# Patient Record
Sex: FEMALE | URBAN - METROPOLITAN AREA
[De-identification: names, ages, dates, MRNs, and addresses within clinical notes are randomized per-mention and may not be internally consistent; named-entity substitution may affect disease eponyms.]

---

## 2018-01-01 ENCOUNTER — INPATIENT (INPATIENT)
Facility: HOSPITAL | Age: 73
LOS: 29 days | End: 2019-01-10
Attending: INTERNAL MEDICINE | Admitting: INTERNAL MEDICINE
Payer: MEDICARE

## 2018-01-01 VITALS
RESPIRATION RATE: 16 BRPM | TEMPERATURE: 97 F | DIASTOLIC BLOOD PRESSURE: 60 MMHG | HEART RATE: 103 BPM | OXYGEN SATURATION: 69 % | SYSTOLIC BLOOD PRESSURE: 129 MMHG

## 2018-01-01 DIAGNOSIS — Z66 DO NOT RESUSCITATE: ICD-10-CM

## 2018-01-01 DIAGNOSIS — E78.5 HYPERLIPIDEMIA, UNSPECIFIED: ICD-10-CM

## 2018-01-01 DIAGNOSIS — B96.4 PROTEUS (MIRABILIS) (MORGANII) AS THE CAUSE OF DISEASES CLASSIFIED ELSEWHERE: ICD-10-CM

## 2018-01-01 DIAGNOSIS — J96.01 ACUTE RESPIRATORY FAILURE WITH HYPOXIA: ICD-10-CM

## 2018-01-01 DIAGNOSIS — D63.8 ANEMIA IN OTHER CHRONIC DISEASES CLASSIFIED ELSEWHERE: ICD-10-CM

## 2018-01-01 DIAGNOSIS — N17.9 ACUTE KIDNEY FAILURE, UNSPECIFIED: ICD-10-CM

## 2018-01-01 DIAGNOSIS — I10 ESSENTIAL (PRIMARY) HYPERTENSION: ICD-10-CM

## 2018-01-01 DIAGNOSIS — B02.9 ZOSTER WITHOUT COMPLICATIONS: ICD-10-CM

## 2018-01-01 DIAGNOSIS — R06.02 SHORTNESS OF BREATH: ICD-10-CM

## 2018-01-01 DIAGNOSIS — I12.9 HYPERTENSIVE CHRONIC KIDNEY DISEASE WITH STAGE 1 THROUGH STAGE 4 CHRONIC KIDNEY DISEASE, OR UNSPECIFIED CHRONIC KIDNEY DISEASE: ICD-10-CM

## 2018-01-01 DIAGNOSIS — J96.22 ACUTE AND CHRONIC RESPIRATORY FAILURE WITH HYPERCAPNIA: ICD-10-CM

## 2018-01-01 DIAGNOSIS — E66.2 MORBID (SEVERE) OBESITY WITH ALVEOLAR HYPOVENTILATION: ICD-10-CM

## 2018-01-01 DIAGNOSIS — I48.91 UNSPECIFIED ATRIAL FIBRILLATION: ICD-10-CM

## 2018-01-01 DIAGNOSIS — E83.51 HYPOCALCEMIA: ICD-10-CM

## 2018-01-01 DIAGNOSIS — E87.6 HYPOKALEMIA: ICD-10-CM

## 2018-01-01 DIAGNOSIS — E87.0 HYPEROSMOLALITY AND HYPERNATREMIA: ICD-10-CM

## 2018-01-01 DIAGNOSIS — J84.09 OTHER ALVEOLAR AND PARIETO-ALVEOLAR CONDITIONS: ICD-10-CM

## 2018-01-01 DIAGNOSIS — I49.5 SICK SINUS SYNDROME: ICD-10-CM

## 2018-01-01 DIAGNOSIS — N18.3 CHRONIC KIDNEY DISEASE, STAGE 3 (MODERATE): ICD-10-CM

## 2018-01-01 DIAGNOSIS — J69.0 PNEUMONITIS DUE TO INHALATION OF FOOD AND VOMIT: ICD-10-CM

## 2018-01-01 DIAGNOSIS — N39.0 URINARY TRACT INFECTION, SITE NOT SPECIFIED: ICD-10-CM
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
## 2018-01-01 DIAGNOSIS — I48.0 PAROXYSMAL ATRIAL FIBRILLATION: ICD-10-CM

## 2018-01-01 DIAGNOSIS — K59.00 CONSTIPATION, UNSPECIFIED: ICD-10-CM

## 2018-01-01 DIAGNOSIS — Z51.5 ENCOUNTER FOR PALLIATIVE CARE: ICD-10-CM

## 2018-01-01 DIAGNOSIS — T45.515A ADVERSE EFFECT OF ANTICOAGULANTS, INITIAL ENCOUNTER: ICD-10-CM

## 2018-01-01 DIAGNOSIS — E83.39 OTHER DISORDERS OF PHOSPHORUS METABOLISM: ICD-10-CM

## 2018-01-01 LAB
-  COAGULASE NEGATIVE STAPHYLOCOCCUS: SIGNIFICANT CHANGE UP
ALBUMIN SERPL ELPH-MCNC: 2.8 G/DL — LOW (ref 3.5–5.2)
ALBUMIN SERPL ELPH-MCNC: 2.9 G/DL — LOW (ref 3.5–5.2)
ALBUMIN SERPL ELPH-MCNC: 3 G/DL — LOW (ref 3.5–5.2)
ALBUMIN SERPL ELPH-MCNC: 3.1 G/DL — LOW (ref 3.5–5.2)
ALBUMIN SERPL ELPH-MCNC: 3.2 G/DL — LOW (ref 3.5–5.2)
ALBUMIN SERPL ELPH-MCNC: 3.3 G/DL — LOW (ref 3.5–5.2)
ALBUMIN SERPL ELPH-MCNC: 3.4 G/DL — LOW (ref 3.5–5.2)
ALBUMIN SERPL ELPH-MCNC: 3.6 G/DL — SIGNIFICANT CHANGE UP (ref 3.5–5.2)
ALBUMIN SERPL ELPH-MCNC: 4.1 G/DL — SIGNIFICANT CHANGE UP (ref 3.5–5.2)
ALDOLASE SERPL-CCNC: 12 U/L — HIGH (ref 3.3–10.3)
ALP SERPL-CCNC: 107 U/L — SIGNIFICANT CHANGE UP (ref 30–115)
ALP SERPL-CCNC: 47 U/L — SIGNIFICANT CHANGE UP (ref 30–115)
ALP SERPL-CCNC: 49 U/L — SIGNIFICANT CHANGE UP (ref 30–115)
ALP SERPL-CCNC: 50 U/L — SIGNIFICANT CHANGE UP (ref 30–115)
ALP SERPL-CCNC: 52 U/L — SIGNIFICANT CHANGE UP (ref 30–115)
ALP SERPL-CCNC: 54 U/L — SIGNIFICANT CHANGE UP (ref 30–115)
ALP SERPL-CCNC: 56 U/L — SIGNIFICANT CHANGE UP (ref 30–115)
ALP SERPL-CCNC: 56 U/L — SIGNIFICANT CHANGE UP (ref 30–115)
ALP SERPL-CCNC: 57 U/L — SIGNIFICANT CHANGE UP (ref 30–115)
ALP SERPL-CCNC: 62 U/L — SIGNIFICANT CHANGE UP (ref 30–115)
ALP SERPL-CCNC: 62 U/L — SIGNIFICANT CHANGE UP (ref 30–115)
ALP SERPL-CCNC: 63 U/L — SIGNIFICANT CHANGE UP (ref 30–115)
ALP SERPL-CCNC: 63 U/L — SIGNIFICANT CHANGE UP (ref 30–115)
ALP SERPL-CCNC: 64 U/L — SIGNIFICANT CHANGE UP (ref 30–115)
ALP SERPL-CCNC: 67 U/L — SIGNIFICANT CHANGE UP (ref 30–115)
ALP SERPL-CCNC: 89 U/L — SIGNIFICANT CHANGE UP (ref 30–115)
ALT FLD-CCNC: 28 U/L — SIGNIFICANT CHANGE UP (ref 0–41)
ALT FLD-CCNC: 32 U/L — SIGNIFICANT CHANGE UP (ref 0–41)
ALT FLD-CCNC: 33 U/L — SIGNIFICANT CHANGE UP (ref 0–41)
ALT FLD-CCNC: 35 U/L — SIGNIFICANT CHANGE UP (ref 0–41)
ALT FLD-CCNC: 37 U/L — SIGNIFICANT CHANGE UP (ref 0–41)
ALT FLD-CCNC: 37 U/L — SIGNIFICANT CHANGE UP (ref 0–41)
ALT FLD-CCNC: 39 U/L — SIGNIFICANT CHANGE UP (ref 0–41)
ALT FLD-CCNC: 39 U/L — SIGNIFICANT CHANGE UP (ref 0–41)
ALT FLD-CCNC: 40 U/L — SIGNIFICANT CHANGE UP (ref 0–41)
ALT FLD-CCNC: 40 U/L — SIGNIFICANT CHANGE UP (ref 0–41)
ALT FLD-CCNC: 41 U/L — SIGNIFICANT CHANGE UP (ref 0–41)
ALT FLD-CCNC: 41 U/L — SIGNIFICANT CHANGE UP (ref 0–41)
ALT FLD-CCNC: 44 U/L — HIGH (ref 0–41)
ALT FLD-CCNC: 51 U/L — HIGH (ref 0–41)
ANA TITR SER: NEGATIVE — SIGNIFICANT CHANGE UP
ANION GAP SERPL CALC-SCNC: 10 MMOL/L — SIGNIFICANT CHANGE UP (ref 7–14)
ANION GAP SERPL CALC-SCNC: 10 MMOL/L — SIGNIFICANT CHANGE UP (ref 7–14)
ANION GAP SERPL CALC-SCNC: 11 MMOL/L — SIGNIFICANT CHANGE UP (ref 7–14)
ANION GAP SERPL CALC-SCNC: 14 MMOL/L — SIGNIFICANT CHANGE UP (ref 7–14)
ANION GAP SERPL CALC-SCNC: 15 MMOL/L — HIGH (ref 7–14)
ANION GAP SERPL CALC-SCNC: 17 MMOL/L — HIGH (ref 7–14)
ANION GAP SERPL CALC-SCNC: 6 MMOL/L — LOW (ref 7–14)
ANION GAP SERPL CALC-SCNC: 6 MMOL/L — LOW (ref 7–14)
ANION GAP SERPL CALC-SCNC: 7 MMOL/L — SIGNIFICANT CHANGE UP (ref 7–14)
ANION GAP SERPL CALC-SCNC: 8 MMOL/L — SIGNIFICANT CHANGE UP (ref 7–14)
ANION GAP SERPL CALC-SCNC: 9 MMOL/L — SIGNIFICANT CHANGE UP (ref 7–14)
APPEARANCE UR: ABNORMAL
APPEARANCE UR: ABNORMAL
APPEARANCE UR: CLEAR — SIGNIFICANT CHANGE UP
APTT BLD: 23.1 SEC — CRITICAL LOW (ref 27–39.2)
APTT BLD: 28.3 SEC — SIGNIFICANT CHANGE UP (ref 27–39.2)
APTT BLD: 29 SEC — SIGNIFICANT CHANGE UP (ref 27–39.2)
APTT BLD: 30.5 SEC — SIGNIFICANT CHANGE UP (ref 27–39.2)
APTT BLD: 35 SEC — SIGNIFICANT CHANGE UP (ref 27–39.2)
APTT BLD: 52.5 SEC — HIGH (ref 27–39.2)
AST SERPL-CCNC: 12 U/L — SIGNIFICANT CHANGE UP (ref 0–41)
AST SERPL-CCNC: 13 U/L — SIGNIFICANT CHANGE UP (ref 0–41)
AST SERPL-CCNC: 15 U/L — SIGNIFICANT CHANGE UP (ref 0–41)
AST SERPL-CCNC: 17 U/L — SIGNIFICANT CHANGE UP (ref 0–41)
AST SERPL-CCNC: 17 U/L — SIGNIFICANT CHANGE UP (ref 0–41)
AST SERPL-CCNC: 24 U/L — SIGNIFICANT CHANGE UP (ref 0–41)
AST SERPL-CCNC: 24 U/L — SIGNIFICANT CHANGE UP (ref 0–41)
AST SERPL-CCNC: 26 U/L — SIGNIFICANT CHANGE UP (ref 0–41)
AST SERPL-CCNC: 28 U/L — SIGNIFICANT CHANGE UP (ref 0–41)
AST SERPL-CCNC: 29 U/L — SIGNIFICANT CHANGE UP (ref 0–41)
AST SERPL-CCNC: 30 U/L — SIGNIFICANT CHANGE UP (ref 0–41)
AST SERPL-CCNC: 32 U/L — SIGNIFICANT CHANGE UP (ref 0–41)
AST SERPL-CCNC: 34 U/L — SIGNIFICANT CHANGE UP (ref 0–41)
AST SERPL-CCNC: 34 U/L — SIGNIFICANT CHANGE UP (ref 0–41)
AST SERPL-CCNC: 45 U/L — HIGH (ref 0–41)
AST SERPL-CCNC: 46 U/L — HIGH (ref 0–41)
AUTO DIFF PNL BLD: NEGATIVE — SIGNIFICANT CHANGE UP
B PERT IGG+IGM PNL SER: ABNORMAL
BACTERIA # UR AUTO: ABNORMAL /HPF
BASE EXCESS BLDA CALC-SCNC: 11 MMOL/L — HIGH (ref -2–2)
BASE EXCESS BLDA CALC-SCNC: 12.3 MMOL/L — HIGH (ref -2–2)
BASE EXCESS BLDA CALC-SCNC: 13.1 MMOL/L — HIGH (ref -2–2)
BASE EXCESS BLDA CALC-SCNC: 13.3 MMOL/L — HIGH (ref -2–2)
BASE EXCESS BLDA CALC-SCNC: 13.4 MMOL/L — HIGH (ref -2–2)
BASE EXCESS BLDA CALC-SCNC: 14.3 MMOL/L — HIGH (ref -2–2)
BASE EXCESS BLDA CALC-SCNC: 14.3 MMOL/L — HIGH (ref -2–2)
BASE EXCESS BLDA CALC-SCNC: 18.1 MMOL/L — HIGH (ref -2–2)
BASE EXCESS BLDA CALC-SCNC: 8 MMOL/L — HIGH (ref -2–2)
BASE EXCESS BLDA CALC-SCNC: 9 MMOL/L — HIGH (ref -2–2)
BASE EXCESS BLDA CALC-SCNC: 9.4 MMOL/L — HIGH (ref -2–2)
BASE EXCESS BLDV CALC-SCNC: 2.3 MMOL/L — HIGH (ref -2–2)
BASOPHILS # BLD AUTO: 0 K/UL — SIGNIFICANT CHANGE UP (ref 0–0.2)
BASOPHILS # BLD AUTO: 0.01 K/UL — SIGNIFICANT CHANGE UP (ref 0–0.2)
BASOPHILS # BLD AUTO: 0.02 K/UL — SIGNIFICANT CHANGE UP (ref 0–0.2)
BASOPHILS # BLD AUTO: 0.03 K/UL — SIGNIFICANT CHANGE UP (ref 0–0.2)
BASOPHILS NFR BLD AUTO: 0 % — SIGNIFICANT CHANGE UP (ref 0–1)
BASOPHILS NFR BLD AUTO: 0.1 % — SIGNIFICANT CHANGE UP (ref 0–1)
BASOPHILS NFR BLD AUTO: 0.2 % — SIGNIFICANT CHANGE UP (ref 0–1)
BASOPHILS NFR BLD AUTO: 0.3 % — SIGNIFICANT CHANGE UP (ref 0–1)
BASOPHILS NFR BLD AUTO: 0.3 % — SIGNIFICANT CHANGE UP (ref 0–1)
BASOPHILS NFR BLD AUTO: 0.4 % — SIGNIFICANT CHANGE UP (ref 0–1)
BILIRUB DIRECT SERPL-MCNC: <0.2 MG/DL — SIGNIFICANT CHANGE UP (ref 0–0.2)
BILIRUB INDIRECT FLD-MCNC: >0.2 MG/DL — SIGNIFICANT CHANGE UP (ref 0.2–1.2)
BILIRUB SERPL-MCNC: 0.4 MG/DL — SIGNIFICANT CHANGE UP (ref 0.2–1.2)
BILIRUB SERPL-MCNC: 0.6 MG/DL — SIGNIFICANT CHANGE UP (ref 0.2–1.2)
BILIRUB SERPL-MCNC: 0.7 MG/DL — SIGNIFICANT CHANGE UP (ref 0.2–1.2)
BILIRUB SERPL-MCNC: 0.8 MG/DL — SIGNIFICANT CHANGE UP (ref 0.2–1.2)
BILIRUB SERPL-MCNC: 0.9 MG/DL — SIGNIFICANT CHANGE UP (ref 0.2–1.2)
BILIRUB SERPL-MCNC: 1 MG/DL — SIGNIFICANT CHANGE UP (ref 0.2–1.2)
BILIRUB UR-MCNC: NEGATIVE — SIGNIFICANT CHANGE UP
BLD GP AB SCN SERPL QL: SIGNIFICANT CHANGE UP
BUN SERPL-MCNC: 36 MG/DL — HIGH (ref 10–20)
BUN SERPL-MCNC: 37 MG/DL — HIGH (ref 10–20)
BUN SERPL-MCNC: 39 MG/DL — HIGH (ref 10–20)
BUN SERPL-MCNC: 39 MG/DL — HIGH (ref 10–20)
BUN SERPL-MCNC: 41 MG/DL — HIGH (ref 10–20)
BUN SERPL-MCNC: 42 MG/DL — HIGH (ref 10–20)
BUN SERPL-MCNC: 43 MG/DL — HIGH (ref 10–20)
BUN SERPL-MCNC: 43 MG/DL — HIGH (ref 10–20)
BUN SERPL-MCNC: 44 MG/DL — HIGH (ref 10–20)
BUN SERPL-MCNC: 47 MG/DL — HIGH (ref 10–20)
BUN SERPL-MCNC: 48 MG/DL — HIGH (ref 10–20)
BUN SERPL-MCNC: 51 MG/DL — HIGH (ref 10–20)
BUN SERPL-MCNC: 53 MG/DL — HIGH (ref 10–20)
BUN SERPL-MCNC: 53 MG/DL — HIGH (ref 10–20)
BUN SERPL-MCNC: 55 MG/DL — HIGH (ref 10–20)
BUN SERPL-MCNC: 55 MG/DL — HIGH (ref 10–20)
BUN SERPL-MCNC: 58 MG/DL — HIGH (ref 10–20)
BUN SERPL-MCNC: 59 MG/DL — HIGH (ref 10–20)
BUN SERPL-MCNC: 59 MG/DL — HIGH (ref 10–20)
BUN SERPL-MCNC: 60 MG/DL — HIGH (ref 10–20)
BUN SERPL-MCNC: 64 MG/DL — CRITICAL HIGH (ref 10–20)
BUN SERPL-MCNC: 66 MG/DL — CRITICAL HIGH (ref 10–20)
C-ANCA SER-ACNC: NEGATIVE — SIGNIFICANT CHANGE UP
C3 SERPL-MCNC: 114 MG/DL — SIGNIFICANT CHANGE UP (ref 81–157)
C4 SERPL-MCNC: 32 MG/DL — SIGNIFICANT CHANGE UP (ref 13–39)
CA-I SERPL-SCNC: 1.23 MMOL/L — SIGNIFICANT CHANGE UP (ref 1.12–1.3)
CALCIUM SERPL-MCNC: 8.2 MG/DL — LOW (ref 8.5–10.1)
CALCIUM SERPL-MCNC: 8.2 MG/DL — LOW (ref 8.5–10.1)
CALCIUM SERPL-MCNC: 8.3 MG/DL — LOW (ref 8.5–10.1)
CALCIUM SERPL-MCNC: 8.4 MG/DL — LOW (ref 8.5–10.1)
CALCIUM SERPL-MCNC: 8.4 MG/DL — LOW (ref 8.5–10.1)
CALCIUM SERPL-MCNC: 8.5 MG/DL — SIGNIFICANT CHANGE UP (ref 8.5–10.1)
CALCIUM SERPL-MCNC: 8.5 MG/DL — SIGNIFICANT CHANGE UP (ref 8.5–10.1)
CALCIUM SERPL-MCNC: 8.7 MG/DL — SIGNIFICANT CHANGE UP (ref 8.5–10.1)
CALCIUM SERPL-MCNC: 8.8 MG/DL — SIGNIFICANT CHANGE UP (ref 8.5–10.1)
CALCIUM SERPL-MCNC: 8.8 MG/DL — SIGNIFICANT CHANGE UP (ref 8.5–10.1)
CALCIUM SERPL-MCNC: 8.9 MG/DL — SIGNIFICANT CHANGE UP (ref 8.5–10.1)
CALCIUM SERPL-MCNC: 9 MG/DL — SIGNIFICANT CHANGE UP (ref 8.5–10.1)
CALCIUM SERPL-MCNC: 9.1 MG/DL — SIGNIFICANT CHANGE UP (ref 8.5–10.1)
CALCIUM SERPL-MCNC: 9.1 MG/DL — SIGNIFICANT CHANGE UP (ref 8.5–10.1)
CALCIUM SERPL-MCNC: 9.3 MG/DL — SIGNIFICANT CHANGE UP (ref 8.5–10.1)
CHLORIDE SERPL-SCNC: 100 MMOL/L — SIGNIFICANT CHANGE UP (ref 98–110)
CHLORIDE SERPL-SCNC: 101 MMOL/L — SIGNIFICANT CHANGE UP (ref 98–110)
CHLORIDE SERPL-SCNC: 101 MMOL/L — SIGNIFICANT CHANGE UP (ref 98–110)
CHLORIDE SERPL-SCNC: 102 MMOL/L — SIGNIFICANT CHANGE UP (ref 98–110)
CHLORIDE SERPL-SCNC: 104 MMOL/L — SIGNIFICANT CHANGE UP (ref 98–110)
CHLORIDE SERPL-SCNC: 90 MMOL/L — LOW (ref 98–110)
CHLORIDE SERPL-SCNC: 90 MMOL/L — LOW (ref 98–110)
CHLORIDE SERPL-SCNC: 92 MMOL/L — LOW (ref 98–110)
CHLORIDE SERPL-SCNC: 94 MMOL/L — LOW (ref 98–110)
CHLORIDE SERPL-SCNC: 94 MMOL/L — LOW (ref 98–110)
CHLORIDE SERPL-SCNC: 95 MMOL/L — LOW (ref 98–110)
CHLORIDE SERPL-SCNC: 95 MMOL/L — LOW (ref 98–110)
CHLORIDE SERPL-SCNC: 96 MMOL/L — LOW (ref 98–110)
CHLORIDE SERPL-SCNC: 96 MMOL/L — LOW (ref 98–110)
CHLORIDE SERPL-SCNC: 97 MMOL/L — LOW (ref 98–110)
CHLORIDE SERPL-SCNC: 98 MMOL/L — SIGNIFICANT CHANGE UP (ref 98–110)
CHLORIDE SERPL-SCNC: 99 MMOL/L — SIGNIFICANT CHANGE UP (ref 98–110)
CHLORIDE SERPL-SCNC: 99 MMOL/L — SIGNIFICANT CHANGE UP (ref 98–110)
CHOLEST SERPL-MCNC: 168 MG/DL — SIGNIFICANT CHANGE UP (ref 100–200)
CK MB CFR SERPL CALC: 12.1 NG/ML — HIGH (ref 0.6–6.3)
CK MB CFR SERPL CALC: 3.7 NG/ML — SIGNIFICANT CHANGE UP (ref 0.6–6.3)
CK MB CFR SERPL CALC: 8 NG/ML — HIGH (ref 0.6–6.3)
CK SERPL-CCNC: 102 U/L — SIGNIFICANT CHANGE UP (ref 0–225)
CK SERPL-CCNC: 11 U/L — SIGNIFICANT CHANGE UP (ref 0–225)
CK SERPL-CCNC: 120 U/L — SIGNIFICANT CHANGE UP (ref 0–225)
CK SERPL-CCNC: 180 U/L — SIGNIFICANT CHANGE UP (ref 0–225)
CO2 SERPL-SCNC: 28 MMOL/L — SIGNIFICANT CHANGE UP (ref 17–32)
CO2 SERPL-SCNC: 33 MMOL/L — HIGH (ref 17–32)
CO2 SERPL-SCNC: 33 MMOL/L — HIGH (ref 17–32)
CO2 SERPL-SCNC: 34 MMOL/L — HIGH (ref 17–32)
CO2 SERPL-SCNC: 35 MMOL/L — HIGH (ref 17–32)
CO2 SERPL-SCNC: 35 MMOL/L — HIGH (ref 17–32)
CO2 SERPL-SCNC: 36 MMOL/L — HIGH (ref 17–32)
CO2 SERPL-SCNC: 36 MMOL/L — HIGH (ref 17–32)
CO2 SERPL-SCNC: 37 MMOL/L — HIGH (ref 17–32)
CO2 SERPL-SCNC: 38 MMOL/L — HIGH (ref 17–32)
CO2 SERPL-SCNC: 38 MMOL/L — HIGH (ref 17–32)
CO2 SERPL-SCNC: 39 MMOL/L — HIGH (ref 17–32)
CO2 SERPL-SCNC: 40 MMOL/L — HIGH (ref 17–32)
CO2 SERPL-SCNC: 40 MMOL/L — HIGH (ref 17–32)
CO2 SERPL-SCNC: 41 MMOL/L — CRITICAL HIGH (ref 17–32)
COLOR FLD: SIGNIFICANT CHANGE UP
COLOR SPEC: YELLOW — SIGNIFICANT CHANGE UP
COMMENT - URINE: SIGNIFICANT CHANGE UP
CREAT ?TM UR-MCNC: 94 MG/DL — SIGNIFICANT CHANGE UP
CREAT SERPL-MCNC: 0.6 MG/DL — LOW (ref 0.7–1.5)
CREAT SERPL-MCNC: 0.7 MG/DL — SIGNIFICANT CHANGE UP (ref 0.7–1.5)
CREAT SERPL-MCNC: 0.7 MG/DL — SIGNIFICANT CHANGE UP (ref 0.7–1.5)
CREAT SERPL-MCNC: 0.8 MG/DL — SIGNIFICANT CHANGE UP (ref 0.7–1.5)
CREAT SERPL-MCNC: 0.8 MG/DL — SIGNIFICANT CHANGE UP (ref 0.7–1.5)
CREAT SERPL-MCNC: 0.9 MG/DL — SIGNIFICANT CHANGE UP (ref 0.7–1.5)
CREAT SERPL-MCNC: 1 MG/DL — SIGNIFICANT CHANGE UP (ref 0.7–1.5)
CREAT SERPL-MCNC: 1.1 MG/DL — SIGNIFICANT CHANGE UP (ref 0.7–1.5)
CREAT SERPL-MCNC: 1.3 MG/DL — SIGNIFICANT CHANGE UP (ref 0.7–1.5)
CREAT SERPL-MCNC: 1.5 MG/DL — SIGNIFICANT CHANGE UP (ref 0.7–1.5)
CREAT SERPL-MCNC: 1.7 MG/DL — HIGH (ref 0.7–1.5)
CRP SERPL-MCNC: 1.72 MG/DL — HIGH (ref 0–0.4)
CRP SERPL-MCNC: 2.35 MG/DL — HIGH (ref 0–0.4)
CRYOGLOB SERPL-MCNC: NEGATIVE — SIGNIFICANT CHANGE UP
CULTURE RESULTS: NO GROWTH — SIGNIFICANT CHANGE UP
CULTURE RESULTS: NO GROWTH — SIGNIFICANT CHANGE UP
CULTURE RESULTS: SIGNIFICANT CHANGE UP
D DIMER BLD IA.RAPID-MCNC: 308 NG/ML DDU — HIGH (ref 0–230)
DIFF PNL FLD: ABNORMAL
DIFF PNL FLD: ABNORMAL
DIFF PNL FLD: NEGATIVE — SIGNIFICANT CHANGE UP
DSDNA AB SER-ACNC: <12 IU/ML — SIGNIFICANT CHANGE UP
EOSINOPHIL # BLD AUTO: 0 K/UL — SIGNIFICANT CHANGE UP (ref 0–0.7)
EOSINOPHIL # BLD AUTO: 0.01 K/UL — SIGNIFICANT CHANGE UP (ref 0–0.7)
EOSINOPHIL # BLD AUTO: 0.02 K/UL — SIGNIFICANT CHANGE UP (ref 0–0.7)
EOSINOPHIL # BLD AUTO: 0.03 K/UL — SIGNIFICANT CHANGE UP (ref 0–0.7)
EOSINOPHIL # BLD AUTO: 0.08 K/UL — SIGNIFICANT CHANGE UP (ref 0–0.7)
EOSINOPHIL # BLD AUTO: 0.19 K/UL — SIGNIFICANT CHANGE UP (ref 0–0.7)
EOSINOPHIL NFR BLD AUTO: 0 % — SIGNIFICANT CHANGE UP (ref 0–8)
EOSINOPHIL NFR BLD AUTO: 0.1 % — SIGNIFICANT CHANGE UP (ref 0–8)
EOSINOPHIL NFR BLD AUTO: 0.2 % — SIGNIFICANT CHANGE UP (ref 0–8)
EOSINOPHIL NFR BLD AUTO: 0.3 % — SIGNIFICANT CHANGE UP (ref 0–8)
EOSINOPHIL NFR BLD AUTO: 0.6 % — SIGNIFICANT CHANGE UP (ref 0–8)
EOSINOPHIL NFR BLD AUTO: 0.7 % — SIGNIFICANT CHANGE UP (ref 0–8)
EOSINOPHIL NFR BLD AUTO: 0.7 % — SIGNIFICANT CHANGE UP (ref 0–8)
EOSINOPHIL NFR BLD AUTO: 1.5 % — SIGNIFICANT CHANGE UP (ref 0–8)
EPI CELLS # UR: ABNORMAL /HPF
ERYTHROCYTE [SEDIMENTATION RATE] IN BLOOD: 86 MM/HR — HIGH (ref 0–20)
ERYTHROCYTE [SEDIMENTATION RATE] IN BLOOD: >140 MM/HR — HIGH (ref 0–20)
FIBRINOGEN PPP-MCNC: 447 MG/DL — SIGNIFICANT CHANGE UP (ref 204.4–570.6)
FLUID INTAKE SUBSTANCE CLASS: SIGNIFICANT CHANGE UP
FLUID SEGMENTED GRANULOCYTES: 20 % — SIGNIFICANT CHANGE UP
GAS PNL BLDA: SIGNIFICANT CHANGE UP
GAS PNL BLDA: SIGNIFICANT CHANGE UP
GAS PNL BLDV: 135 MMOL/L — LOW (ref 136–145)
GAS PNL BLDV: SIGNIFICANT CHANGE UP
GBM IGG SER-ACNC: <1 AI — SIGNIFICANT CHANGE UP
GIANT PLATELETS BLD QL SMEAR: PRESENT — SIGNIFICANT CHANGE UP
GLUCOSE BLDC GLUCOMTR-MCNC: 103 MG/DL — HIGH (ref 70–99)
GLUCOSE BLDC GLUCOMTR-MCNC: 122 MG/DL — HIGH (ref 70–99)
GLUCOSE BLDC GLUCOMTR-MCNC: 130 MG/DL — HIGH (ref 70–99)
GLUCOSE BLDC GLUCOMTR-MCNC: 180 MG/DL — HIGH (ref 70–99)
GLUCOSE SERPL-MCNC: 104 MG/DL — HIGH (ref 70–99)
GLUCOSE SERPL-MCNC: 106 MG/DL — HIGH (ref 70–99)
GLUCOSE SERPL-MCNC: 114 MG/DL — HIGH (ref 70–99)
GLUCOSE SERPL-MCNC: 116 MG/DL — HIGH (ref 70–99)
GLUCOSE SERPL-MCNC: 117 MG/DL — HIGH (ref 70–99)
GLUCOSE SERPL-MCNC: 122 MG/DL — HIGH (ref 70–99)
GLUCOSE SERPL-MCNC: 123 MG/DL — HIGH (ref 70–99)
GLUCOSE SERPL-MCNC: 125 MG/DL — HIGH (ref 70–99)
GLUCOSE SERPL-MCNC: 126 MG/DL — HIGH (ref 70–99)
GLUCOSE SERPL-MCNC: 138 MG/DL — HIGH (ref 70–99)
GLUCOSE SERPL-MCNC: 139 MG/DL — HIGH (ref 70–99)
GLUCOSE SERPL-MCNC: 143 MG/DL — HIGH (ref 70–99)
GLUCOSE SERPL-MCNC: 164 MG/DL — HIGH (ref 70–99)
GLUCOSE SERPL-MCNC: 170 MG/DL — HIGH (ref 70–99)
GLUCOSE SERPL-MCNC: 172 MG/DL — HIGH (ref 70–99)
GLUCOSE SERPL-MCNC: 173 MG/DL — HIGH (ref 70–99)
GLUCOSE SERPL-MCNC: 174 MG/DL — HIGH (ref 70–99)
GLUCOSE SERPL-MCNC: 185 MG/DL — HIGH (ref 70–99)
GLUCOSE SERPL-MCNC: 196 MG/DL — HIGH (ref 70–99)
GLUCOSE SERPL-MCNC: 204 MG/DL — HIGH (ref 70–99)
GLUCOSE SERPL-MCNC: 207 MG/DL — HIGH (ref 70–99)
GLUCOSE SERPL-MCNC: 60 MG/DL — LOW (ref 70–99)
GLUCOSE SERPL-MCNC: 96 MG/DL — SIGNIFICANT CHANGE UP (ref 70–99)
GLUCOSE SERPL-MCNC: 96 MG/DL — SIGNIFICANT CHANGE UP (ref 70–99)
GLUCOSE UR QL: NEGATIVE MG/DL — SIGNIFICANT CHANGE UP
GRAM STN FLD: SIGNIFICANT CHANGE UP
HAV IGM SER-ACNC: SIGNIFICANT CHANGE UP
HBV CORE IGM SER-ACNC: SIGNIFICANT CHANGE UP
HBV SURFACE AG SER-ACNC: SIGNIFICANT CHANGE UP
HCO3 BLDA-SCNC: 36 MMOL/L — HIGH (ref 23–27)
HCO3 BLDA-SCNC: 37 MMOL/L — HIGH (ref 23–27)
HCO3 BLDA-SCNC: 37 MMOL/L — HIGH (ref 23–27)
HCO3 BLDA-SCNC: 38 MMOL/L — HIGH (ref 23–27)
HCO3 BLDA-SCNC: 40 MMOL/L — HIGH (ref 21–29)
HCO3 BLDA-SCNC: 40 MMOL/L — HIGH (ref 23–27)
HCO3 BLDA-SCNC: 48 MMOL/L — CRITICAL HIGH (ref 23–27)
HCO3 BLDV-SCNC: 30 MMOL/L — HIGH (ref 22–29)
HCT VFR BLD CALC: 19.5 % — LOW (ref 37–47)
HCT VFR BLD CALC: 21.8 % — LOW (ref 37–47)
HCT VFR BLD CALC: 22.4 % — LOW (ref 37–47)
HCT VFR BLD CALC: 23.1 % — LOW (ref 37–47)
HCT VFR BLD CALC: 23.7 % — LOW (ref 37–47)
HCT VFR BLD CALC: 23.7 % — LOW (ref 37–47)
HCT VFR BLD CALC: 24.1 % — LOW (ref 37–47)
HCT VFR BLD CALC: 24.2 % — LOW (ref 37–47)
HCT VFR BLD CALC: 25 % — LOW (ref 37–47)
HCT VFR BLD CALC: 25.1 % — LOW (ref 37–47)
HCT VFR BLD CALC: 25.3 % — LOW (ref 37–47)
HCT VFR BLD CALC: 25.3 % — LOW (ref 37–47)
HCT VFR BLD CALC: 25.7 % — LOW (ref 37–47)
HCT VFR BLD CALC: 25.7 % — LOW (ref 37–47)
HCT VFR BLD CALC: 26.1 % — LOW (ref 37–47)
HCT VFR BLD CALC: 26.9 % — LOW (ref 37–47)
HCT VFR BLD CALC: 27.1 % — LOW (ref 37–47)
HCT VFR BLD CALC: 27.7 % — LOW (ref 37–47)
HCT VFR BLD CALC: 27.8 % — LOW (ref 37–47)
HCT VFR BLD CALC: 29.2 % — LOW (ref 37–47)
HCT VFR BLD CALC: 30.4 % — LOW (ref 37–47)
HCT VFR BLD CALC: 31.2 % — LOW (ref 37–47)
HCT VFR BLD CALC: 35.3 % — LOW (ref 37–47)
HCT VFR BLD CALC: 36.7 % — LOW (ref 37–47)
HCT VFR BLD CALC: 37.4 % — SIGNIFICANT CHANGE UP (ref 37–47)
HCT VFR BLD CALC: 38 % — SIGNIFICANT CHANGE UP (ref 37–47)
HCT VFR BLD CALC: 38.4 % — SIGNIFICANT CHANGE UP (ref 37–47)
HCT VFR BLD CALC: 38.5 % — SIGNIFICANT CHANGE UP (ref 37–47)
HCT VFR BLD CALC: 40.8 % — SIGNIFICANT CHANGE UP (ref 37–47)
HCT VFR BLDA CALC: 43 % — SIGNIFICANT CHANGE UP (ref 34–44)
HCV AB S/CO SERPL IA: 0.26 S/CO — SIGNIFICANT CHANGE UP
HCV AB SERPL-IMP: SIGNIFICANT CHANGE UP
HDLC SERPL-MCNC: 71 MG/DL — SIGNIFICANT CHANGE UP
HGB BLD CALC-MCNC: 14 G/DL — SIGNIFICANT CHANGE UP (ref 14–18)
HGB BLD-MCNC: 11 G/DL — LOW (ref 12–16)
HGB BLD-MCNC: 11.3 G/DL — LOW (ref 12–16)
HGB BLD-MCNC: 12.1 G/DL — SIGNIFICANT CHANGE UP (ref 12–16)
HGB BLD-MCNC: 12.1 G/DL — SIGNIFICANT CHANGE UP (ref 12–16)
HGB BLD-MCNC: 12.3 G/DL — SIGNIFICANT CHANGE UP (ref 12–16)
HGB BLD-MCNC: 12.6 G/DL — SIGNIFICANT CHANGE UP (ref 12–16)
HGB BLD-MCNC: 13.4 G/DL — SIGNIFICANT CHANGE UP (ref 12–16)
HGB BLD-MCNC: 6.3 G/DL — CRITICAL LOW (ref 12–16)
HGB BLD-MCNC: 6.7 G/DL — CRITICAL LOW (ref 12–16)
HGB BLD-MCNC: 7.3 G/DL — CRITICAL LOW (ref 12–16)
HGB BLD-MCNC: 7.4 G/DL — CRITICAL LOW (ref 12–16)
HGB BLD-MCNC: 7.4 G/DL — CRITICAL LOW (ref 12–16)
HGB BLD-MCNC: 7.5 G/DL — LOW (ref 12–16)
HGB BLD-MCNC: 7.7 G/DL — LOW (ref 12–16)
HGB BLD-MCNC: 7.8 G/DL — LOW (ref 12–16)
HGB BLD-MCNC: 7.9 G/DL — LOW (ref 12–16)
HGB BLD-MCNC: 8 G/DL — LOW (ref 12–16)
HGB BLD-MCNC: 8 G/DL — LOW (ref 12–16)
HGB BLD-MCNC: 8.1 G/DL — LOW (ref 12–16)
HGB BLD-MCNC: 8.1 G/DL — LOW (ref 12–16)
HGB BLD-MCNC: 8.2 G/DL — LOW (ref 12–16)
HGB BLD-MCNC: 8.2 G/DL — LOW (ref 12–16)
HGB BLD-MCNC: 8.4 G/DL — LOW (ref 12–16)
HGB BLD-MCNC: 8.5 G/DL — LOW (ref 12–16)
HGB BLD-MCNC: 8.6 G/DL — LOW (ref 12–16)
HGB BLD-MCNC: 8.7 G/DL — LOW (ref 12–16)
HGB BLD-MCNC: 9.1 G/DL — LOW (ref 12–16)
HGB BLD-MCNC: 9.5 G/DL — LOW (ref 12–16)
HGB BLD-MCNC: 9.8 G/DL — LOW (ref 12–16)
HOROWITZ INDEX BLDA+IHG-RTO: 40 — SIGNIFICANT CHANGE UP
HOROWITZ INDEX BLDA+IHG-RTO: 55 — SIGNIFICANT CHANGE UP
IMM GRANULOCYTES NFR BLD AUTO: 0.4 % — HIGH (ref 0.1–0.3)
IMM GRANULOCYTES NFR BLD AUTO: 0.5 % — HIGH (ref 0.1–0.3)
IMM GRANULOCYTES NFR BLD AUTO: 0.5 % — HIGH (ref 0.1–0.3)
IMM GRANULOCYTES NFR BLD AUTO: 0.6 % — HIGH (ref 0.1–0.3)
IMM GRANULOCYTES NFR BLD AUTO: 0.7 % — HIGH (ref 0.1–0.3)
IMM GRANULOCYTES NFR BLD AUTO: 0.9 % — HIGH (ref 0.1–0.3)
IMM GRANULOCYTES NFR BLD AUTO: 0.9 % — HIGH (ref 0.1–0.3)
IMM GRANULOCYTES NFR BLD AUTO: 1 % — HIGH (ref 0.1–0.3)
IMM GRANULOCYTES NFR BLD AUTO: 1.3 % — HIGH (ref 0.1–0.3)
IMM GRANULOCYTES NFR BLD AUTO: 1.3 % — HIGH (ref 0.1–0.3)
IMM GRANULOCYTES NFR BLD AUTO: 1.4 % — HIGH (ref 0.1–0.3)
IMM GRANULOCYTES NFR BLD AUTO: 1.4 % — HIGH (ref 0.1–0.3)
IMM GRANULOCYTES NFR BLD AUTO: 1.8 % — HIGH (ref 0.1–0.3)
IMM GRANULOCYTES NFR BLD AUTO: 2 % — HIGH (ref 0.1–0.3)
IMM GRANULOCYTES NFR BLD AUTO: 2.2 % — HIGH (ref 0.1–0.3)
IMM GRANULOCYTES NFR BLD AUTO: 2.6 % — HIGH (ref 0.1–0.3)
IMM GRANULOCYTES NFR BLD AUTO: 2.9 % — HIGH (ref 0.1–0.3)
IMM GRANULOCYTES NFR BLD AUTO: 3.2 % — HIGH (ref 0.1–0.3)
IMM GRANULOCYTES NFR BLD AUTO: 3.6 % — HIGH (ref 0.1–0.3)
IMM GRANULOCYTES NFR BLD AUTO: 3.9 % — HIGH (ref 0.1–0.3)
IMM GRANULOCYTES NFR BLD AUTO: 4 % — HIGH (ref 0.1–0.3)
INR BLD: 1.05 RATIO — SIGNIFICANT CHANGE UP (ref 0.65–1.3)
INR BLD: 1.1 RATIO — SIGNIFICANT CHANGE UP (ref 0.65–1.3)
INR BLD: 1.18 RATIO — SIGNIFICANT CHANGE UP (ref 0.65–1.3)
INR BLD: 1.19 RATIO — SIGNIFICANT CHANGE UP (ref 0.65–1.3)
INR BLD: 1.37 RATIO — HIGH (ref 0.65–1.3)
INR BLD: 1.59 RATIO — HIGH (ref 0.65–1.3)
KETONES UR-MCNC: NEGATIVE — SIGNIFICANT CHANGE UP
LACTATE BLDV-MCNC: 3 MMOL/L — HIGH (ref 0.5–1.6)
LACTATE SERPL-SCNC: 0.8 MMOL/L — SIGNIFICANT CHANGE UP (ref 0.5–2.2)
LEGIONELLA AG UR QL: NEGATIVE — SIGNIFICANT CHANGE UP
LEPTOSPIRA AB TITR SER: NEGATIVE — SIGNIFICANT CHANGE UP
LEUKOCYTE ESTERASE UR-ACNC: ABNORMAL
LIPID PNL WITH DIRECT LDL SERPL: 75 MG/DL — SIGNIFICANT CHANGE UP (ref 4–129)
LYMPHOCYTES # BLD AUTO: 0.54 K/UL — LOW (ref 1.2–3.4)
LYMPHOCYTES # BLD AUTO: 0.64 K/UL — LOW (ref 1.2–3.4)
LYMPHOCYTES # BLD AUTO: 0.69 K/UL — LOW (ref 1.2–3.4)
LYMPHOCYTES # BLD AUTO: 0.73 K/UL — LOW (ref 1.2–3.4)
LYMPHOCYTES # BLD AUTO: 0.84 K/UL — LOW (ref 1.2–3.4)
LYMPHOCYTES # BLD AUTO: 0.85 K/UL — LOW (ref 1.2–3.4)
LYMPHOCYTES # BLD AUTO: 0.85 K/UL — LOW (ref 1.2–3.4)
LYMPHOCYTES # BLD AUTO: 1.05 K/UL — LOW (ref 1.2–3.4)
LYMPHOCYTES # BLD AUTO: 1.05 K/UL — LOW (ref 1.2–3.4)
LYMPHOCYTES # BLD AUTO: 1.08 K/UL — LOW (ref 1.2–3.4)
LYMPHOCYTES # BLD AUTO: 1.12 K/UL — LOW (ref 1.2–3.4)
LYMPHOCYTES # BLD AUTO: 1.14 K/UL — LOW (ref 1.2–3.4)
LYMPHOCYTES # BLD AUTO: 1.25 K/UL — SIGNIFICANT CHANGE UP (ref 1.2–3.4)
LYMPHOCYTES # BLD AUTO: 1.28 K/UL — SIGNIFICANT CHANGE UP (ref 1.2–3.4)
LYMPHOCYTES # BLD AUTO: 1.29 K/UL — SIGNIFICANT CHANGE UP (ref 1.2–3.4)
LYMPHOCYTES # BLD AUTO: 1.38 K/UL — SIGNIFICANT CHANGE UP (ref 1.2–3.4)
LYMPHOCYTES # BLD AUTO: 1.61 K/UL — SIGNIFICANT CHANGE UP (ref 1.2–3.4)
LYMPHOCYTES # BLD AUTO: 1.65 K/UL — SIGNIFICANT CHANGE UP (ref 1.2–3.4)
LYMPHOCYTES # BLD AUTO: 1.75 K/UL — SIGNIFICANT CHANGE UP (ref 1.2–3.4)
LYMPHOCYTES # BLD AUTO: 10.6 % — LOW (ref 20.5–51.1)
LYMPHOCYTES # BLD AUTO: 11 % — LOW (ref 20.5–51.1)
LYMPHOCYTES # BLD AUTO: 11.9 % — LOW (ref 20.5–51.1)
LYMPHOCYTES # BLD AUTO: 12 % — LOW (ref 20.5–51.1)
LYMPHOCYTES # BLD AUTO: 13.1 % — LOW (ref 20.5–51.1)
LYMPHOCYTES # BLD AUTO: 13.2 % — LOW (ref 20.5–51.1)
LYMPHOCYTES # BLD AUTO: 13.8 % — LOW (ref 20.5–51.1)
LYMPHOCYTES # BLD AUTO: 15.4 % — LOW (ref 20.5–51.1)
LYMPHOCYTES # BLD AUTO: 16.5 % — LOW (ref 20.5–51.1)
LYMPHOCYTES # BLD AUTO: 16.8 % — LOW (ref 20.5–51.1)
LYMPHOCYTES # BLD AUTO: 2.05 K/UL — SIGNIFICANT CHANGE UP (ref 1.2–3.4)
LYMPHOCYTES # BLD AUTO: 2.62 K/UL — SIGNIFICANT CHANGE UP (ref 1.2–3.4)
LYMPHOCYTES # BLD AUTO: 20.5 % — SIGNIFICANT CHANGE UP (ref 20.5–51.1)
LYMPHOCYTES # BLD AUTO: 21 % — SIGNIFICANT CHANGE UP (ref 20.5–51.1)
LYMPHOCYTES # BLD AUTO: 22.1 % — SIGNIFICANT CHANGE UP (ref 20.5–51.1)
LYMPHOCYTES # BLD AUTO: 3.01 K/UL — SIGNIFICANT CHANGE UP (ref 1.2–3.4)
LYMPHOCYTES # BLD AUTO: 5.8 % — LOW (ref 20.5–51.1)
LYMPHOCYTES # BLD AUTO: 6.1 % — LOW (ref 20.5–51.1)
LYMPHOCYTES # BLD AUTO: 7.6 % — LOW (ref 20.5–51.1)
LYMPHOCYTES # BLD AUTO: 8.8 % — LOW (ref 20.5–51.1)
LYMPHOCYTES # BLD AUTO: 8.9 % — LOW (ref 20.5–51.1)
LYMPHOCYTES # BLD AUTO: 9.1 % — LOW (ref 20.5–51.1)
LYMPHOCYTES # BLD AUTO: 9.5 % — LOW (ref 20.5–51.1)
LYMPHOCYTES # BLD AUTO: 9.6 % — LOW (ref 20.5–51.1)
LYMPHOCYTES # BLD AUTO: 9.9 % — LOW (ref 20.5–51.1)
LYMPHOCYTES # FLD: 10 — SIGNIFICANT CHANGE UP
MAGNESIUM SERPL-MCNC: 1.9 MG/DL — SIGNIFICANT CHANGE UP (ref 1.8–2.4)
MAGNESIUM SERPL-MCNC: 2 MG/DL — SIGNIFICANT CHANGE UP (ref 1.8–2.4)
MAGNESIUM SERPL-MCNC: 2.1 MG/DL — SIGNIFICANT CHANGE UP (ref 1.8–2.4)
MAGNESIUM SERPL-MCNC: 2.2 MG/DL — SIGNIFICANT CHANGE UP (ref 1.8–2.4)
MAGNESIUM SERPL-MCNC: 2.2 MG/DL — SIGNIFICANT CHANGE UP (ref 1.8–2.4)
MAGNESIUM SERPL-MCNC: 2.3 MG/DL — SIGNIFICANT CHANGE UP (ref 1.8–2.4)
MAGNESIUM SERPL-MCNC: 2.4 MG/DL — SIGNIFICANT CHANGE UP (ref 1.8–2.4)
MAGNESIUM SERPL-MCNC: 2.4 MG/DL — SIGNIFICANT CHANGE UP (ref 1.8–2.4)
MAGNESIUM SERPL-MCNC: 2.7 MG/DL — HIGH (ref 1.8–2.4)
MAGNESIUM SERPL-MCNC: 2.7 MG/DL — HIGH (ref 1.8–2.4)
MAGNESIUM SERPL-MCNC: 2.8 MG/DL — HIGH (ref 1.8–2.4)
MANUAL SMEAR VERIFICATION: SIGNIFICANT CHANGE UP
MCHC RBC-ENTMCNC: 28.1 PG — SIGNIFICANT CHANGE UP (ref 27–31)
MCHC RBC-ENTMCNC: 28.2 PG — SIGNIFICANT CHANGE UP (ref 27–31)
MCHC RBC-ENTMCNC: 28.4 PG — SIGNIFICANT CHANGE UP (ref 27–31)
MCHC RBC-ENTMCNC: 28.5 PG — SIGNIFICANT CHANGE UP (ref 27–31)
MCHC RBC-ENTMCNC: 28.5 PG — SIGNIFICANT CHANGE UP (ref 27–31)
MCHC RBC-ENTMCNC: 28.7 PG — SIGNIFICANT CHANGE UP (ref 27–31)
MCHC RBC-ENTMCNC: 28.8 PG — SIGNIFICANT CHANGE UP (ref 27–31)
MCHC RBC-ENTMCNC: 28.8 PG — SIGNIFICANT CHANGE UP (ref 27–31)
MCHC RBC-ENTMCNC: 28.9 PG — SIGNIFICANT CHANGE UP (ref 27–31)
MCHC RBC-ENTMCNC: 29 PG — SIGNIFICANT CHANGE UP (ref 27–31)
MCHC RBC-ENTMCNC: 29 PG — SIGNIFICANT CHANGE UP (ref 27–31)
MCHC RBC-ENTMCNC: 29.1 PG — SIGNIFICANT CHANGE UP (ref 27–31)
MCHC RBC-ENTMCNC: 29.1 PG — SIGNIFICANT CHANGE UP (ref 27–31)
MCHC RBC-ENTMCNC: 29.2 PG — SIGNIFICANT CHANGE UP (ref 27–31)
MCHC RBC-ENTMCNC: 29.2 PG — SIGNIFICANT CHANGE UP (ref 27–31)
MCHC RBC-ENTMCNC: 29.3 PG — SIGNIFICANT CHANGE UP (ref 27–31)
MCHC RBC-ENTMCNC: 29.4 PG — SIGNIFICANT CHANGE UP (ref 27–31)
MCHC RBC-ENTMCNC: 29.5 PG — SIGNIFICANT CHANGE UP (ref 27–31)
MCHC RBC-ENTMCNC: 29.5 PG — SIGNIFICANT CHANGE UP (ref 27–31)
MCHC RBC-ENTMCNC: 29.7 PG — SIGNIFICANT CHANGE UP (ref 27–31)
MCHC RBC-ENTMCNC: 29.8 PG — SIGNIFICANT CHANGE UP (ref 27–31)
MCHC RBC-ENTMCNC: 29.9 PG — SIGNIFICANT CHANGE UP (ref 27–31)
MCHC RBC-ENTMCNC: 29.9 PG — SIGNIFICANT CHANGE UP (ref 27–31)
MCHC RBC-ENTMCNC: 30.6 G/DL — LOW (ref 32–37)
MCHC RBC-ENTMCNC: 30.7 G/DL — LOW (ref 32–37)
MCHC RBC-ENTMCNC: 30.8 G/DL — LOW (ref 32–37)
MCHC RBC-ENTMCNC: 31 G/DL — LOW (ref 32–37)
MCHC RBC-ENTMCNC: 31 G/DL — LOW (ref 32–37)
MCHC RBC-ENTMCNC: 31.1 G/DL — LOW (ref 32–37)
MCHC RBC-ENTMCNC: 31.2 G/DL — LOW (ref 32–37)
MCHC RBC-ENTMCNC: 31.3 G/DL — LOW (ref 32–37)
MCHC RBC-ENTMCNC: 31.4 G/DL — LOW (ref 32–37)
MCHC RBC-ENTMCNC: 31.4 G/DL — LOW (ref 32–37)
MCHC RBC-ENTMCNC: 31.5 G/DL — LOW (ref 32–37)
MCHC RBC-ENTMCNC: 31.6 G/DL — LOW (ref 32–37)
MCHC RBC-ENTMCNC: 31.8 G/DL — LOW (ref 32–37)
MCHC RBC-ENTMCNC: 31.9 G/DL — LOW (ref 32–37)
MCHC RBC-ENTMCNC: 31.9 G/DL — LOW (ref 32–37)
MCHC RBC-ENTMCNC: 32 G/DL — SIGNIFICANT CHANGE UP (ref 32–37)
MCHC RBC-ENTMCNC: 32 G/DL — SIGNIFICANT CHANGE UP (ref 32–37)
MCHC RBC-ENTMCNC: 32.2 G/DL — SIGNIFICANT CHANGE UP (ref 32–37)
MCHC RBC-ENTMCNC: 32.3 G/DL — SIGNIFICANT CHANGE UP (ref 32–37)
MCHC RBC-ENTMCNC: 32.3 G/DL — SIGNIFICANT CHANGE UP (ref 32–37)
MCHC RBC-ENTMCNC: 32.4 G/DL — SIGNIFICANT CHANGE UP (ref 32–37)
MCHC RBC-ENTMCNC: 32.6 G/DL — SIGNIFICANT CHANGE UP (ref 32–37)
MCHC RBC-ENTMCNC: 32.8 G/DL — SIGNIFICANT CHANGE UP (ref 32–37)
MCHC RBC-ENTMCNC: 32.8 G/DL — SIGNIFICANT CHANGE UP (ref 32–37)
MCV RBC AUTO: 87.9 FL — SIGNIFICANT CHANGE UP (ref 81–99)
MCV RBC AUTO: 88.1 FL — SIGNIFICANT CHANGE UP (ref 81–99)
MCV RBC AUTO: 88.5 FL — SIGNIFICANT CHANGE UP (ref 81–99)
MCV RBC AUTO: 88.8 FL — SIGNIFICANT CHANGE UP (ref 81–99)
MCV RBC AUTO: 89.1 FL — SIGNIFICANT CHANGE UP (ref 81–99)
MCV RBC AUTO: 89.4 FL — SIGNIFICANT CHANGE UP (ref 81–99)
MCV RBC AUTO: 90.3 FL — SIGNIFICANT CHANGE UP (ref 81–99)
MCV RBC AUTO: 90.6 FL — SIGNIFICANT CHANGE UP (ref 81–99)
MCV RBC AUTO: 91 FL — SIGNIFICANT CHANGE UP (ref 81–99)
MCV RBC AUTO: 91.2 FL — SIGNIFICANT CHANGE UP (ref 81–99)
MCV RBC AUTO: 91.3 FL — SIGNIFICANT CHANGE UP (ref 81–99)
MCV RBC AUTO: 91.3 FL — SIGNIFICANT CHANGE UP (ref 81–99)
MCV RBC AUTO: 91.4 FL — SIGNIFICANT CHANGE UP (ref 81–99)
MCV RBC AUTO: 91.4 FL — SIGNIFICANT CHANGE UP (ref 81–99)
MCV RBC AUTO: 91.6 FL — SIGNIFICANT CHANGE UP (ref 81–99)
MCV RBC AUTO: 91.6 FL — SIGNIFICANT CHANGE UP (ref 81–99)
MCV RBC AUTO: 91.9 FL — SIGNIFICANT CHANGE UP (ref 81–99)
MCV RBC AUTO: 92 FL — SIGNIFICANT CHANGE UP (ref 81–99)
MCV RBC AUTO: 92 FL — SIGNIFICANT CHANGE UP (ref 81–99)
MCV RBC AUTO: 92.1 FL — SIGNIFICANT CHANGE UP (ref 81–99)
MCV RBC AUTO: 92.2 FL — SIGNIFICANT CHANGE UP (ref 81–99)
MCV RBC AUTO: 92.2 FL — SIGNIFICANT CHANGE UP (ref 81–99)
MCV RBC AUTO: 92.4 FL — SIGNIFICANT CHANGE UP (ref 81–99)
MCV RBC AUTO: 94.5 FL — SIGNIFICANT CHANGE UP (ref 81–99)
MCV RBC AUTO: 95.1 FL — SIGNIFICANT CHANGE UP (ref 81–99)
MCV RBC AUTO: 97.2 FL — SIGNIFICANT CHANGE UP (ref 81–99)
MESOTHL CELL # FLD: 30 % — SIGNIFICANT CHANGE UP
METHOD TYPE: SIGNIFICANT CHANGE UP
MONOCYTES # BLD AUTO: 0.16 K/UL — SIGNIFICANT CHANGE UP (ref 0.1–0.6)
MONOCYTES # BLD AUTO: 0.21 K/UL — SIGNIFICANT CHANGE UP (ref 0.1–0.6)
MONOCYTES # BLD AUTO: 0.3 K/UL — SIGNIFICANT CHANGE UP (ref 0.1–0.6)
MONOCYTES # BLD AUTO: 0.32 K/UL — SIGNIFICANT CHANGE UP (ref 0.1–0.6)
MONOCYTES # BLD AUTO: 0.43 K/UL — SIGNIFICANT CHANGE UP (ref 0.1–0.6)
MONOCYTES # BLD AUTO: 0.46 K/UL — SIGNIFICANT CHANGE UP (ref 0.1–0.6)
MONOCYTES # BLD AUTO: 0.47 K/UL — SIGNIFICANT CHANGE UP (ref 0.1–0.6)
MONOCYTES # BLD AUTO: 0.49 K/UL — SIGNIFICANT CHANGE UP (ref 0.1–0.6)
MONOCYTES # BLD AUTO: 0.7 K/UL — HIGH (ref 0.1–0.6)
MONOCYTES # BLD AUTO: 0.8 K/UL — HIGH (ref 0.1–0.6)
MONOCYTES # BLD AUTO: 0.88 K/UL — HIGH (ref 0.1–0.6)
MONOCYTES # BLD AUTO: 0.88 K/UL — HIGH (ref 0.1–0.6)
MONOCYTES # BLD AUTO: 0.91 K/UL — HIGH (ref 0.1–0.6)
MONOCYTES # BLD AUTO: 0.92 K/UL — HIGH (ref 0.1–0.6)
MONOCYTES # BLD AUTO: 0.93 K/UL — HIGH (ref 0.1–0.6)
MONOCYTES # BLD AUTO: 0.97 K/UL — HIGH (ref 0.1–0.6)
MONOCYTES # BLD AUTO: 1.14 K/UL — HIGH (ref 0.1–0.6)
MONOCYTES # BLD AUTO: 1.17 K/UL — HIGH (ref 0.1–0.6)
MONOCYTES # BLD AUTO: 1.28 K/UL — HIGH (ref 0.1–0.6)
MONOCYTES # BLD AUTO: 1.3 K/UL — HIGH (ref 0.1–0.6)
MONOCYTES # BLD AUTO: 1.41 K/UL — HIGH (ref 0.1–0.6)
MONOCYTES # BLD AUTO: 1.48 K/UL — HIGH (ref 0.1–0.6)
MONOCYTES NFR BLD AUTO: 1.8 % — SIGNIFICANT CHANGE UP (ref 1.7–9.3)
MONOCYTES NFR BLD AUTO: 10 % — HIGH (ref 1.7–9.3)
MONOCYTES NFR BLD AUTO: 10.1 % — HIGH (ref 1.7–9.3)
MONOCYTES NFR BLD AUTO: 10.3 % — HIGH (ref 1.7–9.3)
MONOCYTES NFR BLD AUTO: 10.7 % — HIGH (ref 1.7–9.3)
MONOCYTES NFR BLD AUTO: 11 % — HIGH (ref 1.7–9.3)
MONOCYTES NFR BLD AUTO: 11.1 % — HIGH (ref 1.7–9.3)
MONOCYTES NFR BLD AUTO: 11.8 % — HIGH (ref 1.7–9.3)
MONOCYTES NFR BLD AUTO: 13.1 % — HIGH (ref 1.7–9.3)
MONOCYTES NFR BLD AUTO: 2.2 % — SIGNIFICANT CHANGE UP (ref 1.7–9.3)
MONOCYTES NFR BLD AUTO: 2.7 % — SIGNIFICANT CHANGE UP (ref 1.7–9.3)
MONOCYTES NFR BLD AUTO: 3.7 % — SIGNIFICANT CHANGE UP (ref 1.7–9.3)
MONOCYTES NFR BLD AUTO: 3.7 % — SIGNIFICANT CHANGE UP (ref 1.7–9.3)
MONOCYTES NFR BLD AUTO: 3.8 % — SIGNIFICANT CHANGE UP (ref 1.7–9.3)
MONOCYTES NFR BLD AUTO: 5.5 % — SIGNIFICANT CHANGE UP (ref 1.7–9.3)
MONOCYTES NFR BLD AUTO: 6.4 % — SIGNIFICANT CHANGE UP (ref 1.7–9.3)
MONOCYTES NFR BLD AUTO: 7.2 % — SIGNIFICANT CHANGE UP (ref 1.7–9.3)
MONOCYTES NFR BLD AUTO: 8.4 % — SIGNIFICANT CHANGE UP (ref 1.7–9.3)
MONOCYTES NFR BLD AUTO: 8.9 % — SIGNIFICANT CHANGE UP (ref 1.7–9.3)
MONOCYTES NFR BLD AUTO: 9.1 % — SIGNIFICANT CHANGE UP (ref 1.7–9.3)
MONOCYTES NFR BLD AUTO: 9.2 % — SIGNIFICANT CHANGE UP (ref 1.7–9.3)
MONOCYTES NFR BLD AUTO: 9.6 % — HIGH (ref 1.7–9.3)
MONOS+MACROS # FLD: 40 % — SIGNIFICANT CHANGE UP
MRSA PCR RESULT.: NEGATIVE — SIGNIFICANT CHANGE UP
NEUTROPHILS # BLD AUTO: 10.39 K/UL — HIGH (ref 1.4–6.5)
NEUTROPHILS # BLD AUTO: 10.71 K/UL — HIGH (ref 1.4–6.5)
NEUTROPHILS # BLD AUTO: 4.92 K/UL — SIGNIFICANT CHANGE UP (ref 1.4–6.5)
NEUTROPHILS # BLD AUTO: 5.93 K/UL — SIGNIFICANT CHANGE UP (ref 1.4–6.5)
NEUTROPHILS # BLD AUTO: 5.97 K/UL — SIGNIFICANT CHANGE UP (ref 1.4–6.5)
NEUTROPHILS # BLD AUTO: 6.13 K/UL — SIGNIFICANT CHANGE UP (ref 1.4–6.5)
NEUTROPHILS # BLD AUTO: 6.16 K/UL — SIGNIFICANT CHANGE UP (ref 1.4–6.5)
NEUTROPHILS # BLD AUTO: 6.71 K/UL — HIGH (ref 1.4–6.5)
NEUTROPHILS # BLD AUTO: 6.96 K/UL — HIGH (ref 1.4–6.5)
NEUTROPHILS # BLD AUTO: 6.97 K/UL — HIGH (ref 1.4–6.5)
NEUTROPHILS # BLD AUTO: 7.42 K/UL — HIGH (ref 1.4–6.5)
NEUTROPHILS # BLD AUTO: 7.77 K/UL — HIGH (ref 1.4–6.5)
NEUTROPHILS # BLD AUTO: 7.88 K/UL — HIGH (ref 1.4–6.5)
NEUTROPHILS # BLD AUTO: 8.25 K/UL — HIGH (ref 1.4–6.5)
NEUTROPHILS # BLD AUTO: 8.42 K/UL — HIGH (ref 1.4–6.5)
NEUTROPHILS # BLD AUTO: 8.64 K/UL — HIGH (ref 1.4–6.5)
NEUTROPHILS # BLD AUTO: 8.72 K/UL — HIGH (ref 1.4–6.5)
NEUTROPHILS # BLD AUTO: 9.17 K/UL — HIGH (ref 1.4–6.5)
NEUTROPHILS # BLD AUTO: 9.47 K/UL — HIGH (ref 1.4–6.5)
NEUTROPHILS # BLD AUTO: 9.48 K/UL — HIGH (ref 1.4–6.5)
NEUTROPHILS # BLD AUTO: 9.66 K/UL — HIGH (ref 1.4–6.5)
NEUTROPHILS # BLD AUTO: 9.79 K/UL — HIGH (ref 1.4–6.5)
NEUTROPHILS NFR BLD AUTO: 64 % — SIGNIFICANT CHANGE UP (ref 42.2–75.2)
NEUTROPHILS NFR BLD AUTO: 64.6 % — SIGNIFICANT CHANGE UP (ref 42.2–75.2)
NEUTROPHILS NFR BLD AUTO: 65.7 % — SIGNIFICANT CHANGE UP (ref 42.2–75.2)
NEUTROPHILS NFR BLD AUTO: 70.1 % — SIGNIFICANT CHANGE UP (ref 42.2–75.2)
NEUTROPHILS NFR BLD AUTO: 70.3 % — SIGNIFICANT CHANGE UP (ref 42.2–75.2)
NEUTROPHILS NFR BLD AUTO: 72.6 % — SIGNIFICANT CHANGE UP (ref 42.2–75.2)
NEUTROPHILS NFR BLD AUTO: 72.8 % — SIGNIFICANT CHANGE UP (ref 42.2–75.2)
NEUTROPHILS NFR BLD AUTO: 74.2 % — SIGNIFICANT CHANGE UP (ref 42.2–75.2)
NEUTROPHILS NFR BLD AUTO: 74.8 % — SIGNIFICANT CHANGE UP (ref 42.2–75.2)
NEUTROPHILS NFR BLD AUTO: 77 % — HIGH (ref 42.2–75.2)
NEUTROPHILS NFR BLD AUTO: 77.9 % — HIGH (ref 42.2–75.2)
NEUTROPHILS NFR BLD AUTO: 79.9 % — HIGH (ref 42.2–75.2)
NEUTROPHILS NFR BLD AUTO: 80.2 % — HIGH (ref 42.2–75.2)
NEUTROPHILS NFR BLD AUTO: 80.9 % — HIGH (ref 42.2–75.2)
NEUTROPHILS NFR BLD AUTO: 81.4 % — HIGH (ref 42.2–75.2)
NEUTROPHILS NFR BLD AUTO: 81.7 % — HIGH (ref 42.2–75.2)
NEUTROPHILS NFR BLD AUTO: 84 % — HIGH (ref 42.2–75.2)
NEUTROPHILS NFR BLD AUTO: 86.3 % — HIGH (ref 42.2–75.2)
NEUTROPHILS NFR BLD AUTO: 87.1 % — HIGH (ref 42.2–75.2)
NEUTROPHILS NFR BLD AUTO: 87.4 % — HIGH (ref 42.2–75.2)
NEUTROPHILS NFR BLD AUTO: 88.6 % — HIGH (ref 42.2–75.2)
NEUTROPHILS NFR BLD AUTO: 89.4 % — HIGH (ref 42.2–75.2)
NITRITE UR-MCNC: NEGATIVE — SIGNIFICANT CHANGE UP
NON-GYNECOLOGICAL CYTOLOGY STUDY: SIGNIFICANT CHANGE UP
NON-GYNECOLOGICAL CYTOLOGY STUDY: SIGNIFICANT CHANGE UP
NRBC # BLD: 0 /100 WBCS — SIGNIFICANT CHANGE UP (ref 0–0)
NRBC # BLD: 1 /100 — HIGH (ref 0–0)
NRBC # BLD: SIGNIFICANT CHANGE UP /100 WBCS (ref 0–0)
NRBC # FLD: 0 % — SIGNIFICANT CHANGE UP (ref 0–0)
NT-PROBNP SERPL-SCNC: 320 PG/ML — HIGH (ref 0–300)
NT-PROBNP SERPL-SCNC: 479 PG/ML — HIGH (ref 0–300)
NT-PROBNP SERPL-SCNC: 874 PG/ML — HIGH (ref 0–300)
ORGANISM # SPEC MICROSCOPIC CNT: SIGNIFICANT CHANGE UP
ORGANISM # SPEC MICROSCOPIC CNT: SIGNIFICANT CHANGE UP
P-ANCA SER-ACNC: NEGATIVE — SIGNIFICANT CHANGE UP
PCO2 BLDA: 53 MMHG — HIGH (ref 38–42)
PCO2 BLDA: 60 MMHG — HIGH (ref 38–42)
PCO2 BLDA: 62 MMHG — CRITICAL HIGH (ref 38–42)
PCO2 BLDA: 63 MMHG — CRITICAL HIGH (ref 38–42)
PCO2 BLDA: 68 MMHG — CRITICAL HIGH (ref 38–42)
PCO2 BLDA: 68 MMHG — CRITICAL HIGH (ref 38–42)
PCO2 BLDA: 70 MMHG — CRITICAL HIGH (ref 38–42)
PCO2 BLDA: 86 MMHG — CRITICAL HIGH (ref 38–42)
PCO2 BLDV: 62 MMHG — HIGH (ref 41–51)
PH BLDA: 7.34 — LOW (ref 7.38–7.42)
PH BLDA: 7.34 — LOW (ref 7.38–7.42)
PH BLDA: 7.35 — LOW (ref 7.38–7.42)
PH BLDA: 7.37 — LOW (ref 7.38–7.42)
PH BLDA: 7.38 — SIGNIFICANT CHANGE UP (ref 7.38–7.42)
PH BLDA: 7.39 — SIGNIFICANT CHANGE UP (ref 7.38–7.42)
PH BLDA: 7.42 — SIGNIFICANT CHANGE UP (ref 7.38–7.42)
PH BLDA: 7.43 — HIGH (ref 7.38–7.42)
PH BLDA: 7.43 — HIGH (ref 7.38–7.42)
PH BLDA: 7.44 — HIGH (ref 7.38–7.42)
PH BLDA: 7.47 — HIGH (ref 7.38–7.42)
PH BLDV: 7.3 — SIGNIFICANT CHANGE UP (ref 7.26–7.43)
PH UR: 5.5 — SIGNIFICANT CHANGE UP (ref 5–8)
PH UR: 5.5 — SIGNIFICANT CHANGE UP (ref 5–8)
PH UR: 6 — SIGNIFICANT CHANGE UP (ref 5–8)
PHOSPHATE SERPL-MCNC: 2.5 MG/DL — SIGNIFICANT CHANGE UP (ref 2.1–4.9)
PHOSPHATE SERPL-MCNC: 3.3 MG/DL — SIGNIFICANT CHANGE UP (ref 2.1–4.9)
PHOSPHATE SERPL-MCNC: 4.3 MG/DL — SIGNIFICANT CHANGE UP (ref 2.1–4.9)
PLAT MORPH BLD: NORMAL — SIGNIFICANT CHANGE UP
PLATELET # BLD AUTO: 143 K/UL — SIGNIFICANT CHANGE UP (ref 130–400)
PLATELET # BLD AUTO: 168 K/UL — SIGNIFICANT CHANGE UP (ref 130–400)
PLATELET # BLD AUTO: 171 K/UL — SIGNIFICANT CHANGE UP (ref 130–400)
PLATELET # BLD AUTO: 180 K/UL — SIGNIFICANT CHANGE UP (ref 130–400)
PLATELET # BLD AUTO: 182 K/UL — SIGNIFICANT CHANGE UP (ref 130–400)
PLATELET # BLD AUTO: 186 K/UL — SIGNIFICANT CHANGE UP (ref 130–400)
PLATELET # BLD AUTO: 186 K/UL — SIGNIFICANT CHANGE UP (ref 130–400)
PLATELET # BLD AUTO: 196 K/UL — SIGNIFICANT CHANGE UP (ref 130–400)
PLATELET # BLD AUTO: 200 K/UL — SIGNIFICANT CHANGE UP (ref 130–400)
PLATELET # BLD AUTO: 201 K/UL — SIGNIFICANT CHANGE UP (ref 130–400)
PLATELET # BLD AUTO: 210 K/UL — SIGNIFICANT CHANGE UP (ref 130–400)
PLATELET # BLD AUTO: 211 K/UL — SIGNIFICANT CHANGE UP (ref 130–400)
PLATELET # BLD AUTO: 211 K/UL — SIGNIFICANT CHANGE UP (ref 130–400)
PLATELET # BLD AUTO: 214 K/UL — SIGNIFICANT CHANGE UP (ref 130–400)
PLATELET # BLD AUTO: 216 K/UL — SIGNIFICANT CHANGE UP (ref 130–400)
PLATELET # BLD AUTO: 216 K/UL — SIGNIFICANT CHANGE UP (ref 130–400)
PLATELET # BLD AUTO: 219 K/UL — SIGNIFICANT CHANGE UP (ref 130–400)
PLATELET # BLD AUTO: 230 K/UL — SIGNIFICANT CHANGE UP (ref 130–400)
PLATELET # BLD AUTO: 232 K/UL — SIGNIFICANT CHANGE UP (ref 130–400)
PLATELET # BLD AUTO: 233 K/UL — SIGNIFICANT CHANGE UP (ref 130–400)
PLATELET # BLD AUTO: 238 K/UL — SIGNIFICANT CHANGE UP (ref 130–400)
PLATELET # BLD AUTO: 239 K/UL — SIGNIFICANT CHANGE UP (ref 130–400)
PLATELET # BLD AUTO: 249 K/UL — SIGNIFICANT CHANGE UP (ref 130–400)
PLATELET # BLD AUTO: 250 K/UL — SIGNIFICANT CHANGE UP (ref 130–400)
PLATELET # BLD AUTO: 310 K/UL — SIGNIFICANT CHANGE UP (ref 130–400)
PLATELET # BLD AUTO: 310 K/UL — SIGNIFICANT CHANGE UP (ref 130–400)
PLATELET # BLD AUTO: 351 K/UL — SIGNIFICANT CHANGE UP (ref 130–400)
PLATELET # BLD AUTO: 397 K/UL — SIGNIFICANT CHANGE UP (ref 130–400)
PLATELET # BLD AUTO: 420 K/UL — HIGH (ref 130–400)
PO2 BLDA: 107 MMHG — HIGH (ref 78–95)
PO2 BLDA: 123 MMHG — HIGH (ref 78–95)
PO2 BLDA: 145 MMHG — HIGH (ref 78–95)
PO2 BLDA: 53 MMHG — LOW (ref 78–95)
PO2 BLDA: 67 MMHG — LOW (ref 78–95)
PO2 BLDA: 68 MMHG — LOW (ref 78–95)
PO2 BLDA: 75 MMHG — LOW (ref 78–95)
PO2 BLDA: 77 MMHG — LOW (ref 78–95)
PO2 BLDA: 82 MMHG — SIGNIFICANT CHANGE UP (ref 78–95)
PO2 BLDA: 85 MMHG — SIGNIFICANT CHANGE UP (ref 78–95)
PO2 BLDA: 93 MMHG — SIGNIFICANT CHANGE UP (ref 78–95)
PO2 BLDV: 52 MMHG — HIGH (ref 20–40)
POTASSIUM BLDV-SCNC: 3.2 MMOL/L — LOW (ref 3.3–5.6)
POTASSIUM SERPL-MCNC: 3.2 MMOL/L — LOW (ref 3.5–5)
POTASSIUM SERPL-MCNC: 3.2 MMOL/L — LOW (ref 3.5–5)
POTASSIUM SERPL-MCNC: 3.4 MMOL/L — LOW (ref 3.5–5)
POTASSIUM SERPL-MCNC: 3.4 MMOL/L — LOW (ref 3.5–5)
POTASSIUM SERPL-MCNC: 3.5 MMOL/L — SIGNIFICANT CHANGE UP (ref 3.5–5)
POTASSIUM SERPL-MCNC: 3.6 MMOL/L — SIGNIFICANT CHANGE UP (ref 3.5–5)
POTASSIUM SERPL-MCNC: 3.6 MMOL/L — SIGNIFICANT CHANGE UP (ref 3.5–5)
POTASSIUM SERPL-MCNC: 3.7 MMOL/L — SIGNIFICANT CHANGE UP (ref 3.5–5)
POTASSIUM SERPL-MCNC: 3.7 MMOL/L — SIGNIFICANT CHANGE UP (ref 3.5–5)
POTASSIUM SERPL-MCNC: 3.8 MMOL/L — SIGNIFICANT CHANGE UP (ref 3.5–5)
POTASSIUM SERPL-MCNC: 3.9 MMOL/L — SIGNIFICANT CHANGE UP (ref 3.5–5)
POTASSIUM SERPL-MCNC: 3.9 MMOL/L — SIGNIFICANT CHANGE UP (ref 3.5–5)
POTASSIUM SERPL-MCNC: 4 MMOL/L — SIGNIFICANT CHANGE UP (ref 3.5–5)
POTASSIUM SERPL-MCNC: 4 MMOL/L — SIGNIFICANT CHANGE UP (ref 3.5–5)
POTASSIUM SERPL-MCNC: 4.1 MMOL/L — SIGNIFICANT CHANGE UP (ref 3.5–5)
POTASSIUM SERPL-MCNC: 4.2 MMOL/L — SIGNIFICANT CHANGE UP (ref 3.5–5)
POTASSIUM SERPL-MCNC: 4.2 MMOL/L — SIGNIFICANT CHANGE UP (ref 3.5–5)
POTASSIUM SERPL-MCNC: 4.4 MMOL/L — SIGNIFICANT CHANGE UP (ref 3.5–5)
POTASSIUM SERPL-SCNC: 3.2 MMOL/L — LOW (ref 3.5–5)
POTASSIUM SERPL-SCNC: 3.2 MMOL/L — LOW (ref 3.5–5)
POTASSIUM SERPL-SCNC: 3.4 MMOL/L — LOW (ref 3.5–5)
POTASSIUM SERPL-SCNC: 3.4 MMOL/L — LOW (ref 3.5–5)
POTASSIUM SERPL-SCNC: 3.5 MMOL/L — SIGNIFICANT CHANGE UP (ref 3.5–5)
POTASSIUM SERPL-SCNC: 3.6 MMOL/L — SIGNIFICANT CHANGE UP (ref 3.5–5)
POTASSIUM SERPL-SCNC: 3.6 MMOL/L — SIGNIFICANT CHANGE UP (ref 3.5–5)
POTASSIUM SERPL-SCNC: 3.7 MMOL/L — SIGNIFICANT CHANGE UP (ref 3.5–5)
POTASSIUM SERPL-SCNC: 3.7 MMOL/L — SIGNIFICANT CHANGE UP (ref 3.5–5)
POTASSIUM SERPL-SCNC: 3.8 MMOL/L — SIGNIFICANT CHANGE UP (ref 3.5–5)
POTASSIUM SERPL-SCNC: 3.9 MMOL/L — SIGNIFICANT CHANGE UP (ref 3.5–5)
POTASSIUM SERPL-SCNC: 3.9 MMOL/L — SIGNIFICANT CHANGE UP (ref 3.5–5)
POTASSIUM SERPL-SCNC: 4 MMOL/L — SIGNIFICANT CHANGE UP (ref 3.5–5)
POTASSIUM SERPL-SCNC: 4 MMOL/L — SIGNIFICANT CHANGE UP (ref 3.5–5)
POTASSIUM SERPL-SCNC: 4.1 MMOL/L — SIGNIFICANT CHANGE UP (ref 3.5–5)
POTASSIUM SERPL-SCNC: 4.2 MMOL/L — SIGNIFICANT CHANGE UP (ref 3.5–5)
POTASSIUM SERPL-SCNC: 4.2 MMOL/L — SIGNIFICANT CHANGE UP (ref 3.5–5)
POTASSIUM SERPL-SCNC: 4.4 MMOL/L — SIGNIFICANT CHANGE UP (ref 3.5–5)
PROT SERPL-MCNC: 5.5 G/DL — LOW (ref 6–8)
PROT SERPL-MCNC: 5.7 G/DL — LOW (ref 6–8)
PROT SERPL-MCNC: 5.7 G/DL — LOW (ref 6–8)
PROT SERPL-MCNC: 6 G/DL — SIGNIFICANT CHANGE UP (ref 6–8)
PROT SERPL-MCNC: 6.2 G/DL — SIGNIFICANT CHANGE UP (ref 6–8)
PROT SERPL-MCNC: 6.3 G/DL — SIGNIFICANT CHANGE UP (ref 6–8)
PROT SERPL-MCNC: 6.3 G/DL — SIGNIFICANT CHANGE UP (ref 6–8)
PROT SERPL-MCNC: 6.4 G/DL — SIGNIFICANT CHANGE UP (ref 6–8)
PROT SERPL-MCNC: 6.4 G/DL — SIGNIFICANT CHANGE UP (ref 6–8)
PROT SERPL-MCNC: 6.5 G/DL — SIGNIFICANT CHANGE UP (ref 6–8)
PROT SERPL-MCNC: 6.5 G/DL — SIGNIFICANT CHANGE UP (ref 6–8)
PROT SERPL-MCNC: 6.7 G/DL — SIGNIFICANT CHANGE UP (ref 6–8)
PROT SERPL-MCNC: 7.1 G/DL — SIGNIFICANT CHANGE UP (ref 6–8)
PROT SERPL-MCNC: 8.1 G/DL — HIGH (ref 6–8)
PROT UR-MCNC: 30 MG/DL
PROT UR-MCNC: 30 MG/DL
PROT UR-MCNC: ABNORMAL MG/DL
PROTHROM AB SERPL-ACNC: 12.1 SEC — SIGNIFICANT CHANGE UP (ref 9.95–12.87)
PROTHROM AB SERPL-ACNC: 12.6 SEC — SIGNIFICANT CHANGE UP (ref 9.95–12.87)
PROTHROM AB SERPL-ACNC: 13.6 SEC — HIGH (ref 9.95–12.87)
PROTHROM AB SERPL-ACNC: 13.7 SEC — HIGH (ref 9.95–12.87)
PROTHROM AB SERPL-ACNC: 15.7 SEC — HIGH (ref 9.95–12.87)
PROTHROM AB SERPL-ACNC: 18.2 SEC — HIGH (ref 9.95–12.87)
RAPID RVP RESULT: SIGNIFICANT CHANGE UP
RBC # BLD: 2.11 M/UL — LOW (ref 4.2–5.4)
RBC # BLD: 2.38 M/UL — LOW (ref 4.2–5.4)
RBC # BLD: 2.45 M/UL — LOW (ref 4.2–5.4)
RBC # BLD: 2.51 M/UL — LOW (ref 4.2–5.4)
RBC # BLD: 2.58 M/UL — LOW (ref 4.2–5.4)
RBC # BLD: 2.6 M/UL — LOW (ref 4.2–5.4)
RBC # BLD: 2.64 M/UL — LOW (ref 4.2–5.4)
RBC # BLD: 2.68 M/UL — LOW (ref 4.2–5.4)
RBC # BLD: 2.74 M/UL — LOW (ref 4.2–5.4)
RBC # BLD: 2.76 M/UL — LOW (ref 4.2–5.4)
RBC # BLD: 2.78 M/UL — LOW (ref 4.2–5.4)
RBC # BLD: 2.78 M/UL — LOW (ref 4.2–5.4)
RBC # BLD: 2.79 M/UL — LOW (ref 4.2–5.4)
RBC # BLD: 2.83 M/UL — LOW (ref 4.2–5.4)
RBC # BLD: 2.84 M/UL — LOW (ref 4.2–5.4)
RBC # BLD: 2.85 M/UL — LOW (ref 4.2–5.4)
RBC # BLD: 2.86 M/UL — LOW (ref 4.2–5.4)
RBC # BLD: 2.91 M/UL — LOW (ref 4.2–5.4)
RBC # BLD: 3.03 M/UL — LOW (ref 4.2–5.4)
RBC # BLD: 3.09 M/UL — LOW (ref 4.2–5.4)
RBC # BLD: 3.33 M/UL — LOW (ref 4.2–5.4)
RBC # BLD: 3.39 M/UL — LOW (ref 4.2–5.4)
RBC # BLD: 3.82 M/UL — LOW (ref 4.2–5.4)
RBC # BLD: 3.98 M/UL — LOW (ref 4.2–5.4)
RBC # BLD: 4.21 M/UL — SIGNIFICANT CHANGE UP (ref 4.2–5.4)
RBC # BLD: 4.25 M/UL — SIGNIFICANT CHANGE UP (ref 4.2–5.4)
RBC # BLD: 4.34 M/UL — SIGNIFICANT CHANGE UP (ref 4.2–5.4)
RBC # BLD: 4.38 M/UL — SIGNIFICANT CHANGE UP (ref 4.2–5.4)
RBC # BLD: 4.63 M/UL — SIGNIFICANT CHANGE UP (ref 4.2–5.4)
RBC # FLD: 15.4 % — HIGH (ref 11.5–14.5)
RBC # FLD: 15.6 % — HIGH (ref 11.5–14.5)
RBC # FLD: 15.6 % — HIGH (ref 11.5–14.5)
RBC # FLD: 15.7 % — HIGH (ref 11.5–14.5)
RBC # FLD: 15.8 % — HIGH (ref 11.5–14.5)
RBC # FLD: 15.9 % — HIGH (ref 11.5–14.5)
RBC # FLD: 16.4 % — HIGH (ref 11.5–14.5)
RBC # FLD: 16.5 % — HIGH (ref 11.5–14.5)
RBC # FLD: 16.6 % — HIGH (ref 11.5–14.5)
RBC # FLD: 16.9 % — HIGH (ref 11.5–14.5)
RBC # FLD: 17.3 % — HIGH (ref 11.5–14.5)
RBC # FLD: 17.3 % — HIGH (ref 11.5–14.5)
RBC # FLD: 17.5 % — HIGH (ref 11.5–14.5)
RBC # FLD: 17.6 % — HIGH (ref 11.5–14.5)
RBC # FLD: 17.9 % — HIGH (ref 11.5–14.5)
RBC # FLD: 18 % — HIGH (ref 11.5–14.5)
RBC # FLD: 18.3 % — HIGH (ref 11.5–14.5)
RBC # FLD: 18.5 % — HIGH (ref 11.5–14.5)
RBC # FLD: 18.5 % — HIGH (ref 11.5–14.5)
RBC # FLD: 18.6 % — HIGH (ref 11.5–14.5)
RBC BLD AUTO: NORMAL — SIGNIFICANT CHANGE UP
RBC CASTS # UR COMP ASSIST: >50 /HPF
RBC CASTS # UR COMP ASSIST: >50 /HPF
RCV VOL RI: HIGH /UL (ref 0–5)
RHEUMATOID FACT SERPL-ACNC: <10 IU/ML — SIGNIFICANT CHANGE UP (ref 0–13)
S PNEUM AG UR QL: NEGATIVE — SIGNIFICANT CHANGE UP
SAO2 % BLDA: 100 % — HIGH (ref 94–98)
SAO2 % BLDA: 86 % — LOW (ref 94–98)
SAO2 % BLDA: 92 % — LOW (ref 94–98)
SAO2 % BLDA: 95 % — SIGNIFICANT CHANGE UP (ref 94–98)
SAO2 % BLDA: 95 % — SIGNIFICANT CHANGE UP (ref 94–98)
SAO2 % BLDA: 96 % — SIGNIFICANT CHANGE UP (ref 94–98)
SAO2 % BLDA: 97 % — SIGNIFICANT CHANGE UP (ref 94–98)
SAO2 % BLDA: 98 % — SIGNIFICANT CHANGE UP (ref 94–98)
SAO2 % BLDA: 99 % — HIGH (ref 92–96)
SAO2 % BLDA: 99 % — HIGH (ref 94–98)
SAO2 % BLDA: 99 % — HIGH (ref 94–98)
SAO2 % BLDV: 83 % — SIGNIFICANT CHANGE UP
SODIUM SERPL-SCNC: 135 MMOL/L — SIGNIFICANT CHANGE UP (ref 135–146)
SODIUM SERPL-SCNC: 135 MMOL/L — SIGNIFICANT CHANGE UP (ref 135–146)
SODIUM SERPL-SCNC: 136 MMOL/L — SIGNIFICANT CHANGE UP (ref 135–146)
SODIUM SERPL-SCNC: 138 MMOL/L — SIGNIFICANT CHANGE UP (ref 135–146)
SODIUM SERPL-SCNC: 140 MMOL/L — SIGNIFICANT CHANGE UP (ref 135–146)
SODIUM SERPL-SCNC: 141 MMOL/L — SIGNIFICANT CHANGE UP (ref 135–146)
SODIUM SERPL-SCNC: 142 MMOL/L — SIGNIFICANT CHANGE UP (ref 135–146)
SODIUM SERPL-SCNC: 143 MMOL/L — SIGNIFICANT CHANGE UP (ref 135–146)
SODIUM SERPL-SCNC: 144 MMOL/L — SIGNIFICANT CHANGE UP (ref 135–146)
SODIUM SERPL-SCNC: 145 MMOL/L — SIGNIFICANT CHANGE UP (ref 135–146)
SODIUM SERPL-SCNC: 146 MMOL/L — SIGNIFICANT CHANGE UP (ref 135–146)
SODIUM SERPL-SCNC: 147 MMOL/L — HIGH (ref 135–146)
SODIUM SERPL-SCNC: 147 MMOL/L — HIGH (ref 135–146)
SODIUM UR-SCNC: 30 MMOL/L — SIGNIFICANT CHANGE UP
SP GR SPEC: 1.02 — SIGNIFICANT CHANGE UP (ref 1.01–1.03)
SPECIMEN SOURCE: SIGNIFICANT CHANGE UP
TOTAL CHOLESTEROL/HDL RATIO MEASUREMENT: 2.4 RATIO — LOW (ref 4–5.5)
TOTAL NUCLEATED CELL COUNT, BODY FLUID: 262 /UL — HIGH (ref 0–5)
TRIGL SERPL-MCNC: 90 MG/DL — SIGNIFICANT CHANGE UP (ref 10–149)
TROPONIN T SERPL-MCNC: 0.02 NG/ML — HIGH
TROPONIN T SERPL-MCNC: 0.03 NG/ML — CRITICAL HIGH
TROPONIN T SERPL-MCNC: <0.01 NG/ML — SIGNIFICANT CHANGE UP
TSH SERPL-MCNC: 0.77 UIU/ML — SIGNIFICANT CHANGE UP (ref 0.27–4.2)
TUBE TYPE: SIGNIFICANT CHANGE UP
TYPE + AB SCN PNL BLD: SIGNIFICANT CHANGE UP
UROBILINOGEN FLD QL: 0.2 MG/DL — SIGNIFICANT CHANGE UP (ref 0.2–0.2)
UUN UR-MCNC: 757 MG/DL — SIGNIFICANT CHANGE UP
WBC # BLD: 10.58 K/UL — SIGNIFICANT CHANGE UP (ref 4.8–10.8)
WBC # BLD: 11.05 K/UL — HIGH (ref 4.8–10.8)
WBC # BLD: 11.41 K/UL — HIGH (ref 4.8–10.8)
WBC # BLD: 11.52 K/UL — HIGH (ref 4.8–10.8)
WBC # BLD: 11.55 K/UL — HIGH (ref 4.8–10.8)
WBC # BLD: 11.64 K/UL — HIGH (ref 4.8–10.8)
WBC # BLD: 11.98 K/UL — HIGH (ref 4.8–10.8)
WBC # BLD: 11.99 K/UL — HIGH (ref 4.8–10.8)
WBC # BLD: 12.15 K/UL — HIGH (ref 4.8–10.8)
WBC # BLD: 12.25 K/UL — HIGH (ref 4.8–10.8)
WBC # BLD: 12.69 K/UL — HIGH (ref 4.8–10.8)
WBC # BLD: 12.8 K/UL — HIGH (ref 4.8–10.8)
WBC # BLD: 13.65 K/UL — HIGH (ref 4.8–10.8)
WBC # BLD: 14.34 K/UL — HIGH (ref 4.8–10.8)
WBC # BLD: 6.23 K/UL — SIGNIFICANT CHANGE UP (ref 4.8–10.8)
WBC # BLD: 7 K/UL — SIGNIFICANT CHANGE UP (ref 4.8–10.8)
WBC # BLD: 7.3 K/UL — SIGNIFICANT CHANGE UP (ref 4.8–10.8)
WBC # BLD: 7.68 K/UL — SIGNIFICANT CHANGE UP (ref 4.8–10.8)
WBC # BLD: 7.87 K/UL — SIGNIFICANT CHANGE UP (ref 4.8–10.8)
WBC # BLD: 7.99 K/UL — SIGNIFICANT CHANGE UP (ref 4.8–10.8)
WBC # BLD: 8.15 K/UL — SIGNIFICANT CHANGE UP (ref 4.8–10.8)
WBC # BLD: 8.33 K/UL — SIGNIFICANT CHANGE UP (ref 4.8–10.8)
WBC # BLD: 8.61 K/UL — SIGNIFICANT CHANGE UP (ref 4.8–10.8)
WBC # BLD: 8.72 K/UL — SIGNIFICANT CHANGE UP (ref 4.8–10.8)
WBC # BLD: 8.89 K/UL — SIGNIFICANT CHANGE UP (ref 4.8–10.8)
WBC # BLD: 8.92 K/UL — SIGNIFICANT CHANGE UP (ref 4.8–10.8)
WBC # BLD: 9.26 K/UL — SIGNIFICANT CHANGE UP (ref 4.8–10.8)
WBC # BLD: 9.56 K/UL — SIGNIFICANT CHANGE UP (ref 4.8–10.8)
WBC # BLD: 9.57 K/UL — SIGNIFICANT CHANGE UP (ref 4.8–10.8)
WBC # FLD AUTO: 10.58 K/UL — SIGNIFICANT CHANGE UP (ref 4.8–10.8)
WBC # FLD AUTO: 11.05 K/UL — HIGH (ref 4.8–10.8)
WBC # FLD AUTO: 11.41 K/UL — HIGH (ref 4.8–10.8)
WBC # FLD AUTO: 11.52 K/UL — HIGH (ref 4.8–10.8)
WBC # FLD AUTO: 11.55 K/UL — HIGH (ref 4.8–10.8)
WBC # FLD AUTO: 11.64 K/UL — HIGH (ref 4.8–10.8)
WBC # FLD AUTO: 11.98 K/UL — HIGH (ref 4.8–10.8)
WBC # FLD AUTO: 11.99 K/UL — HIGH (ref 4.8–10.8)
WBC # FLD AUTO: 12.15 K/UL — HIGH (ref 4.8–10.8)
WBC # FLD AUTO: 12.25 K/UL — HIGH (ref 4.8–10.8)
WBC # FLD AUTO: 12.69 K/UL — HIGH (ref 4.8–10.8)
WBC # FLD AUTO: 12.8 K/UL — HIGH (ref 4.8–10.8)
WBC # FLD AUTO: 13.65 K/UL — HIGH (ref 4.8–10.8)
WBC # FLD AUTO: 14.34 K/UL — HIGH (ref 4.8–10.8)
WBC # FLD AUTO: 6.23 K/UL — SIGNIFICANT CHANGE UP (ref 4.8–10.8)
WBC # FLD AUTO: 7 K/UL — SIGNIFICANT CHANGE UP (ref 4.8–10.8)
WBC # FLD AUTO: 7.3 K/UL — SIGNIFICANT CHANGE UP (ref 4.8–10.8)
WBC # FLD AUTO: 7.68 K/UL — SIGNIFICANT CHANGE UP (ref 4.8–10.8)
WBC # FLD AUTO: 7.87 K/UL — SIGNIFICANT CHANGE UP (ref 4.8–10.8)
WBC # FLD AUTO: 7.99 K/UL — SIGNIFICANT CHANGE UP (ref 4.8–10.8)
WBC # FLD AUTO: 8.15 K/UL — SIGNIFICANT CHANGE UP (ref 4.8–10.8)
WBC # FLD AUTO: 8.33 K/UL — SIGNIFICANT CHANGE UP (ref 4.8–10.8)
WBC # FLD AUTO: 8.61 K/UL — SIGNIFICANT CHANGE UP (ref 4.8–10.8)
WBC # FLD AUTO: 8.72 K/UL — SIGNIFICANT CHANGE UP (ref 4.8–10.8)
WBC # FLD AUTO: 8.89 K/UL — SIGNIFICANT CHANGE UP (ref 4.8–10.8)
WBC # FLD AUTO: 8.92 K/UL — SIGNIFICANT CHANGE UP (ref 4.8–10.8)
WBC # FLD AUTO: 9.26 K/UL — SIGNIFICANT CHANGE UP (ref 4.8–10.8)
WBC # FLD AUTO: 9.56 K/UL — SIGNIFICANT CHANGE UP (ref 4.8–10.8)
WBC # FLD AUTO: 9.57 K/UL — SIGNIFICANT CHANGE UP (ref 4.8–10.8)
WBC UR QL: >50 /HPF
WBC UR QL: ABNORMAL /HPF
WBC UR QL: SIGNIFICANT CHANGE UP /HPF

## 2018-01-01 PROCEDURE — 93010 ELECTROCARDIOGRAM REPORT: CPT

## 2018-01-01 PROCEDURE — 93970 EXTREMITY STUDY: CPT | Mod: 26

## 2018-01-01 RX ORDER — ACYCLOVIR SODIUM 500 MG
800 VIAL (EA) INTRAVENOUS EVERY 8 HOURS
Qty: 0 | Refills: 0 | Status: DISCONTINUED | OUTPATIENT
Start: 2018-01-01 | End: 2018-01-01

## 2018-01-01 RX ORDER — MIDAZOLAM HYDROCHLORIDE 1 MG/ML
2 INJECTION, SOLUTION INTRAMUSCULAR; INTRAVENOUS ONCE
Qty: 0 | Refills: 0 | Status: DISCONTINUED | OUTPATIENT
Start: 2018-01-01 | End: 2018-01-01

## 2018-01-01 RX ORDER — TIOTROPIUM BROMIDE 18 UG/1
1 CAPSULE ORAL; RESPIRATORY (INHALATION) DAILY
Qty: 0 | Refills: 0 | Status: COMPLETED | OUTPATIENT
Start: 2018-01-01 | End: 2019-11-10

## 2018-01-01 RX ORDER — METOPROLOL TARTRATE 50 MG
1 TABLET ORAL
Qty: 0 | Refills: 0 | COMMUNITY

## 2018-01-01 RX ORDER — CEFTRIAXONE 500 MG/1
1 INJECTION, POWDER, FOR SOLUTION INTRAMUSCULAR; INTRAVENOUS ONCE
Qty: 0 | Refills: 0 | Status: COMPLETED | OUTPATIENT
Start: 2018-01-01 | End: 2018-01-01

## 2018-01-01 RX ORDER — FENTANYL CITRATE 50 UG/ML
25 INJECTION INTRAVENOUS ONCE
Qty: 0 | Refills: 0 | Status: DISCONTINUED | OUTPATIENT
Start: 2018-01-01 | End: 2018-01-01

## 2018-01-01 RX ORDER — ALBUTEROL 90 UG/1
1 AEROSOL, METERED ORAL EVERY 6 HOURS
Qty: 0 | Refills: 0 | Status: DISCONTINUED | OUTPATIENT
Start: 2018-01-01 | End: 2018-01-01

## 2018-01-01 RX ORDER — POTASSIUM CHLORIDE 20 MEQ
20 PACKET (EA) ORAL
Qty: 0 | Refills: 0 | Status: COMPLETED | OUTPATIENT
Start: 2018-01-01 | End: 2018-01-01

## 2018-01-01 RX ORDER — POTASSIUM CHLORIDE 20 MEQ
20 PACKET (EA) ORAL
Qty: 0 | Refills: 0 | Status: DISCONTINUED | OUTPATIENT
Start: 2018-01-01 | End: 2018-01-01

## 2018-01-01 RX ORDER — DEXMEDETOMIDINE HYDROCHLORIDE IN 0.9% SODIUM CHLORIDE 4 UG/ML
0.5 INJECTION INTRAVENOUS
Qty: 200 | Refills: 0 | Status: DISCONTINUED | OUTPATIENT
Start: 2018-01-01 | End: 2019-01-01

## 2018-01-01 RX ORDER — PIPERACILLIN AND TAZOBACTAM 4; .5 G/20ML; G/20ML
2.25 INJECTION, POWDER, LYOPHILIZED, FOR SOLUTION INTRAVENOUS ONCE
Qty: 0 | Refills: 0 | Status: COMPLETED | OUTPATIENT
Start: 2018-01-01 | End: 2018-01-01

## 2018-01-01 RX ORDER — SODIUM CHLORIDE 9 MG/ML
1000 INJECTION INTRAMUSCULAR; INTRAVENOUS; SUBCUTANEOUS
Qty: 0 | Refills: 0 | Status: DISCONTINUED | OUTPATIENT
Start: 2018-01-01 | End: 2019-01-01

## 2018-01-01 RX ORDER — CEFAZOLIN SODIUM 1 G
2000 VIAL (EA) INJECTION ONCE
Qty: 0 | Refills: 0 | Status: DISCONTINUED | OUTPATIENT
Start: 2018-01-01 | End: 2018-01-01

## 2018-01-01 RX ORDER — PROPOFOL 10 MG/ML
0.1 INJECTION, EMULSION INTRAVENOUS
Qty: 500 | Refills: 0 | Status: DISCONTINUED | OUTPATIENT
Start: 2018-01-01 | End: 2018-01-01

## 2018-01-01 RX ORDER — IPRATROPIUM BROMIDE 0.2 MG/ML
1 SOLUTION, NON-ORAL INHALATION EVERY 6 HOURS
Qty: 0 | Refills: 0 | Status: DISCONTINUED | OUTPATIENT
Start: 2018-01-01 | End: 2018-01-01

## 2018-01-01 RX ORDER — HEPARIN SODIUM 5000 [USP'U]/ML
5000 INJECTION INTRAVENOUS; SUBCUTANEOUS EVERY 8 HOURS
Qty: 0 | Refills: 0 | Status: DISCONTINUED | OUTPATIENT
Start: 2018-01-01 | End: 2018-01-01

## 2018-01-01 RX ORDER — SIMVASTATIN 20 MG/1
5 TABLET, FILM COATED ORAL AT BEDTIME
Qty: 0 | Refills: 0 | Status: DISCONTINUED | OUTPATIENT
Start: 2018-01-01 | End: 2019-01-01

## 2018-01-01 RX ORDER — HYDROCHLOROTHIAZIDE 25 MG
25 TABLET ORAL DAILY
Qty: 0 | Refills: 0 | Status: DISCONTINUED | OUTPATIENT
Start: 2018-01-01 | End: 2018-01-01

## 2018-01-01 RX ORDER — CHLORHEXIDINE GLUCONATE 213 G/1000ML
1 SOLUTION TOPICAL
Qty: 0 | Refills: 0 | Status: DISCONTINUED | OUTPATIENT
Start: 2018-01-01 | End: 2019-01-01

## 2018-01-01 RX ORDER — ENOXAPARIN SODIUM 100 MG/ML
80 INJECTION SUBCUTANEOUS EVERY 12 HOURS
Qty: 0 | Refills: 0 | Status: DISCONTINUED | OUTPATIENT
Start: 2018-01-01 | End: 2018-01-01

## 2018-01-01 RX ORDER — POTASSIUM CHLORIDE 20 MEQ
20 PACKET (EA) ORAL ONCE
Qty: 0 | Refills: 0 | Status: COMPLETED | OUTPATIENT
Start: 2018-01-01 | End: 2018-01-01

## 2018-01-01 RX ORDER — HEPARIN SODIUM 5000 [USP'U]/ML
5000 INJECTION INTRAVENOUS; SUBCUTANEOUS EVERY 8 HOURS
Qty: 0 | Refills: 0 | Status: DISCONTINUED | OUTPATIENT
Start: 2018-01-01 | End: 2019-01-01

## 2018-01-01 RX ORDER — POTASSIUM CHLORIDE 20 MEQ
10 PACKET (EA) ORAL
Qty: 0 | Refills: 0 | Status: DISCONTINUED | OUTPATIENT
Start: 2018-01-01 | End: 2018-01-01

## 2018-01-01 RX ORDER — METOPROLOL TARTRATE 50 MG
25 TABLET ORAL DAILY
Qty: 0 | Refills: 0 | Status: DISCONTINUED | OUTPATIENT
Start: 2018-01-01 | End: 2018-01-01

## 2018-01-01 RX ORDER — NOREPINEPHRINE BITARTRATE/D5W 8 MG/250ML
0.05 PLASTIC BAG, INJECTION (ML) INTRAVENOUS
Qty: 8 | Refills: 0 | Status: DISCONTINUED | OUTPATIENT
Start: 2018-01-01 | End: 2018-01-01

## 2018-01-01 RX ORDER — VALACYCLOVIR 500 MG/1
1000 TABLET, FILM COATED ORAL THREE TIMES A DAY
Qty: 0 | Refills: 0 | Status: DISCONTINUED | OUTPATIENT
Start: 2018-01-01 | End: 2018-01-01

## 2018-01-01 RX ORDER — PROPOFOL 10 MG/ML
5 INJECTION, EMULSION INTRAVENOUS
Qty: 1000 | Refills: 0 | Status: DISCONTINUED | OUTPATIENT
Start: 2018-01-01 | End: 2018-01-01

## 2018-01-01 RX ORDER — SCOPALAMINE 1 MG/3D
1 PATCH, EXTENDED RELEASE TRANSDERMAL ONCE
Qty: 0 | Refills: 0 | Status: COMPLETED | OUTPATIENT
Start: 2018-01-01 | End: 2018-01-01

## 2018-01-01 RX ORDER — SODIUM CHLORIDE 9 MG/ML
1000 INJECTION, SOLUTION INTRAVENOUS
Qty: 0 | Refills: 0 | Status: DISCONTINUED | OUTPATIENT
Start: 2018-01-01 | End: 2018-01-01

## 2018-01-01 RX ORDER — METOPROLOL TARTRATE 50 MG
25 TABLET ORAL ONCE
Qty: 0 | Refills: 0 | Status: DISCONTINUED | OUTPATIENT
Start: 2018-01-01 | End: 2018-01-01

## 2018-01-01 RX ORDER — FENTANYL CITRATE 50 UG/ML
50 INJECTION INTRAVENOUS
Qty: 0 | Refills: 0 | Status: DISCONTINUED | OUTPATIENT
Start: 2018-01-01 | End: 2018-01-01

## 2018-01-01 RX ORDER — PIPERACILLIN AND TAZOBACTAM 4; .5 G/20ML; G/20ML
INJECTION, POWDER, LYOPHILIZED, FOR SOLUTION INTRAVENOUS
Qty: 0 | Refills: 0 | Status: DISCONTINUED | OUTPATIENT
Start: 2018-01-01 | End: 2018-01-01

## 2018-01-01 RX ORDER — FUROSEMIDE 40 MG
40 TABLET ORAL ONCE
Qty: 0 | Refills: 0 | Status: COMPLETED | OUTPATIENT
Start: 2018-01-01 | End: 2018-01-01

## 2018-01-01 RX ORDER — METOPROLOL TARTRATE 50 MG
5 TABLET ORAL
Qty: 0 | Refills: 0 | Status: DISCONTINUED | OUTPATIENT
Start: 2018-01-01 | End: 2018-01-01

## 2018-01-01 RX ORDER — PROPOFOL 10 MG/ML
5 INJECTION, EMULSION INTRAVENOUS
Qty: 500 | Refills: 0 | Status: DISCONTINUED | OUTPATIENT
Start: 2018-01-01 | End: 2018-01-01

## 2018-01-01 RX ORDER — ROCURONIUM BROMIDE 10 MG/ML
5 VIAL (ML) INTRAVENOUS ONCE
Qty: 0 | Refills: 0 | Status: DISCONTINUED | OUTPATIENT
Start: 2018-01-01 | End: 2018-01-01

## 2018-01-01 RX ORDER — DEXMEDETOMIDINE HYDROCHLORIDE IN 0.9% SODIUM CHLORIDE 4 UG/ML
0.05 INJECTION INTRAVENOUS
Qty: 200 | Refills: 0 | Status: DISCONTINUED | OUTPATIENT
Start: 2018-01-01 | End: 2018-01-01

## 2018-01-01 RX ORDER — CEFTRIAXONE 500 MG/1
INJECTION, POWDER, FOR SOLUTION INTRAMUSCULAR; INTRAVENOUS
Qty: 0 | Refills: 0 | Status: DISCONTINUED | OUTPATIENT
Start: 2018-01-01 | End: 2018-01-01

## 2018-01-01 RX ORDER — CEFTRIAXONE 500 MG/1
1 INJECTION, POWDER, FOR SOLUTION INTRAMUSCULAR; INTRAVENOUS EVERY 24 HOURS
Qty: 0 | Refills: 0 | Status: DISCONTINUED | OUTPATIENT
Start: 2018-01-01 | End: 2018-01-01

## 2018-01-01 RX ORDER — PANTOPRAZOLE SODIUM 20 MG/1
40 TABLET, DELAYED RELEASE ORAL
Qty: 0 | Refills: 0 | Status: DISCONTINUED | OUTPATIENT
Start: 2018-01-01 | End: 2019-01-01

## 2018-01-01 RX ORDER — NOREPINEPHRINE BITARTRATE/D5W 8 MG/250ML
0.05 PLASTIC BAG, INJECTION (ML) INTRAVENOUS
Qty: 16 | Refills: 0 | Status: DISCONTINUED | OUTPATIENT
Start: 2018-01-01 | End: 2019-01-01

## 2018-01-01 RX ORDER — VANCOMYCIN HCL 1 G
1500 VIAL (EA) INTRAVENOUS ONCE
Qty: 0 | Refills: 0 | Status: DISCONTINUED | OUTPATIENT
Start: 2018-01-01 | End: 2018-01-01

## 2018-01-01 RX ORDER — PROPOFOL 10 MG/ML
10 INJECTION, EMULSION INTRAVENOUS
Qty: 1000 | Refills: 0 | Status: DISCONTINUED | OUTPATIENT
Start: 2018-01-01 | End: 2018-01-01

## 2018-01-01 RX ORDER — SODIUM CHLORIDE 9 MG/ML
2500 INJECTION, SOLUTION INTRAVENOUS ONCE
Qty: 0 | Refills: 0 | Status: COMPLETED | OUTPATIENT
Start: 2018-01-01 | End: 2018-01-01

## 2018-01-01 RX ORDER — FENTANYL CITRATE 50 UG/ML
0.5 INJECTION INTRAVENOUS
Qty: 2500 | Refills: 0 | Status: DISCONTINUED | OUTPATIENT
Start: 2018-01-01 | End: 2019-01-01

## 2018-01-01 RX ORDER — ACETAMINOPHEN 500 MG
650 TABLET ORAL EVERY 6 HOURS
Qty: 0 | Refills: 0 | Status: DISCONTINUED | OUTPATIENT
Start: 2018-01-01 | End: 2019-01-01

## 2018-01-01 RX ORDER — POTASSIUM CHLORIDE 20 MEQ
40 PACKET (EA) ORAL ONCE
Qty: 0 | Refills: 0 | Status: COMPLETED | OUTPATIENT
Start: 2018-01-01 | End: 2018-01-01

## 2018-01-01 RX ORDER — TIOTROPIUM BROMIDE 18 UG/1
1 CAPSULE ORAL; RESPIRATORY (INHALATION) DAILY
Qty: 0 | Refills: 0 | Status: DISCONTINUED | OUTPATIENT
Start: 2018-01-01 | End: 2018-01-01

## 2018-01-01 RX ORDER — VECURONIUM BROMIDE 20 MG/1
5 INJECTION, POWDER, FOR SOLUTION INTRAVENOUS ONCE
Qty: 0 | Refills: 0 | Status: COMPLETED | OUTPATIENT
Start: 2018-01-01 | End: 2018-01-01

## 2018-01-01 RX ORDER — AMIODARONE HYDROCHLORIDE 400 MG/1
400 TABLET ORAL
Qty: 0 | Refills: 0 | Status: DISCONTINUED | OUTPATIENT
Start: 2018-01-01 | End: 2018-01-01

## 2018-01-01 RX ORDER — FUROSEMIDE 40 MG
20 TABLET ORAL ONCE
Qty: 0 | Refills: 0 | Status: COMPLETED | OUTPATIENT
Start: 2018-01-01 | End: 2018-01-01

## 2018-01-01 RX ORDER — DIPHENHYDRAMINE HCL 50 MG
25 CAPSULE ORAL EVERY 6 HOURS
Qty: 0 | Refills: 0 | Status: DISCONTINUED | OUTPATIENT
Start: 2018-01-01 | End: 2018-01-01

## 2018-01-01 RX ORDER — ALBUTEROL 90 UG/1
1 AEROSOL, METERED ORAL EVERY 4 HOURS
Qty: 0 | Refills: 0 | Status: DISCONTINUED | OUTPATIENT
Start: 2018-01-01 | End: 2018-01-01

## 2018-01-01 RX ORDER — ALBUTEROL 90 UG/1
1 AEROSOL, METERED ORAL EVERY 4 HOURS
Qty: 0 | Refills: 0 | Status: COMPLETED | OUTPATIENT
Start: 2018-01-01 | End: 2019-11-10

## 2018-01-01 RX ORDER — IPRATROPIUM/ALBUTEROL SULFATE 18-103MCG
3 AEROSOL WITH ADAPTER (GRAM) INHALATION EVERY 6 HOURS
Qty: 0 | Refills: 0 | Status: DISCONTINUED | OUTPATIENT
Start: 2018-01-01 | End: 2018-01-01

## 2018-01-01 RX ORDER — PIPERACILLIN AND TAZOBACTAM 4; .5 G/20ML; G/20ML
2.25 INJECTION, POWDER, LYOPHILIZED, FOR SOLUTION INTRAVENOUS EVERY 6 HOURS
Qty: 0 | Refills: 0 | Status: DISCONTINUED | OUTPATIENT
Start: 2018-01-01 | End: 2018-01-01

## 2018-01-01 RX ORDER — FUROSEMIDE 40 MG
40 TABLET ORAL DAILY
Qty: 0 | Refills: 0 | Status: DISCONTINUED | OUTPATIENT
Start: 2018-01-01 | End: 2018-01-01

## 2018-01-01 RX ORDER — NYSTATIN CREAM 100000 [USP'U]/G
1 CREAM TOPICAL
Qty: 0 | Refills: 0 | Status: DISCONTINUED | OUTPATIENT
Start: 2018-01-01 | End: 2019-01-01

## 2018-01-01 RX ORDER — POTASSIUM CHLORIDE 20 MEQ
20 PACKET (EA) ORAL ONCE
Qty: 0 | Refills: 0 | Status: DISCONTINUED | OUTPATIENT
Start: 2018-01-01 | End: 2018-01-01

## 2018-01-01 RX ORDER — LACTULOSE 10 G/15ML
10 SOLUTION ORAL DAILY
Qty: 0 | Refills: 0 | Status: DISCONTINUED | OUTPATIENT
Start: 2018-01-01 | End: 2019-01-01

## 2018-01-01 RX ORDER — AMIODARONE HYDROCHLORIDE 400 MG/1
200 TABLET ORAL DAILY
Qty: 0 | Refills: 0 | Status: DISCONTINUED | OUTPATIENT
Start: 2018-01-01 | End: 2018-01-01

## 2018-01-01 RX ORDER — CHLORHEXIDINE GLUCONATE 213 G/1000ML
15 SOLUTION TOPICAL
Qty: 0 | Refills: 0 | Status: DISCONTINUED | OUTPATIENT
Start: 2018-01-01 | End: 2019-01-01

## 2018-01-01 RX ORDER — METOPROLOL TARTRATE 50 MG
12.5 TABLET ORAL EVERY 12 HOURS
Qty: 0 | Refills: 0 | Status: DISCONTINUED | OUTPATIENT
Start: 2018-01-01 | End: 2018-01-01

## 2018-01-01 RX ORDER — CEFEPIME 1 G/1
2000 INJECTION, POWDER, FOR SOLUTION INTRAMUSCULAR; INTRAVENOUS ONCE
Qty: 0 | Refills: 0 | Status: DISCONTINUED | OUTPATIENT
Start: 2018-01-01 | End: 2018-01-01

## 2018-01-01 RX ORDER — METOPROLOL TARTRATE 50 MG
0 TABLET ORAL
Qty: 0 | Refills: 0 | COMMUNITY

## 2018-01-01 RX ADMIN — SIMVASTATIN 5 MILLIGRAM(S): 20 TABLET, FILM COATED ORAL at 21:08

## 2018-01-01 RX ADMIN — PANTOPRAZOLE SODIUM 40 MILLIGRAM(S): 20 TABLET, DELAYED RELEASE ORAL at 18:44

## 2018-01-01 RX ADMIN — Medication 50 MILLIEQUIVALENT(S): at 07:56

## 2018-01-01 RX ADMIN — SIMVASTATIN 5 MILLIGRAM(S): 20 TABLET, FILM COATED ORAL at 21:38

## 2018-01-01 RX ADMIN — SCOPALAMINE 1 PATCH: 1 PATCH, EXTENDED RELEASE TRANSDERMAL at 06:14

## 2018-01-01 RX ADMIN — CHLORHEXIDINE GLUCONATE 15 MILLILITER(S): 213 SOLUTION TOPICAL at 17:47

## 2018-01-01 RX ADMIN — Medication 3.75 MICROGRAM(S)/KG/MIN: at 19:48

## 2018-01-01 RX ADMIN — Medication 60 MILLIGRAM(S): at 05:36

## 2018-01-01 RX ADMIN — Medication 5 MILLIGRAM(S): at 05:37

## 2018-01-01 RX ADMIN — Medication 60 MILLIGRAM(S): at 12:57

## 2018-01-01 RX ADMIN — FENTANYL CITRATE 50 MICROGRAM(S): 50 INJECTION INTRAVENOUS at 10:27

## 2018-01-01 RX ADMIN — SIMVASTATIN 5 MILLIGRAM(S): 20 TABLET, FILM COATED ORAL at 21:35

## 2018-01-01 RX ADMIN — Medication 1 TABLET(S): at 12:00

## 2018-01-01 RX ADMIN — ENOXAPARIN SODIUM 80 MILLIGRAM(S): 100 INJECTION SUBCUTANEOUS at 18:26

## 2018-01-01 RX ADMIN — PIPERACILLIN AND TAZOBACTAM 200 GRAM(S): 4; .5 INJECTION, POWDER, LYOPHILIZED, FOR SOLUTION INTRAVENOUS at 23:47

## 2018-01-01 RX ADMIN — Medication 40 MILLIGRAM(S): at 06:28

## 2018-01-01 RX ADMIN — PIPERACILLIN AND TAZOBACTAM 200 GRAM(S): 4; .5 INJECTION, POWDER, LYOPHILIZED, FOR SOLUTION INTRAVENOUS at 18:18

## 2018-01-01 RX ADMIN — NYSTATIN CREAM 1 APPLICATION(S): 100000 CREAM TOPICAL at 18:45

## 2018-01-01 RX ADMIN — Medication 60 MILLIGRAM(S): at 15:31

## 2018-01-01 RX ADMIN — DEXMEDETOMIDINE HYDROCHLORIDE IN 0.9% SODIUM CHLORIDE 10 MICROGRAM(S)/KG/HR: 4 INJECTION INTRAVENOUS at 02:38

## 2018-01-01 RX ADMIN — Medication 60 MILLIGRAM(S): at 06:30

## 2018-01-01 RX ADMIN — AMIODARONE HYDROCHLORIDE 200 MILLIGRAM(S): 400 TABLET ORAL at 12:00

## 2018-01-01 RX ADMIN — CHLORHEXIDINE GLUCONATE 1 APPLICATION(S): 213 SOLUTION TOPICAL at 05:21

## 2018-01-01 RX ADMIN — LACTULOSE 10 GRAM(S): 10 SOLUTION ORAL at 17:32

## 2018-01-01 RX ADMIN — FENTANYL CITRATE 25 MICROGRAM(S): 50 INJECTION INTRAVENOUS at 07:45

## 2018-01-01 RX ADMIN — PANTOPRAZOLE SODIUM 40 MILLIGRAM(S): 20 TABLET, DELAYED RELEASE ORAL at 05:02

## 2018-01-01 RX ADMIN — CHLORHEXIDINE GLUCONATE 1 APPLICATION(S): 213 SOLUTION TOPICAL at 05:43

## 2018-01-01 RX ADMIN — PANTOPRAZOLE SODIUM 40 MILLIGRAM(S): 20 TABLET, DELAYED RELEASE ORAL at 05:31

## 2018-01-01 RX ADMIN — PANTOPRAZOLE SODIUM 40 MILLIGRAM(S): 20 TABLET, DELAYED RELEASE ORAL at 05:35

## 2018-01-01 RX ADMIN — Medication 50 MILLIEQUIVALENT(S): at 21:00

## 2018-01-01 RX ADMIN — CHLORHEXIDINE GLUCONATE 15 MILLILITER(S): 213 SOLUTION TOPICAL at 05:36

## 2018-01-01 RX ADMIN — SIMVASTATIN 5 MILLIGRAM(S): 20 TABLET, FILM COATED ORAL at 21:11

## 2018-01-01 RX ADMIN — HEPARIN SODIUM 5000 UNIT(S): 5000 INJECTION INTRAVENOUS; SUBCUTANEOUS at 15:00

## 2018-01-01 RX ADMIN — Medication 1 PUFF(S): at 20:15

## 2018-01-01 RX ADMIN — PIPERACILLIN AND TAZOBACTAM 200 GRAM(S): 4; .5 INJECTION, POWDER, LYOPHILIZED, FOR SOLUTION INTRAVENOUS at 18:25

## 2018-01-01 RX ADMIN — Medication 50 MILLIEQUIVALENT(S): at 13:04

## 2018-01-01 RX ADMIN — Medication 60 MILLIGRAM(S): at 17:38

## 2018-01-01 RX ADMIN — PANTOPRAZOLE SODIUM 40 MILLIGRAM(S): 20 TABLET, DELAYED RELEASE ORAL at 17:43

## 2018-01-01 RX ADMIN — CHLORHEXIDINE GLUCONATE 15 MILLILITER(S): 213 SOLUTION TOPICAL at 18:14

## 2018-01-01 RX ADMIN — CHLORHEXIDINE GLUCONATE 15 MILLILITER(S): 213 SOLUTION TOPICAL at 18:00

## 2018-01-01 RX ADMIN — NYSTATIN CREAM 1 APPLICATION(S): 100000 CREAM TOPICAL at 18:15

## 2018-01-01 RX ADMIN — ENOXAPARIN SODIUM 80 MILLIGRAM(S): 100 INJECTION SUBCUTANEOUS at 17:27

## 2018-01-01 RX ADMIN — SODIUM CHLORIDE 75 MILLILITER(S): 9 INJECTION, SOLUTION INTRAVENOUS at 10:30

## 2018-01-01 RX ADMIN — NYSTATIN CREAM 1 APPLICATION(S): 100000 CREAM TOPICAL at 17:44

## 2018-01-01 RX ADMIN — SIMVASTATIN 5 MILLIGRAM(S): 20 TABLET, FILM COATED ORAL at 22:10

## 2018-01-01 RX ADMIN — Medication 50 MILLIEQUIVALENT(S): at 09:07

## 2018-01-01 RX ADMIN — DEXMEDETOMIDINE HYDROCHLORIDE IN 0.9% SODIUM CHLORIDE 10 MICROGRAM(S)/KG/HR: 4 INJECTION INTRAVENOUS at 00:46

## 2018-01-01 RX ADMIN — NYSTATIN CREAM 1 APPLICATION(S): 100000 CREAM TOPICAL at 05:22

## 2018-01-01 RX ADMIN — PANTOPRAZOLE SODIUM 40 MILLIGRAM(S): 20 TABLET, DELAYED RELEASE ORAL at 05:40

## 2018-01-01 RX ADMIN — CHLORHEXIDINE GLUCONATE 1 APPLICATION(S): 213 SOLUTION TOPICAL at 05:03

## 2018-01-01 RX ADMIN — FENTANYL CITRATE 4 MICROGRAM(S)/KG/HR: 50 INJECTION INTRAVENOUS at 05:54

## 2018-01-01 RX ADMIN — CHLORHEXIDINE GLUCONATE 15 MILLILITER(S): 213 SOLUTION TOPICAL at 18:17

## 2018-01-01 RX ADMIN — PANTOPRAZOLE SODIUM 40 MILLIGRAM(S): 20 TABLET, DELAYED RELEASE ORAL at 17:19

## 2018-01-01 RX ADMIN — DEXMEDETOMIDINE HYDROCHLORIDE IN 0.9% SODIUM CHLORIDE 10 MICROGRAM(S)/KG/HR: 4 INJECTION INTRAVENOUS at 22:17

## 2018-01-01 RX ADMIN — Medication 110 MILLIGRAM(S): at 18:11

## 2018-01-01 RX ADMIN — NYSTATIN CREAM 1 APPLICATION(S): 100000 CREAM TOPICAL at 18:01

## 2018-01-01 RX ADMIN — CHLORHEXIDINE GLUCONATE 15 MILLILITER(S): 213 SOLUTION TOPICAL at 05:48

## 2018-01-01 RX ADMIN — Medication 50 MILLIEQUIVALENT(S): at 09:53

## 2018-01-01 RX ADMIN — ALBUTEROL 1 PUFF(S): 90 AEROSOL, METERED ORAL at 13:03

## 2018-01-01 RX ADMIN — HEPARIN SODIUM 5000 UNIT(S): 5000 INJECTION INTRAVENOUS; SUBCUTANEOUS at 06:30

## 2018-01-01 RX ADMIN — FENTANYL CITRATE 50 MICROGRAM(S): 50 INJECTION INTRAVENOUS at 10:15

## 2018-01-01 RX ADMIN — CHLORHEXIDINE GLUCONATE 15 MILLILITER(S): 213 SOLUTION TOPICAL at 17:07

## 2018-01-01 RX ADMIN — Medication 60 MILLIGRAM(S): at 22:09

## 2018-01-01 RX ADMIN — PANTOPRAZOLE SODIUM 40 MILLIGRAM(S): 20 TABLET, DELAYED RELEASE ORAL at 05:44

## 2018-01-01 RX ADMIN — Medication 60 MILLIGRAM(S): at 14:10

## 2018-01-01 RX ADMIN — CHLORHEXIDINE GLUCONATE 1 APPLICATION(S): 213 SOLUTION TOPICAL at 06:31

## 2018-01-01 RX ADMIN — DEXMEDETOMIDINE HYDROCHLORIDE IN 0.9% SODIUM CHLORIDE 10 MICROGRAM(S)/KG/HR: 4 INJECTION INTRAVENOUS at 05:42

## 2018-01-01 RX ADMIN — NYSTATIN CREAM 1 APPLICATION(S): 100000 CREAM TOPICAL at 05:36

## 2018-01-01 RX ADMIN — LACTULOSE 10 GRAM(S): 10 SOLUTION ORAL at 13:07

## 2018-01-01 RX ADMIN — CHLORHEXIDINE GLUCONATE 15 MILLILITER(S): 213 SOLUTION TOPICAL at 05:28

## 2018-01-01 RX ADMIN — Medication 60 MILLIGRAM(S): at 14:24

## 2018-01-01 RX ADMIN — NYSTATIN CREAM 1 APPLICATION(S): 100000 CREAM TOPICAL at 05:55

## 2018-01-01 RX ADMIN — Medication 60 MILLIGRAM(S): at 18:00

## 2018-01-01 RX ADMIN — PIPERACILLIN AND TAZOBACTAM 200 GRAM(S): 4; .5 INJECTION, POWDER, LYOPHILIZED, FOR SOLUTION INTRAVENOUS at 17:19

## 2018-01-01 RX ADMIN — Medication 60 MILLIGRAM(S): at 17:37

## 2018-01-01 RX ADMIN — Medication 12.5 MILLIGRAM(S): at 18:33

## 2018-01-01 RX ADMIN — Medication 12.5 MILLIGRAM(S): at 09:00

## 2018-01-01 RX ADMIN — NYSTATIN CREAM 1 APPLICATION(S): 100000 CREAM TOPICAL at 19:01

## 2018-01-01 RX ADMIN — Medication 60 MILLIGRAM(S): at 06:28

## 2018-01-01 RX ADMIN — PANTOPRAZOLE SODIUM 40 MILLIGRAM(S): 20 TABLET, DELAYED RELEASE ORAL at 17:37

## 2018-01-01 RX ADMIN — PANTOPRAZOLE SODIUM 40 MILLIGRAM(S): 20 TABLET, DELAYED RELEASE ORAL at 18:00

## 2018-01-01 RX ADMIN — Medication 110 MILLIGRAM(S): at 17:24

## 2018-01-01 RX ADMIN — Medication 116 MILLIGRAM(S): at 02:21

## 2018-01-01 RX ADMIN — PIPERACILLIN AND TAZOBACTAM 200 GRAM(S): 4; .5 INJECTION, POWDER, LYOPHILIZED, FOR SOLUTION INTRAVENOUS at 17:33

## 2018-01-01 RX ADMIN — HEPARIN SODIUM 5000 UNIT(S): 5000 INJECTION INTRAVENOUS; SUBCUTANEOUS at 22:07

## 2018-01-01 RX ADMIN — Medication 60 MILLIGRAM(S): at 05:48

## 2018-01-01 RX ADMIN — DEXMEDETOMIDINE HYDROCHLORIDE IN 0.9% SODIUM CHLORIDE 10 MICROGRAM(S)/KG/HR: 4 INJECTION INTRAVENOUS at 21:37

## 2018-01-01 RX ADMIN — Medication 3 MILLILITER(S): at 12:57

## 2018-01-01 RX ADMIN — Medication 650 MILLIGRAM(S): at 17:32

## 2018-01-01 RX ADMIN — Medication 125 MILLIGRAM(S): at 11:55

## 2018-01-01 RX ADMIN — Medication 266 MILLIGRAM(S): at 05:06

## 2018-01-01 RX ADMIN — ENOXAPARIN SODIUM 80 MILLIGRAM(S): 100 INJECTION SUBCUTANEOUS at 06:11

## 2018-01-01 RX ADMIN — PIPERACILLIN AND TAZOBACTAM 200 GRAM(S): 4; .5 INJECTION, POWDER, LYOPHILIZED, FOR SOLUTION INTRAVENOUS at 05:44

## 2018-01-01 RX ADMIN — Medication 60 MILLIGRAM(S): at 05:20

## 2018-01-01 RX ADMIN — Medication 3 MILLILITER(S): at 01:20

## 2018-01-01 RX ADMIN — Medication 116 MILLIGRAM(S): at 20:15

## 2018-01-01 RX ADMIN — Medication 3.75 MICROGRAM(S)/KG/MIN: at 19:58

## 2018-01-01 RX ADMIN — ENOXAPARIN SODIUM 80 MILLIGRAM(S): 100 INJECTION SUBCUTANEOUS at 05:32

## 2018-01-01 RX ADMIN — Medication 110 MILLIGRAM(S): at 15:33

## 2018-01-01 RX ADMIN — SIMVASTATIN 5 MILLIGRAM(S): 20 TABLET, FILM COATED ORAL at 22:47

## 2018-01-01 RX ADMIN — ENOXAPARIN SODIUM 80 MILLIGRAM(S): 100 INJECTION SUBCUTANEOUS at 05:37

## 2018-01-01 RX ADMIN — ENOXAPARIN SODIUM 80 MILLIGRAM(S): 100 INJECTION SUBCUTANEOUS at 18:18

## 2018-01-01 RX ADMIN — PROPOFOL 2.4 MICROGRAM(S)/KG/MIN: 10 INJECTION, EMULSION INTRAVENOUS at 14:35

## 2018-01-01 RX ADMIN — Medication 1 TABLET(S): at 11:59

## 2018-01-01 RX ADMIN — FENTANYL CITRATE 4 MICROGRAM(S)/KG/HR: 50 INJECTION INTRAVENOUS at 19:49

## 2018-01-01 RX ADMIN — Medication 40 MILLIGRAM(S): at 06:17

## 2018-01-01 RX ADMIN — MIDAZOLAM HYDROCHLORIDE 2 MILLIGRAM(S): 1 INJECTION, SOLUTION INTRAMUSCULAR; INTRAVENOUS at 10:34

## 2018-01-01 RX ADMIN — Medication 116 MILLIGRAM(S): at 03:54

## 2018-01-01 RX ADMIN — ALBUTEROL 1 PUFF(S): 90 AEROSOL, METERED ORAL at 20:15

## 2018-01-01 RX ADMIN — SCOPALAMINE 1 PATCH: 1 PATCH, EXTENDED RELEASE TRANSDERMAL at 07:01

## 2018-01-01 RX ADMIN — Medication 125 MILLIGRAM(S): at 23:18

## 2018-01-01 RX ADMIN — Medication 1 TABLET(S): at 12:02

## 2018-01-01 RX ADMIN — Medication 3 MILLILITER(S): at 08:00

## 2018-01-01 RX ADMIN — Medication 110 MILLIGRAM(S): at 07:01

## 2018-01-01 RX ADMIN — CHLORHEXIDINE GLUCONATE 15 MILLILITER(S): 213 SOLUTION TOPICAL at 05:02

## 2018-01-01 RX ADMIN — PANTOPRAZOLE SODIUM 40 MILLIGRAM(S): 20 TABLET, DELAYED RELEASE ORAL at 18:16

## 2018-01-01 RX ADMIN — CHLORHEXIDINE GLUCONATE 15 MILLILITER(S): 213 SOLUTION TOPICAL at 17:35

## 2018-01-01 RX ADMIN — Medication 200 MILLIGRAM(S): at 17:36

## 2018-01-01 RX ADMIN — CHLORHEXIDINE GLUCONATE 1 APPLICATION(S): 213 SOLUTION TOPICAL at 05:37

## 2018-01-01 RX ADMIN — SIMVASTATIN 5 MILLIGRAM(S): 20 TABLET, FILM COATED ORAL at 22:20

## 2018-01-01 RX ADMIN — NYSTATIN CREAM 1 APPLICATION(S): 100000 CREAM TOPICAL at 05:37

## 2018-01-01 RX ADMIN — Medication 1 PUFF(S): at 01:25

## 2018-01-01 RX ADMIN — Medication 1 TABLET(S): at 12:26

## 2018-01-01 RX ADMIN — NYSTATIN CREAM 1 APPLICATION(S): 100000 CREAM TOPICAL at 06:31

## 2018-01-01 RX ADMIN — CHLORHEXIDINE GLUCONATE 15 MILLILITER(S): 213 SOLUTION TOPICAL at 17:37

## 2018-01-01 RX ADMIN — SCOPALAMINE 1 PATCH: 1 PATCH, EXTENDED RELEASE TRANSDERMAL at 22:48

## 2018-01-01 RX ADMIN — NYSTATIN CREAM 1 APPLICATION(S): 100000 CREAM TOPICAL at 05:43

## 2018-01-01 RX ADMIN — CHLORHEXIDINE GLUCONATE 15 MILLILITER(S): 213 SOLUTION TOPICAL at 17:38

## 2018-01-01 RX ADMIN — NYSTATIN CREAM 1 APPLICATION(S): 100000 CREAM TOPICAL at 17:47

## 2018-01-01 RX ADMIN — CHLORHEXIDINE GLUCONATE 15 MILLILITER(S): 213 SOLUTION TOPICAL at 17:45

## 2018-01-01 RX ADMIN — LACTULOSE 10 GRAM(S): 10 SOLUTION ORAL at 12:02

## 2018-01-01 RX ADMIN — SODIUM CHLORIDE 50 MILLILITER(S): 9 INJECTION, SOLUTION INTRAVENOUS at 12:42

## 2018-01-01 RX ADMIN — CHLORHEXIDINE GLUCONATE 1 APPLICATION(S): 213 SOLUTION TOPICAL at 05:29

## 2018-01-01 RX ADMIN — PANTOPRAZOLE SODIUM 40 MILLIGRAM(S): 20 TABLET, DELAYED RELEASE ORAL at 17:33

## 2018-01-01 RX ADMIN — MIDAZOLAM HYDROCHLORIDE 2 MILLIGRAM(S): 1 INJECTION, SOLUTION INTRAMUSCULAR; INTRAVENOUS at 19:03

## 2018-01-01 RX ADMIN — NYSTATIN CREAM 1 APPLICATION(S): 100000 CREAM TOPICAL at 18:32

## 2018-01-01 RX ADMIN — Medication 125 MILLIGRAM(S): at 14:09

## 2018-01-01 RX ADMIN — PIPERACILLIN AND TAZOBACTAM 200 GRAM(S): 4; .5 INJECTION, POWDER, LYOPHILIZED, FOR SOLUTION INTRAVENOUS at 11:40

## 2018-01-01 RX ADMIN — Medication 110 MILLIGRAM(S): at 05:23

## 2018-01-01 RX ADMIN — Medication 3 MILLILITER(S): at 08:39

## 2018-01-01 RX ADMIN — CHLORHEXIDINE GLUCONATE 1 APPLICATION(S): 213 SOLUTION TOPICAL at 05:33

## 2018-01-01 RX ADMIN — Medication 3 MILLILITER(S): at 02:21

## 2018-01-01 RX ADMIN — Medication 125 MILLIGRAM(S): at 05:23

## 2018-01-01 RX ADMIN — HEPARIN SODIUM 5000 UNIT(S): 5000 INJECTION INTRAVENOUS; SUBCUTANEOUS at 13:39

## 2018-01-01 RX ADMIN — Medication 110 MILLIGRAM(S): at 18:14

## 2018-01-01 RX ADMIN — ALBUTEROL 1 PUFF(S): 90 AEROSOL, METERED ORAL at 08:30

## 2018-01-01 RX ADMIN — PIPERACILLIN AND TAZOBACTAM 200 GRAM(S): 4; .5 INJECTION, POWDER, LYOPHILIZED, FOR SOLUTION INTRAVENOUS at 00:32

## 2018-01-01 RX ADMIN — CEFTRIAXONE 100 GRAM(S): 500 INJECTION, POWDER, FOR SOLUTION INTRAMUSCULAR; INTRAVENOUS at 15:16

## 2018-01-01 RX ADMIN — NYSTATIN CREAM 1 APPLICATION(S): 100000 CREAM TOPICAL at 05:24

## 2018-01-01 RX ADMIN — PROPOFOL 2.4 MICROGRAM(S)/KG/MIN: 10 INJECTION, EMULSION INTRAVENOUS at 12:01

## 2018-01-01 RX ADMIN — SCOPALAMINE 1 PATCH: 1 PATCH, EXTENDED RELEASE TRANSDERMAL at 18:02

## 2018-01-01 RX ADMIN — Medication 116 MILLIGRAM(S): at 03:07

## 2018-01-01 RX ADMIN — Medication 3 MILLILITER(S): at 08:06

## 2018-01-01 RX ADMIN — Medication 60 MILLIGRAM(S): at 12:08

## 2018-01-01 RX ADMIN — DEXMEDETOMIDINE HYDROCHLORIDE IN 0.9% SODIUM CHLORIDE 10 MICROGRAM(S)/KG/HR: 4 INJECTION INTRAVENOUS at 12:10

## 2018-01-01 RX ADMIN — CHLORHEXIDINE GLUCONATE 15 MILLILITER(S): 213 SOLUTION TOPICAL at 05:42

## 2018-01-01 RX ADMIN — HEPARIN SODIUM 5000 UNIT(S): 5000 INJECTION INTRAVENOUS; SUBCUTANEOUS at 05:44

## 2018-01-01 RX ADMIN — LACTULOSE 10 GRAM(S): 10 SOLUTION ORAL at 12:19

## 2018-01-01 RX ADMIN — Medication 7.5 MICROGRAM(S)/KG/MIN: at 07:00

## 2018-01-01 RX ADMIN — CHLORHEXIDINE GLUCONATE 15 MILLILITER(S): 213 SOLUTION TOPICAL at 17:26

## 2018-01-01 RX ADMIN — PIPERACILLIN AND TAZOBACTAM 200 GRAM(S): 4; .5 INJECTION, POWDER, LYOPHILIZED, FOR SOLUTION INTRAVENOUS at 12:47

## 2018-01-01 RX ADMIN — PANTOPRAZOLE SODIUM 40 MILLIGRAM(S): 20 TABLET, DELAYED RELEASE ORAL at 05:43

## 2018-01-01 RX ADMIN — ALBUTEROL 1 PUFF(S): 90 AEROSOL, METERED ORAL at 09:11

## 2018-01-01 RX ADMIN — Medication 60 MILLIGRAM(S): at 21:38

## 2018-01-01 RX ADMIN — PROPOFOL 2.4 MICROGRAM(S)/KG/MIN: 10 INJECTION, EMULSION INTRAVENOUS at 17:37

## 2018-01-01 RX ADMIN — Medication 3 MILLILITER(S): at 07:21

## 2018-01-01 RX ADMIN — HEPARIN SODIUM 5000 UNIT(S): 5000 INJECTION INTRAVENOUS; SUBCUTANEOUS at 05:56

## 2018-01-01 RX ADMIN — PANTOPRAZOLE SODIUM 40 MILLIGRAM(S): 20 TABLET, DELAYED RELEASE ORAL at 05:36

## 2018-01-01 RX ADMIN — Medication 60 MILLIGRAM(S): at 05:43

## 2018-01-01 RX ADMIN — Medication 125 MILLIGRAM(S): at 05:29

## 2018-01-01 RX ADMIN — CHLORHEXIDINE GLUCONATE 1 APPLICATION(S): 213 SOLUTION TOPICAL at 05:36

## 2018-01-01 RX ADMIN — CHLORHEXIDINE GLUCONATE 15 MILLILITER(S): 213 SOLUTION TOPICAL at 05:07

## 2018-01-01 RX ADMIN — HEPARIN SODIUM 5000 UNIT(S): 5000 INJECTION INTRAVENOUS; SUBCUTANEOUS at 22:20

## 2018-01-01 RX ADMIN — SODIUM CHLORIDE 75 MILLILITER(S): 9 INJECTION INTRAMUSCULAR; INTRAVENOUS; SUBCUTANEOUS at 00:05

## 2018-01-01 RX ADMIN — Medication 60 MILLIGRAM(S): at 21:41

## 2018-01-01 RX ADMIN — NYSTATIN CREAM 1 APPLICATION(S): 100000 CREAM TOPICAL at 06:12

## 2018-01-01 RX ADMIN — Medication 40 MILLIGRAM(S): at 21:30

## 2018-01-01 RX ADMIN — Medication 60 MILLIGRAM(S): at 18:17

## 2018-01-01 RX ADMIN — CHLORHEXIDINE GLUCONATE 15 MILLILITER(S): 213 SOLUTION TOPICAL at 17:36

## 2018-01-01 RX ADMIN — Medication 40 MILLIGRAM(S): at 06:30

## 2018-01-01 RX ADMIN — Medication 60 MILLIGRAM(S): at 05:32

## 2018-01-01 RX ADMIN — Medication 3 MILLILITER(S): at 02:19

## 2018-01-01 RX ADMIN — Medication 60 MILLIGRAM(S): at 05:07

## 2018-01-01 RX ADMIN — CHLORHEXIDINE GLUCONATE 1 APPLICATION(S): 213 SOLUTION TOPICAL at 05:56

## 2018-01-01 RX ADMIN — SCOPALAMINE 1 PATCH: 1 PATCH, EXTENDED RELEASE TRANSDERMAL at 06:34

## 2018-01-01 RX ADMIN — VECURONIUM BROMIDE 5 MILLIGRAM(S): 20 INJECTION, POWDER, FOR SOLUTION INTRAVENOUS at 13:15

## 2018-01-01 RX ADMIN — NYSTATIN CREAM 1 APPLICATION(S): 100000 CREAM TOPICAL at 17:29

## 2018-01-01 RX ADMIN — Medication 1 TABLET(S): at 18:10

## 2018-01-01 RX ADMIN — Medication 266 MILLIGRAM(S): at 21:47

## 2018-01-01 RX ADMIN — DEXMEDETOMIDINE HYDROCHLORIDE IN 0.9% SODIUM CHLORIDE 10 MICROGRAM(S)/KG/HR: 4 INJECTION INTRAVENOUS at 19:58

## 2018-01-01 RX ADMIN — FENTANYL CITRATE 4 MICROGRAM(S)/KG/HR: 50 INJECTION INTRAVENOUS at 05:42

## 2018-01-01 RX ADMIN — PIPERACILLIN AND TAZOBACTAM 200 GRAM(S): 4; .5 INJECTION, POWDER, LYOPHILIZED, FOR SOLUTION INTRAVENOUS at 06:11

## 2018-01-01 RX ADMIN — SIMVASTATIN 5 MILLIGRAM(S): 20 TABLET, FILM COATED ORAL at 21:31

## 2018-01-01 RX ADMIN — Medication 116 MILLIGRAM(S): at 12:47

## 2018-01-01 RX ADMIN — NYSTATIN CREAM 1 APPLICATION(S): 100000 CREAM TOPICAL at 18:21

## 2018-01-01 RX ADMIN — LACTULOSE 10 GRAM(S): 10 SOLUTION ORAL at 12:26

## 2018-01-01 RX ADMIN — SIMVASTATIN 5 MILLIGRAM(S): 20 TABLET, FILM COATED ORAL at 21:22

## 2018-01-01 RX ADMIN — Medication 125 MILLIGRAM(S): at 05:36

## 2018-01-01 RX ADMIN — PANTOPRAZOLE SODIUM 40 MILLIGRAM(S): 20 TABLET, DELAYED RELEASE ORAL at 18:19

## 2018-01-01 RX ADMIN — MIDAZOLAM HYDROCHLORIDE 2 MILLIGRAM(S): 1 INJECTION, SOLUTION INTRAMUSCULAR; INTRAVENOUS at 08:45

## 2018-01-01 RX ADMIN — CHLORHEXIDINE GLUCONATE 1 APPLICATION(S): 213 SOLUTION TOPICAL at 05:44

## 2018-01-01 RX ADMIN — Medication 50 MILLIEQUIVALENT(S): at 23:39

## 2018-01-01 RX ADMIN — Medication 40 MILLIEQUIVALENT(S): at 01:05

## 2018-01-01 RX ADMIN — Medication 125 MILLIGRAM(S): at 17:25

## 2018-01-01 RX ADMIN — SIMVASTATIN 5 MILLIGRAM(S): 20 TABLET, FILM COATED ORAL at 22:07

## 2018-01-01 RX ADMIN — HEPARIN SODIUM 5000 UNIT(S): 5000 INJECTION INTRAVENOUS; SUBCUTANEOUS at 21:22

## 2018-01-01 RX ADMIN — Medication 3 MILLILITER(S): at 20:39

## 2018-01-01 RX ADMIN — Medication 116 MILLIGRAM(S): at 18:26

## 2018-01-01 RX ADMIN — SIMVASTATIN 5 MILLIGRAM(S): 20 TABLET, FILM COATED ORAL at 21:39

## 2018-01-01 RX ADMIN — CHLORHEXIDINE GLUCONATE 15 MILLILITER(S): 213 SOLUTION TOPICAL at 05:32

## 2018-01-01 RX ADMIN — CEFTRIAXONE 100 GRAM(S): 500 INJECTION, POWDER, FOR SOLUTION INTRAMUSCULAR; INTRAVENOUS at 14:54

## 2018-01-01 RX ADMIN — SCOPALAMINE 1 PATCH: 1 PATCH, EXTENDED RELEASE TRANSDERMAL at 17:02

## 2018-01-01 RX ADMIN — PIPERACILLIN AND TAZOBACTAM 200 GRAM(S): 4; .5 INJECTION, POWDER, LYOPHILIZED, FOR SOLUTION INTRAVENOUS at 12:40

## 2018-01-01 RX ADMIN — Medication 3 MILLILITER(S): at 21:15

## 2018-01-01 RX ADMIN — Medication 20 MILLIGRAM(S): at 11:45

## 2018-01-01 RX ADMIN — CHLORHEXIDINE GLUCONATE 1 APPLICATION(S): 213 SOLUTION TOPICAL at 05:24

## 2018-01-01 RX ADMIN — Medication 110 MILLIGRAM(S): at 05:44

## 2018-01-01 RX ADMIN — Medication 650 MILLIGRAM(S): at 18:05

## 2018-01-01 RX ADMIN — Medication 116 MILLIGRAM(S): at 12:29

## 2018-01-01 RX ADMIN — PANTOPRAZOLE SODIUM 40 MILLIGRAM(S): 20 TABLET, DELAYED RELEASE ORAL at 17:34

## 2018-01-01 RX ADMIN — CHLORHEXIDINE GLUCONATE 1 APPLICATION(S): 213 SOLUTION TOPICAL at 22:07

## 2018-01-01 RX ADMIN — DEXMEDETOMIDINE HYDROCHLORIDE IN 0.9% SODIUM CHLORIDE 10 MICROGRAM(S)/KG/HR: 4 INJECTION INTRAVENOUS at 19:43

## 2018-01-01 RX ADMIN — DEXMEDETOMIDINE HYDROCHLORIDE IN 0.9% SODIUM CHLORIDE 10 MICROGRAM(S)/KG/HR: 4 INJECTION INTRAVENOUS at 05:55

## 2018-01-01 RX ADMIN — Medication 116 MILLIGRAM(S): at 20:54

## 2018-01-01 RX ADMIN — HEPARIN SODIUM 5000 UNIT(S): 5000 INJECTION INTRAVENOUS; SUBCUTANEOUS at 21:43

## 2018-01-01 RX ADMIN — NYSTATIN CREAM 1 APPLICATION(S): 100000 CREAM TOPICAL at 05:44

## 2018-01-01 RX ADMIN — Medication 125 MILLIGRAM(S): at 18:44

## 2018-01-01 RX ADMIN — SCOPALAMINE 1 PATCH: 1 PATCH, EXTENDED RELEASE TRANSDERMAL at 18:35

## 2018-01-01 RX ADMIN — Medication 110 MILLIGRAM(S): at 17:35

## 2018-01-01 RX ADMIN — Medication 125 MILLIGRAM(S): at 11:46

## 2018-01-01 RX ADMIN — HEPARIN SODIUM 5000 UNIT(S): 5000 INJECTION INTRAVENOUS; SUBCUTANEOUS at 14:28

## 2018-01-01 RX ADMIN — PANTOPRAZOLE SODIUM 40 MILLIGRAM(S): 20 TABLET, DELAYED RELEASE ORAL at 05:37

## 2018-01-01 RX ADMIN — Medication 60 MILLIGRAM(S): at 05:55

## 2018-01-01 RX ADMIN — HEPARIN SODIUM 5000 UNIT(S): 5000 INJECTION INTRAVENOUS; SUBCUTANEOUS at 15:35

## 2018-01-01 RX ADMIN — FENTANYL CITRATE 25 MICROGRAM(S): 50 INJECTION INTRAVENOUS at 08:00

## 2018-01-01 RX ADMIN — ALBUTEROL 1 PUFF(S): 90 AEROSOL, METERED ORAL at 22:09

## 2018-01-01 RX ADMIN — PIPERACILLIN AND TAZOBACTAM 200 GRAM(S): 4; .5 INJECTION, POWDER, LYOPHILIZED, FOR SOLUTION INTRAVENOUS at 00:45

## 2018-01-01 RX ADMIN — Medication 125 MILLIGRAM(S): at 00:00

## 2018-01-01 RX ADMIN — CHLORHEXIDINE GLUCONATE 1 APPLICATION(S): 213 SOLUTION TOPICAL at 05:35

## 2018-01-01 RX ADMIN — CHLORHEXIDINE GLUCONATE 1 APPLICATION(S): 213 SOLUTION TOPICAL at 05:07

## 2018-01-01 RX ADMIN — PROPOFOL 4.8 MICROGRAM(S)/KG/MIN: 10 INJECTION, EMULSION INTRAVENOUS at 13:00

## 2018-01-01 RX ADMIN — NYSTATIN CREAM 1 APPLICATION(S): 100000 CREAM TOPICAL at 05:39

## 2018-01-01 RX ADMIN — NYSTATIN CREAM 1 APPLICATION(S): 100000 CREAM TOPICAL at 17:34

## 2018-01-01 RX ADMIN — LACTULOSE 10 GRAM(S): 10 SOLUTION ORAL at 11:42

## 2018-01-01 RX ADMIN — PANTOPRAZOLE SODIUM 40 MILLIGRAM(S): 20 TABLET, DELAYED RELEASE ORAL at 05:21

## 2018-01-01 RX ADMIN — Medication 110 MILLIGRAM(S): at 05:52

## 2018-01-01 RX ADMIN — NYSTATIN CREAM 1 APPLICATION(S): 100000 CREAM TOPICAL at 05:32

## 2018-01-01 RX ADMIN — Medication 110 MILLIGRAM(S): at 05:28

## 2018-01-01 RX ADMIN — HEPARIN SODIUM 5000 UNIT(S): 5000 INJECTION INTRAVENOUS; SUBCUTANEOUS at 21:08

## 2018-01-01 RX ADMIN — Medication 60 MILLIGRAM(S): at 18:12

## 2018-01-01 RX ADMIN — PANTOPRAZOLE SODIUM 40 MILLIGRAM(S): 20 TABLET, DELAYED RELEASE ORAL at 06:31

## 2018-01-01 RX ADMIN — CHLORHEXIDINE GLUCONATE 15 MILLILITER(S): 213 SOLUTION TOPICAL at 07:19

## 2018-01-01 RX ADMIN — FENTANYL CITRATE 4 MICROGRAM(S)/KG/HR: 50 INJECTION INTRAVENOUS at 19:58

## 2018-01-01 RX ADMIN — ENOXAPARIN SODIUM 80 MILLIGRAM(S): 100 INJECTION SUBCUTANEOUS at 18:33

## 2018-01-01 RX ADMIN — PIPERACILLIN AND TAZOBACTAM 200 GRAM(S): 4; .5 INJECTION, POWDER, LYOPHILIZED, FOR SOLUTION INTRAVENOUS at 05:06

## 2018-01-01 RX ADMIN — HEPARIN SODIUM 5000 UNIT(S): 5000 INJECTION INTRAVENOUS; SUBCUTANEOUS at 13:20

## 2018-01-01 RX ADMIN — HEPARIN SODIUM 5000 UNIT(S): 5000 INJECTION INTRAVENOUS; SUBCUTANEOUS at 05:36

## 2018-01-01 RX ADMIN — Medication 60 MILLIGRAM(S): at 05:44

## 2018-01-01 RX ADMIN — Medication 110 MILLIGRAM(S): at 05:54

## 2018-01-01 RX ADMIN — PIPERACILLIN AND TAZOBACTAM 200 GRAM(S): 4; .5 INJECTION, POWDER, LYOPHILIZED, FOR SOLUTION INTRAVENOUS at 23:39

## 2018-01-01 RX ADMIN — VALACYCLOVIR 1000 MILLIGRAM(S): 500 TABLET, FILM COATED ORAL at 11:31

## 2018-01-01 RX ADMIN — NYSTATIN CREAM 1 APPLICATION(S): 100000 CREAM TOPICAL at 05:30

## 2018-01-01 RX ADMIN — LACTULOSE 10 GRAM(S): 10 SOLUTION ORAL at 12:00

## 2018-01-01 RX ADMIN — PIPERACILLIN AND TAZOBACTAM 200 GRAM(S): 4; .5 INJECTION, POWDER, LYOPHILIZED, FOR SOLUTION INTRAVENOUS at 11:28

## 2018-01-01 RX ADMIN — NYSTATIN CREAM 1 APPLICATION(S): 100000 CREAM TOPICAL at 05:45

## 2018-01-01 RX ADMIN — Medication 60 MILLIGRAM(S): at 05:42

## 2018-01-01 RX ADMIN — Medication 60 MILLIGRAM(S): at 17:07

## 2018-01-01 RX ADMIN — SCOPALAMINE 1 PATCH: 1 PATCH, EXTENDED RELEASE TRANSDERMAL at 22:06

## 2018-01-01 RX ADMIN — PANTOPRAZOLE SODIUM 40 MILLIGRAM(S): 20 TABLET, DELAYED RELEASE ORAL at 05:55

## 2018-01-01 RX ADMIN — PIPERACILLIN AND TAZOBACTAM 200 GRAM(S): 4; .5 INJECTION, POWDER, LYOPHILIZED, FOR SOLUTION INTRAVENOUS at 11:46

## 2018-01-01 RX ADMIN — NYSTATIN CREAM 1 APPLICATION(S): 100000 CREAM TOPICAL at 18:23

## 2018-01-01 RX ADMIN — Medication 110 MILLIGRAM(S): at 17:45

## 2018-01-01 RX ADMIN — PIPERACILLIN AND TAZOBACTAM 200 GRAM(S): 4; .5 INJECTION, POWDER, LYOPHILIZED, FOR SOLUTION INTRAVENOUS at 05:03

## 2018-01-01 RX ADMIN — Medication 3 MILLILITER(S): at 13:45

## 2018-01-01 RX ADMIN — HEPARIN SODIUM 5000 UNIT(S): 5000 INJECTION INTRAVENOUS; SUBCUTANEOUS at 21:38

## 2018-01-01 RX ADMIN — PANTOPRAZOLE SODIUM 40 MILLIGRAM(S): 20 TABLET, DELAYED RELEASE ORAL at 05:48

## 2018-01-01 RX ADMIN — HEPARIN SODIUM 5000 UNIT(S): 5000 INJECTION INTRAVENOUS; SUBCUTANEOUS at 05:33

## 2018-01-01 RX ADMIN — DEXMEDETOMIDINE HYDROCHLORIDE IN 0.9% SODIUM CHLORIDE 10 MICROGRAM(S)/KG/HR: 4 INJECTION INTRAVENOUS at 03:08

## 2018-01-01 RX ADMIN — PANTOPRAZOLE SODIUM 40 MILLIGRAM(S): 20 TABLET, DELAYED RELEASE ORAL at 05:23

## 2018-01-01 RX ADMIN — Medication 3 MILLILITER(S): at 13:30

## 2018-01-01 RX ADMIN — ENOXAPARIN SODIUM 80 MILLIGRAM(S): 100 INJECTION SUBCUTANEOUS at 20:00

## 2018-01-01 RX ADMIN — PIPERACILLIN AND TAZOBACTAM 200 GRAM(S): 4; .5 INJECTION, POWDER, LYOPHILIZED, FOR SOLUTION INTRAVENOUS at 05:39

## 2018-01-01 RX ADMIN — CHLORHEXIDINE GLUCONATE 1 APPLICATION(S): 213 SOLUTION TOPICAL at 06:10

## 2018-01-01 RX ADMIN — PANTOPRAZOLE SODIUM 40 MILLIGRAM(S): 20 TABLET, DELAYED RELEASE ORAL at 17:06

## 2018-01-01 RX ADMIN — Medication 1 PUFF(S): at 08:31

## 2018-01-01 RX ADMIN — PANTOPRAZOLE SODIUM 40 MILLIGRAM(S): 20 TABLET, DELAYED RELEASE ORAL at 17:46

## 2018-01-01 RX ADMIN — SIMVASTATIN 5 MILLIGRAM(S): 20 TABLET, FILM COATED ORAL at 21:41

## 2018-01-01 RX ADMIN — ALBUTEROL 1 PUFF(S): 90 AEROSOL, METERED ORAL at 01:25

## 2018-01-01 RX ADMIN — CHLORHEXIDINE GLUCONATE 15 MILLILITER(S): 213 SOLUTION TOPICAL at 05:24

## 2018-01-01 RX ADMIN — Medication 50 MILLIEQUIVALENT(S): at 17:28

## 2018-01-01 RX ADMIN — LACTULOSE 10 GRAM(S): 10 SOLUTION ORAL at 12:06

## 2018-01-01 RX ADMIN — SODIUM CHLORIDE 75 MILLILITER(S): 9 INJECTION INTRAMUSCULAR; INTRAVENOUS; SUBCUTANEOUS at 21:10

## 2018-01-01 RX ADMIN — NYSTATIN CREAM 1 APPLICATION(S): 100000 CREAM TOPICAL at 17:35

## 2018-01-01 RX ADMIN — FENTANYL CITRATE 4 MICROGRAM(S)/KG/HR: 50 INJECTION INTRAVENOUS at 12:21

## 2018-01-01 RX ADMIN — CHLORHEXIDINE GLUCONATE 15 MILLILITER(S): 213 SOLUTION TOPICAL at 18:44

## 2018-01-01 RX ADMIN — LACTULOSE 10 GRAM(S): 10 SOLUTION ORAL at 12:07

## 2018-01-01 RX ADMIN — PIPERACILLIN AND TAZOBACTAM 200 GRAM(S): 4; .5 INJECTION, POWDER, LYOPHILIZED, FOR SOLUTION INTRAVENOUS at 00:14

## 2018-01-01 RX ADMIN — NYSTATIN CREAM 1 APPLICATION(S): 100000 CREAM TOPICAL at 17:38

## 2018-01-01 RX ADMIN — Medication 3.75 MICROGRAM(S)/KG/MIN: at 20:00

## 2018-01-01 RX ADMIN — Medication 25 MILLIGRAM(S): at 06:18

## 2018-01-01 RX ADMIN — TIOTROPIUM BROMIDE 1 CAPSULE(S): 18 CAPSULE ORAL; RESPIRATORY (INHALATION) at 08:06

## 2018-01-01 RX ADMIN — Medication 116 MILLIGRAM(S): at 20:30

## 2018-01-01 RX ADMIN — CHLORHEXIDINE GLUCONATE 15 MILLILITER(S): 213 SOLUTION TOPICAL at 05:20

## 2018-01-01 RX ADMIN — PANTOPRAZOLE SODIUM 40 MILLIGRAM(S): 20 TABLET, DELAYED RELEASE ORAL at 18:11

## 2018-01-01 RX ADMIN — CHLORHEXIDINE GLUCONATE 15 MILLILITER(S): 213 SOLUTION TOPICAL at 18:12

## 2018-01-01 RX ADMIN — Medication 3 MILLILITER(S): at 04:30

## 2018-01-01 RX ADMIN — Medication 116 MILLIGRAM(S): at 12:39

## 2018-01-01 RX ADMIN — PIPERACILLIN AND TAZOBACTAM 200 GRAM(S): 4; .5 INJECTION, POWDER, LYOPHILIZED, FOR SOLUTION INTRAVENOUS at 00:33

## 2018-01-01 RX ADMIN — Medication 116 MILLIGRAM(S): at 02:04

## 2018-01-01 RX ADMIN — Medication 110 MILLIGRAM(S): at 18:44

## 2018-01-01 RX ADMIN — Medication 116 MILLIGRAM(S): at 12:57

## 2018-01-01 RX ADMIN — NYSTATIN CREAM 1 APPLICATION(S): 100000 CREAM TOPICAL at 17:08

## 2018-01-01 RX ADMIN — LACTULOSE 10 GRAM(S): 10 SOLUTION ORAL at 11:46

## 2018-01-01 RX ADMIN — Medication 110 MILLIGRAM(S): at 18:20

## 2018-01-01 RX ADMIN — HEPARIN SODIUM 5000 UNIT(S): 5000 INJECTION INTRAVENOUS; SUBCUTANEOUS at 14:19

## 2018-01-01 RX ADMIN — Medication 7.5 MICROGRAM(S)/KG/MIN: at 15:48

## 2018-01-01 RX ADMIN — PANTOPRAZOLE SODIUM 40 MILLIGRAM(S): 20 TABLET, DELAYED RELEASE ORAL at 17:47

## 2018-01-01 RX ADMIN — Medication 3 MILLILITER(S): at 22:16

## 2018-01-01 RX ADMIN — ENOXAPARIN SODIUM 80 MILLIGRAM(S): 100 INJECTION SUBCUTANEOUS at 06:30

## 2018-01-01 RX ADMIN — CHLORHEXIDINE GLUCONATE 15 MILLILITER(S): 213 SOLUTION TOPICAL at 05:40

## 2018-01-01 RX ADMIN — PIPERACILLIN AND TAZOBACTAM 200 GRAM(S): 4; .5 INJECTION, POWDER, LYOPHILIZED, FOR SOLUTION INTRAVENOUS at 18:33

## 2018-01-01 RX ADMIN — HEPARIN SODIUM 5000 UNIT(S): 5000 INJECTION INTRAVENOUS; SUBCUTANEOUS at 05:42

## 2018-01-01 RX ADMIN — NYSTATIN CREAM 1 APPLICATION(S): 100000 CREAM TOPICAL at 05:50

## 2018-01-01 RX ADMIN — PANTOPRAZOLE SODIUM 40 MILLIGRAM(S): 20 TABLET, DELAYED RELEASE ORAL at 17:26

## 2018-01-01 RX ADMIN — CHLORHEXIDINE GLUCONATE 15 MILLILITER(S): 213 SOLUTION TOPICAL at 05:55

## 2018-01-01 RX ADMIN — NYSTATIN CREAM 1 APPLICATION(S): 100000 CREAM TOPICAL at 17:27

## 2018-01-01 RX ADMIN — LACTULOSE 10 GRAM(S): 10 SOLUTION ORAL at 12:01

## 2018-01-01 RX ADMIN — PIPERACILLIN AND TAZOBACTAM 200 GRAM(S): 4; .5 INJECTION, POWDER, LYOPHILIZED, FOR SOLUTION INTRAVENOUS at 05:31

## 2018-01-01 RX ADMIN — Medication 60 MILLIGRAM(S): at 17:47

## 2018-01-01 RX ADMIN — MIDAZOLAM HYDROCHLORIDE 2 MILLIGRAM(S): 1 INJECTION, SOLUTION INTRAMUSCULAR; INTRAVENOUS at 16:32

## 2018-01-01 RX ADMIN — NYSTATIN CREAM 1 APPLICATION(S): 100000 CREAM TOPICAL at 18:18

## 2018-01-01 RX ADMIN — Medication 125 MILLIGRAM(S): at 23:03

## 2018-01-01 RX ADMIN — Medication 1 PUFF(S): at 22:10

## 2018-01-01 RX ADMIN — NYSTATIN CREAM 1 APPLICATION(S): 100000 CREAM TOPICAL at 05:07

## 2018-01-01 RX ADMIN — CHLORHEXIDINE GLUCONATE 15 MILLILITER(S): 213 SOLUTION TOPICAL at 17:19

## 2018-01-01 RX ADMIN — PIPERACILLIN AND TAZOBACTAM 200 GRAM(S): 4; .5 INJECTION, POWDER, LYOPHILIZED, FOR SOLUTION INTRAVENOUS at 11:42

## 2018-01-01 RX ADMIN — Medication 116 MILLIGRAM(S): at 06:08

## 2018-01-01 RX ADMIN — Medication 3 MILLILITER(S): at 20:53

## 2018-01-01 RX ADMIN — HEPARIN SODIUM 5000 UNIT(S): 5000 INJECTION INTRAVENOUS; SUBCUTANEOUS at 17:06

## 2018-01-01 RX ADMIN — Medication 1 PUFF(S): at 09:12

## 2018-01-01 RX ADMIN — DEXMEDETOMIDINE HYDROCHLORIDE IN 0.9% SODIUM CHLORIDE 10 MICROGRAM(S)/KG/HR: 4 INJECTION INTRAVENOUS at 21:08

## 2018-01-01 RX ADMIN — CHLORHEXIDINE GLUCONATE 1 APPLICATION(S): 213 SOLUTION TOPICAL at 05:51

## 2018-01-01 RX ADMIN — ENOXAPARIN SODIUM 80 MILLIGRAM(S): 100 INJECTION SUBCUTANEOUS at 05:44

## 2018-01-01 RX ADMIN — NYSTATIN CREAM 1 APPLICATION(S): 100000 CREAM TOPICAL at 05:02

## 2018-01-01 RX ADMIN — Medication 1 TABLET(S): at 12:06

## 2018-01-01 RX ADMIN — CHLORHEXIDINE GLUCONATE 15 MILLILITER(S): 213 SOLUTION TOPICAL at 06:30

## 2018-01-01 RX ADMIN — ENOXAPARIN SODIUM 80 MILLIGRAM(S): 100 INJECTION SUBCUTANEOUS at 06:28

## 2018-01-01 RX ADMIN — PIPERACILLIN AND TAZOBACTAM 200 GRAM(S): 4; .5 INJECTION, POWDER, LYOPHILIZED, FOR SOLUTION INTRAVENOUS at 17:09

## 2018-01-01 RX ADMIN — SIMVASTATIN 5 MILLIGRAM(S): 20 TABLET, FILM COATED ORAL at 21:18

## 2018-01-01 RX ADMIN — Medication 20 MILLIGRAM(S): at 09:51

## 2018-01-01 RX ADMIN — Medication 125 MILLIGRAM(S): at 17:40

## 2018-01-01 RX ADMIN — Medication 116 MILLIGRAM(S): at 21:46

## 2018-01-01 RX ADMIN — HEPARIN SODIUM 5000 UNIT(S): 5000 INJECTION INTRAVENOUS; SUBCUTANEOUS at 21:31

## 2018-01-01 RX ADMIN — Medication 60 MILLIGRAM(S): at 05:03

## 2018-01-01 RX ADMIN — Medication 1 PUFF(S): at 13:03

## 2018-12-11 NOTE — ED PROVIDER NOTE - PROGRESS NOTE DETAILS
Lactated ringers stopped and held less than 50 mL was given Jaylyn called and oredered Jaylyn called and ordered Patient to be admitted to an inpatient floor. Case discussed with and care endorsed to medical admitting resident. Admitting physician notified. I personally evaluated the patient. I reviewed the Physician Assistant Fellow's note and agree with the findings and plan. Patient improved and felt much better. cyanotic resolved BiPAP. I personally evaluated the patient. I reviewed the Physician Assistant Fellow's note and agree with the findings and plan.

## 2018-12-11 NOTE — ED PROVIDER NOTE - NS ED ROS FT
Review of Systems    Constitutional: (-) fever  Eyes/ENT: (-) change in vision, (-) sore throat, (-) ear pain  Cardiovascular: (-) chest pain, (+) palpitation   Respiratory: (-) cough  Gastrointestinal: (-) abdominal pain (-) vomiting, (-) diarrhea  Musculoskeletal: (-) neck pain, (-) back pain, (-) joint pain  Integumentary: (-) rash, (-) edema  Neurological: (-) headache, (-) altered mental status  Heme/Lymph: (-) easy bruising (-) easy bleeding  Allergic/Immunologic: (-) pruritus Review of Systems    Constitutional: (-) fever  Eyes/ENT: (-) change in vision, (-) sore throat, (-) ear pain  Cardiovascular: (-) chest pain, (+) palpitation   Respiratory:+ SOB.  (-) cough  Gastrointestinal: (-) abdominal pain (-) vomiting, (-) diarrhea  Musculoskeletal: (-) neck pain, (-) back pain, (-) joint pain  Integumentary: (+) rash, (-) edema  Neurological: (-) headache, (-) altered mental status  Heme/Lymph: (-) easy bruising (-) easy bleeding  Allergic/Immunologic: (-) pruritus

## 2018-12-11 NOTE — ED PROVIDER NOTE - OBJECTIVE STATEMENT
72 yo f w pmhx of htn reports with worsening weakness for 2 in durration and SOB that began 1 d ago not associated with CP. Denies n/v, daiphresis, cough, abd pain. No unilateral swelling, hemoptysis, estogen supplementation, malignancy, recent immobilization or surgery, or prior DVT/PE      I have reviewed available current nursing and previous documentation of past medical, surgical, family, and/or social history. 74 yo f w pmhx of htn reports with worsening weakness for 2 in duration and SOB that began 1 d ago not associated with CP. Denies n/v, diaphoresis, cough, abd pain. No unilateral swelling, hemoptysis, estrogen supplementation, malignancy, recent immobilization or surgery, or prior DVT/PE      I have reviewed available current nursing and previous documentation of past medical, surgical, family, and/or social history. 72 yo f w pmhx of htn reports with worsening weakness for 3 days in duration and SOB that began 1 d ago not associated with CP. Denies n/v, diaphoresis, cough, abd pain. No unilateral swelling, hemoptysis, estrogen supplementation, malignancy, recent immobilization or surgery, or prior DVT/PE      I have reviewed available current nursing and previous documentation of past medical, surgical, family, and/or social history. 74 yo f w pmhx of htn reports with worsening weakness for 3 days in duration and SOB that began 1 d ago not associated with CP. Denies n/v, diaphoresis, cough, abd pain. No unilateral swelling, hemoptysis, estrogen supplementation, malignancy, recent immobilization or surgery, or prior DVT/PE  Patient went to an local urgent care earlier today for rash to the right side of chest. She was given a cream. As per family, they found her SaO2 was 88% but she was told it was okay to go home. [place] worsening symptoms so she came to ED for evaluation.       I have reviewed available current nursing and previous documentation of past medical, surgical, family, and/or social history.

## 2018-12-11 NOTE — ED PROVIDER NOTE - CARE PLAN
Principal Discharge DX:	Shortness of breath  Secondary Diagnosis:	Elevated brain natriuretic peptide (BNP) level  Secondary Diagnosis:	Pulmonary edema  Secondary Diagnosis:	Hypocalcemia Principal Discharge DX:	Shortness of breath  Secondary Diagnosis:	Elevated brain natriuretic peptide (BNP) level  Secondary Diagnosis:	Pulmonary edema  Secondary Diagnosis:	Hypocalcemia  Secondary Diagnosis:	Herpes zoster

## 2018-12-11 NOTE — ED PROVIDER NOTE - MEDICAL DECISION MAKING DETAILS
73 female here for dyspnea has acute decompensated heart failure requiring emergent management by NIPPV and diuresis with significant improvement got IV labs imaging and will admit to monitored setting.

## 2018-12-11 NOTE — ED PROVIDER NOTE - ATTENDING CONTRIBUTION TO CARE
I personally evaluated the patient. I reviewed the Resident’s or Physician Assistant’s note (as assigned above), and agree with the findings and plan except as documented in my note.     73 female here for acute shortness of breath with weakness from home.  ROS otherwise negative. HPI and ROS limited due to severity of illness, family here to assist.     PMH: HTN    PE: female in no distre I personally evaluated the patient. I reviewed the Resident’s or Physician Assistant’s note (as assigned above), and agree with the findings and plan except as documented in my note.     73 female here for acute shortness of breath with weakness from home.  ROS otherwise negative. HPI and ROS limited due to severity of illness, family here to assist.     PMH: HTN    PE: female in moderate distress appears ill and shocky.  SpO2 severely hypoxic in triage. CHEST: diminished sounds bilaterally with heaving respirations, noted respiratory distress with rales noted. EXT: bilateral lower extremity edema noted CV: tachycardic, pulses intact.     Impression: ADHF, respiratory failure    Plan: resuscitation, NIPPV, IV labs imaging diuresis and admission to monitored setting

## 2018-12-11 NOTE — ED PROVIDER NOTE - PHYSICAL EXAMINATION
Physical Exam    Vital Signs: I have reviewed the initial vital signs.  Constitutional: well-nourished, appears stated age, moderate distress  Eyes: PERRLA, EOM intact, RAPD absent, no conjunctival injection, and symmetrical lids.  ENT: Neck supple with no adenopathy, moist MM.  Cardiovascular: regular rate, regular rhythm, well-perfused extremities  Respiratory: tachypnea, rales all lung fields more prominent at the bases, belly breathing, speaks in phrases.  Gastrointestinal: soft, non-tender abdomen, no pulsatile mass  Musculoskeletal: supple neck, no lower extremity edema  Integumentary: warm, dry, no rash  Neurologic: awake, alert, extremities’ motor and sensory functions grossly intact  Psychiatric: A&Ox3, appropriate mood, appropriate affect Physical Exam    Vital Signs: I have reviewed the initial vital signs.  Constitutional: well-nourished, appears stated age, moderate distress  Eyes: PERRLA, EOM intact, RAPD absent, no conjunctival injection, and symmetrical lids.  ENT: cyanotic lips. Neck supple with no adenopathy, moist MM.  Cardiovascular: regular rate, regular rhythm, well-perfused extremities  Respiratory: tachypnea, rales all lung fields more prominent at the bases, belly breathing, speaks in phrases.  Gastrointestinal: soft, non-tender abdomen, no pulsatile mass  Musculoskeletal: supple neck, no lower extremity edema  Integumentary: + vesicular rash localized to right shoulder and neck concerning over dermatome C4,5 concerning for herpes zoster.   Neurologic: awake, alert, extremities’ motor and sensory functions grossly intact  Psychiatric: A&Ox3, appropriate mood, appropriate affect

## 2018-12-12 NOTE — CHART NOTE - NSCHARTNOTEFT_GEN_A_CORE
74 yo F from [facility] w/ pmhx of HTN reports with worsening generalized weakness and SOB for 3 days. Pt was at urgent care for evaluation of rash on right shoulder and chest, was found to have O2 sat of 88% on RA and send to the ED for further evaluation. On arrival to ED, Pt was found to have A fib with RVR HR 130s and O2 sat 69% on RA. Pt improved after Bipap and IV lasix 40mg X1.    During Day:    Called by RN patients saturation was 85%  Friend at bedside fed patient and she was coughing - possible aspiration  Respiratory attempted suctioning, it did not improve situation  Patient placed back on BiPAP  ABG noted  Will give trial of BiPAP and monitor with serial ABG. If no improvement will consider ICU consult.  Patient placed as NPO.  Repeat ABG with worsening CO2 retention  ICU consulted, accepted patient    ASSESSMENT:    Acute Hypercapnic Respiratory Failure  HTN  New onset Atrial Fibrillation  r/o CAP    PLAN:    Neuro: Monitor currently not on sedative, Awake and alert, able to speak full sentences and respond appropriately    Cardio: Continue metoprolol. Cardiology following. Continue Lovenox BID. echo,F/    Resp: Continue BiPAP. Repeat ABG in one hour. If not improving high likely zamudio of intubation. Head of bed to 35 degrees. CT chest non-contrast ordered, f/u    GI: NPO, gi ppx if intubated    Heme Onc: DVT - Lovenox BID    Endo: Monitor FSG, Begin Insulin in FSG > 18    ID: Continue abx - Levaquin and rocephin. f/u cultures    MSK: b/l le venous stasis changes.  Venous Dupplex, f/u 74 yo F from [facility] w/ pmhx of HTN reports with worsening generalized weakness and SOB for 3 days. Pt was at urgent care for evaluation of rash on right shoulder and chest, was found to have O2 sat of 88% on RA and send to the ED for further evaluation. On arrival to ED, Pt was found to have A fib with RVR HR 130s and O2 sat 69% on RA. Pt improved after Bipap and IV lasix 40mg X1.    During Day:    Called by RN patients saturation was 85%  Friend at bedside fed patient and she was coughing - possible aspiration  Respiratory attempted suctioning, it did not improve situation  Patient placed back on BiPAP  ABG noted  Will give trial of BiPAP and monitor with serial ABG. If no improvement will consider ICU consult.  Patient placed as NPO.  Repeat ABG with worsening CO2 retention  ICU consulted, accepted patient    ASSESSMENT:    Acute Hypercapnic Respiratory Failure  HTN  New onset Atrial Fibrillation  r/o CAP    PLAN:    Neuro: Monitor currently not on sedative, Awake and alert, able to speak full sentences and respond appropriately    Cardio: Continue metoprolol. Cardiology following. Continue Lovenox BID. echo pending, f/u cardiac enzymes at 1600    Resp: Continue BiPAP. Repeat ABG in one hour. If not improving high likely zamudio of intubation. Head of bed to 35 degrees. CT chest non-contrast ordered, f/u    GI: NPO, gi ppx if intubated    Heme Onc: DVT - Lovenox BID    Endo: Monitor FSG, Begin Insulin in FSG > 18    ID: Continue abx - Levaquin and rocephin. f/u cultures    MSK: b/l le venous stasis changes.  Venous Dupplex, f/u    sign out given to dr downey [id number]

## 2018-12-12 NOTE — H&P ADULT - ATTENDING COMMENTS
72 yo F from [facility] w/ pmhx of HTN reports with worsening generalized weakness and SOB for 3 days. Pt was at urgent care for evaluation of rash on right shoulder and chest, was found to have O2 sat of 88% on RA and send to the ED for further evaluation. Pt reports b/l leg swelling for a few months. Pt's denies CP, palpitations, orthopnea or PND. Rash on right shoulder, chest and upper arm started on Sunday 12/9/18, pt admits to pruritus, denies pain.   No fever, chills, cough, URI symptoms, dizziness, abd pain, n/v/d, dysuria.     - on arrival to ED, Pt was found to have A fib with RVR HR 130s and O2 sat 69% on RA. Pt improved after Bipap and IV lasix 40mg X1  - pro-  and CXR showes pulmonary edema.     REVIEW OF SYSTEMS: see cc/HPI   CONSTITUTIONAL: No weakness, fevers or chills  EYES/ENT: No visual changes;  No vertigo or throat pain   NECK: No pain or stiffness  RESPIRATORY: No cough, wheezing, hemoptysis; (+) shortness of breath  CARDIOVASCULAR: No chest pain or palpitations, (+) pedal edema  GASTROINTESTINAL: No abdominal or epigastric pain. No nausea, vomiting, or hematemesis; No diarrhea or constipation. No melena or hematochezia.  GENITOURINARY: No dysuria, frequency or hematuria  NEUROLOGICAL: No numbness or weakness  SKIN: No itching, rashes    A/P   Acute HF/Acute resp failure/ hypoxia - responded well to Lasix and BiPAP  -IV lasix ( hold oral agents)and BiPAP PRN   - Is and Os/daily weights/Dietary eval  -monitor lytes - Keep K+>4 Mg++ > 2  -Agree w/ echo ??EF  - cardiology eval  -serial EKG and Kunal    A. Fib rate controlled  -check TSH   -A/C   -agree w/ lopressor for now    Herpes Zoster   -agree w/ Acyclovir/ Prednisone   -Benadryl or hydroxyzine for symptomatic relief  -PRN NSAID for pain  -isolation   -ID eval

## 2018-12-12 NOTE — H&P ADULT - NSHPLABSRESULTS_GEN_ALL_CORE
Labs:      12-11    135  |  90<L>  |  37<H>  ----------------------------<  170<H>  3.5   |  28  |  1.0    Ca    9.3      11 Dec 2018 19:30  Mg     1.9     12-11    TPro  8.1<H>  /  Alb  4.1  /  TBili  0.7  /  DBili  x   /  AST  29  /  ALT  39  /  AlkPhos  107  12-11    eGFR if Non African American: 55 mL/min/1.73M2 (12-11-18 @ 19:30)  eGFR if : 64 mL/min/1.73M2 (12-11-18 @ 19:30)    PT/INR - ( 11 Dec 2018 19:30 )   PT: 12.10 sec;   INR: 1.05 ratio         PTT - ( 11 Dec 2018 19:30 )  PTT:35.0 sec    CARDIAC MARKERS ( 11 Dec 2018 19:30 )  x     / <0.01 ng/mL / x     / x     / x            LIVER FUNCTIONS - ( 11 Dec 2018 19:30 )  Alb: 4.1 g/dL / Pro: 8.1 g/dL / ALK PHOS: 107 U/L / ALT: 39 U/L / AST: 29 U/L / GGT: x

## 2018-12-12 NOTE — CONSULT NOTE ADULT - PROBLEM SELECTOR RECOMMENDATION 9
pt with leg edema x several months, acute sob x 3 days, pro bnp of only 800 with  o2 sat of 60's, cxray with only low lung volumes, ekg with new pafib and rbbb is not explained by chf alone and concern of possible pulmonary embolus.  pt is already on therapeutic lovenox for afib  consider at least venoud Dopplers of lower ext and a d dimer and possible vq scan when pt is more stable  cont low dose beat blockers and lovenox  d/c hctz  cont lasix for now but decrease to 20 q24 to  avoid overdiuresis  supplemental oxygen as needed  echo eval ef and pulm htn  tsh  keep on tele

## 2018-12-12 NOTE — CONSULT NOTE ADULT - SUBJECTIVE AND OBJECTIVE BOX
Patient is a 73y old  Female who presents with a chief complaint of SOB and rash (12 Dec 2018 01:56)      HPI:  74 yo F from [facility] w/ pmhx of HTN reports with worsening generalized weakness and SOB for 3 days. Pt was at urgent care for evaluation of rash on right shoulder and chest, was found to have O2 sat of 88% on RA and sent to the ED for further evaluation. Pt reports b/l leg swelling for a few months on zaroxolyn.. Pt's denies CP, palpitations, orthopnea or PND. Rash on right shoulder, chest and upper arm started on Sunday 12/9/18, pt admits to pruritus, denies pain.   No fever, chills, cough, URI symptoms, dizziness, abd pain, n/v/d, dysuria.     - on arrival to ED, Pt was found to have A fib with RVR HR 130s and O2 sat 69% on RA. Pt improved after Bipap and IV lasix 40mg X1  - pro-  and CXR showed low lung volumes otherwise stable      PAST MEDICAL & SURGICAL HISTORY:  Hypertension  No significant past surgical history        MEDICATIONS  (STANDING):  enoxaparin Injectable 80 milliGRAM(s) SubCutaneous every 12 hours  furosemide   Injectable 40 milliGRAM(s) IV Push daily  hydrochlorothiazide 25 milliGRAM(s) Oral daily  metoprolol succinate ER 25 milliGRAM(s) Oral daily  valACYclovir 1000 milliGRAM(s) Oral three times a day    MEDICATIONS  (PRN):  diphenhydrAMINE 25 milliGRAM(s) Oral every 6 hours PRN Rash and/or Itching      FAMILY HISTORY:  Family history of stroke (Father)      SOCIAL HISTORY:  CIGARETTES: none  ALCOHOL:none  DRUGS:none                      REVIEW OF SYSTEMS:  CONSTITUTIONAL: No distress, Looks stable  NECK: No pains  RESPIRATORY: No cough, wheezing,(+) shortness of breath  CARDIOVASCULAR: No chest pain, (+)SOB,  no palpitations,(+) leg swelling  GASTROINTESTINAL: No abdominal or epigastric pain. No nausea, vomiting, or hematemesis;  No melena.  NEUROLOGICAL: No dizziness, headaches, memory loss, loss of strength  SKIN: (+) itching, burning, (+)rashes, or lesions   ENDOCRINE: No heat or cold intolerance  MUSCULOSKELETAL: No joint pain, No  swelling; No muscle pain          Vital Signs Last 24 Hrs  T(C): 36.3 (12 Dec 2018 07:44), Max: 36.3 (12 Dec 2018 07:44)  T(F): 97.3 (12 Dec 2018 07:44), Max: 97.3 (12 Dec 2018 07:44)  HR: 51 (12 Dec 2018 07:44) (51 - 132)  BP: 92/53 (12 Dec 2018 07:44) (92/53 - 138/64)  BP(mean): --  RR: 19 (12 Dec 2018 07:44) (16 - 24)  SpO2: 92% (12 Dec 2018 07:44) (64% - 97%)                      PHYSICAL EXAM:  GENERAL: No distress, well developed  HEAD:  Atraumatic, Normocephalic  NECK: Supple, No JVD, No Bruit of either carotid arteries  NERVOUS SYSTEM:  Alert, Awake, Oriented to time, place, person; Normal memory and speech;   CHEST/LUNG: Normal air entry to lung base bilaterally; No wheeze, crackle, rales, rhonchi  HEART: Regular heart beat, S1, A2, P2, No S3, No S4, No gallop, No murmur  ABDOMEN: Soft, Non tender, Non distended; Bowel sounds present  EXTREMITIES:  2+ Peripheral Pulses, No clubbing, (+) edema  SKIN: (+) rashes or lesions    TELEMETRY:SB    ECG:< from: 12 Lead ECG (12.11.18 @ 19:48) >  Diagnosis Line Atrial fibrillation with rapid ventricular response with premature ventricular  or aberrantly conducted complexes  Left axis deviation  Right bundle branch block  Inferior infarct , age undetermined  Abnormal ECG    < end of copied text >  CXRAY   < from: Xray Chest 1 View- PORTABLE-Routine (12.12.18 @ 05:16) >  Low lung volumes. Otherwise stable examination.          < end of copied text >    I&O's Detail      LABS:                        12.3   7.68  )-----------( 168      ( 12 Dec 2018 08:08 )             38.5     12-11    135  |  90<L>  |  37<H>  ----------------------------<  170<H>  3.5   |  28  |  1.0    Ca    9.3      11 Dec 2018 19:30  Mg     1.9     12-11    TPro  8.1<H>  /  Alb  4.1  /  TBili  0.7  /  DBili  x   /  AST  29  /  ALT  39  /  AlkPhos  107  12-11    CARDIAC MARKERS ( 11 Dec 2018 19:30 )  x     / <0.01 ng/mL / x     / x     / x          PT/INR - ( 11 Dec 2018 19:30 )   PT: 12.10 sec;   INR: 1.05 ratio         PTT - ( 11 Dec 2018 19:30 )  PTT:35.0 sec    I&O's Summary      RADIOLOGY & ADDITIONAL STUDIES:

## 2018-12-12 NOTE — H&P ADULT - NSHPPHYSICALEXAM_GEN_ALL_CORE
GENERAL: NAD, well-developed. mildly tachypneic, speaking in full sentences, not using accessory muscles  HEAD:  Atraumatic, Normocephalic  EYES: EOMI, PERRLA,   NECK: Supple, No JVD  CHEST/LUNG: bibasilar crackles  HEART: Regular rate and rhythm; No murmurs, rubs, or gallops  ABDOMEN: Soft, Nontender, Nondistended; Bowel sounds present  EXTREMITIES:  trace b/l pitting edema. + b/l shiny, erythematous skin (lower leg)  NEUROLOGY: non-focal  SKIN: raised erythematous rash on right sided shoulder, chest and upper arm + vesicles near right axilla. + rash tender on palpation

## 2018-12-12 NOTE — PROVIDER CONTACT NOTE (OTHER) - RECOMMENDATIONS
Patient placed back on continuous bipap. O2 sat improved. Patient reports relief. Will continue to monitor.

## 2018-12-12 NOTE — CONSULT NOTE ADULT - SUBJECTIVE AND OBJECTIVE BOX
[facility] [place]  73y, Female  Allergy: No Known Allergies    HPI:  74 yo F from [facility] w/ pmhx of HTN reports with worsening generalized weakness and SOB for 3 days. Pt was at urgent care for evaluation of rash on right shoulder and chest, was found to have O2 sat of 88% on RA and send to the ED for further evaluation. Pt reports b/l leg swelling for a few months. Pt's denies CP, palpitations, orthopnea or PND. Rash on right shoulder, chest and upper arm started on Sunday 12/9/18, pt admits to pruritus, denies pain.   No fever, chills, cough, URI symptoms, dizziness, abd pain, n/v/d, dysuria.     - on arrival to ED, Pt was found to have A fib with RVR HR 130s and O2 sat 69% on RA. Pt improved after Bipap and IV lasix 40mg X1  - pro-  and CXR showes pulmonary edema. (12 Dec 2018 01:56)  # Acute hypoxic respiratory failure likely 2/2 new onset decompensated CHF  oncern of possible pulmonary embolu    FAMILY HISTORY:  Family history of stroke (Father)    PAST MEDICAL & SURGICAL HISTORY:  Hypertension  No significant past surgical history    HOSPITAL MEDICATIONS  enoxaparin Injectable 80 milliGRAM(s) SubCutaneous every 12 hours  furosemide   Injectable 40 milliGRAM(s) IV Push daily  hydrochlorothiazide 25 milliGRAM(s) Oral daily  metoprolol succinate ER 25 milliGRAM(s) Oral daily  valACYclovir 1000 milliGRAM(s) Oral three times a day    PHYSICAL EXAM:  Constitutional: NAD  HEENT: anicteric sclera, oropharynx clear, MMM  Neck: No JVD  Respiratory: CTAB, no wheezes, rales or rhonchi  Cardiovascular: S1, S2, RRR  Gastrointestinal: BS+, soft, NT/ND  Extremities: No cyanosis or clubbing. No peripheral edema  Neurological: A/O x 3, no focal deficits  Psychiatric: Normal mood, normal affect  : No CVA tenderness. No wilson.   Skin: No rashes  Vascular Access:    VITALS:  T(F): 97.3, Max: 97.3 (12-12-18 @ 07:44)  HR: 50  BP: 100/50  RR: 20Vital Signs Last 24 Hrs  T(C): 36.3 (12 Dec 2018 07:44), Max: 36.3 (12 Dec 2018 07:44)  T(F): 97.3 (12 Dec 2018 07:44), Max: 97.3 (12 Dec 2018 07:44)  HR: 50 (12 Dec 2018 08:00) (50 - 132)  BP: 100/50 (12 Dec 2018 08:00) (92/53 - 138/64)  BP(mean): --  RR: 20 (12 Dec 2018 08:00) (16 - 24)  SpO2: 95% (12 Dec 2018 08:00) (64% - 97%)  Weight: 80.7    TESTS & MEASUREMENTS:                      12.3   7.68  )-----------( 168      ( 12 Dec 2018 08:08 )             38.5   Differentials: 84% > 80.2% Neutrophils; 8.8%> 9.5% Lymphocyte  12-12    138  |  90<L>  |  36<H>  ----------------------------<  104 (170 on admission)<H>  3.2<L>   |  34<H>  |  1.1    Ca    9.1      12 Dec 2018 08:08  Mg     2.0     12-12  eGFR 55>49      TPro  8.1<H>  /  Alb  4.1  /  TBili  0.7  /  DBili  x   /  AST  29  /  ALT  39  /  AlkPhos  107  12-11    LIVER FUNCTIONS - ( 11 Dec 2018 19:30 )  Alb: 4.1 g/dL / Pro: 8.1 g/dL / ALK PHOS: 107 U/L / ALT: 39 U/L / AST: 29 U/L / GGT: x           RADIOLOGY & ADDITIONAL TESTS:  CXR (12/12): Low Lung Volume; Otherwise stable exam.  EKG (12/11): AFib with RVR with PVC or abberantly conducted complexes    ANTIBIOTICS:  valACYclovir 1000 milliGRAM(s) Oral three times a day    ASSESSMENT AND PLAN:  Herpes Zoster   -agree w/ Acyclovir/ Prednisone   -Benadryl or hydroxyzine for symptomatic relief  -PRN NSAID for pain  -isolation   -ID eval . [facility]  73y, Female  Allergy: No Known Allergies    HPI:  74 yo F from [facility] w/ PMH of HTN presents with worsening generalized weakness and SOB onset 3 days. While at the urgent clinic on 12/11 for rash on R shoulder and chest saturation was as low as  88% on RA. Infectious disease is consulted for evaluation of rash. Rash developed on Sunday, 12/09. It was pruritic, erythematous, raised, "hive-like" with distinct borders limited to the anterior portion of the R chest and shoulder. Pt denies any prodromal symptoms including pain, pruritus and numbness prior to onset. She was prescribed Triamcinolone cream and applied it the night of 12/09. Per aid, on 12/11, rash spread to the back and appeared flatter. Reports few vesicles seen today (12/12). Pt denies any history of similar rash, eczema, use of topical creams, perfumes, new products around the neck. Denies any immunosuppressant medication, new stressors. Per aid, pt is anxious. Per aid, another resident at [facility] had shingles within the past week but it is unclear if there was direct contact. Pt had chicken pox as a child. She has not received the varicella vaccines or zoster vaccine. Denies fever, chills, CP, N/V, urinary symptms, diarrhea, constipation, Numbness or tingling. Reports chronic wet non-productive cough for one year with worsening PE.     FAMILY HISTORY:  Family history of stroke (Father)    PAST MEDICAL & SURGICAL HISTORY:  Hypertension  No significant past surgical history    HOSPITAL MEDICATIONS  enoxaparin Injectable 80 milliGRAM(s) SubCutaneous every 12 hours  furosemide   Injectable 40 milliGRAM(s) IV Push daily  hydrochlorothiazide 25 milliGRAM(s) Oral daily  metoprolol succinate ER 25 milliGRAM(s) Oral daily  valACYclovir 1000 milliGRAM(s) Oral three times a day    PHYSICAL EXAM:  Constitutional: Respiratory distress on BIPAP  HEENT: anicteric sclera, oropharynx clear, MMM  Neck: No JVD  Respiratory: Decreased breath sounds in lower lung lobes.   Cardiovascular: S1, S2, RRR  Gastrointestinal: BS+, soft, NT/ND  Extremities: No cyanosis or clubbing. PE 1+. Stasis Dermatitis  : No CVA tenderness. No wilson.   Skin: Rash limited to the R C4/C5 dermatome. Distinct borders. Erythematous with infrequent vesicles    VITALS:  T(F): 97.3, Max: 97.3 (12-12-18 @ 07:44)  HR: 50  BP: 100/50  RR: 20Vital Signs Last 24 Hrs  T(C): 36.3 (12 Dec 2018 07:44), Max: 36.3 (12 Dec 2018 07:44)  T(F): 97.3 (12 Dec 2018 07:44), Max: 97.3 (12 Dec 2018 07:44)  HR: 50 (12 Dec 2018 08:00) (50 - 132)  BP: 100/50 (12 Dec 2018 08:00) (92/53 - 138/64)  BP(mean): --  RR: 20 (12 Dec 2018 08:00) (16 - 24)  SpO2: 95% (12 Dec 2018 08:00) (64% - 97%)  Weight: 80.7    TESTS & MEASUREMENTS:                      12.3   7.68  )-----------( 168      ( 12 Dec 2018 08:08 )             38.5   Differentials: 84% > 80.2% Neutrophils; 8.8%> 9.5% Lymphocyte  12-12    138  |  90<L>  |  36<H>  ----------------------------<  104 (170 on admission)<H>  3.2<L>   |  34<H>  |  1.1    Ca    9.1      12 Dec 2018 08:08  Mg     2.0     12-12  eGFR 55>49      TPro  8.1<H>  /  Alb  4.1  /  TBili  0.7  /  DBili  x   /  AST  29  /  ALT  39  /  AlkPhos  107  12-11    LIVER FUNCTIONS - ( 11 Dec 2018 19:30 )  Alb: 4.1 g/dL / Pro: 8.1 g/dL / ALK PHOS: 107 U/L / ALT: 39 U/L / AST: 29 U/L / GGT: x           RADIOLOGY & ADDITIONAL TESTS:  CXR (12/12): Low Lung Volume; Otherwise stable exam.  EKG (12/11): AFib with RVR with PVC or abberantly conducted complexes    ANTIBIOTICS:  valACYclovir 1000 milliGRAM(s) Oral three times a day    ASSESSMENT AND PLAN:  Acute Herpes Zoster of the R C4/C5 Dermatome.    Plan:  -Valacyclovir 1000mg TID x 7-10days  -Benadryl or hydroxyzine for symptomatic relief  -PRN NSAID for pain  -airborne and contact isolation [facility]  73y, Female  Allergy: No Known Allergies    HPI:  74 yo F from [facility] w/ PMH of HTN presents with worsening generalized weakness and SOB onset 3 days. While at the urgent clinic on 12/11 for rash on R shoulder and chest, O2 saturation was as low as 88% on RA. Infectious disease is consulted for evaluation of rash. Rash developed on Sunday, 12/09. It was pruritic, erythematous, raised, "hive-like" with distinct borders limited to the anterior portion of the R chest and shoulder. Pt denies any prodromal symptoms including pain, pruritus and numbness prior to onset. She was prescribed Triamcinolone cream and applied it the night of 12/09. Per aid, on 12/11, rash spread to the back and appeared flatter. Reports few vesicles within the axillary region onset today (12/12). Pt denies any history of similar rash, eczema, use of topical creams, perfumes. Denies any immunosuppressant medication, steroids or new stressors. Per aid, pt is anxious. Pt had chicken pox as a child. She has not received the varicella vaccines or zoster vaccine. Denies drainage or pus from site of rash, fever, chills, CP, N/V, urinary symptoms, diarrhea, constipation, numbness or tingling. Reports chronic wet non-productive cough for one year with worsening PE. Reports recent onset of L groin rash most likely secondary to diaper use.    FAMILY HISTORY:  Family history of stroke (Father)    PAST MEDICAL & SURGICAL HISTORY:  Hypertension  No significant past surgical history    SOCIAL HISTORY  Denies alcohol, drugs and tobacco use    HOSPITAL MEDICATIONS  enoxaparin Injectable 80 milliGRAM(s) SubCutaneous every 12 hours  furosemide   Injectable 40 milliGRAM(s) IV Push daily  hydrochlorothiazide 25 milliGRAM(s) Oral daily  metoprolol succinate ER 25 milliGRAM(s) Oral daily  valACYclovir 1000 milliGRAM(s) Oral three times a day    PHYSICAL EXAM:  Constitutional: Respiratory distress on BIPAP  HEENT: anicteric sclera, oropharynx clear, MMM  Neck: No JVD  Respiratory: Decreased breath sounds in lower lung lobes.   Cardiovascular: S1, S2, RRR  Gastrointestinal: BS+, soft, NT/ND  Extremities: No cyanosis or clubbing. PE 1+. Stasis Dermatitis  : No CVA tenderness. No wilson.   Skin: Rash limited to the R C4/C5 dermatome. Distinct borders. Erythematous with vesicles within the axillary region. L groin erythematous rash with white foul-smelling discharge.    VITALS:  T(F): 97.3, Max: 97.3 (12-12-18 @ 07:44)  HR: 50  BP: 100/50  RR: 20Vital Signs Last 24 Hrs  T(C): 36.3 (12 Dec 2018 07:44), Max: 36.3 (12 Dec 2018 07:44)  T(F): 97.3 (12 Dec 2018 07:44), Max: 97.3 (12 Dec 2018 07:44)  HR: 50 (12 Dec 2018 08:00) (50 - 132)  BP: 100/50 (12 Dec 2018 08:00) (92/53 - 138/64)  BP(mean): --  RR: 20 (12 Dec 2018 08:00) (16 - 24)  SpO2: 95% (12 Dec 2018 08:00) (64% - 97%)  Weight: 80.7    TESTS & MEASUREMENTS:                      12.3   7.68  )-----------( 168      ( 12 Dec 2018 08:08 )             38.5   Differentials: 84% > 80.2% Neutrophils; 8.8%> 9.5% Lymphocyte  12-12    138  |  90<L>  |  36<H>  ----------------------------<  104 (170 on admission)<H>  3.2<L>   |  34<H>  |  1.1    Ca    9.1      12 Dec 2018 08:08  Mg     2.0     12-12  eGFR 55>49      TPro  8.1<H>  /  Alb  4.1  /  TBili  0.7  /  DBili  x   /  AST  29  /  ALT  39  /  AlkPhos  107  12-11    LIVER FUNCTIONS - ( 11 Dec 2018 19:30 )  Alb: 4.1 g/dL / Pro: 8.1 g/dL / ALK PHOS: 107 U/L / ALT: 39 U/L / AST: 29 U/L / GGT: x           RADIOLOGY & ADDITIONAL TESTS:  CXR (12/12): Low Lung Volume; Otherwise stable exam.  EKG (12/11): AFib with RVR with PVC or abberantly conducted complexes    ANTIBIOTICS:  valACYclovir 1000 milliGRAM(s) Oral three times a day    ASSESSMENT AND PLAN:  Acute Herpes Zoster of the R C4/C5 Dermatome.  Plan:  -Continue Valacyclovir 1000mg TID x 7-10days  -Benadryl or hydroxyzine for symptomatic relief  -PRN NSAID for pain  -airborne and contact isolation [facility]  73y, Female  Allergy: No Known Allergies    HPI:  74 yo F from [facility] w/ PMH of HTN presents with worsening generalized weakness and SOB onset 3 days. While at the urgent clinic on 12/11 for rash on R shoulder and chest, O2 saturation was as low as 88% on RA. Infectious disease is consulted for evaluation of rash. Rash developed on Sunday, 12/09. It was pruritic, erythematous, raised, "hive-like" with distinct borders limited to the anterior portion of the R chest and shoulder. Pt denies any prodromal symptoms including pain, pruritus and numbness prior to onset. She was prescribed Triamcinolone cream and applied it the night of 12/09. Per aid, on 12/11, rash spread to the back and appeared flatter. Reports few vesicles within the axillary region onset today (12/12). Pt denies any history of similar rash, eczema, use of topical creams, perfumes. Denies any immunosuppressant medication, steroids or new stressors. Per aid, pt is anxious. Pt had chicken pox as a child. She has not received the varicella vaccines or zoster vaccine. Denies drainage or pus from site of rash, allodynia, fever, chills, CP, N/V, urinary symptoms, diarrhea, constipation, numbness or tingling. Reports chronic wet non-productive cough for one year with worsening PE. Reports recent onset of L groin rash most likely secondary to diaper use.    FAMILY HISTORY:  Family history of stroke (Father)    PAST MEDICAL & SURGICAL HISTORY:  Hypertension  No significant past surgical history    SOCIAL HISTORY  Denies alcohol, drugs and tobacco use    HOSPITAL MEDICATIONS  enoxaparin Injectable 80 milliGRAM(s) SubCutaneous every 12 hours  furosemide   Injectable 40 milliGRAM(s) IV Push daily  hydrochlorothiazide 25 milliGRAM(s) Oral daily  metoprolol succinate ER 25 milliGRAM(s) Oral daily  valACYclovir 1000 milliGRAM(s) Oral three times a day    PHYSICAL EXAM:  Constitutional: Respiratory distress on BIPAP  HEENT: anicteric sclera, oropharynx clear, MMM  Neck: No JVD  Respiratory: Decreased breath sounds in lower lung lobes.   Cardiovascular: S1, S2, RRR  Gastrointestinal: BS+, soft, NT/ND  Extremities: No cyanosis or clubbing. PE 1+. Stasis Dermatitis  : No CVA tenderness. No wilson.   Skin: Rash limited to the R C4/C5 dermatome. Distinct borders. Erythematous with vesicles within the axillary region. L groin erythematous rash with white foul-smelling discharge.    VITALS:  T(F): 97.3, Max: 97.3 (12-12-18 @ 07:44)  HR: 50  BP: 100/50  RR: 20Vital Signs Last 24 Hrs  T(C): 36.3 (12 Dec 2018 07:44), Max: 36.3 (12 Dec 2018 07:44)  T(F): 97.3 (12 Dec 2018 07:44), Max: 97.3 (12 Dec 2018 07:44)  HR: 50 (12 Dec 2018 08:00) (50 - 132)  BP: 100/50 (12 Dec 2018 08:00) (92/53 - 138/64)  BP(mean): --  RR: 20 (12 Dec 2018 08:00) (16 - 24)  SpO2: 95% (12 Dec 2018 08:00) (64% - 97%)  Weight: 80.7    TESTS & MEASUREMENTS:                      12.3   7.68  )-----------( 168      ( 12 Dec 2018 08:08 )             38.5   Differentials: 84% > 80.2% Neutrophils; 8.8%> 9.5% Lymphocyte  12-12    138  |  90<L>  |  36<H>  ----------------------------<  104 (170 on admission)<H>  3.2<L>   |  34<H>  |  1.1    Ca    9.1      12 Dec 2018 08:08  Mg     2.0     12-12  eGFR 55>49      TPro  8.1<H>  /  Alb  4.1  /  TBili  0.7  /  DBili  x   /  AST  29  /  ALT  39  /  AlkPhos  107  12-11    LIVER FUNCTIONS - ( 11 Dec 2018 19:30 )  Alb: 4.1 g/dL / Pro: 8.1 g/dL / ALK PHOS: 107 U/L / ALT: 39 U/L / AST: 29 U/L / GGT: x           RADIOLOGY & ADDITIONAL TESTS:  CXR (12/12): Low Lung Volume; Otherwise stable exam.  EKG (12/11): AFib with RVR with PVC or abberantly conducted complexes    ANTIBIOTICS:  valACYclovir 1000 milliGRAM(s) Oral three times a day    ASSESSMENT AND PLAN:  Acute Herpes Zoster of the R C4/C5 Dermatome.  Plan:  -Continue Valacyclovir 1000mg TID x 7-10days  -Benadryl or hydroxyzine for symptomatic relief  -PRN NSAID for pain  -airborne and contact isolation [facility]  73y, Female  Allergy: No Known Allergies    HPI:  74 yo F from [facility] w/ PMH of HTN presents with worsening generalized weakness and SOB onset 3 days. While at the urgent clinic on 12/11 for rash on R shoulder and chest, O2 saturation was as low as 88% on RA. Infectious disease is consulted for evaluation of rash. Rash developed on Sunday, 12/09. It was pruritic, erythematous, raised, "hive-like" with distinct borders limited to the anterior portion of the R chest and shoulder. Pt denies any prodromal symptoms including pain, pruritus and numbness prior to onset. She was prescribed Triamcinolone cream and applied it the night of 12/09. Per aid, on 12/11, rash spread to the back and appeared flatter. Reports few vesicles within the axillary region onset today (12/12). Pt denies any history of similar rash, eczema, use of topical creams, perfumes. Denies any immunosuppressant medication, steroids or new stressors. Per aid, pt is anxious. Pt had chicken pox as a child. She has not received the varicella vaccines or zoster vaccine. Denies drainage or pus from site of rash, allodynia, fever, chills, CP, N/V, urinary symptoms, diarrhea, constipation, numbness or tingling. Reports chronic wet non-productive cough for one year with worsening PE. Reports recent onset of L groin rash most likely secondary to diaper use.    FAMILY HISTORY:  Family history of stroke (Father)    PAST MEDICAL & SURGICAL HISTORY:  Hypertension  No significant past surgical history    SOCIAL HISTORY  Denies alcohol, drugs and tobacco use    HOSPITAL MEDICATIONS  enoxaparin Injectable 80 milliGRAM(s) SubCutaneous every 12 hours  furosemide   Injectable 40 milliGRAM(s) IV Push daily  hydrochlorothiazide 25 milliGRAM(s) Oral daily  metoprolol succinate ER 25 milliGRAM(s) Oral daily  valACYclovir 1000 milliGRAM(s) Oral three times a day    PHYSICAL EXAM:  Constitutional: Respiratory distress on BIPAP  HEENT: anicteric sclera, oropharynx clear, MMM  Neck: No JVD  Respiratory: Decreased breath sounds in lower lung lobes.   Cardiovascular: S1, S2, RRR  Gastrointestinal: BS+, soft, NT/ND  Extremities: No cyanosis or clubbing. PE 1+. Stasis Dermatitis  : No CVA tenderness. No wilson.   Skin: Rash limited to the R C4/C5 dermatome. Distinct borders. Erythematous with vesicles within the axillary region. L groin erythematous rash with white foul-smelling discharge.    VITALS:  T(F): 97.3, Max: 97.3 (12-12-18 @ 07:44)  HR: 50  BP: 100/50  RR: 20Vital Signs Last 24 Hrs  T(C): 36.3 (12 Dec 2018 07:44), Max: 36.3 (12 Dec 2018 07:44)  T(F): 97.3 (12 Dec 2018 07:44), Max: 97.3 (12 Dec 2018 07:44)  HR: 50 (12 Dec 2018 08:00) (50 - 132)  BP: 100/50 (12 Dec 2018 08:00) (92/53 - 138/64)  BP(mean): --  RR: 20 (12 Dec 2018 08:00) (16 - 24)  SpO2: 95% (12 Dec 2018 08:00) (64% - 97%)  Weight: 80.7    TESTS & MEASUREMENTS:                      12.3   7.68  )-----------( 168      ( 12 Dec 2018 08:08 )             38.5   Differentials: 84% > 80.2% Neutrophils; 8.8%> 9.5% Lymphocyte  12-12    138  |  90<L>  |  36<H>  ----------------------------<  104 (170 on admission)<H>  3.2<L>   |  34<H>  |  1.1    Ca    9.1      12 Dec 2018 08:08  Mg     2.0     12-12  eGFR 55>49      TPro  8.1<H>  /  Alb  4.1  /  TBili  0.7  /  DBili  x   /  AST  29  /  ALT  39  /  AlkPhos  107  12-11    LIVER FUNCTIONS - ( 11 Dec 2018 19:30 )  Alb: 4.1 g/dL / Pro: 8.1 g/dL / ALK PHOS: 107 U/L / ALT: 39 U/L / AST: 29 U/L / GGT: x           RADIOLOGY & ADDITIONAL TESTS:  CXR (12/12): Low Lung Volume; Otherwise stable exam.  EKG (12/11): AFib with RVR with PVC or abberantly conducted complexes    ANTIBIOTICS:  valACYclovir 1000 milliGRAM(s) Oral three times a day

## 2018-12-12 NOTE — H&P ADULT - ASSESSMENT
74 yo F from [facility] w pmhx of HTN reports with worsening generalized weakness and SOB for 3 days. Pt found to be hypoxic in ED and A fib with RVR. CXR showed pulmonary edema.    # Acute hypoxic respiratory failure likely 2/2 new onset decompensated CHF  - c/w IV lasix 40mg qD  - hold metolazone for now  - f/u 2D ECHO, TSH  - f/u cardiology  - strict Is and Os and daily weight     # new onset A fib  - pt back to NSR now  - c/w Toprol 25mg qD for now  - lovenox 80mg q12 for anticoagulation for now    # erythematous rash with vesicle possible herpes zoster  - ID follow up  - start valacyclovir 1000mg q8  - benadryl for pruritus     # HTN  - c/w HCTZ    # DVT ppx: on Lovenox therapeutic dose  - no need for GI ppx 72 yo F from [facility] w pmhx of HTN reports with worsening generalized weakness and SOB for 3 days. Pt found to be hypoxic in ED and A fib with RVR. CXR showed pulmonary edema.    # Acute hypoxic respiratory failure likely 2/2 new onset decompensated CHF  - c/w IV lasix 40mg qD  - hold metolazone for now  - f/u 2D ECHO, TSH  - f/u cardiac enzyme, CXR in AM  - f/u cardiology  - strict Is and Os and daily weight     # new onset A fib  - pt back to NSR now  - c/w Toprol 25mg qD for now  - lovenox 80mg q12 for anticoagulation for now    # erythematous rash with vesicle possible herpes zoster  - ID follow up  - start valacyclovir 1000mg q8  - benadryl for pruritus     # HTN  - c/w HCTZ    # DVT ppx: on Lovenox therapeutic dose  - no need for GI ppx 72 yo F from [facility] w pmhx of HTN reports with worsening generalized weakness and SOB for 3 days. Pt found to be hypoxic in ED and A fib with RVR. CXR showed pulmonary edema.    # Acute hypoxic respiratory failure likely 2/2 new onset decompensated CHF  - c/w IV lasix 40mg qD  - hold metolazone for now  - f/u 2D ECHO, TSH  - f/u cardiac enzyme, CXR in AM  - f/u cardiology  - strict Is and Os and daily weight     # new onset A fib  - pt back to NSR now  - c/w Toprol 25mg qD for now  - lovenox 80mg q12 for anticoagulation for now    # widespread right sided erythematous rash with vesicle possible VZV zoster  - ID follow up  - start valacyclovir 1000mg q8  - benadryl for pruritus   - contact and airborne precaution     # HTN  - c/w HCTZ    # DVT ppx: on Lovenox therapeutic dose  - no need for GI ppx 74 yo F from [facility] w pmhx of HTN reports with worsening generalized weakness and SOB for 3 days. Pt found to be hypoxic in ED and A fib with RVR. CXR showed pulmonary edema.    # Acute hypoxic respiratory failure likely 2/2 new onset decompensated CHF  - c/w IV lasix 40mg qD, BB  - consider adding ACEi if pt has HFrEF  - hold metolazone for now  - f/u 2D ECHO, TSH  - f/u cardiac enzyme, CXR in AM  - f/u cardiology  - strict Is and Os and daily weight     # new onset A fib  - CHADsVASc: 4-5  - pt back to NSR now  - c/w Toprol 25mg qD for now  - lovenox 80mg q12 for anticoagulation for now    # widespread right sided erythematous rash with vesicle possible VZV zoster  - ID follow up  - start valacyclovir 1000mg q8  - benadryl for pruritus   - contact and airborne precaution     # HTN  - c/w HCTZ    # DVT ppx: on Lovenox therapeutic dose  - no need for GI ppx

## 2018-12-12 NOTE — CONSULT NOTE ADULT - ASSESSMENT
IMPRESSION:    Acute on chronic hypercapnic respiratory failure  Possible pneumonia  Elevated right hemidiaphragm      PLAN:    CNS: no depressants    HEENT: Oral care    PULMONARY:  HOB @ 45 degrees, CT chest non contrast, BPAP 18/10, intubated if clinically worse, repeat ABG in 1 hour    CARDIOVASCULAR: I = O    GI: GI prophylaxis. NPO    RENAL:  Follow up lytes.  Correct as needed    INFECTIOUS DISEASE: Follow up cultures, Rocephin/Levaquin, urine strep and legionella    HEMATOLOGICAL:  DVT prophylaxis.    ENDOCRINE:  Follow up FS.  Insulin protocol if needed.    MUSCULOSKELETAL:    monitor in ICU IMPRESSION:    Acute on chronic hypercapnic respiratory failure  Possible pneumonia  Elevated right hemidiaphragm      PLAN: ICU admission     CNS: no depressants    HEENT: Oral care    PULMONARY:  HOB @ 45 degrees, CT chest non contrast, BPAP 18/10, intubated if clinically worse, repeat ABG in 1 hour    CARDIOVASCULAR: I = O    GI: GI prophylaxis. NPO    RENAL:  Follow up lytes.  Correct as needed    INFECTIOUS DISEASE: Follow up cultures, Rocephin/Levaquin, urine strep and legionella    HEMATOLOGICAL:  DVT prophylaxis.    ENDOCRINE:  Follow up FS.  Insulin protocol if needed.    MUSCULOSKELETAL:    monitor in ICU

## 2018-12-12 NOTE — H&P ADULT - NSHPREVIEWOFSYSTEMS_GEN_ALL_CORE
Review of Systems:   CONSTITUTIONAL: No fever, chills. + generalized weakness  EYES: No eye pain, visual disturbances, or discharge  ENMT:  No difficulty hearing, tinnitus, vertigo; No sinus or throat pain  NECK: No pain or stiffness  RESPIRATORY: No cough, wheezing, chills or hemoptysis; +shortness of breath  CARDIOVASCULAR: No chest pain, palpitations, dizziness. + leg swelling  GASTROINTESTINAL: No abdominal or epigastric pain. No nausea, vomiting, or hematemesis; No diarrhea   GENITOURINARY: No dysuria  NEUROLOGICAL: No headaches  SKIN: + itching + rash on right sided shoulder, chest and upper arm. no pain on rash  MUSCULOSKELETAL: No joint pain or swelling  HEME/LYMPH: No easy bruising, or bleeding gums

## 2018-12-12 NOTE — CHART NOTE - NSCHARTNOTEFT_GEN_A_CORE
Called by RN patients saturation was 85%  Friend at bedside fed patient and she was coughing - possible aspiration  Respiratory attempted suctioning, it did not improve situation  Patient placed back on BiPAP  ABG noted  Will give trial of BiPAP and monitor with serial ABG. If no improvement will consider ICU consult. Called by RN patients saturation was 85%  Friend at bedside fed patient and she was coughing - possible aspiration  Respiratory attempted suctioning, it did not improve situation  Patient placed back on BiPAP  ABG noted  Will give trial of BiPAP and monitor with serial ABG. If no improvement will consider ICU consult.  Patient placed as NPO

## 2018-12-12 NOTE — CONSULT NOTE ADULT - ASSESSMENT
ASSESSMENT AND PLAN:  Acute Herpes Zoster of the R C4/C5 Dermatome.  Plan:  -Continue Valacyclovir 1000mg TID x 7-10days  -Benadryl or hydroxyzine for symptomatic relief  -PRN NSAID for pain  -airborne and contact isolation

## 2018-12-12 NOTE — CONSULT NOTE ADULT - PROBLEM SELECTOR RECOMMENDATION 3
consider ace inhibitor in future when pt is euvolemic and more renal fxn is stable  above plan discussed with house staff.

## 2018-12-12 NOTE — ED ADULT NURSE REASSESSMENT NOTE - NS ED NURSE REASSESS COMMENT FT1
pt improved on bipap, A & O x 3, breathing unlabored. family at bedside. VSS/NAD. will continue to monitor closely.
BIPAP removed, pt has no SOB and is in no distress; placed on O2 via NC at 4L

## 2018-12-12 NOTE — CONSULT NOTE ADULT - SUBJECTIVE AND OBJECTIVE BOX
Patient is a 73y old  Female who presents with a chief complaint of SOB and rash (12 Dec 2018 09:47)      HPI:  74 yo F from [facility] w/ pmhx of HTN reports with worsening generalized weakness and SOB for 3 days. Pt was at urgent care for evaluation of rash on right shoulder and chest, was found to have O2 sat of 88% on RA and send to the ED for further evaluation. Pt reports b/l leg swelling for a few months. Pt's denies CP, palpitations, orthopnea or PND. Rash on right shoulder, chest and upper arm started on Sunday 12/9/18, pt admits to pruritus, denies pain.   No fever, chills, cough, URI symptoms, dizziness, abd pain, n/v/d, dysuria.     - on arrival to ED, Pt was found to have A fib with RVR HR 130s and O2 sat 69% on RA. Pt improved after Bipap and IV lasix 40mg X1    Patient was placed on BPAP. She remains tachypneic.      PAST MEDICAL & SURGICAL HISTORY:  Hypertension  No significant past surgical history      SOCIAL HX:   Smoking     neg                    ETOH        neg                    Other  neg    FAMILY HISTORY:  Family history of stroke (Father)      ROS:  See HPI     Allergies    No Known Allergies    Intolerances          PHYSICAL EXAM    ICU Vital Signs Last 24 Hrs  T(C): 36.3 (12 Dec 2018 07:44), Max: 36.3 (12 Dec 2018 07:44)  T(F): 97.3 (12 Dec 2018 07:44), Max: 97.3 (12 Dec 2018 07:44)  HR: 50 (12 Dec 2018 08:00) (50 - 132)  BP: 100/50 (12 Dec 2018 08:00) (92/53 - 138/64)  BP(mean): --  ABP: --  ABP(mean): --  RR: 20 (12 Dec 2018 08:00) (16 - 24)  SpO2: 97% (12 Dec 2018 10:45) (64% - 97%)      General: on BPAP,tachypneic  HEENT:  BEATRIZ              Lymph Nodes: No cervical LN   Lungs: Bilateral BS, rhonchi L>R  Cardiovascular: irregular  Abdomen: Soft, Positive BS  Extremities: + 2 b/l LLE, shin erythema  Skin: Warm  Neurological: Non focal         LABS:                          12.3   7.68  )-----------( 168      ( 12 Dec 2018 08:08 )             38.5                                               12-12    138  |  90<L>  |  36<H>  ----------------------------<  104<H>  3.2<L>   |  34<H>  |  1.1    Ca    9.1      12 Dec 2018 08:08  Mg     2.0     12-12    TPro  8.1<H>  /  Alb  4.1  /  TBili  0.7  /  DBili  x   /  AST  29  /  ALT  39  /  AlkPhos  107  12-11      PT/INR - ( 11 Dec 2018 19:30 )   PT: 12.10 sec;   INR: 1.05 ratio         PTT - ( 11 Dec 2018 19:30 )  PTT:35.0 sec                                           CARDIAC MARKERS ( 12 Dec 2018 08:08 )  x     / 0.03 ng/mL / 102 U/L / x     / 8.0 ng/mL  CARDIAC MARKERS ( 11 Dec 2018 19:30 )  x     / <0.01 ng/mL / x     / x     / x                                                LIVER FUNCTIONS - ( 11 Dec 2018 19:30 )  Alb: 4.1 g/dL / Pro: 8.1 g/dL / ALK PHOS: 107 U/L / ALT: 39 U/L / AST: 29 U/L / GGT: x                                                                                                                                   ABG - ( 12 Dec 2018 12:46 )  pH, Arterial: 7.32  pH, Blood: x     /  pCO2: 74    /  pO2: 82    / HCO3: 38    / Base Excess: 9.0   /  SaO2: 96                  X-Rays      elevated R hemidiaphragm                  Left lower lung zone opacity                                                                        MEDICATIONS  (STANDING):  ALBUTerol    90 MICROgram(s) HFA Inhaler 1 Puff(s) Inhalation every 4 hours  ALBUTerol/ipratropium for Nebulization 3 milliLiter(s) Nebulizer every 6 hours  cefTRIAXone   IVPB 1 Gram(s) IV Intermittent once  cefTRIAXone   IVPB      enoxaparin Injectable 80 milliGRAM(s) SubCutaneous every 12 hours  furosemide   Injectable 40 milliGRAM(s) IV Push daily  hydrochlorothiazide 25 milliGRAM(s) Oral daily  levoFLOXacin IVPB      levoFLOXacin IVPB 750 milliGRAM(s) IV Intermittent once  metoprolol succinate ER 25 milliGRAM(s) Oral daily  potassium chloride    Tablet ER 20 milliEquivalent(s) Oral every 2 hours  tiotropium 18 MICROgram(s) Capsule 1 Capsule(s) Inhalation daily  valACYclovir 1000 milliGRAM(s) Oral three times a day    MEDICATIONS  (PRN):  diphenhydrAMINE 25 milliGRAM(s) Oral every 6 hours PRN Rash and/or Itching

## 2018-12-12 NOTE — H&P ADULT - HISTORY OF PRESENT ILLNESS
74 yo F from [facility] w/ pmhx of HTN reports with worsening generalized weakness and SOB for 3 days. Pt was at urgent care for evaluation of rash on right shoulder and chest, was found to have O2 sat of 88% on RA and send to the ED for further evaluation. Pt reports b/l leg swelling for a few months. Pt's denies CP, palpitations, orthopnea or PND. Rash on right shoulder, chest and upper arm started on Sunday 12/9/18, pt admits to pruritus, denies pain.   No fever, chills, cough, URI symptoms, dizziness, abd pain, n/v/d, dysuria.     - on arrival to ED, Pt was found to have A fib with RVR HR 130s and O2 sat 69% on RA. Pt improved after Bipap and IV lasix 40mg X1  - pro-  and CXR showes pulmonary edema.

## 2018-12-13 NOTE — PROGRESS NOTE ADULT - PROBLEM SELECTOR PLAN 3
hold metoprolol with bradycardia  cont lovenox for now  ekg  echo  keep on tele  pt with marginal troponin secondary to supply and demand not acs.

## 2018-12-13 NOTE — PROGRESS NOTE ADULT - ASSESSMENT
· Assessment		  IMPRESSION:    Acute on chronic hypercapnic respiratory failure  Pneumonia/Aspiration?  Elevated right hemidiaphragm      PLAN:     CNS: no depressants    HEENT: Oral care    PULMONARY:  HOB @ 45 degrees, BPAP with ST mode    CARDIOVASCULAR: I = O    GI: GI prophylaxis. speech and swallow eval    RENAL:  Follow up lytes.  Correct as needed    INFECTIOUS DISEASE: Follow up cultures, Rocephin/Levaquin, solumedrol 60 q24h    HEMATOLOGICAL:  DVT prophylaxis.    ENDOCRINE:  Follow up FS.  Insulin protocol if needed.    MUSCULOSKELETAL: OOB to chair as tolerated    downgrade to medicine. · Assessment		  IMPRESSION:    Acute on chronic hypercapnic respiratory failure, resolved  IDALMIS / OHS  Pneumonia/Aspiration?  Elevated right hemidiaphragm      PLAN:     CNS: no depressants    HEENT: Oral care    PULMONARY:  HOB @ 45 degrees, BiPAP with ST mode during sleep and PRN during the day     CARDIOVASCULAR: I = O    GI: GI prophylaxis. speech and swallow eval    RENAL:  Follow up lytes.  Correct as needed    INFECTIOUS DISEASE: Follow up cultures, Rocephin/Levaquin, solumedrol 60 q24h    HEMATOLOGICAL:  DVT prophylaxis.    ENDOCRINE:  Follow up FS. Check TSH    MUSCULOSKELETAL: OOB to chair as tolerated    downgrade to medicine.

## 2018-12-13 NOTE — PROGRESS NOTE ADULT - ASSESSMENT
73F    Hypertension    Admitted for hypoxemia, Afib RVR, , elevated d-dimer  Sepsis ruled out on admission  Afebrile  No leukocytosis  Lactic acidosis to 3    Rash on C4/5 area, vesicles? in axilla- cannot r/o Zoster  also L erythematous groin rash with blisters  CXR edema, likely fluid overload in the setting of Afib RVR, lower suspicion for PNA  CT chest Mucoid debris in the proximal trachea suggestive of aspiration. Bilateral patchy airspace opacities greatest in the right lower lobe.   Correlate for infectious/inflammatory etiologies. Porcelain gallbladder      - Valtrex 1g TID D2, monitor rash  - Airborne, contact  - Pulm following  - on levo/ceftriaxone for PNA, consider d/c as no fever, leukocytosis and e/o fluid overload, possible aspiration    Spectra [id number]

## 2018-12-13 NOTE — PROGRESS NOTE ADULT - SUBJECTIVE AND OBJECTIVE BOX
DENISE BARAJAS  73y, Female      OVERNIGHT EVENTS:  afebrile  no acute events overnight  CT chest Mucoid debris in the proximal trachea suggestive of aspiration. Bilateral patchy airspace opacities greatest in the right lower lobe.   Correlate for infectious/inflammatory etiologies. Porcelain gallbladder    ROS negative except as per above    VITALS:  T(F): 96.7, Max: 97.9 (12-12-18 @ 14:13)  HR: 61  BP: 113/56  RR: 20Vital Signs Last 24 Hrs  T(C): 35.9 (13 Dec 2018 05:00), Max: 36.6 (12 Dec 2018 14:13)  T(F): 96.7 (13 Dec 2018 05:00), Max: 97.9 (12 Dec 2018 14:13)  HR: 61 (13 Dec 2018 08:08) (50 - 65)  BP: 113/56 (13 Dec 2018 08:08) (89/50 - 149/59)  BP(mean): 85 (13 Dec 2018 06:00) (66 - 86)  RR: 20 (13 Dec 2018 08:08) (18 - 99)  SpO2: 96% (13 Dec 2018 08:08) (96% - 100%)    PHYSICAL EXAM  Gen: NAD NC  HEENT: NCAT. EOMI. MMM.   Neck: Supple  CV: RRR, no murmurs  Lungs: decreased bases  Abd: Soft. NTND  Extr: wwp, trace edema  Skin: rash expanding multiple dermitomes C5-T1 with vesicular like lesions in axilla, L groin erythematous rash   Neuro: No focal deficits  Lines: clean      TESTS & MEASUREMENTS:                        12.1   6.23  )-----------( 143      ( 13 Dec 2018 07:14 )             38.0     12-13    136  |  92<L>  |  42<H>  ----------------------------<  96  3.8   |  33<H>  |  1.3    Ca    8.3<L>      13 Dec 2018 07:14  Mg     1.9     12-13    TPro  7.1  /  Alb  3.6  /  TBili  0.4  /  DBili  <0.2  /  AST  24  /  ALT  32  /  AlkPhos  89  12-13    LIVER FUNCTIONS - ( 13 Dec 2018 07:14 )  Alb: 3.6 g/dL / Pro: 7.1 g/dL / ALK PHOS: 89 U/L / ALT: 32 U/L / AST: 24 U/L / GGT: x             Culture - Blood (collected 12-11-18 @ 19:26)  Source: .Blood Blood-Peripheral  Preliminary Report (12-13-18 @ 01:02):    No growth to date.    Culture - Blood (collected 12-11-18 @ 19:26)  Source: .Blood Blood-Peripheral  Preliminary Report (12-13-18 @ 01:02):    No growth to date.          RADIOLOGY & ADDITIONAL TESTS:    ANTIBIOTICS:  acyclovir IVPB   116 mL/Hr IV Intermittent (12-13-18 @ 02:21)   116 mL/Hr IV Intermittent (12-12-18 @ 20:30)    cefTRIAXone   IVPB   100 mL/Hr IV Intermittent (12-12-18 @ 14:54)    levoFLOXacin IVPB   100 mL/Hr IV Intermittent (12-12-18 @ 17:17)    valACYclovir   1000 milliGRAM(s) Oral (12-12-18 @ 11:31)        acyclovir IVPB 800 milliGRAM(s) IV Intermittent every 8 hours  cefTRIAXone   IVPB 1 Gram(s) IV Intermittent every 24 hours  cefTRIAXone   IVPB      levoFLOXacin IVPB      levoFLOXacin IVPB 750 milliGRAM(s) IV Intermittent every 24 hours

## 2018-12-13 NOTE — PROGRESS NOTE ADULT - SUBJECTIVE AND OBJECTIVE BOX
Patient is a 73y old  Female who presents with a chief complaint of SOB and rash (13 Dec 2018 09:16)        Over Night Events: patient feels better. now off BPAP        ROS:  See HPI    PHYSICAL EXAM    ICU Vital Signs Last 24 Hrs  T(C): 35.9 (13 Dec 2018 05:00), Max: 36.6 (12 Dec 2018 14:13)  T(F): 96.7 (13 Dec 2018 05:00), Max: 97.9 (12 Dec 2018 14:13)  HR: 61 (13 Dec 2018 08:08) (50 - 65)  BP: 113/56 (13 Dec 2018 08:08) (89/50 - 149/59)  BP(mean): 85 (13 Dec 2018 06:00) (66 - 86)  ABP: --  ABP(mean): --  RR: 20 (13 Dec 2018 08:08) (18 - 99)  SpO2: 96% (13 Dec 2018 08:08) (96% - 100%)      General: in NAD  HEENT: BEATRIZ             Lymphatic system: No cervical LN   Lungs: Bilateral BS, b/l rhonchi  Cardiovascular: Regular   Gastrointestinal: Soft, Positive BS  Extremities: No clubbing.  Moves extremities.  Full Range of motion, + 1 b/l LLE  Skin: Warm, intact  Neurological: No motor or sensory deficit       12-12-18 @ 07:01  -  12-13-18 @ 07:00  --------------------------------------------------------  IN:    IV PiggyBack: 500 mL  Total IN: 500 mL    OUT:    Voided: 200 mL  Total OUT: 200 mL    Total NET: 300 mL          LABS:                            12.1   6.23  )-----------( 143      ( 13 Dec 2018 07:14 )             38.0                                               12-13    136  |  92<L>  |  42<H>  ----------------------------<  96  3.8   |  33<H>  |  1.3    Ca    8.3<L>      13 Dec 2018 07:14  Mg     1.9     12-13    TPro  7.1  /  Alb  3.6  /  TBili  0.4  /  DBili  <0.2  /  AST  24  /  ALT  32  /  AlkPhos  89  12-13      PT/INR - ( 11 Dec 2018 19:30 )   PT: 12.10 sec;   INR: 1.05 ratio         PTT - ( 11 Dec 2018 19:30 )  PTT:35.0 sec                                           CARDIAC MARKERS ( 12 Dec 2018 16:51 )  x     / 0.02 ng/mL / 180 U/L / x     / 12.1 ng/mL  CARDIAC MARKERS ( 12 Dec 2018 08:08 )  x     / 0.03 ng/mL / 102 U/L / x     / 8.0 ng/mL  CARDIAC MARKERS ( 11 Dec 2018 19:30 )  x     / <0.01 ng/mL / x     / x     / x                                                LIVER FUNCTIONS - ( 13 Dec 2018 07:14 )  Alb: 3.6 g/dL / Pro: 7.1 g/dL / ALK PHOS: 89 U/L / ALT: 32 U/L / AST: 24 U/L / GGT: x                                                  Culture - Blood (collected 11 Dec 2018 19:26)  Source: .Blood Blood-Peripheral  Preliminary Report (13 Dec 2018 01:02):    No growth to date.    Culture - Blood (collected 11 Dec 2018 19:26)  Source: .Blood Blood-Peripheral  Preliminary Report (13 Dec 2018 01:02):    No growth to date.                                                                                       ABG - ( 12 Dec 2018 17:31 )  pH, Arterial: 7.37  pH, Blood: x     /  pCO2: 63    /  pO2: 145   / HCO3: 37    / Base Excess: 9.0   /  SaO2: 100                 MEDICATIONS  (STANDING):  acyclovir IVPB 800 milliGRAM(s) IV Intermittent every 8 hours  ALBUTerol    90 MICROgram(s) HFA Inhaler 1 Puff(s) Inhalation every 4 hours  ALBUTerol/ipratropium for Nebulization 3 milliLiter(s) Nebulizer every 6 hours  cefTRIAXone   IVPB 1 Gram(s) IV Intermittent every 24 hours  cefTRIAXone   IVPB      enoxaparin Injectable 80 milliGRAM(s) SubCutaneous every 12 hours  furosemide   Injectable 40 milliGRAM(s) IV Push daily  hydrochlorothiazide 25 milliGRAM(s) Oral daily  levoFLOXacin IVPB      levoFLOXacin IVPB 750 milliGRAM(s) IV Intermittent every 24 hours  methylPREDNISolone sodium succinate Injectable 60 milliGRAM(s) IV Push daily  metoprolol succinate ER 25 milliGRAM(s) Oral daily  tiotropium 18 MICROgram(s) Capsule 1 Capsule(s) Inhalation daily    MEDICATIONS  (PRN):  diphenhydrAMINE 25 milliGRAM(s) Oral every 6 hours PRN Rash and/or Itching      < from: CT Chest No Cont (12.12.18 @ 18:08) >  Mucoid debris in the proximal trachea (3:61) suggestive of aspiration.    Bilateral patchy airspace opacities greatest in the right lower lobe.   Correlate for infectious/inflammatory etiologies.    Prominent mediastinal lymph nodes up to 1.0 cm, probably reactive in   nature.    Cholelithiasis with suggestion of calcification within the wall of the   gallbladder (porcelain gallbladder).    < end of copied text > Patient is a 73y old  Female who presents with a chief complaint of SOB and rash (13 Dec 2018 09:16)        Over Night Events: patient feels better. now off BPAP        ROS:  See HPI    PHYSICAL EXAM    ICU Vital Signs Last 24 Hrs  T(C): 35.9 (13 Dec 2018 05:00), Max: 36.6 (12 Dec 2018 14:13)  T(F): 96.7 (13 Dec 2018 05:00), Max: 97.9 (12 Dec 2018 14:13)  HR: 61 (13 Dec 2018 08:08) (50 - 65)  BP: 113/56 (13 Dec 2018 08:08) (89/50 - 149/59)  BP(mean): 85 (13 Dec 2018 06:00) (66 - 86)  ABP: --  ABP(mean): --  RR: 20 (13 Dec 2018 08:08) (18 - 99)  SpO2: 96% (13 Dec 2018 08:08) (96% - 100%)      General: in NAD  HEENT: BEATRIZ             Lymphatic system: No cervical LN   Lungs: Bilateral BS,  Cardiovascular: Regular   Gastrointestinal: Soft, Positive BS  Extremities: No clubbing.  Moves extremities.  Full Range of motion, + 1 b/l LLE  Skin: Warm, intact  Neurological: No motor or sensory deficit       12-12-18 @ 07:01  -  12-13-18 @ 07:00  --------------------------------------------------------  IN:    IV PiggyBack: 500 mL  Total IN: 500 mL    OUT:    Voided: 200 mL  Total OUT: 200 mL    Total NET: 300 mL          LABS:                            12.1   6.23  )-----------( 143      ( 13 Dec 2018 07:14 )             38.0                                               12-13    136  |  92<L>  |  42<H>  ----------------------------<  96  3.8   |  33<H>  |  1.3    Ca    8.3<L>      13 Dec 2018 07:14  Mg     1.9     12-13    TPro  7.1  /  Alb  3.6  /  TBili  0.4  /  DBili  <0.2  /  AST  24  /  ALT  32  /  AlkPhos  89  12-13      PT/INR - ( 11 Dec 2018 19:30 )   PT: 12.10 sec;   INR: 1.05 ratio         PTT - ( 11 Dec 2018 19:30 )  PTT:35.0 sec                                           CARDIAC MARKERS ( 12 Dec 2018 16:51 )  x     / 0.02 ng/mL / 180 U/L / x     / 12.1 ng/mL  CARDIAC MARKERS ( 12 Dec 2018 08:08 )  x     / 0.03 ng/mL / 102 U/L / x     / 8.0 ng/mL  CARDIAC MARKERS ( 11 Dec 2018 19:30 )  x     / <0.01 ng/mL / x     / x     / x                                                LIVER FUNCTIONS - ( 13 Dec 2018 07:14 )  Alb: 3.6 g/dL / Pro: 7.1 g/dL / ALK PHOS: 89 U/L / ALT: 32 U/L / AST: 24 U/L / GGT: x                                                  Culture - Blood (collected 11 Dec 2018 19:26)  Source: .Blood Blood-Peripheral  Preliminary Report (13 Dec 2018 01:02):    No growth to date.    Culture - Blood (collected 11 Dec 2018 19:26)  Source: .Blood Blood-Peripheral  Preliminary Report (13 Dec 2018 01:02):    No growth to date.                                                                                       ABG - ( 12 Dec 2018 17:31 )  pH, Arterial: 7.37  pH, Blood: x     /  pCO2: 63    /  pO2: 145   / HCO3: 37    / Base Excess: 9.0   /  SaO2: 100                 MEDICATIONS  (STANDING):  acyclovir IVPB 800 milliGRAM(s) IV Intermittent every 8 hours  ALBUTerol    90 MICROgram(s) HFA Inhaler 1 Puff(s) Inhalation every 4 hours  ALBUTerol/ipratropium for Nebulization 3 milliLiter(s) Nebulizer every 6 hours  cefTRIAXone   IVPB 1 Gram(s) IV Intermittent every 24 hours  cefTRIAXone   IVPB      enoxaparin Injectable 80 milliGRAM(s) SubCutaneous every 12 hours  furosemide   Injectable 40 milliGRAM(s) IV Push daily  hydrochlorothiazide 25 milliGRAM(s) Oral daily  levoFLOXacin IVPB      levoFLOXacin IVPB 750 milliGRAM(s) IV Intermittent every 24 hours  methylPREDNISolone sodium succinate Injectable 60 milliGRAM(s) IV Push daily  metoprolol succinate ER 25 milliGRAM(s) Oral daily  tiotropium 18 MICROgram(s) Capsule 1 Capsule(s) Inhalation daily    MEDICATIONS  (PRN):  diphenhydrAMINE 25 milliGRAM(s) Oral every 6 hours PRN Rash and/or Itching      < from: CT Chest No Cont (12.12.18 @ 18:08) >  Mucoid debris in the proximal trachea (3:61) suggestive of aspiration.    Bilateral patchy airspace opacities greatest in the right lower lobe.   Correlate for infectious/inflammatory etiologies.    Prominent mediastinal lymph nodes up to 1.0 cm, probably reactive in   nature.    Cholelithiasis with suggestion of calcification within the wall of the   gallbladder (porcelain gallbladder).    < end of copied text >

## 2018-12-13 NOTE — PROGRESS NOTE ADULT - SUBJECTIVE AND OBJECTIVE BOX
Subjective: pt more alert off bipap, no cp, breathing improved      MEDICATIONS  (STANDING):  acyclovir IVPB 800 milliGRAM(s) IV Intermittent every 8 hours  ALBUTerol    90 MICROgram(s) HFA Inhaler 1 Puff(s) Inhalation every 4 hours  ALBUTerol/ipratropium for Nebulization 3 milliLiter(s) Nebulizer every 6 hours  cefTRIAXone   IVPB 1 Gram(s) IV Intermittent every 24 hours  cefTRIAXone   IVPB      enoxaparin Injectable 80 milliGRAM(s) SubCutaneous every 12 hours  furosemide   Injectable 40 milliGRAM(s) IV Push daily  hydrochlorothiazide 25 milliGRAM(s) Oral daily  levoFLOXacin IVPB      levoFLOXacin IVPB 750 milliGRAM(s) IV Intermittent every 24 hours  metoprolol succinate ER 25 milliGRAM(s) Oral daily  tiotropium 18 MICROgram(s) Capsule 1 Capsule(s) Inhalation daily    MEDICATIONS  (PRN):  diphenhydrAMINE 25 milliGRAM(s) Oral every 6 hours PRN Rash and/or Itching            Vital Signs Last 24 Hrs  T(C): 35.9 (13 Dec 2018 05:00), Max: 36.6 (12 Dec 2018 14:13)  T(F): 96.7 (13 Dec 2018 05:00), Max: 97.9 (12 Dec 2018 14:13)  HR: 52 (13 Dec 2018 06:00) (50 - 65)  BP: 149/59 (13 Dec 2018 06:00) (89/50 - 149/59)  BP(mean): 85 (13 Dec 2018 06:00) (66 - 86)  RR: 20 (13 Dec 2018 06:00) (18 - 99)  SpO2: 99% (13 Dec 2018 06:00) (97% - 100%)             REVIEW OF SYSTEMS:  CONSTITUTIONAL: no fever, no chills, no diaphoresis  CARDIOLOGY: no chest pain, (+) SOB, no palpitation, no diaphoresis, no faint   RESPIRATORY:(+) dyspnea, no wheeze, no orthopnea, no PND   NEUROLOGICAL: no dizziness, headache, focal deficits to report.  GI: no abdominal pain, no dyspepsia, no nausea, no vomiting, no diarrhea.    HEENT: no congestion, no nasal bleeding  SKIN: no ecchymosis, (+) rash left groin and seprate rash rt chest             PHYSICAL EXAM:  · CONSTITUTIONAL: Looks stable, in no distress  . NECK: Supple, no JVD, no bruit on either carotid side   · RESPIRATORY: Normal air entry to lung base, no wheeze, no crackle, no wet rales (+) rhonchi  · CARDIOVASCULAR: Normal S1, A2, P2, no murmur, no click, regular rate,  no rub,  · EXTREMITIES: No cyanosis, no clubbing, no edema  · VASCULAR: Pulses are regular, equal, bilateral in upper and lower extremities  	  TELEMETRY: sb    CT of chest < from: CT Chest No Cont (12.12.18 @ 18:08) >  MPRESSION:    Mucoid debris in the proximal trachea (3:61) suggestive of aspiration.    Bilateral patchy airspace opacities greatest in the right lower lobe.   Correlate for infectious/inflammatory etiologies.    Prominent mediastinal lymph nodes up to 1.0 cm, probably reactive in   nature.    Cholelithiasis with suggestion of calcification within the wall of the   gallbladder (porcelain gallbladder).      < end of copied text >    LABS:                        12.1   6.23  )-----------( 143      ( 13 Dec 2018 07:14 )             38.0     12-12    138  |  90<L>  |  36<H>  ----------------------------<  104<H>  3.2<L>   |  34<H>  |  1.1    Ca    9.1      12 Dec 2018 08:08  Mg     2.0     12-12    TPro  8.1<H>  /  Alb  4.1  /  TBili  0.7  /  DBili  x   /  AST  29  /  ALT  39  /  AlkPhos  107  12-11    CARDIAC MARKERS ( 12 Dec 2018 16:51 )  x     / 0.02 ng/mL / 180 U/L / x     / 12.1 ng/mL  CARDIAC MARKERS ( 12 Dec 2018 08:08 )  x     / 0.03 ng/mL / 102 U/L / x     / 8.0 ng/mL  CARDIAC MARKERS ( 11 Dec 2018 19:30 )  x     / <0.01 ng/mL / x     / x     / x          PT/INR - ( 11 Dec 2018 19:30 )   PT: 12.10 sec;   INR: 1.05 ratio         PTT - ( 11 Dec 2018 19:30 )  PTT:35.0 sec    I&O's Summary    12 Dec 2018 07:01  -  13 Dec 2018 07:00  --------------------------------------------------------  IN: 500 mL / OUT: 200 mL / NET: 300 mL      BNP  RADIOLOGY & ADDITIONAL STUDIES:    IMPRESSION AND PLAN:

## 2018-12-13 NOTE — SWALLOW BEDSIDE ASSESSMENT ADULT - SWALLOW EVAL: DIAGNOSIS
mild oral dysphagia for puree. toleration observed for thins. chewables not attempted 2/2 oral dysphagia

## 2018-12-13 NOTE — PATIENT PROFILE ADULT - NSPROPTRIGHTREPPHONE_GEN_A_NUR
Subjective:      Aurelio Ace is a 69 y.o. male is here for device follow up. He is doing well. The patient denies chest pain/ shortness of breath, orthopnea, PND, LE edema, palpitations, syncope, presyncope or fatigue.        Patient Active Problem List    Diagnosis Date Noted    Low back pain 07/02/2013    Automatic implantable cardioverter-defibrillator in situ 06/11/2012    GERD (gastroesophageal reflux disease) 05/14/2012    Hematochezia 05/14/2012    Screen for colon cancer 05/14/2012    CAD (coronary artery disease) 02/16/2011    Mixed hyperlipidemia 02/16/2011    Cardiomyopathy ([facility] [address] 02/16/2011    Esophageal reflux 02/16/2011    Encounter for long-term (current) use of other medications 02/16/2011    Nocturia 02/16/2011    Abnormal PSA 02/16/2011    Essential hypertension, benign 02/16/2011      Harish Ivory MD  Past Medical History:   Diagnosis Date    Abnormal PSA 2/16/2011    CAD (coronary artery disease) 2/16/2011    Cardiomyopathy ([facility] [address] 2/16/2011    Encounter for long-term (current) use of other medications 2/16/2011    Esophageal reflux 2/16/2011    Essential hypertension, benign 2/16/2011    GERD (gastroesophageal reflux disease) 5/14/2012    Heart attack ([facility] [address]     Low back pain 7/2/2013    Mixed hyperlipidemia 2/16/2011    Nocturia 2/16/2011    Pacemaker       Past Surgical History:   Procedure Laterality Date    HX CERVICAL FUSION  1997    c4,5,6    MD COLONOSCOPY FLX DX W/COLLJ SPEC WHEN PFRMD  5/14/2012         MD EGD TRANSORAL BIOPSY SINGLE/MULTIPLE  5/14/2012         REMOVAL GALLBLADDER       No Known Allergies   Family History   Problem Relation Age of Onset    Lung Disease Mother     Alcohol abuse Father     negative for cardiac disease  Social History     Social History    Marital status:      Spouse name: N/A    Number of children: N/A    Years of education: N/A     Social History Main Topics    Smoking status: Never Smoker    Smokeless tobacco: Never Used    Alcohol use No    Drug use: None    Sexual activity: Not Asked     Other Topics Concern    None     Social History Narrative     Current Outpatient Prescriptions   Medication Sig    apixaban (ELIQUIS) 5 mg tablet Take 1 Tab by mouth two (2) times a day.  furosemide (LASIX) 20 mg tablet TAKE 1 TABLET BY MOUTH EVERY DAY    carvedilol (COREG) 25 mg tablet TAKE 1 TABLET BY MOUTH TWO (2) TIMES DAILY (WITH MEALS).  tamsulosin (FLOMAX) 0.4 mg capsule TAKE 1 CAPSULE BY MOUTH EVERY DAY    ezetimibe (ZETIA) 10 mg tablet TAKE 1 TABLET BY MOUTH EVERY DAY    captopril (CAPOTEN) 12.5 mg tablet Take 1 Tab by mouth two (2) times a day.  CRESTOR 20 mg tablet TAKE 1 TABLET BY MOUTH EVERY DAY    nitroglycerin (NITROLINGUAL) 400 mcg/spray spray 1 Spray by SubLINGual route every five (5) minutes as needed.  cyanocobalamin 1,000 mcg tablet Take 1,000 mcg by mouth daily.  omega-3-dha-epa-dpa-fish oil 1,050-1,200 mg cap Take 1 Cap by mouth two (2) times a day.  D-METHORPHAN HB/PROMETH HCL (PROMETHAZINE-DM) Syrp Take 5 mL by mouth four (4) times daily as needed.  cholecalciferol, vitamin d3, (VITAMIN D) 1,000 unit tablet Take  by mouth daily.  multivitamins-minerals-lutein (CENTRUM SILVER) Tab Take  by mouth.  LEVITRA 20 mg tablet Take 20 mg by mouth as needed. No current facility-administered medications for this visit. Vitals:    04/19/18 0829   BP: 132/78   Pulse: 74   Resp: 16   SpO2: 96%   Weight: 242 lb 3.2 oz (109.9 kg)   Height: 5' 8\" (1.727 m)       I have reviewed the nurses notes, vitals, problem list, allergy list, medical history, family, social history and medications. Review of Symptoms:    General: Pt denies excessive weight gain or loss. Pt is able to conduct ADL's  HEENT: Denies blurred vision, headaches, epistaxis and difficulty swallowing. Respiratory: Denies shortness of breath, WING, wheezing or stridor.   Cardiovascular: Denies precordial pain, palpitations, edema or PND  Gastrointestinal: Denies poor appetite, indigestion, abdominal pain or blood in stool  Urinary: Denies dysuria, pyuria  Musculoskeletal: Denies pain or swelling from muscles or joints  Neurologic: Denies tremor, paresthesias, or sensory motor disturbance  Skin: Denies rash, itching or texture change. Psych: Denies depression      Physical Exam:      General: Well developed, in no acute distress. HEENT: Eyes - PERRL, no jvd  Heart:  Normal S1/S2 negative S3 or S4. Regular, no murmur, gallop or rub.   Respiratory: Clear bilaterally x 4, no wheezing or rales  Abdomen:   Soft, non-tender, bowel sounds are active.   Extremities:  No edema, normal cap refill, no cyanosis. Musculoskeletal: No clubbing  Neuro: A&Ox3, speech clear, gait stable. Skin: Skin color is normal. No rashes or lesions. Non diaphoretic  Vascular: 2+ pulses symmetric in all extremities    Cardiographics    Ekg: dual chamber pacing  BSC BIV ICD: 81% AP,  BIV pacing 95/99%    No results found for this or any previous visit. Lab Results   Component Value Date/Time    WBC 5.0 05/08/2017 01:54 PM    HGB 14.9 05/08/2017 01:54 PM    HCT 42.0 05/08/2017 01:54 PM    PLATELET 356 (L) 82/07/4258 01:54 PM    MCV 90 05/08/2017 01:54 PM      Lab Results   Component Value Date/Time    Sodium 143 05/08/2017 01:54 PM    Potassium 3.8 05/08/2017 01:54 PM    Chloride 104 05/08/2017 01:54 PM    CO2 20 05/08/2017 01:54 PM    Anion gap 8 07/29/2010 02:23 PM    Glucose 136 (H) 05/08/2017 01:54 PM    BUN 13 05/08/2017 01:54 PM    Creatinine 1.31 (H) 05/08/2017 01:54 PM    BUN/Creatinine ratio 10 05/08/2017 01:54 PM    GFR est AA 64 05/08/2017 01:54 PM    GFR est non-AA 56 (L) 05/08/2017 01:54 PM    Calcium 9.2 05/08/2017 01:54 PM    Bilirubin, total 1.3 (H) 05/08/2017 01:54 PM    AST (SGOT) 25 05/08/2017 01:54 PM    Alk.  phosphatase 35 (L) 05/08/2017 01:54 PM    Protein, total 6.0 05/08/2017 01:54 PM    Albumin 4.3 05/08/2017 01:54 PM    Globulin 2.3 07/29/2010 02:23 PM    A-G Ratio 2.5 (H) 05/08/2017 01:54 PM    ALT (SGPT) 32 05/08/2017 01:54 PM         Assessment:     Assessment:        ICD-10-CM ICD-9-CM    1. Irregular heartbeat I49.9 427.9 AMB POC EKG ROUTINE W/ 12 LEADS, INTER & REP   2. Cardiomyopathy, unspecified type ([address] I42.9 425.4    3. Essential hypertension, benign I10 401.1    4. Automatic implantable cardioverter-defibrillator in situ Z95.810 V45.02    5. Paroxysmal atrial fibrillation (HCC) I48.0 427.31    6. Coronary artery disease involving native coronary artery of native heart without angina pectoris I25.10 414.01      Orders Placed This Encounter    AMB POC EKG ROUTINE W/ 12 LEADS, INTER & REP     Order Specific Question:   Reason for Exam:     Answer:   routine        Plan:   Mr. Kathe Gonzalez is here for device follow up s/p BIV ICD upgrade 5/2017. During his last device check, interrogation indicated an ATR lasting 10 hours. Today, his device shows 2 episodes, longest lasting 4 hr. 40 minute. He was started on Eliquis and is tolerating well. He denies cardiac complaints and reports continued improvement in quality of life post BIV upgrade. EKG shows dual chamber pacing. He denies s/s of sleep apnea. We discussed cutting back on his caffeine intake - he will do that and f/u in 4 weeks to reassess his AF burden. Cont med rx for htn and cardiomyopathy. Continue medical management for AF, cardiomyopathy, CAD, HTN. Thank you for allowing me to participate in Ivy AspCitlali 's care.     Claudeen Coach, NP    Tato Ramirez MD, Alanna Bloch [phone]

## 2018-12-13 NOTE — CHART NOTE - NSCHARTNOTEFT_GEN_A_CORE
ICU DOWNGRADE NOTE:    73y Female transferred to floor from ICU    Patient is a 73y old Female who presents with a chief complaint of SOB and rash (13 Dec 2018 09:16)    The patient is currently admitted for the primary diagnosis of Shortness of breath    The patient was admitted to the unit for (1) Days.    The patient (was never) intubated and was never on pressors.    Patient feels well today. Saturating well on nasal cannula. Aide at bedside reporting patient is much improved in symptoms and in mentation.     ICU COURSE OF EVENTS:  -------------------------------------------------------------------------------------------    74 yo F from [facility] w/ pmhx of HTN reports with worsening generalized weakness and SOB for 3 days. Pt was at urgent care for evaluation of rash on right shoulder and chest, was found to have O2 sat of 88% on RA and send to the ED for further evaluation. On arrival to ED, Pt was found to have A fib with RVR HR 130s and O2 sat 69% on RA. Pt improved after Bipap and IV lasix 40mg X1. Called by RN patients saturation was 85%. Aide fed patient and she choked, likely possible aspiration. Patient placed back on BiPAP. ABG noted and showing worsening hypercapnia. ICU consulted for worsening mental status and concern for impending intubation. Repeat ABG showed improvement in CO2. Patient was AAOx3. Reported she felt much better. Seen and examined. Stable for downgrade. CT findings noted  -------------------------------------------------------------------------------------------    General: well developed, well nourished, NAD  Neuro: alert and oriented, no focal deficits, moves all extremities spontaneously  HEENT: NCAT, EOMI, anicteric, mucosa moist  Respiratory: Decreased breath sounds bilaterally   CVS: regular rate and rhythm  Abdomen: soft, nontender, nondistended  Extremities: no edema, sensation and movement grossly intact  Skin: warm, dry, appropriate color                 12.1   6.23   )----------(  143       ( 13 Dec 2018 07:14 )               38.0    12-13    136  |  92<L>  |  42<H>  ----------------------------<  96  3.8   |  33<H>  |  1.3    Ca    8.3<L>      13 Dec 2018 07:14  Mg     1.9     12-13    TPro  7.1  /  Alb  3.6  /  TBili  0.4  /  DBili  <0.2  /  AST  24  /  ALT  32  /  AlkPhos  89  12-13    < from: CT Chest No Cont (12.12.18 @ 18:08) >    IMPRESSION:    Mucoid debris in the proximal trachea (3:61) suggestive of aspiration.    Bilateral patchy airspace opacities greatest in the right lower lobe.   Correlate for infectious/inflammatory etiologies.    Prominent mediastinal lymph nodes up to 1.0 cm, probably reactive in   nature.    Cholelithiasis with suggestion of calcification within the wall of the   gallbladder (porcelain gallbladder).    < end of copied text >        Current workup in progress:  Solumedrol 60mg daily   Cont Antibiotic therapy for now   BiPAP at night and PRN   Cardiology follow up     Signout given to Dr. Peres and Dr. Healy       SIGN OUT AT 12-13-18 @ 10:43 GIVEN TO: ICU DOWNGRADE NOTE:    73y Female transferred to floor from ICU    Patient is a 73y old Female who presents with a chief complaint of SOB and rash (13 Dec 2018 09:16)    The patient is currently admitted for the primary diagnosis of Shortness of breath    The patient was admitted to the unit for (1) Days.    The patient (was never) intubated and was never on pressors.    Patient feels well today. Saturating well on nasal cannula. Aide at bedside reporting patient is much improved in symptoms and in mentation.     ICU COURSE OF EVENTS:  -------------------------------------------------------------------------------------------    72 yo F from [facility] w/ pmhx of HTN reports with worsening generalized weakness and SOB for 3 days. Pt was at urgent care for evaluation of rash on right shoulder and chest, was found to have O2 sat of 88% on RA and send to the ED for further evaluation. On arrival to ED, Pt was found to have A fib with RVR HR 130s and O2 sat 69% on RA. Pt improved after Bipap and IV lasix 40mg X1. Called by RN patients saturation was 85%. Aide fed patient and she choked, likely possible aspiration. Patient placed back on BiPAP. ABG noted and showing worsening hypercapnia. ICU consulted for worsening mental status and concern for impending intubation. Repeat ABG showed improvement in CO2. Patient was AAOx3. Reported she felt much better. Seen and examined. Stable for downgrade. CT findings noted  -------------------------------------------------------------------------------------------    General: well developed, well nourished, NAD  Neuro: alert and oriented, no focal deficits, moves all extremities spontaneously  HEENT: NCAT, EOMI, anicteric, mucosa moist  Respiratory: Decreased breath sounds bilaterally   CVS: regular rate and rhythm  Abdomen: soft, nontender, nondistended  Extremities: no edema, sensation and movement grossly intact  Skin: warm, dry, appropriate color                 12.1   6.23   )----------(  143       ( 13 Dec 2018 07:14 )               38.0    12-13    136  |  92<L>  |  42<H>  ----------------------------<  96  3.8   |  33<H>  |  1.3    Ca    8.3<L>      13 Dec 2018 07:14  Mg     1.9     12-13    TPro  7.1  /  Alb  3.6  /  TBili  0.4  /  DBili  <0.2  /  AST  24  /  ALT  32  /  AlkPhos  89  12-13    < from: CT Chest No Cont (12.12.18 @ 18:08) >    IMPRESSION:    Mucoid debris in the proximal trachea (3:61) suggestive of aspiration.    Bilateral patchy airspace opacities greatest in the right lower lobe.   Correlate for infectious/inflammatory etiologies.    Prominent mediastinal lymph nodes up to 1.0 cm, probably reactive in   nature.    Cholelithiasis with suggestion of calcification within the wall of the   gallbladder (porcelain gallbladder).    < end of copied text >        Current workup in progress:  Solumedrol 60mg daily   Cont Antibiotic therapy for now   BiPAP at night and PRN   Cardiology follow up   Check urine legionella and strep pneumo and RVP    Signout given to Dr. Peres and Dr. Healy       SIGN OUT AT 12-13-18 @ 10:43 GIVEN TO:

## 2018-12-14 NOTE — PROGRESS NOTE ADULT - SUBJECTIVE AND OBJECTIVE BOX
CC: SOB     HPI: SOB worsened today after she ate was placed on non rebreather. currently on BIPAP     ROS: Rash right sided of chest and right upper back     Vital Signs Last 24 Hrs  T(C): 35.2 (14 Dec 2018 14:26), Max: 36.6 (13 Dec 2018 22:39)  T(F): 95.4 (14 Dec 2018 14:26), Max: 97.8 (13 Dec 2018 22:39)  HR: 110 (14 Dec 2018 14:26) (71 - 110)  BP: 127/57 (14 Dec 2018 14:26) (100/52 - 135/87)  BP(mean): --  RR: 20 (14 Dec 2018 14:26) (18 - 20)  SpO2: 94% (14 Dec 2018 13:42) (93% - 100%)    PHYSICAL EXAM:  GENERAL: NAD, well-developed  HEAD:  Atraumatic, Normocephalic  EYES: EOMI, PERRLA, conjunctiva and sclera clear  NECK: Supple, No JVD  Pulm: B/L dry crackles   CV: Regular rate and rhythm; No murmurs, rubs, or gallops  GI: Soft, Nontender, Nondistended; Bowel sounds present  EXTREMITIES:  2+ Peripheral Pulses, No clubbing, cyanosis, or edema  PSYCH: AAOx3  NEUROLOGY: non-focal  SKIN: rash upper right chest and right upper back                           12.1   6.23  )-----------( 143      ( 13 Dec 2018 07:14 )             38.0     12-13    136  |  92<L>  |  42<H>  ----------------------------<  96  3.8   |  33<H>  |  1.3    Ca    8.3<L>      13 Dec 2018 07:14  Mg     1.9     12-13    TPro  7.1  /  Alb  3.6  /  TBili  0.4  /  DBili  <0.2  /  AST  24  /  ALT  32  /  AlkPhos  89  12-13    LIVER FUNCTIONS - ( 13 Dec 2018 07:14 )  Alb: 3.6 g/dL / Pro: 7.1 g/dL / ALK PHOS: 89 U/L / ALT: 32 U/L / AST: 24 U/L / GGT: x             CARDIAC MARKERS ( 12 Dec 2018 16:51 )  x     / 0.02 ng/mL / 180 U/L / x     / 12.1 ng/mL        Culture - Blood (collected 12 Dec 2018 20:40)  Source: .Blood None  Gram Stain (13 Dec 2018 23:21):    Growth in aerobic bottle: Gram Positive Cocci in Clusters  Preliminary Report (13 Dec 2018 23:21):    Growth in aerobic bottle: Gram Positive Cocci in Clusters    "Due to technical problems, Proteus sp. will Not be reported as part of    the BCID panel until further notice"    ***Blood Panel PCR results on this specimen are available    approximately 3 hours after the Gram stain result.***    Gram stain, PCR, and/or culture results may not always    correspond due to difference in methodologies.    ************************************************************    This PCR assay was performed using [employer].    The following targets are tested for: Enterococcus,    vancomycin resistant enterococci, Listeria monocytogenes,    coagulase negative staphylococci, S. aureus,    methicillin resistant S. aureus, Streptococcus agalactiae    (Group B), S. pneumoniae, S.pyogenes (Group A),    Acinetobacter baumannii, Enterobacter cloacae, E. coli,    Klebsiella oxytoca, K. pneumoniae, Proteus sp.,    Serratia marcescens, Haemophilus influenzae,    Neisseria meningitidis, Pseudomonas aeruginosa, Candida    albicans, C. glabrata, C krusei, C parapsilosis,    C. tropicalis and the KPC resistance gene.  Organism: Blood Culture PCR (14 Dec 2018 01:42)  Organism: Blood Culture PCR (14 Dec 2018 01:42)    Culture - Blood (collected 12 Dec 2018 16:51)  Source: .Blood None  Preliminary Report (14 Dec 2018 01:01):    No growth to date.    Culture - Blood (collected 11 Dec 2018 19:26)  Source: .Blood Blood-Peripheral  Preliminary Report (13 Dec 2018 01:02):    No growth to date.    Culture - Blood (collected 11 Dec 2018 19:26)  Source: .Blood Blood-Peripheral  Preliminary Report (13 Dec 2018 01:02):    No growth to date.

## 2018-12-14 NOTE — PROGRESS NOTE ADULT - SUBJECTIVE AND OBJECTIVE BOX
Patient is a 73y old  Female who presents with a chief complaint of SOB and rash (14 Dec 2018 15:27)        Over Night Events: she feels short of breath. cannot come off BPAP        ROS:  See HPI    PHYSICAL EXAM    ICU Vital Signs Last 24 Hrs  T(C): 35.2 (14 Dec 2018 14:26), Max: 36.6 (13 Dec 2018 22:39)  T(F): 95.4 (14 Dec 2018 14:26), Max: 97.8 (13 Dec 2018 22:39)  HR: 110 (14 Dec 2018 14:26) (71 - 110)  BP: 127/57 (14 Dec 2018 14:26) (100/52 - 135/87)  BP(mean): --  ABP: --  ABP(mean): --  RR: 20 (14 Dec 2018 14:26) (18 - 20)  SpO2: 98% (14 Dec 2018 16:10) (93% - 100%)      General: on BPAP  HEENT: BEATRIZ             Lymphatic system: No cervical LN   Lungs: Bilateral BS, rhonchi R>L  Cardiovascular: Regular   Gastrointestinal: Soft, Positive BS  Extremities: No clubbing.  Moves extremities.  Full Range of motion + 2 b/l LLE   Skin: Warm, intact  Neurological: No motor or sensory deficit       12-13-18 @ 07:01  -  12-14-18 @ 07:00  --------------------------------------------------------  IN:    IV PiggyBack: 100 mL  Total IN: 100 mL    OUT:    Voided: 500 mL  Total OUT: 500 mL    Total NET: -400 mL          LABS:                            12.1   6.23  )-----------( 143      ( 13 Dec 2018 07:14 )             38.0                                               12-13    136  |  92<L>  |  42<H>  ----------------------------<  96  3.8   |  33<H>  |  1.3    Ca    8.3<L>      13 Dec 2018 07:14  Mg     1.9     12-13    TPro  7.1  /  Alb  3.6  /  TBili  0.4  /  DBili  <0.2  /  AST  24  /  ALT  32  /  AlkPhos  89  12-13                                                                                           LIVER FUNCTIONS - ( 13 Dec 2018 07:14 )  Alb: 3.6 g/dL / Pro: 7.1 g/dL / ALK PHOS: 89 U/L / ALT: 32 U/L / AST: 24 U/L / GGT: x                                                  Culture - Blood (collected 12 Dec 2018 20:40)  Source: .Blood None  Gram Stain (13 Dec 2018 23:21):    Growth in aerobic bottle: Gram Positive Cocci in Clusters  Preliminary Report (13 Dec 2018 23:21):    Growth in aerobic bottle: Gram Positive Cocci in Clusters    "Due to technical problems, Proteus sp. will Not be reported as part of    the BCID panel until further notice"    ***Blood Panel PCR results on this specimen are available    approximately 3 hours after the Gram stain result.***    Gram stain, PCR, and/or culture results may not always    correspond due to difference in methodologies.    ************************************************************    This PCR assay was performed using [employer].    The following targets are tested for: Enterococcus,    vancomycin resistant enterococci, Listeria monocytogenes,    coagulase negative staphylococci, S. aureus,    methicillin resistant S. aureus, Streptococcus agalactiae    (Group B), S. pneumoniae, S.pyogenes (Group A),    Acinetobacter baumannii, Enterobacter cloacae, E. coli,    Klebsiella oxytoca, K. pneumoniae, Proteus sp.,    Serratia marcescens, Haemophilus influenzae,    Neisseria meningitidis, Pseudomonas aeruginosa, Candida    albicans, C. glabrata, C krusei, C parapsilosis,    C. tropicalis and the KPC resistance gene.  Organism: Blood Culture PCR (14 Dec 2018 01:42)  Organism: Blood Culture PCR (14 Dec 2018 01:42)    Culture - Blood (collected 12 Dec 2018 16:51)  Source: .Blood None  Preliminary Report (14 Dec 2018 01:01):    No growth to date.    Culture - Blood (collected 11 Dec 2018 19:26)  Source: .Blood Blood-Peripheral  Preliminary Report (13 Dec 2018 01:02):    No growth to date.    Culture - Blood (collected 11 Dec 2018 19:26)  Source: .Blood Blood-Peripheral  Preliminary Report (13 Dec 2018 01:02):    No growth to date.                                                                                       ABG - ( 14 Dec 2018 13:19 )  pH, Arterial: 7.39  pH, Blood: x     /  pCO2: 60    /  pO2: 85    / HCO3: 36    / Base Excess: 9.4   /  SaO2: 97                  MEDICATIONS  (STANDING):  acyclovir IVPB 800 milliGRAM(s) IV Intermittent every 8 hours  ALBUTerol    90 MICROgram(s) HFA Inhaler 1 Puff(s) Inhalation every 4 hours  ALBUTerol/ipratropium for Nebulization 3 milliLiter(s) Nebulizer every 6 hours  chlorhexidine 4% Liquid 1 Application(s) Topical <User Schedule>  enoxaparin Injectable 80 milliGRAM(s) SubCutaneous every 12 hours  methylPREDNISolone sodium succinate Injectable 60 milliGRAM(s) IV Push daily  metoprolol tartrate 12.5 milliGRAM(s) Oral every 12 hours  piperacillin/tazobactam IVPB.      piperacillin/tazobactam IVPB. 2.25 Gram(s) IV Intermittent every 6 hours  simvastatin 5 milliGRAM(s) Oral at bedtime  tiotropium 18 MICROgram(s) Capsule 1 Capsule(s) Inhalation daily    MEDICATIONS  (PRN):  diphenhydrAMINE 25 milliGRAM(s) Oral every 6 hours PRN Rash and/or Itching      Xrays:    b/l opacities R>L                                                                                 ECHO pending

## 2018-12-14 NOTE — PROGRESS NOTE ADULT - SUBJECTIVE AND OBJECTIVE BOX
Subjective: pt appears comfortable.  pt is hungary and wishes to eat.  pt with no cp.       MEDICATIONS  (STANDING):  acyclovir IVPB 800 milliGRAM(s) IV Intermittent every 8 hours  ALBUTerol    90 MICROgram(s) HFA Inhaler 1 Puff(s) Inhalation every 4 hours  ALBUTerol/ipratropium for Nebulization 3 milliLiter(s) Nebulizer every 6 hours  cefTRIAXone   IVPB 1 Gram(s) IV Intermittent every 24 hours  cefTRIAXone   IVPB      enoxaparin Injectable 80 milliGRAM(s) SubCutaneous every 12 hours  furosemide   Injectable 40 milliGRAM(s) IV Push daily  hydrochlorothiazide 25 milliGRAM(s) Oral daily  levoFLOXacin IVPB      levoFLOXacin IVPB 750 milliGRAM(s) IV Intermittent every 24 hours  methylPREDNISolone sodium succinate Injectable 60 milliGRAM(s) IV Push daily  metoprolol succinate ER 25 milliGRAM(s) Oral daily  tiotropium 18 MICROgram(s) Capsule 1 Capsule(s) Inhalation daily    MEDICATIONS  (PRN):  diphenhydrAMINE 25 milliGRAM(s) Oral every 6 hours PRN Rash and/or Itching            Vital Signs Last 24 Hrs  T(C): 36.4 (14 Dec 2018 06:55), Max: 36.6 (13 Dec 2018 22:39)  T(F): 97.5 (14 Dec 2018 06:55), Max: 97.8 (13 Dec 2018 22:39)  HR: 95 (14 Dec 2018 06:55) (62 - 95)  BP: 100/52 (14 Dec 2018 06:55) (100/52 - 135/87)  BP(mean): --  RR: 18 (13 Dec 2018 22:39) (18 - 20)  SpO2: 93% (14 Dec 2018 07:45) (93% - 100%)             REVIEW OF SYSTEMS:  CONSTITUTIONAL: no fever, no chills, no diaphoresis  CARDIOLOGY: no chest pain, (+) SOB, no palpitation, no diaphoresis, no faint   RESPIRATORY: (+) dyspnea, no wheeze, no orthopnea, no PND   NEUROLOGICAL: no dizziness, headache, focal deficits to report.  GI: no abdominal pain, no dyspepsia, no nausea, no vomiting, no diarrhea.    HEENT: no congestion, no nasal bleeding  SKIN: no ecchymosis, (+) rash             PHYSICAL EXAM:  · CONSTITUTIONAL: Looks stable  . NECK: Supple, no JVD, no bruit on either carotid side   · RESPIRATORY: Normal air entry to lung base, no wheeze, no crackle, no wet rales(+) rhonchi bilateral  · CARDIOVASCULAR: Normal S1, A2, P2, no murmur, no click, regular rate,  no rub,  · EXTREMITIES: No cyanosis, no clubbing, no edema  · VASCULAR: Pulses are regular, equal, bilateral in upper and lower extremities  	  TELEMETRY:nsr      LABS:                        12.1   6.23  )-----------( 143      ( 13 Dec 2018 07:14 )             38.0     12-13    136  |  92<L>  |  42<H>  ----------------------------<  96  3.8   |  33<H>  |  1.3    Ca    8.3<L>      13 Dec 2018 07:14  Mg     1.9     12-13    TPro  7.1  /  Alb  3.6  /  TBili  0.4  /  DBili  <0.2  /  AST  24  /  ALT  32  /  AlkPhos  89  12-13    CARDIAC MARKERS ( 12 Dec 2018 16:51 )  x     / 0.02 ng/mL / 180 U/L / x     / 12.1 ng/mL          I&O's Summary    13 Dec 2018 07:01  -  14 Dec 2018 07:00  --------------------------------------------------------  IN: 100 mL / OUT: 500 mL / NET: -400 mL      BNP  RADIOLOGY & ADDITIONAL STUDIES:    IMPRESSION AND PLAN:

## 2018-12-14 NOTE — PROGRESS NOTE ADULT - ASSESSMENT
Acute hypoxic resp failure/ Aspiration:   on BIPAP   on zosyn now for suspected aspiration PNA  pulm follow up    dysphagia follow speech for dysphagia diet. if does not tolerate then will make NPO and will consider NG for feeding     AFIB:   on lovenox 80 BID   rate controlled on lopressor   Echo     suspected CKD 3 no previous labs       Blood culture coag neg staph: contaminant repeat blood culture

## 2018-12-14 NOTE — PROGRESS NOTE ADULT - ASSESSMENT
IMPRESSION:    Acute on chronic hypercapnic respiratory failure, resolved  IDALMIS / OHS  Pneumonia/Aspiration?  Elevated right hemidiaphragm      PLAN:     CNS: no depressants    HEENT: Oral care    PULMONARY:  HOB @ 45 degrees, BiPAP with ST mode during sleep and PRN during the day     CARDIOVASCULAR: I = O    GI: GI prophylaxis. speech and swallow eval    RENAL:  Follow up lytes.  Correct as needed    INFECTIOUS DISEASE: Follow up cultures, zosyn, solumedrol 60 q24h    HEMATOLOGICAL:  DVT prophylaxis.    ENDOCRINE:  Follow up FS.     MUSCULOSKELETAL: OOB to chair as tolerated    Monitor in ICU

## 2018-12-14 NOTE — PROGRESS NOTE ADULT - ASSESSMENT
73F    Hypertension    Admitted for hypoxemia, Afib RVR, , elevated d-dimer  Sepsis ruled out on admission  Afebrile  No leukocytosis  Lactic acidosis to 3    Rash on C4/5 area, vesicles? in axilla- cannot r/o Zoster  also L erythematous groin rash with blisters  CXR edema, likely fluid overload in the setting of Afib RVR, lower suspicion for PNA  CT chest Mucoid debris in the proximal trachea suggestive of aspiration. Bilateral patchy airspace opacities greatest in the right lower lobe.   Correlate for infectious/inflammatory etiologies. Porcelain gallbladder      - Valtrex 1g TID D3, monitor rash  - Airborne, contact  - Pulm following  - on levo/ceftriaxone for PNA, consider d/c as no fever, leukocytosis and e/o fluid overload, possible aspiration    Spectra [id number]

## 2018-12-14 NOTE — PROGRESS NOTE ADULT - SUBJECTIVE AND OBJECTIVE BOX
[facility]  73[facility]      OVERNIGHT EVENTS:  1 vesicular lesion axillary, otherwise scabbing over    ROS negative except as per above    VITALS:  T(F): 97.5, Max: 97.8 (12-13-18 @ 22:39)  HR: 95  BP: 100/52  RR: 18Vital Signs Last 24 Hrs  T(C): 36.4 (14 Dec 2018 06:55), Max: 36.6 (13 Dec 2018 22:39)  T(F): 97.5 (14 Dec 2018 06:55), Max: 97.8 (13 Dec 2018 22:39)  HR: 95 (14 Dec 2018 06:55) (62 - 95)  BP: 100/52 (14 Dec 2018 06:55) (100/52 - 135/87)  BP(mean): --  RR: 18 (13 Dec 2018 22:39) (18 - 20)  SpO2: 94% (14 Dec 2018 10:53) (93% - 100%)    PHYSICAL EXAM  Gen: NAD NC  HEENT: NCAT. EOMI. MMM.   Neck: Supple  CV: RRR, no murmurs  Lungs: decreased bases  Abd: Soft. NTND  Extr: wwp, trace edema  Skin: rash expanding multiple dermitomes C5-T1, now crusting, still with vesicular like lesion in axilla, L groin erythematous rash improving  Neuro: No focal deficits  Lines: clean    TESTS & MEASUREMENTS:                        12.1   6.23  )-----------( 143      ( 13 Dec 2018 07:14 )             38.0     12-13    136  |  92<L>  |  42<H>  ----------------------------<  96  3.8   |  33<H>  |  1.3    Ca    8.3<L>      13 Dec 2018 07:14  Mg     1.9     12-13    TPro  7.1  /  Alb  3.6  /  TBili  0.4  /  DBili  <0.2  /  AST  24  /  ALT  32  /  AlkPhos  89  12-13    LIVER FUNCTIONS - ( 13 Dec 2018 07:14 )  Alb: 3.6 g/dL / Pro: 7.1 g/dL / ALK PHOS: 89 U/L / ALT: 32 U/L / AST: 24 U/L / GGT: x             Culture - Blood (collected 12-12-18 @ 20:40)  Source: .Blood None  Gram Stain (12-13-18 @ 23:21):    Growth in aerobic bottle: Gram Positive Cocci in Clusters  Preliminary Report (12-13-18 @ 23:21):    Growth in aerobic bottle: Gram Positive Cocci in Clusters    "Due to technical problems, Proteus sp. will Not be reported as part of    the BCID panel until further notice"    ***Blood Panel PCR results on this specimen are available    approximately 3 hours after the Gram stain result.***    Gram stain, PCR, and/or culture results may not always    correspond due to difference in methodologies.    ************************************************************    This PCR assay was performed using [employer].    The following targets are tested for: Enterococcus,    vancomycin resistant enterococci, Listeria monocytogenes,    coagulase negative staphylococci, S. aureus,    methicillin resistant S. aureus, Streptococcus agalactiae    (Group B), S. pneumoniae, S.pyogenes (Group A),    Acinetobacter baumannii, Enterobacter cloacae, E. coli,    Klebsiella oxytoca, K. pneumoniae, Proteus sp.,    Serratia marcescens, Haemophilus influenzae,    Neisseria meningitidis, Pseudomonas aeruginosa, Candida    albicans, C. glabrata, C krusei, C parapsilosis,    C. tropicalis and the KPC resistance gene.  Organism: Blood Culture PCR (12-14-18 @ 01:42)  Organism: Blood Culture PCR (12-14-18 @ 01:42)      -  Coagulase negative Staphylococcus: Detec      Method Type: PCR    Culture - Blood (collected 12-12-18 @ 16:51)  Source: .Blood None  Preliminary Report (12-14-18 @ 01:01):    No growth to date.    Culture - Blood (collected 12-11-18 @ 19:26)  Source: .Blood Blood-Peripheral  Preliminary Report (12-13-18 @ 01:02):    No growth to date.    Culture - Blood (collected 12-11-18 @ 19:26)  Source: .Blood Blood-Peripheral  Preliminary Report (12-13-18 @ 01:02):    No growth to date.          RADIOLOGY & ADDITIONAL TESTS:    ANTIBIOTICS:  acyclovir IVPB   116 mL/Hr IV Intermittent (12-14-18 @ 12:29)   116 mL/Hr IV Intermittent (12-14-18 @ 03:54)   116 mL/Hr IV Intermittent (12-13-18 @ 20:15)   116 mL/Hr IV Intermittent (12-13-18 @ 12:57)   116 mL/Hr IV Intermittent (12-13-18 @ 02:21)   116 mL/Hr IV Intermittent (12-12-18 @ 20:30)    cefTRIAXone   IVPB   100 mL/Hr IV Intermittent (12-12-18 @ 14:54)    cefTRIAXone   IVPB   100 mL/Hr IV Intermittent (12-13-18 @ 15:16)    levoFLOXacin IVPB   100 mL/Hr IV Intermittent (12-12-18 @ 17:17)    levoFLOXacin IVPB   100 mL/Hr IV Intermittent (12-13-18 @ 15:16)    valACYclovir   1000 milliGRAM(s) Oral (12-12-18 @ 11:31)        acyclovir IVPB 800 milliGRAM(s) IV Intermittent every 8 hours  cefTRIAXone   IVPB      cefTRIAXone   IVPB 1 Gram(s) IV Intermittent every 24 hours  levoFLOXacin IVPB      levoFLOXacin IVPB 750 milliGRAM(s) IV Intermittent every 24 hours

## 2018-12-14 NOTE — CHART NOTE - NSCHARTNOTEFT_GEN_A_CORE
The patient is ICU downgrade on 12/14 am, approved for upgrade due to worsening respiratory failure,        # Acute hypercapnic respiratory failure/ aspiration?  - opacities and mucus debris in chest CT, CXR repeat today showed worsening bilateral opacities   - on BIPAP, failed to wean off during the day   - on zosyn now for suspected aspiration PNA, d/c levo/ceftr as per ID ( afebrile, no leukocytosis )    -  d/c lasix as per cardiology,  f/u 2D ECHO  - pulm follow up    # new onset A fib  - CHADsVASc: 4-5  - pt back to NSR now  - c/w Toprol 12.5mg qD for now  - lovenox 80mg q12 for anticoagulation for now    # right shoulder vesicular rash   - VZV zoster  - ID follow up  - valacyclovir 1000mg q8  - benadryl for pruritus   - contact and airborne precaution as per ID    # HTN, controlled   - c/w HCTZ    # positive blood culture coag neg staph:   - likely contaminanted/ f/u repeat blood culture     # DVT ppx: on Lovenox therapeutic dose

## 2018-12-15 NOTE — CONSULT NOTE ADULT - SUBJECTIVE AND OBJECTIVE BOX
Patient is a 73y old  Female who presents with a chief complaint of Dyspnea, Rash (15 Dec 2018 00:57)      HPI:  72 yo F from [facility] w/ pmhx of HTN reports with worsening generalized weakness and SOB for 3 days. Pt was at urgent care for evaluation of rash on right shoulder and chest, was found to have O2 sat of 88% on RA and send to the ED for further evaluation. Pt reports b/l leg swelling for a few months. Pt's denies CP, palpitations, orthopnea or PND. Rash on right shoulder, chest and upper arm started on 18, pt admits to pruritus, denies pain.   No fever, chills, cough, URI symptoms, dizziness, abd pain, n/v/d, dysuria.     - on arrival to ED, Pt was found to have A fib with RVR HR 130s and O2 sat 69% on RA. Pt improved after Bipap and IV lasix 40mg X1  - pro-  and CXR showes pulmonary edema. (12 Dec 2018 01:56)      PAST MEDICAL & SURGICAL HISTORY:  Hypertension  No significant past surgical history      SOCIAL HX:   Smoking                         ETOH                            Other    FAMILY HISTORY:  Family history of stroke (Father)      REVIEW OF SYSTEMS      General:	    Skin/Breast:  	  Ophthalmologic:  	  ENMT:	    Respiratory and Thorax:  	  Cardiovascular:	    Gastrointestinal:	    Genitourinary:	    Musculoskeletal:	    Neurological:	    Psychiatric:	    Hematology/Lymphatics:	    Endocrine:	    Allergic/Immunologic:	    Allergies    No Known Allergies    Intolerances          PHYSICAL EXAM    ICU Vital Signs Last 24 Hrs  T(C): 35.4 (15 Dec 2018 12:00), Max: 36.1 (14 Dec 2018 20:20)  T(F): 95.7 (15 Dec 2018 12:00), Max: 97 (14 Dec 2018 20:20)  HR: 80 (15 Dec 2018 12:00) (68 - 110)  BP: 108/47 (15 Dec 2018 12:00) (91/43 - 127/57)  BP(mean): 64 (15 Dec 2018 12:00) (52 - 75)  ABP: --  ABP(mean): --  RR: 34 (15 Dec 2018 12:00) (20 - 59)  SpO2: 95% (15 Dec 2018 12:00) (93% - 100%)            General:  HEENT:                Lymph Nodes:  Lungs: Bilateral BS  Cardiovascular: Regular  Abdomen: Soft, Positive BS  Extremities: No clubbing  Skin:   Neurological:         LABS:                          12.1   7.00  )-----------( 186      ( 15 Dec 2018 04:55 )             37.4                                               12-15    141  |  94<L>  |  60<H>  ----------------------------<  126<H>  3.4<L>   |  37<H>  |  1.5    Ca    9.0      15 Dec 2018 04:55  Phos  4.3     12-15  Mg     2.3     12-15                                               Urinalysis Basic - ( 15 Dec 2018 05:50 )    Color: Yellow / Appearance: Cloudy / S.020 / pH: x  Gluc: x / Ketone: Negative  / Bili: Negative / Urobili: 0.2 mg/dL   Blood: x / Protein: 30 mg/dL / Nitrite: Negative   Leuk Esterase: Large / RBC: >50 /HPF / WBC >50 /HPF   Sq Epi: x / Non Sq Epi: Few /HPF / Bacteria: Moderate /HPF        CARDIAC MARKERS ( 15 Dec 2018 04:55 )  x     / <0.01 ng/mL / x     / x     / 3.7 ng/mL                                                                                     Culture - Blood (collected 12 Dec 2018 20:40)  Source: .Blood None  Gram Stain (13 Dec 2018 23:21):    Growth in aerobic bottle: Gram Positive Cocci in Clusters  Preliminary Report (15 Dec 2018 00:46):    Growth in aerobic bottle: Staphylococcus species    "Due to technical problems, Proteus sp. will Not be reported as part of    the BCID panel until further notice"    ***Blood Panel PCR results on this specimen are available    approximately 3 hours after the Gram stain result.***    Gram stain, PCR, and/or culture results may not always    correspond due to difference in methodologies.    ************************************************************    This PCR assay was performed using [employer].    Thefollowing targets are tested for: Enterococcus,    vancomycin resistant enterococci, Listeria monocytogenes,    coagulase negative staphylococci, S. aureus,    methicillin resistant S. aureus, Streptococcus agalactiae    (Group B), S. pneumoniae, S. pyogenes(Group A),    Acinetobacter baumannii, Enterobacter cloacae, E. coli,    Klebsiella oxytoca, K. pneumoniae, Proteus sp.,    Serratia marcescens, Haemophilus influenzae,    Neisseria meningitidis, Pseudomonas aeruginosa, Candida    albicans, C. glabrata, C krusei, C parapsilosis,    C. tropicalis and the KPC resistance gene.  Organism: Blood Culture PCR (14 Dec 2018 01:42)  Organism: Blood Culture PCR (14 Dec 2018 01:42)    Culture - Blood (collected 12 Dec 2018 16:51)  Source: .Blood None  Preliminary Report (14 Dec 2018 01:01):    No growth to date.                                                                                       ABG - ( 14 Dec 2018 13:19 )  pH, Arterial: 7.39  pH, Blood: x     /  pCO2: 60    /  pO2: 85    / HCO3: 36    / Base Excess: 9.4   /  SaO2: 97                  X-Rays                                                                                     ECHO    MEDICATIONS  (STANDING):  acyclovir IVPB 800 milliGRAM(s) IV Intermittent every 8 hours  ALBUTerol    90 MICROgram(s) HFA Inhaler 1 Puff(s) Inhalation every 4 hours  ALBUTerol/ipratropium for Nebulization 3 milliLiter(s) Nebulizer every 6 hours  chlorhexidine 4% Liquid 1 Application(s) Topical <User Schedule>  enoxaparin Injectable 80 milliGRAM(s) SubCutaneous every 12 hours  metoprolol tartrate 12.5 milliGRAM(s) Oral every 12 hours  nystatin Powder 1 Application(s) Topical two times a day  piperacillin/tazobactam IVPB.      piperacillin/tazobactam IVPB. 2.25 Gram(s) IV Intermittent every 6 hours  simvastatin 5 milliGRAM(s) Oral at bedtime  tiotropium 18 MICROgram(s) Capsule 1 Capsule(s) Inhalation daily    MEDICATIONS  (PRN):  diphenhydrAMINE 25 milliGRAM(s) Oral every 6 hours PRN Rash and/or Itching

## 2018-12-15 NOTE — PROGRESS NOTE ADULT - SUBJECTIVE AND OBJECTIVE BOX
SUBJECTIVE:    Patient is a 73y old Female who presents with a chief complaint of SOB and rash (14 Dec 2018 17:11)    Currently admitted to medicine with the primary diagnosis of acute hypercapnic respiratory failure, varicella zoster      Today is hospital day 4d. Overnight patient was noted to have MAP of 58. Patient's BP was closely monitored.     PAST MEDICAL & SURGICAL HISTORY  Hypertension  No significant past surgical history    SOCIAL HISTORY:  Patient denies alcohol, tobacco, and recreational drug use.     From ( ) home ( ) nursing home ( ) other   Ambulation status: ( ) ambulates independently ( ) ambulates with assistance ( ) ambulates with device ( ) dependent   Device: ( ) rolling walker ( ) cane     ALLERGIES:  No Known Allergies    MEDICATIONS:  STANDING MEDICATIONS  acyclovir IVPB 800 milliGRAM(s) IV Intermittent every 8 hours  ALBUTerol    90 MICROgram(s) HFA Inhaler 1 Puff(s) Inhalation every 4 hours  ALBUTerol/ipratropium for Nebulization 3 milliLiter(s) Nebulizer every 6 hours  chlorhexidine 4% Liquid 1 Application(s) Topical <User Schedule>  enoxaparin Injectable 80 milliGRAM(s) SubCutaneous every 12 hours  methylPREDNISolone sodium succinate Injectable 60 milliGRAM(s) IV Push daily  metoprolol tartrate 12.5 milliGRAM(s) Oral every 12 hours  nystatin Powder 1 Application(s) Topical two times a day  piperacillin/tazobactam IVPB.      piperacillin/tazobactam IVPB. 2.25 Gram(s) IV Intermittent every 6 hours  simvastatin 5 milliGRAM(s) Oral at bedtime  tiotropium 18 MICROgram(s) Capsule 1 Capsule(s) Inhalation daily    PRN MEDICATIONS  diphenhydrAMINE 25 milliGRAM(s) Oral every 6 hours PRN    VITALS:   T(F): 95.5  HR: 82  BP: 101/45  RR: 39  SpO2: 99%    LABS:                        12.6   7.87  )-----------( 211      ( 14 Dec 2018 17:09 )             38.4     12-14    140  |  92<L>  |  59<H>  ----------------------------<  60<L>  3.9   |  33<H>  |  1.7<H>    Ca    8.7      14 Dec 2018 17:09  Mg     2.1     12-14    TPro  7.1  /  Alb  3.6  /  TBili  0.4  /  DBili  <0.2  /  AST  24  /  ALT  32  /  AlkPhos  89  12-13    ABG - ( 14 Dec 2018 13:19 )  pH, Arterial: 7.39  pH, Blood: x     /  pCO2: 60    /  pO2: 85    / HCO3: 36    / Base Excess: 9.4   /  SaO2: 97        Culture - Blood (collected 12 Dec 2018 20:40)  Source: .Blood None  Gram Stain (13 Dec 2018 23:21):    Growth in aerobic bottle: Gram Positive Cocci in Clusters  Preliminary Report (15 Dec 2018 00:46):    Growth in aerobic bottle: Staphylococcus species    "Due to technical problems, Proteus sp. will Not be reported as part of    the BCID panel until further notice"    ***Blood Panel PCR results on this specimen are available    approximately 3 hours after the Gram stain result.***    Gram stain, PCR, and/or culture results may not always    correspond due to difference in methodologies.    ************************************************************    This PCR assay was performed using [employer].    Thefollowing targets are tested for: Enterococcus,    vancomycin resistant enterococci, Listeria monocytogenes,    coagulase negative staphylococci, S. aureus,    methicillin resistant S. aureus, Streptococcus agalactiae    (Group B), S. pneumoniae, S. pyogenes(Group A),    Acinetobacter baumannii, Enterobacter cloacae, E. coli,    Klebsiella oxytoca, K. pneumoniae, Proteus sp.,    Serratia marcescens, Haemophilus influenzae,    Neisseria meningitidis, Pseudomonas aeruginosa, Candida    albicans, C. glabrata, C krusei, C parapsilosis,    C. tropicalis and the KPC resistance gene.  Organism: Blood Culture PCR (14 Dec 2018 01:42)  Organism: Blood Culture PCR (14 Dec 2018 01:42)    Culture - Blood (collected 12 Dec 2018 16:51)  Source: .Blood None  Preliminary Report (14 Dec 2018 01:01):    No growth to date.    RADIOLOGY:    Follow up official report for Duplex BL LE Venous US     CXR  Impression:    Worsening basilar opacifications. Follow-up as needed.    PHYSICAL EXAM:  GEN: No acute distress, lying comfortably in bed   LUNGS: Clear to auscultation bilaterally, no labored breathing   HEART: S1/S2 present. RRR.   ABD: Soft, non-tender, non-distended. Bowel sounds present.   EXT: Noncyanotic, nonedematous, 2+ peripheral pulses, skin intact   NEURO: AAOX3, CN II-XII grossly intact     Lines/Tubes   Peripheral IV access SUBJECTIVE:    Patient is a 73y old Female who presents with a chief complaint of SOB and rash (14 Dec 2018 17:11)    Currently admitted to medicine with the primary diagnosis of acute hypercapnic respiratory failure, varicella zoster      Today is hospital day 4d. Overnight patient was noted to have MAP of 58. UOP was 75cc in approximately 12 hours -> will bladder scan patient to assess for urinary retention. Patient's BP will be closely monitored.     PAST MEDICAL & SURGICAL HISTORY  Hypertension  No significant past surgical history    SOCIAL HISTORY:  Patient denies alcohol, tobacco, and recreational drug use.     From () home ( ) nursing home ( X) other: [facility]    Ambulation status: (X ) ambulates independently ( ) ambulates with assistance ( ) ambulates with device ( ) dependent   Device: ( ) rolling walker ( ) cane     ALLERGIES:  No Known Allergies    MEDICATIONS:  STANDING MEDICATIONS  acyclovir IVPB 800 milliGRAM(s) IV Intermittent every 8 hours  ALBUTerol    90 MICROgram(s) HFA Inhaler 1 Puff(s) Inhalation every 4 hours  ALBUTerol/ipratropium for Nebulization 3 milliLiter(s) Nebulizer every 6 hours  chlorhexidine 4% Liquid 1 Application(s) Topical <User Schedule>  enoxaparin Injectable 80 milliGRAM(s) SubCutaneous every 12 hours  methylPREDNISolone sodium succinate Injectable 60 milliGRAM(s) IV Push daily  metoprolol tartrate 12.5 milliGRAM(s) Oral every 12 hours  nystatin Powder 1 Application(s) Topical two times a day  piperacillin/tazobactam IVPB.      piperacillin/tazobactam IVPB. 2.25 Gram(s) IV Intermittent every 6 hours  simvastatin 5 milliGRAM(s) Oral at bedtime  tiotropium 18 MICROgram(s) Capsule 1 Capsule(s) Inhalation daily    PRN MEDICATIONS  diphenhydrAMINE 25 milliGRAM(s) Oral every 6 hours PRN    VITALS:   T(F): 95.5  HR: 82  BP: 101/45  RR: 39  SpO2: 99%    LABS:                        12.6   7.87  )-----------( 211      ( 14 Dec 2018 17:09 )             38.4     12-14    140  |  92<L>  |  59<H>  ----------------------------<  60<L>  3.9   |  33<H>  |  1.7<H>    Ca    8.7      14 Dec 2018 17:09  Mg     2.1     12-14    TPro  7.1  /  Alb  3.6  /  TBili  0.4  /  DBili  <0.2  /  AST  24  /  ALT  32  /  AlkPhos  89  12-13    ABG - ( 14 Dec 2018 13:19 )  pH, Arterial: 7.39  pH, Blood: x     /  pCO2: 60    /  pO2: 85    / HCO3: 36    / Base Excess: 9.4   /  SaO2: 97        Culture - Blood (collected 12 Dec 2018 20:40)  Source: .Blood None  Gram Stain (13 Dec 2018 23:21):    Growth in aerobic bottle: Gram Positive Cocci in Clusters  Preliminary Report (15 Dec 2018 00:46):    Growth in aerobic bottle: Staphylococcus species    "Due to technical problems, Proteus sp. will Not be reported as part of    the BCID panel until further notice"    ***Blood Panel PCR results on this specimen are available    approximately 3 hours after the Gram stain result.***    Gram stain, PCR, and/or culture results may not always    correspond due to difference in methodologies.    ************************************************************    This PCR assay was performed using [employer].    Thefollowing targets are tested for: Enterococcus,    vancomycin resistant enterococci, Listeria monocytogenes,    coagulase negative staphylococci, S. aureus,    methicillin resistant S. aureus, Streptococcus agalactiae    (Group B), S. pneumoniae, S. pyogenes(Group A),    Acinetobacter baumannii, Enterobacter cloacae, E. coli,    Klebsiella oxytoca, K. pneumoniae, Proteus sp.,    Serratia marcescens, Haemophilus influenzae,    Neisseria meningitidis, Pseudomonas aeruginosa, Candida    albicans, C. glabrata, C krusei, C parapsilosis,    C. tropicalis and the KPC resistance gene.  Organism: Blood Culture PCR (14 Dec 2018 01:42)  Organism: Blood Culture PCR (14 Dec 2018 01:42)    Culture - Blood (collected 12 Dec 2018 16:51)  Source: .Blood None  Preliminary Report (14 Dec 2018 01:01):    No growth to date.    RADIOLOGY:    Follow up official report for Duplex BL LE Venous US     CXR  Impression:    Worsening basilar opacifications. Follow-up as needed.    PHYSICAL EXAM:  GEN: No acute distress, lying in bed on BIPAP   LUNGS: Bilateral basilar crackles, no wheezing, no labored breathing or use of accessory muscles or respiration   HEART: S1/S2 present. RRR.   ABD: Soft, non-tender, non-distended. Bowel sounds present.   EXT: Noncyanotic, nonedematous, 2+ peripheral pulses, skin intact. Healing vesicles on right shoulder.   NEURO: AAOX3, CN II-XII grossly intact     Lines/Tubes   Peripheral IV access SUBJECTIVE:    Patient is a 73y old Female who presents with a chief complaint of SOB and rash (14 Dec 2018 17:11)    Currently admitted to medicine with the primary diagnosis of acute hypercapnic respiratory failure, varicella zoster      Today is hospital day 4d. Overnight patient was noted to have MAP of 58. UOP was 75cc in approximately 12 hours -> will bladder scan patient to assess for urinary retention. Patient's BP will be closely monitored.     PAST MEDICAL & SURGICAL HISTORY  Hypertension  No significant past surgical history    SOCIAL HISTORY:  Patient denies alcohol, tobacco, and recreational drug use.     From () home ( ) nursing home ( X) other: [facility]    Ambulation status: (X ) ambulates independently ( ) ambulates with assistance ( ) ambulates with device ( ) dependent   Device: ( ) rolling walker ( ) cane     ALLERGIES:  No Known Allergies    MEDICATIONS:  STANDING MEDICATIONS  acyclovir IVPB 800 milliGRAM(s) IV Intermittent every 8 hours  ALBUTerol    90 MICROgram(s) HFA Inhaler 1 Puff(s) Inhalation every 4 hours  ALBUTerol/ipratropium for Nebulization 3 milliLiter(s) Nebulizer every 6 hours  chlorhexidine 4% Liquid 1 Application(s) Topical <User Schedule>  enoxaparin Injectable 80 milliGRAM(s) SubCutaneous every 12 hours  methylPREDNISolone sodium succinate Injectable 60 milliGRAM(s) IV Push daily  metoprolol tartrate 12.5 milliGRAM(s) Oral every 12 hours  nystatin Powder 1 Application(s) Topical two times a day  piperacillin/tazobactam IVPB.      piperacillin/tazobactam IVPB. 2.25 Gram(s) IV Intermittent every 6 hours  simvastatin 5 milliGRAM(s) Oral at bedtime  tiotropium 18 MICROgram(s) Capsule 1 Capsule(s) Inhalation daily    PRN MEDICATIONS  diphenhydrAMINE 25 milliGRAM(s) Oral every 6 hours PRN    VITALS:   T(F): 95.5  HR: 82  BP: 101/45  RR: 39  SpO2: 99%    LABS:                        12.6   7.87  )-----------( 211      ( 14 Dec 2018 17:09 )             38.4     12-14    140  |  92<L>  |  59<H>  ----------------------------<  60<L>  3.9   |  33<H>  |  1.7<H>    Ca    8.7      14 Dec 2018 17:09  Mg     2.1     12-14    TPro  7.1  /  Alb  3.6  /  TBili  0.4  /  DBili  <0.2  /  AST  24  /  ALT  32  /  AlkPhos  89  12-13    ABG - ( 14 Dec 2018 13:19 )  pH, Arterial: 7.39  pH, Blood: x     /  pCO2: 60    /  pO2: 85    / HCO3: 36    / Base Excess: 9.4   /  SaO2: 97        Culture - Blood (collected 12 Dec 2018 20:40)  Source: .Blood None  Gram Stain (13 Dec 2018 23:21):    Growth in aerobic bottle: Gram Positive Cocci in Clusters  Preliminary Report (15 Dec 2018 00:46):    Growth in aerobic bottle: Staphylococcus species    "Due to technical problems, Proteus sp. will Not be reported as part of    the BCID panel until further notice"    ***Blood Panel PCR results on this specimen are available    approximately 3 hours after the Gram stain result.***    Gram stain, PCR, and/or culture results may not always    correspond due to difference in methodologies.    ************************************************************    This PCR assay was performed using [employer].    Thefollowing targets are tested for: Enterococcus,    vancomycin resistant enterococci, Listeria monocytogenes,    coagulase negative staphylococci, S. aureus,    methicillin resistant S. aureus, Streptococcus agalactiae    (Group B), S. pneumoniae, S. pyogenes(Group A),    Acinetobacter baumannii, Enterobacter cloacae, E. coli,    Klebsiella oxytoca, K. pneumoniae, Proteus sp.,    Serratia marcescens, Haemophilus influenzae,    Neisseria meningitidis, Pseudomonas aeruginosa, Candida    albicans, C. glabrata, C krusei, C parapsilosis,    C. tropicalis and the KPC resistance gene.  Organism: Blood Culture PCR (14 Dec 2018 01:42)  Organism: Blood Culture PCR (14 Dec 2018 01:42)    Culture - Blood (collected 12 Dec 2018 16:51)  Source: .Blood None  Preliminary Report (14 Dec 2018 01:01):    No growth to date.    RADIOLOGY:    Follow up official report for Duplex BL LE Venous US     CXR  Impression:    Worsening basilar opacifications. Follow-up as needed.    PHYSICAL EXAM:  GEN: No acute distress, on 4L nasal cannula  LUNGS: Bilateral basilar crackles, no wheezing, no labored breathing or use of accessory muscles or respiration   HEART: S1/S2 present. RRR.   ABD: Soft, non-tender, non-distended. Bowel sounds present.   EXT: Noncyanotic, nonedematous, 2+ peripheral pulses, skin intact. Healing vesicles on right shoulder.   NEURO: alert, oriented to place and self     Lines/Tubes   Peripheral IV access

## 2018-12-15 NOTE — PROGRESS NOTE ADULT - ASSESSMENT
73F from [facility] w/ PMH of HTN presented to [facility] with worsening generalized weakness and SOB for 3 days.     #Acute Hypercapnic Respiratory Failure 2/2 Aspiration Pneumonia  -Patient currently on BIPAP, Sp02 100%    -Low threshold for intubation   -Follow up AM CXR and ABG   -Prelim Duplex report negative for DVT   -ID on board  -Zosyn, Solumedrol 60 q6h, Proventil, Spiriva, Duonebs q6h   -Per cardiology, Lasix was discontinued   -Echocardiogram ordered     #New Atrial Fibrillation, rate controlled   -Patient currently in NSR   -CHADSVASC: 4   -Toprol 12.5 q12h   -Therapeutic Lovenox for AC     #HLD  -Simvastatin     #Varicella Zoster Right Shoulder  -Contact and airborne precautions  -ID on board  -Valtrex 1000mg q8h, Benadryl PRN for pruritus     #HTN  -HCTZ held for hypotension     #ANTONIETTA   -Creatinine 1.0 -> 1.7 (uptrending)  -Urine studies ordered    DVT ppx: Therapeutic Lovenox  GI ppx: Not indicated   Diet: Dysphagia 1 pureed thin liquids, aspiration precautions   Activity: increase as tolerated   FULL CODE 73F from [facility] w/ PMH of HTN presented to [facility] with worsening generalized weakness and SOB for 3 days.     #Acute Hypercapnic Respiratory Failure 2/2 Aspiration Pneumonia  -Patient currently on BIPAP, Sp02 100%    -Low threshold for intubation   -Follow up AM CXR and ABG   -Prelim Duplex report negative for DVT   -ID on board  -Zosyn, Solumedrol 60 q6h, Proventil, Spiriva, Duonebs q6h   -Per cardiology, Lasix was discontinued   -Echocardiogram ordered     #New Atrial Fibrillation, rate controlled   -Patient currently in NSR   -CHADSVASC: 4   -Toprol 12.5 q12h   -Therapeutic Lovenox for AC     #HLD  -Simvastatin     #Varicella Zoster Right Shoulder  -Contact and airborne precautions  -ID on board  -Valtrex 1000mg q8h, Benadryl PRN for pruritus     #HTN  -HCTZ held for hypotension     #ANTONIETTA   -Creatinine 1.0 -> 1.7 -> 1.5   -Urine studies ordered    #Hypokalemia  -Repleted, monitor BMP     DVT ppx: Therapeutic Lovenox  GI ppx: Not indicated   Diet: Dysphagia 1 pureed thin liquids, aspiration precautions   Activity: increase as tolerated   FULL CODE 73F from [facility] w/ PMH of HTN presented to [facility] with worsening generalized weakness and SOB for 3 days.     #Acute Hypercapnic Respiratory Failure 2/2 Aspiration Pneumonia, Possible CHF   -Patient currently on BIPAP, Sp02 100%    -Low threshold for intubation   -Follow up AM CXR and ABG   -Prelim Duplex report negative for DVT   -ID on board  -Zosyn, Proventil, Spiriva, Duonebs q6h   -Per cardiology, Lasix was discontinued. Follow up new recommendations.   -Echocardiogram ordered     #New Atrial Fibrillation, rate controlled   -Patient currently in NSR   -CHADSVASC: 4   -Toprol 12.5 q12h   -Therapeutic Lovenox for AC     #HLD  -Simvastatin     #Varicella Zoster Right Shoulder  -Contact and airborne precautions  -ID on board  -Valtrex 1000mg q8h, Benadryl PRN for pruritus     #HTN  -HCTZ held for hypotension     #ANTONIETTA  -Creatinine 1.0 -> 1.7 -> 1.5   -Urine studies ordered    #Asymptomatic Bacteriuria with RBCs  -Urine culture ordered     #Hypokalemia  -Repleted, monitor BMP     DVT ppx: Therapeutic Lovenox  GI ppx: Not indicated   Diet: Dysphagia 1 pureed thin liquids, aspiration precautions   Activity: increase as tolerated   FULL CODE 73F from [facility] w/ PMH of HTN presented to [facility] with worsening generalized weakness and SOB for 3 days.     #Acute Hypercapnic Respiratory Failure 2/2 Aspiration Pneumonia, Possible CHF   -Patient currently on BIPAP, Sp02 100%    -Keep SpO2 88-92%  -Low threshold for intubation   -Prelim Duplex report 12/14: negative for DVT   -ID on board  -Zosyn, Proventil, Spiriva, Duonebs q6h   -Per cardiology, Lasix was discontinued. Follow up new recommendations.   -Echo 12/15: LVEF 50-55%    #New Atrial Fibrillation, rate controlled   -Patient currently in NSR   -CHADSVASC: 4   -Toprol 12.5 q12h   -Therapeutic Lovenox for AC     #HLD  -Simvastatin     #Varicella Zoster Right Shoulder  -Contact and airborne precautions  -ID on board  -Acyclovir 800mg IV q8h, Benadryl PRN for pruritus     #HTN  -HCTZ held for hypotension     #ANTONIETTA  -Creatinine 1.0 -> 1.7 -> 1.5   -Urine studies ordered    #Asymptomatic Bacteriuria with RBCs  -Urine culture ordered     #Hypokalemia  -Repleted, monitor BMP     DVT ppx: Therapeutic Lovenox  GI ppx: Not indicated   Diet: Dysphagia 1 pureed thin liquids, aspiration precautions   Activity: increase as tolerated   FULL CODE   - Brother Lei Maravilla is [facility] [phone]  - Brother will decide on code status tomorrow

## 2018-12-16 NOTE — PROGRESS NOTE ADULT - ASSESSMENT
73F from [facility] w/ PMH of HTN presented to [facility] with worsening generalized weakness and SOB for 3 days.     #Acute Hypercapnic Respiratory Failure 2/2 Aspiration Pneumonia, Possible CHF   -Patient currently on BIPAP, Sp02 100%    -Keep SpO2 88-92%  -Low threshold for intubation   -Prelim Duplex report 12/14: negative for DVT   -ID on board  -Zosyn, Proventil, Spiriva, Duonebs q6h   -Per cardiology, Lasix was discontinued. Follow up new recommendations.   -Echo 12/15: LVEF 50-55%    #New Atrial Fibrillation, rate controlled   -Patient currently in NSR   -CHADSVASC: 4   -Toprol 12.5 q12h   -Therapeutic Lovenox for AC     #HLD  -Simvastatin     #Varicella Zoster Right Shoulder  -Contact and airborne precautions  -ID on board  -Acyclovir 800mg IV q8h, Benadryl PRN for pruritus     #HTN  -HCTZ held for hypotension     #ANTONIETTA  -Creatinine 1.0 -> 1.7 -> 1.5   -Urine studies ordered    #Asymptomatic Bacteriuria with RBCs  -Urine culture ordered     #Hypokalemia  -Repleted, monitor BMP     DVT ppx: Therapeutic Lovenox  GI ppx: Not indicated   Diet: Dysphagia 1 pureed thin liquids, aspiration precautions   Activity: increase as tolerated   FULL CODE   - Brother Lei Maravilla is [facility] [phone]  - Brother will decide on code status tomorrow 73F from [facility] w/ PMH of HTN presented to [facility] with worsening generalized weakness and SOB for 3 days.     #Acute Hypercapnic Respiratory Failure 2/2 Aspiration Pneumonia, Possible CHF   -Patient currently on BIPAP  -Keep SpO2 88-92%  -Low threshold for intubation   -Pt had an episode of possible aspiration yesterday evening per RN  -Prelim Duplex report 12/14: negative for DVT   -ID on board  -Zosyn, Proventil, Spiriva, Duonebs q6h   -Per cardiology, Lasix was discontinued. Follow up new recommendations.   -Echo 12/15: LVEF 50-55%    #New Atrial Fibrillation, rate controlled   -Patient currently in NSR   -CHADSVASC: 4   -Toprol 12.5 q12h   -Therapeutic Lovenox for AC     #HLD  -Simvastatin     #Varicella Zoster Right Shoulder  -Contact and airborne precautions  -ID on board  -Acyclovir 800mg IV q8h, Benadryl PRN for pruritus     #HTN  -HCTZ held for hypotension     #ANTONIETTA  -Creatinine 1.0 -> 1.7 -> 1.5   -Urine studies ordered    #Asymptomatic Bacteriuria with RBCs  -Urine culture ordered     #Hypokalemia  -Repleted, monitor BMP     DVT ppx: Therapeutic Lovenox  GI ppx: Not indicated   Diet: Dysphagia 1 pureed thin liquids, aspiration precautions   Activity: increase as tolerated   FULL CODE   - Brother Lei Maravilla is [facility] [phone]  - Brother will decide on code status tomorrow

## 2018-12-16 NOTE — PROGRESS NOTE ADULT - ASSESSMENT
73F    Hypertension    Admitted for hypoxemia, Afib RVR, , elevated d-dimer  Sepsis ruled out on admission  Afebrile  No leukocytosis  Lactic acidosis to 3    Rash on C4/5 area, vesicles? in axilla- cannot r/o Zoster  also L erythematous groin rash with blisters  CXR edema, likely fluid overload in the setting of Afib RVR, lower suspicion for PNA  CT chest Mucoid debris in the proximal trachea suggestive of aspiration. Bilateral patchy airspace opacities greatest in the right lower lobe.   Correlate for infectious/inflammatory etiologies. Porcelain gallbladder    This note is not complete, all recommendations to follow.    - Valtrex 1g TID D5 monitor rash  - Airborne, contact  - Pulm following  - on zosyn for PNA, consider d/c as no fever, leukocytosis and e/o fluid overload, possible aspiration  - on steroids    Spectra [id number] 73F    Hypertension    Admitted for hypoxemia, Afib RVR, , elevated d-dimer  Sepsis ruled out on admission  Afebrile  No leukocytosis  Lactic acidosis to 3    Rash on C4/5 area, vesicles? in axilla- cannot r/o Zoster  also L erythematous groin rash with blisters  CXR edema, likely fluid overload in the setting of Afib RVR, lower suspicion for PNA  CT chest Mucoid debris in the proximal trachea suggestive of aspiration. Bilateral patchy airspace opacities greatest in the right lower lobe.   Correlate for infectious/inflammatory etiologies. Porcelain gallbladder      - D/C IV ACV, Valtrex 1g TID (end 12/18)  - D/C Airborne, contact  - Pulm following  - on zosyn for PNA, consider d/c as no fever, leukocytosis and e/o fluid overload, possible aspiration  - on steroids    Spectra [id number]

## 2018-12-16 NOTE — PROGRESS NOTE ADULT - SUBJECTIVE AND OBJECTIVE BOX
SUBJECTIVE:    Patient is a 73y old Female who presents with a chief complaint of SOB and rash (15 Dec 2018 14:03)    Currently admitted to medicine with the primary diagnosis of Shortness of breath     Today is hospital day 5d. Pt looks laboured while breathing , at the time of encounter pt was on nasal canula. During rounds     PAST MEDICAL & SURGICAL HISTORY  Hypertension  No significant past surgical history    SOCIAL HISTORY:  Negative for smoking/alcohol/drug use.     ALLERGIES:  No Known Allergies    MEDICATIONS:  STANDING MEDICATIONS  acyclovir IVPB 800 milliGRAM(s) IV Intermittent every 8 hours  ALBUTerol    90 MICROgram(s) HFA Inhaler 1 Puff(s) Inhalation every 4 hours  ALBUTerol/ipratropium for Nebulization 3 milliLiter(s) Nebulizer every 6 hours  chlorhexidine 4% Liquid 1 Application(s) Topical <User Schedule>  enoxaparin Injectable 80 milliGRAM(s) SubCutaneous every 12 hours  metoprolol tartrate 12.5 milliGRAM(s) Oral every 12 hours  nystatin Powder 1 Application(s) Topical two times a day  piperacillin/tazobactam IVPB.      piperacillin/tazobactam IVPB. 2.25 Gram(s) IV Intermittent every 6 hours  simvastatin 5 milliGRAM(s) Oral at bedtime  tiotropium 18 MICROgram(s) Capsule 1 Capsule(s) Inhalation daily    PRN MEDICATIONS  diphenhydrAMINE 25 milliGRAM(s) Oral every 6 hours PRN    VITALS:   T(F): 96.2  HR: 78  BP: 119/59  RR: 61  SpO2: 99%    PHYSICAL EXAM:  GEN: No acute distress  LUNGS: Clear to auscultation bilaterally   HEART: S1/S2 present. RRR.   ABD: Soft, non-tender, non-distended. Bowel sounds present  EXT: NC/NC/NE/2+PP/FLORES  NEURO: AAOX3      LABS:                        11.3   8.72  )-----------( 211      ( 16 Dec 2018 04:50 )             36.7     12-16    144  |  99  |  66<HH>  ----------------------------<  117<H>  4.0   |  39<H>  |  1.1    Ca    8.9      16 Dec 2018 04:50  Phos  4.3     12-15  Mg     2.7     12-16        Urinalysis Basic - ( 15 Dec 2018 05:50 )    Color: Yellow / Appearance: Cloudy / S.020 / pH: x  Gluc: x / Ketone: Negative  / Bili: Negative / Urobili: 0.2 mg/dL   Blood: x / Protein: 30 mg/dL / Nitrite: Negative   Leuk Esterase: Large / RBC: >50 /HPF / WBC >50 /HPF   Sq Epi: x / Non Sq Epi: Few /HPF / Bacteria: Moderate /HPF      ABG - ( 16 Dec 2018 07:57 )  pH, Arterial: 7.34  pH, Blood: x     /  pCO2: 84    /  pO2: 117   / HCO3: 45    / Base Excess: 15.6  /  SaO2: 99                Serum Pro-Brain Natriuretic Peptide: 320 pg/mL (18 @ 04:50)    Troponin T, Serum: <0.01 ng/mL (18 @ 04:50)      Culture - Blood (collected 14 Dec 2018 17:09)  Source: .Blood None  Preliminary Report (16 Dec 2018 01:01):    No growth to date.      CARDIAC MARKERS ( 16 Dec 2018 04:50 )  x     / <0.01 ng/mL / x     / x     / x      CARDIAC MARKERS ( 15 Dec 2018 04:55 )  x     / <0.01 ng/mL / x     / x     / 3.7 ng/mL        RADIOLOGY: SUBJECTIVE:    Patient is a 73y old Female who presents with a chief complaint of SOB and rash (15 Dec 2018 14:03)    Currently admitted to medicine with the primary diagnosis of Shortness of breath     Today is hospital day 5d. Pt looks laboured while breathing , at the time of encounter pt was on nasal canula. During rounds morning ABG was discussed. Pt is low threshold for intubation.    PAST MEDICAL & SURGICAL HISTORY  Hypertension  No significant past surgical history    SOCIAL HISTORY:  Negative for smoking/alcohol/drug use.     ALLERGIES:  No Known Allergies    MEDICATIONS:  STANDING MEDICATIONS  acyclovir IVPB 800 milliGRAM(s) IV Intermittent every 8 hours  ALBUTerol    90 MICROgram(s) HFA Inhaler 1 Puff(s) Inhalation every 4 hours  ALBUTerol/ipratropium for Nebulization 3 milliLiter(s) Nebulizer every 6 hours  chlorhexidine 4% Liquid 1 Application(s) Topical <User Schedule>  enoxaparin Injectable 80 milliGRAM(s) SubCutaneous every 12 hours  metoprolol tartrate 12.5 milliGRAM(s) Oral every 12 hours  nystatin Powder 1 Application(s) Topical two times a day  piperacillin/tazobactam IVPB.      piperacillin/tazobactam IVPB. 2.25 Gram(s) IV Intermittent every 6 hours  simvastatin 5 milliGRAM(s) Oral at bedtime  tiotropium 18 MICROgram(s) Capsule 1 Capsule(s) Inhalation daily    PRN MEDICATIONS  diphenhydrAMINE 25 milliGRAM(s) Oral every 6 hours PRN    VITALS:   T(F): 96.2  HR: 78  BP: 119/59  RR: 61  SpO2: 99%    PHYSICAL EXAM:  GEN: laboured breathing  LUNGS: b/l decreased air entry  HEART: S1/S2 present.    ABD: Soft, non-tender, non-distended.   NEURO: AAOX2      LABS:                        11.3   8.72  )-----------( 211      ( 16 Dec 2018 04:50 )             36.7     12-16    144  |  99  |  66<HH>  ----------------------------<  117<H>  4.0   |  39<H>  |  1.1    Ca    8.9      16 Dec 2018 04:50  Phos  4.3     12-15  Mg     2.7     12-16        Urinalysis Basic - ( 15 Dec 2018 05:50 )    Color: Yellow / Appearance: Cloudy / S.020 / pH: x  Gluc: x / Ketone: Negative  / Bili: Negative / Urobili: 0.2 mg/dL   Blood: x / Protein: 30 mg/dL / Nitrite: Negative   Leuk Esterase: Large / RBC: >50 /HPF / WBC >50 /HPF   Sq Epi: x / Non Sq Epi: Few /HPF / Bacteria: Moderate /HPF      ABG - ( 16 Dec 2018 07:57 )  pH, Arterial: 7.34  pH, Blood: x     /  pCO2: 84    /  pO2: 117   / HCO3: 45    / Base Excess: 15.6  /  SaO2: 99                Serum Pro-Brain Natriuretic Peptide: 320 pg/mL (18 @ 04:50)    Troponin T, Serum: <0.01 ng/mL (18 @ 04:50)      Culture - Blood (collected 14 Dec 2018 17:09)  Source: .Blood None  Preliminary Report (16 Dec 2018 01:01):    No growth to date.      CARDIAC MARKERS ( 16 Dec 2018 04:50 )  x     / <0.01 ng/mL / x     / x     / x      CARDIAC MARKERS ( 15 Dec 2018 04:55 )  x     / <0.01 ng/mL / x     / x     / 3.7 ng/mL        RADIOLOGY:

## 2018-12-16 NOTE — SWALLOW BEDSIDE ASSESSMENT ADULT - COMMENTS
dysphagia evaluation attempted. As per DINESH Mcgarry, Pt on BiPAP with desaturation. SLP will re assess PRN. DINESH Mcgarry notified.
mild oral dysphagia

## 2018-12-16 NOTE — PROGRESS NOTE ADULT - SUBJECTIVE AND OBJECTIVE BOX
DENISE BARAJAS  73y, Female      OVERNIGHT EVENTS:  afebrile  WBC 8    ROS negative except as per above    VITALS:  T(F): 96.2, Max: 96.8 (18 @ 00:00)  HR: 78  BP: 119/59  RR: 61Vital Signs Last 24 Hrs  T(C): 35.7 (16 Dec 2018 03:00), Max: 36 (16 Dec 2018 00:00)  T(F): 96.2 (16 Dec 2018 03:00), Max: 96.8 (16 Dec 2018 00:00)  HR: 78 (16 Dec 2018 06:00) (68 - 96)  BP: 119/59 (16 Dec 2018 06:00) (92/55 - 136/76)  BP(mean): 65 (16 Dec 2018 05:00) (58 - 106)  RR: 61 (16 Dec 2018 06:00) (32 - 61)  SpO2: 99% (16 Dec 2018 06:00) (81% - 100%)    PHYSICAL EXAM  Gen: NAD NC  HEENT: NCAT. EOMI. MMM.   Neck: Supple  CV: RRR, no murmurs  Lungs: decreased bases  Abd: Soft. NTND  Extr: wwp, trace edema  Skin: rash expanding multiple dermitomes C5-T1, now crusting, still with vesicular like lesion in axilla, L groin erythematous rash improving  Neuro: No focal deficits  Lines: clean      TESTS & MEASUREMENTS:                        11.3   8.72  )-----------( 211      ( 16 Dec 2018 04:50 )             36.7         144  |  99  |  66<HH>  ----------------------------<  117<H>  4.0   |  39<H>  |  1.1    Ca    8.9      16 Dec 2018 04:50  Phos  4.3     12-15  Mg     2.7               Culture - Blood (collected 18 @ 17:09)  Source: .Blood None  Preliminary Report (18 @ 01:01):    No growth to date.    Culture - Blood (collected 18 @ 20:40)  Source: .Blood None  Gram Stain (18 @ 23:21):    Growth in aerobic bottle: Gram Positive Cocci in Clusters  Final Report (12-15-18 @ 18:25):    Growth in aerobic bottle: Coag Negative Staphylococcus "Susceptibilities    not performed"    "Due to technical problems, Proteus sp. will Not be reported as part of    the BCID panel until further notice"    ***Blood Panel PCR results on this specimen areavailable    approximately 3 hours after the Gram stain result.***    Gram stain, PCR, and/or culture results may not always    correspond due to difference in methodologies.    ************************************************************    This PCR assay wasperformed using [employer].    The following targets are tested for: Enterococcus,    vancomycin resistant enterococci, Listeria monocytogenes,    coagulase negative staphylococci, S. aureus,    methicillin resistant S. aureus, Streptococcus agalactiae    (Group B), S. pneumoniae, S. pyogenes (Group A),    Acinetobacter baumannii, Enterobacter cloacae, E. coli,    Klebsiella oxytoca, K. pneumoniae, Proteus sp.,    Serratia marcescens, Haemophilus influenzae,    Neisseria meningitidis, Pseudomonas aeruginosa, Candida    albicans, C. glabrata, C krusei, C parapsilosis,    C. tropicalis and the KPC resistance gene.  Organism: Blood Culture PCR (12-15-18 @ 18:25)  Organism: Blood Culture PCR (12-15-18 @ 18:25)      -  Coagulase negative Staphylococcus: Detec      Method Type: PCR    Culture - Blood (collected 18 @ 16:51)  Source: .Blood None  Preliminary Report (18 @ 01:01):    No growth to date.    Culture - Blood (collected 18 @ 19:26)  Source: .Blood Blood-Peripheral  Preliminary Report (18 @ 01:02):    No growth to date.    Culture - Blood (collected 18 @ 19:26)  Source: .Blood Blood-Peripheral  Preliminary Report (18 @ 01:02):    No growth to date.      Urinalysis Basic - ( 15 Dec 2018 05:50 )    Color: Yellow / Appearance: Cloudy / S.020 / pH: x  Gluc: x / Ketone: Negative  / Bili: Negative / Urobili: 0.2 mg/dL   Blood: x / Protein: 30 mg/dL / Nitrite: Negative   Leuk Esterase: Large / RBC: >50 /HPF / WBC >50 /HPF   Sq Epi: x / Non Sq Epi: Few /HPF / Bacteria: Moderate /HPF        RADIOLOGY & ADDITIONAL TESTS:    ANTIBIOTICS:  acyclovir IVPB   116 mL/Hr IV Intermittent (18 @ 06:08)   116 mL/Hr IV Intermittent (12-15-18 @ 20:54)   116 mL/Hr IV Intermittent (12-15-18 @ 12:47)   116 mL/Hr IV Intermittent (12-15-18 @ 03:07)   116 mL/Hr IV Intermittent (18 @ 21:46)   116 mL/Hr IV Intermittent (18 @ 12:29)   116 mL/Hr IV Intermittent (18 @ 03:54)   116 mL/Hr IV Intermittent (18 @ 20:15)   116 mL/Hr IV Intermittent (18 @ 12:57)   116 mL/Hr IV Intermittent (18 @ 02:21)   116 mL/Hr IV Intermittent (18 @ 20:30)    cefTRIAXone   IVPB   100 mL/Hr IV Intermittent (18 @ 14:54)    cefTRIAXone   IVPB   100 mL/Hr IV Intermittent (18 @ 15:16)    levoFLOXacin IVPB   100 mL/Hr IV Intermittent (18 @ 17:17)    levoFLOXacin IVPB   100 mL/Hr IV Intermittent (18 @ 15:16)    piperacillin/tazobactam IVPB.   200 mL/Hr IV Intermittent (18 @ 17:09)    piperacillin/tazobactam IVPB.   200 mL/Hr IV Intermittent (18 @ 06:11)   200 mL/Hr IV Intermittent (18 @ 00:32)   200 mL/Hr IV Intermittent (12-15-18 @ 18:33)   200 mL/Hr IV Intermittent (12-15-18 @ 12:47)   200 mL/Hr IV Intermittent (12-15-18 @ 05:31)   200 mL/Hr IV Intermittent (12-15-18 @ 00:33)    valACYclovir   1000 milliGRAM(s) Oral (18 @ 11:31)        acyclovir IVPB 800 milliGRAM(s) IV Intermittent every 8 hours  piperacillin/tazobactam IVPB.      piperacillin/tazobactam IVPB. 2.25 Gram(s) IV Intermittent every 6 hours DENISE BARAJAS  73y, Female      OVERNIGHT EVENTS:  afebrile  WBC 8  all lesions crusted    ROS negative except as per above    VITALS:  T(F): 96.2, Max: 96.8 (18 @ 00:00)  HR: 78  BP: 119/59  RR: 61Vital Signs Last 24 Hrs  T(C): 35.7 (16 Dec 2018 03:00), Max: 36 (16 Dec 2018 00:00)  T(F): 96.2 (16 Dec 2018 03:00), Max: 96.8 (16 Dec 2018 00:00)  HR: 78 (16 Dec 2018 06:00) (68 - 96)  BP: 119/59 (16 Dec 2018 06:00) (92/55 - 136/76)  BP(mean): 65 (16 Dec 2018 05:00) (58 - 106)  RR: 61 (16 Dec 2018 06:00) (32 - 61)  SpO2: 99% (16 Dec 2018 06:00) (81% - 100%)    PHYSICAL EXAM  Gen: NAD NC  HEENT: NCAT. EOMI. MMM.   Neck: Supple  CV: RRR, no murmurs  Lungs: decreased bases  Abd: Soft. NTND  Extr: wwp, trace edema  Skin: improved rash expanding multiple dermitomes C5-T1, now all crusted, L groin erythematous rash improving  Neuro: No focal deficits  Lines: clean      TESTS & MEASUREMENTS:                        11.3   8.72  )-----------( 211      ( 16 Dec 2018 04:50 )             36.7         144  |  99  |  66<HH>  ----------------------------<  117<H>  4.0   |  39<H>  |  1.1    Ca    8.9      16 Dec 2018 04:50  Phos  4.3     12-15  Mg     2.7               Culture - Blood (collected 18 @ 17:09)  Source: .Blood None  Preliminary Report (18 @ 01:01):    No growth to date.    Culture - Blood (collected 18 @ 20:40)  Source: .Blood None  Gram Stain (18 @ 23:21):    Growth in aerobic bottle: Gram Positive Cocci in Clusters  Final Report (12-15-18 @ 18:25):    Growth in aerobic bottle: Coag Negative Staphylococcus "Susceptibilities    not performed"    "Due to technical problems, Proteus sp. will Not be reported as part of    the BCID panel until further notice"    ***Blood Panel PCR results on this specimen areavailable    approximately 3 hours after the Gram stain result.***    Gram stain, PCR, and/or culture results may not always    correspond due to difference in methodologies.    ************************************************************    This PCR assay wasperformed using [employer].    The following targets are tested for: Enterococcus,    vancomycin resistant enterococci, Listeria monocytogenes,    coagulase negative staphylococci, S. aureus,    methicillin resistant S. aureus, Streptococcus agalactiae    (Group B), S. pneumoniae, S. pyogenes (Group A),    Acinetobacter baumannii, Enterobacter cloacae, E. coli,    Klebsiella oxytoca, K. pneumoniae, Proteus sp.,    Serratia marcescens, Haemophilus influenzae,    Neisseria meningitidis, Pseudomonas aeruginosa, Candida    albicans, C. glabrata, C krusei, C parapsilosis,    C. tropicalis and the KPC resistance gene.  Organism: Blood Culture PCR (12-15-18 @ 18:25)  Organism: Blood Culture PCR (12-15-18 @ 18:25)      -  Coagulase negative Staphylococcus: Detec      Method Type: PCR    Culture - Blood (collected 18 @ 16:51)  Source: .Blood None  Preliminary Report (18 @ 01:01):    No growth to date.    Culture - Blood (collected 18 @ 19:26)  Source: .Blood Blood-Peripheral  Preliminary Report (18 @ 01:02):    No growth to date.    Culture - Blood (collected 18 @ 19:26)  Source: .Blood Blood-Peripheral  Preliminary Report (18 @ 01:02):    No growth to date.      Urinalysis Basic - ( 15 Dec 2018 05:50 )    Color: Yellow / Appearance: Cloudy / S.020 / pH: x  Gluc: x / Ketone: Negative  / Bili: Negative / Urobili: 0.2 mg/dL   Blood: x / Protein: 30 mg/dL / Nitrite: Negative   Leuk Esterase: Large / RBC: >50 /HPF / WBC >50 /HPF   Sq Epi: x / Non Sq Epi: Few /HPF / Bacteria: Moderate /HPF        RADIOLOGY & ADDITIONAL TESTS:    ANTIBIOTICS:  acyclovir IVPB   116 mL/Hr IV Intermittent (18 @ 06:08)   116 mL/Hr IV Intermittent (12-15-18 @ 20:54)   116 mL/Hr IV Intermittent (12-15-18 @ 12:47)   116 mL/Hr IV Intermittent (12-15-18 @ 03:07)   116 mL/Hr IV Intermittent (18 @ 21:46)   116 mL/Hr IV Intermittent (18 @ 12:29)   116 mL/Hr IV Intermittent (18 @ 03:54)   116 mL/Hr IV Intermittent (18 @ 20:15)   116 mL/Hr IV Intermittent (18 @ 12:57)   116 mL/Hr IV Intermittent (18 @ 02:21)   116 mL/Hr IV Intermittent (18 @ 20:30)    cefTRIAXone   IVPB   100 mL/Hr IV Intermittent (18 @ 14:54)    cefTRIAXone   IVPB   100 mL/Hr IV Intermittent (18 @ 15:16)    levoFLOXacin IVPB   100 mL/Hr IV Intermittent (18 @ 17:17)    levoFLOXacin IVPB   100 mL/Hr IV Intermittent (18 @ 15:16)    piperacillin/tazobactam IVPB.   200 mL/Hr IV Intermittent (18 @ 17:09)    piperacillin/tazobactam IVPB.   200 mL/Hr IV Intermittent (18 @ 06:11)   200 mL/Hr IV Intermittent (18 @ 00:32)   200 mL/Hr IV Intermittent (12-15-18 @ 18:33)   200 mL/Hr IV Intermittent (12-15-18 @ 12:47)   200 mL/Hr IV Intermittent (12-15-18 @ 05:31)   200 mL/Hr IV Intermittent (12-15-18 @ 00:33)    valACYclovir   1000 milliGRAM(s) Oral (18 @ 11:31)        acyclovir IVPB 800 milliGRAM(s) IV Intermittent every 8 hours  piperacillin/tazobactam IVPB.      piperacillin/tazobactam IVPB. 2.25 Gram(s) IV Intermittent every 6 hours

## 2018-12-17 NOTE — PROGRESS NOTE ADULT - SUBJECTIVE AND OBJECTIVE BOX
Patient is a 73y old Female who presented with a chief complaint of SOB and rash (17 Dec 2018 09:46)  Currently admitted to medicine with the primary diagnosis of Shortness of breath       Today is hospital day 6d.  CCU day 03, Pt was initially on 3c and was upgraded on 12/14    PAST MEDICAL & SURGICAL HISTORY  Hypertension  No significant past surgical history    SOCIAL HISTORY:  Negative for smoking/alcohol/drug use.     ALLERGIES:  No Known Allergies    MEDICATIONS:  STANDING MEDICATIONS  acyclovir IVPB 800 milliGRAM(s) IV Intermittent every 8 hours  ALBUTerol    90 MICROgram(s) HFA Inhaler 1 Puff(s) Inhalation every 4 hours  ALBUTerol/ipratropium for Nebulization 3 milliLiter(s) Nebulizer every 6 hours  chlorhexidine 4% Liquid 1 Application(s) Topical <User Schedule>  enoxaparin Injectable 80 milliGRAM(s) SubCutaneous every 12 hours  levoFLOXacin IVPB      methylPREDNISolone sodium succinate Injectable 60 milliGRAM(s) IV Push two times a day  metoprolol tartrate Injectable 5 milliGRAM(s) IV Push two times a day  nystatin Powder 1 Application(s) Topical two times a day  piperacillin/tazobactam IVPB.      piperacillin/tazobactam IVPB. 2.25 Gram(s) IV Intermittent every 6 hours  propofol Infusion 10 MICROgram(s)/kG/Min IV Continuous <Continuous>  simvastatin 5 milliGRAM(s) Oral at bedtime  tiotropium 18 MICROgram(s) Capsule 1 Capsule(s) Inhalation daily    PRN MEDICATIONS  diphenhydrAMINE 25 milliGRAM(s) Oral every 6 hours PRN    VITALS:   T(F): 95  HR: 72  BP: 100/52  RR: 40  SpO2: 97%    LABS:                        11.0   8.15  )-----------( 219      ( 17 Dec 2018 04:15 )             35.3     12-17    143  |  94<L>  |  55<H>  ----------------------------<  172<H>  3.6   |  40<H>  |  0.9    Ca    9.0      17 Dec 2018 04:15  Mg     2.8     12-17          ABG - ( 17 Dec 2018 11:00 )  pH, Arterial: 7.43  pH, Blood: x     /  pCO2: 68    /  pO2: 64    / HCO3: 45    / Base Excess: 17.4  /  SaO2: 93                    Culture - Urine (collected 15 Dec 2018 09:54)  Source: .Urine Clean Catch (Midstream)  Final Report (16 Dec 2018 20:18):    No growth    Culture - Blood (collected 14 Dec 2018 17:09)  Source: .Blood None  Preliminary Report (16 Dec 2018 01:01):    No growth to date.      CARDIAC MARKERS ( 16 Dec 2018 04:50 )  x     / <0.01 ng/mL / x     / x     / x          RADIOLOGY:    PHYSICAL EXAM:  GEN: No acute distress  LUNGS: Clear to auscultation bilaterally   HEART: S1/S2 present. RRR.   ABD: Soft, non-tender, non-distended. Bowel sounds present  EXT: NC/NC/NE/2+PP/FLORES/Skin Intact.   NEURO: AAOX3    Intravenous access:   NG tube:   [place]ent cathter: Indwelling Urethral Catheter:     Connect To:  Straight Drainage/[place]    Indication:  Urinary Retention / Obstruction (12-17-18 @ 12:55) (not performed) [active] Patient is a 73y old Female who presented with a chief complaint of SOB and rash (17 Dec 2018 09:46)  Currently admitted to medicine with the primary diagnosis of Shortness of breath       Today is hospital day 6d.  CCU day 03, Pt was initially in ICU  and downgraded on 12/14 to 3c but was upgraded to CCU the same day because of worsening SOB  Intubated today 12/17  Kent: In place  NG tube: No  Central Line: No  3 iv Lines in place    PAST MEDICAL & SURGICAL HISTORY  Hypertension  No significant past surgical history    SOCIAL HISTORY:  Negative for smoking/alcohol/drug use.     ALLERGIES:  No Known Allergies    MEDICATIONS:  STANDING MEDICATIONS  acyclovir IVPB 800 milliGRAM(s) IV Intermittent every 8 hours  ALBUTerol    90 MICROgram(s) HFA Inhaler 1 Puff(s) Inhalation every 4 hours  ALBUTerol/ipratropium for Nebulization 3 milliLiter(s) Nebulizer every 6 hours  chlorhexidine 4% Liquid 1 Application(s) Topical <User Schedule>  enoxaparin Injectable 80 milliGRAM(s) SubCutaneous every 12 hours  levoFLOXacin IVPB      methylPREDNISolone sodium succinate Injectable 60 milliGRAM(s) IV Push two times a day  metoprolol tartrate Injectable 5 milliGRAM(s) IV Push two times a day  nystatin Powder 1 Application(s) Topical two times a day  piperacillin/tazobactam IVPB.      piperacillin/tazobactam IVPB. 2.25 Gram(s) IV Intermittent every 6 hours  propofol Infusion 10 MICROgram(s)/kG/Min IV Continuous <Continuous>  simvastatin 5 milliGRAM(s) Oral at bedtime  tiotropium 18 MICROgram(s) Capsule 1 Capsule(s) Inhalation daily    PRN MEDICATIONS  diphenhydrAMINE 25 milliGRAM(s) Oral every 6 hours PRN    Home Medications:  hydroCHLOROthiazide 25 mg oral tablet: 1 tab(s) orally once a day (12 Dec 2018 02:08)  metOLazone 5 mg oral tablet: 1  orally 3 times a week (12 Dec 2018 02:08)  metoprolol succinate 25 mg oral tablet, extended release: 1 tab(s) orally once a day (12 Dec 2018 02:08)      VITALS:   T(F): 95  HR: 72  BP: 100/52  RR: 40  SpO2: 97%    LABS:                        11.0   8.15  )-----------( 219      ( 17 Dec 2018 04:15 )             35.3     12-17    143  |  94<L>  |  55<H>  ----------------------------<  172<H>  3.6   |  40<H>  |  0.9    Ca    9.0      17 Dec 2018 04:15  Mg     2.8     12-17    ABG - ( 17 Dec 2018 11:00 )  pH, Arterial: 7.43  pH, Blood: x     /  pCO2: 68    /  pO2: 64    / HCO3: 45    / Base Excess: 17.4  /  SaO2: 93          Culture - Urine (collected 15 Dec 2018 09:54)  Source: .Urine Clean Catch (Midstream)  Final Report (16 Dec 2018 20:18):    No growth    Culture - Blood (collected 14 Dec 2018 17:09)  Source: .Blood None  Preliminary Report (16 Dec 2018 01:01):    No growth to date.      CARDIAC MARKERS ( 16 Dec 2018 04:50 )  x     / <0.01 ng/mL / x     / x     / x          RADIOLOGY:  < from: Xray Chest 1 View- PORTABLE-Routine (12.16.18 @ 06:12) >  Impression:    No radiographic evidence of acute cardiopulmonary disease.      < from: Xray Chest 1 View- PORTABLE-Routine (12.15.18 @ 06:58) >  Impression:    Decreased bilateral basilar opacity. No pleural effusion or air leak      < from: [facility] Duplex Lower Ext Vein Scan, Bilat (12.14.18 @ 16:42) >  Impression:  No evidence of deep venous thrombosis or superficial thrombophlebitis in   bilateral lower extremities.      < from: Xray Chest 1 View- PORTABLE-Routine (12.14.18 @ 13:26) >  Impression:    Worsening basilar opacifications. Follow-up as needed.      < from: CT Chest No Cont (12.12.18 @ 18:08) >  IMPRESSION:  Mucoid debris in the proximal trachea (3:61) suggestive of aspiration.  Bilateral patchy airspace opacities greatest in the right lower lobe.   Correlate for infectious/inflammatory etiologies.  Prominent mediastinal lymph nodes up to 1.0 cm, probably reactive in   nature.  Cholelithiasis with suggestion of calcification within the wall of the   gallbladder (porcelain gallbladder).      < from: Xray Chest 1 View-PORTABLE IMMEDIATE (12.11.18 @ 20:16) >  IMPRESSION:   Limited exam due to patient body habitus. Apparent perihilar opacities.    < from: Transthoracic Echocardiogram (12.15.18 @ 11:18) >  Summary:   1. Left ventricular ejection fraction, by visual estimation, is 50 to   55%.   2. Normal left ventricular size and wall thicknesses, with normal   systolic function.   3. Spectral Doppler shows impaired relaxation pattern of left   ventricular myocardial filling (Grade I diastolic dysfunction).   4. Mild tricuspid regurgitation.   5. Mild aortic regurgitation.   6. Pulmonic valve regurgitation.   7. Estimated pulmonary artery systolic pressure is 52.5 mmHg assuming a   right atrial pressure of 8 mmHg, which is consistent with moderate   pulmonary hypertension.        PHYSICAL EXAM:  GEN: Intubated,   LUNGS: intubated  HEART: S1/S2 present. RRR.   ABD: Soft, non-tender, non-distended. Bowel sounds present  NEURO: Intubated    Intravenous access:   NG tube:   [place] cathter: Indwelling Urethral Catheter:     Connect To:  Straight Drainage/[place]    Indication:  Urinary Retention / Obstruction (12-17-18 @ 12:55) (not performed) [active] Patient is a 73y old Female who presented with a chief complaint of SOB and rash (17 Dec 2018 09:46)  Currently admitted to medicine with the primary diagnosis of Shortness of breath       Today is hospital day 6d.  CCU day 03, Pt was initially in ICU  and downgraded on 12/14 to 3c but was upgraded to CCU the same day because of worsening SOB  Intubated today 12/17  Kent: In place  NG tube: No  Central Line: No  3 iv Lines in place    PAST MEDICAL & SURGICAL HISTORY  Hypertension  No significant past surgical history    SOCIAL HISTORY:  Negative for smoking/alcohol/drug use.     ALLERGIES:  No Known Allergies    MEDICATIONS:  STANDING MEDICATIONS  acyclovir IVPB 800 milliGRAM(s) IV Intermittent every 8 hours  ALBUTerol    90 MICROgram(s) HFA Inhaler 1 Puff(s) Inhalation every 4 hours  ALBUTerol/ipratropium for Nebulization 3 milliLiter(s) Nebulizer every 6 hours  chlorhexidine 4% Liquid 1 Application(s) Topical <User Schedule>  enoxaparin Injectable 80 milliGRAM(s) SubCutaneous every 12 hours  levoFLOXacin IVPB      methylPREDNISolone sodium succinate Injectable 60 milliGRAM(s) IV Push two times a day  metoprolol tartrate Injectable 5 milliGRAM(s) IV Push two times a day  nystatin Powder 1 Application(s) Topical two times a day  piperacillin/tazobactam IVPB.      piperacillin/tazobactam IVPB. 2.25 Gram(s) IV Intermittent every 6 hours  propofol Infusion 10 MICROgram(s)/kG/Min IV Continuous <Continuous>  simvastatin 5 milliGRAM(s) Oral at bedtime  tiotropium 18 MICROgram(s) Capsule 1 Capsule(s) Inhalation daily    PRN MEDICATIONS  diphenhydrAMINE 25 milliGRAM(s) Oral every 6 hours PRN    Home Medications:  hydroCHLOROthiazide 25 mg oral tablet: 1 tab(s) orally once a day (12 Dec 2018 02:08)  metOLazone 5 mg oral tablet: 1  orally 3 times a week (12 Dec 2018 02:08)  metoprolol succinate 25 mg oral tablet, extended release: 1 tab(s) orally once a day (12 Dec 2018 02:08)      VITALS:   T(F): 95  HR: 72  BP: 100/52  RR: 40  SpO2: 97%    LABS:                        11.0   8.15  )-----------( 219      ( 17 Dec 2018 04:15 )             35.3     12-17    143  |  94<L>  |  55<H>  ----------------------------<  172<H>  3.6   |  40<H>  |  0.9    Ca    9.0      17 Dec 2018 04:15  Mg     2.8     12-17    ABG - ( 17 Dec 2018 11:00 )  pH, Arterial: 7.43  pH, Blood: x     /  pCO2: 68    /  pO2: 64    / HCO3: 45    / Base Excess: 17.4  /  SaO2: 93          Culture - Urine (collected 15 Dec 2018 09:54)  Source: .Urine Clean Catch (Midstream)  Final Report (16 Dec 2018 20:18):    No growth    Culture - Blood (collected 14 Dec 2018 17:09)  Source: .Blood None  Preliminary Report (16 Dec 2018 01:01):    No growth to date.      CARDIAC MARKERS ( 16 Dec 2018 04:50 )  x     / <0.01 ng/mL / x     / x     / x          RADIOLOGY:  < from: Xray Chest 1 View- PORTABLE-Routine (12.16.18 @ 06:12) >  Impression:    No radiographic evidence of acute cardiopulmonary disease.      < from: Xray Chest 1 View- PORTABLE-Routine (12.15.18 @ 06:58) >  Impression:    Decreased bilateral basilar opacity. No pleural effusion or air leak      < from: VA Duplex Lower Ext Vein Scan, Bilat (12.14.18 @ 16:42) >  Impression:  No evidence of deep venous thrombosis or superficial thrombophlebitis in   bilateral lower extremities.      < from: Xray Chest 1 View- PORTABLE-Routine (12.14.18 @ 13:26) >  Impression:    Worsening basilar opacifications. Follow-up as needed.      < from: CT Chest No Cont (12.12.18 @ 18:08) >  IMPRESSION:  Mucoid debris in the proximal trachea (3:61) suggestive of aspiration.  Bilateral patchy airspace opacities greatest in the right lower lobe.   Correlate for infectious/inflammatory etiologies.  Prominent mediastinal lymph nodes up to 1.0 cm, probably reactive in   nature.  Cholelithiasis with suggestion of calcification within the wall of the   gallbladder (porcelain gallbladder).      < from: Xray Chest 1 View-PORTABLE IMMEDIATE (12.11.18 @ 20:16) >  IMPRESSION:   Limited exam due to patient body habitus. Apparent perihilar opacities.    < from: Transthoracic Echocardiogram (12.15.18 @ 11:18) >  Summary:   1. Left ventricular ejection fraction, by visual estimation, is 50 to   55%.   2. Normal left ventricular size and wall thicknesses, with normal   systolic function.   3. Spectral Doppler shows impaired relaxation pattern of left   ventricular myocardial filling (Grade I diastolic dysfunction).   4. Mild tricuspid regurgitation.   5. Mild aortic regurgitation.   6. Pulmonic valve regurgitation.   7. Estimated pulmonary artery systolic pressure is 52.5 mmHg assuming a   right atrial pressure of 8 mmHg, which is consistent with moderate   pulmonary hypertension.        PHYSICAL EXAM:  GEN: Intubated,   LUNGS: intubated,   HEART: S1/S2 present. RRR.   ABD: Soft, non-tender, non-distended. Bowel sounds present  NEURO: Intubated    Intravenous access:   NG tube:   [place] cathter: Indwelling Urethral Catheter:     Connect To:  Straight Drainage/[place]    Indication:  Urinary Retention / Obstruction (12-17-18 @ 12:55) (not performed) [active]

## 2018-12-17 NOTE — SWALLOW BEDSIDE ASSESSMENT ADULT - SLP PERTINENT HISTORY OF CURRENT PROBLEM
Pt intubated. D/C SLP. Reconsult PRN
74 yo F from [facility] had O2 sat of 88%,  A fib,  w/ pmhx of HTN reports with worsening generalized weakness and SOB
pt admitted with weakness and SOB. pt was hypoxic in ED and placed on bipap. + RVR and pulmonary edema. possible aspiration noted on cxr. Aide at bedside denies h/o dysphagia

## 2018-12-17 NOTE — PROGRESS NOTE ADULT - SUBJECTIVE AND OBJECTIVE BOX
OVERNIGHT EVENTS: desaturation overnight, back on bipap, tachypneic, hypothermic, not on drips    Vital Signs Last 24 Hrs  T(C): 34.6 (17 Dec 2018 06:00), Max: 35.6 (17 Dec 2018 00:00)  T(F): 94.3 (17 Dec 2018 06:00), Max: 96 (17 Dec 2018 00:00)  HR: 62 (17 Dec 2018 07:00) (62 - 96)  BP: 97/46 (17 Dec 2018 07:00) (92/41 - 123/60)  BP(mean): 65 (17 Dec 2018 07:00) (63 - 82)  RR: 30 (17 Dec 2018 07:00) (30 - 61)  SpO2: 95% (17 Dec 2018 07:00) (91% - 97%)    PHYSICAL EXAMINATION:    GENERAL:  tachypneic    HEENT: Head is normocephalic and atraumatic. Extraocular muscles are intact. Mucous membranes are moist.    NECK: Supple.    LUNGS: bl crackles    HEART: Regular rate and rhythm without murmur.    ABDOMEN: Soft, nontender, and nondistended.      EXTREMITIES: Without any cyanosis, clubbing, rash, lesions or edema.    NEUROLOGIC: Grossly intact.    SKIN: No ulceration or induration present.      LABS:                        11.0   8.15  )-----------( 219      ( 17 Dec 2018 04:15 )             35.3     12-17    143  |  94<L>  |  55<H>  ----------------------------<  172<H>  3.6   |  40<H>  |  0.9    Ca    9.0      17 Dec 2018 04:15  Mg     2.8     12-17          ABG - ( 17 Dec 2018 06:34 )  pH, Arterial: 7.35  pH, Blood: x     /  pCO2: 86    /  pO2: 67    / HCO3: 48    / Base Excess: 18.1  /  SaO2: 92                CARDIAC MARKERS ( 16 Dec 2018 04:50 )  x     / <0.01 ng/mL / x     / x     / x            Serum Pro-Brain Natriuretic Peptide: 320 pg/mL (12-16-18 @ 04:50)            12-16-18 @ 07:01  -  12-17-18 @ 07:00  --------------------------------------------------------  IN: 1150 mL / OUT: 1150 mL / NET: 0 mL        MICROBIOLOGY:  Culture Results:   No growth (12-15 @ 09:54)  Culture Results:   No growth to date. (12-14 @ 17:09)      MEDICATIONS  (STANDING):  acyclovir IVPB 800 milliGRAM(s) IV Intermittent every 8 hours  ALBUTerol    90 MICROgram(s) HFA Inhaler 1 Puff(s) Inhalation every 4 hours  ALBUTerol/ipratropium for Nebulization 3 milliLiter(s) Nebulizer every 6 hours  chlorhexidine 4% Liquid 1 Application(s) Topical <User Schedule>  enoxaparin Injectable 80 milliGRAM(s) SubCutaneous every 12 hours  metoprolol tartrate Injectable 5 milliGRAM(s) IV Push two times a day  nystatin Powder 1 Application(s) Topical two times a day  piperacillin/tazobactam IVPB.      piperacillin/tazobactam IVPB. 2.25 Gram(s) IV Intermittent every 6 hours  simvastatin 5 milliGRAM(s) Oral at bedtime  tiotropium 18 MICROgram(s) Capsule 1 Capsule(s) Inhalation daily    MEDICATIONS  (PRN):  diphenhydrAMINE 25 milliGRAM(s) Oral every 6 hours PRN Rash and/or Itching      RADIOLOGY & ADDITIONAL STUDIES:

## 2018-12-17 NOTE — PROGRESS NOTE ADULT - ASSESSMENT
73F from [facility] w/ PMH of HTN presented to [facility] with worsening generalized weakness and SOB for 3 days.     #Acute Hypercapnic Respiratory Failure 2/2 Aspiration Pneumonia, Possible CHF   -Patient Intubated today 12/17 as she was saturat  -Keep SpO2 88-92%  -Low threshold for intubation   -Duplex report 12/14: negative for DVT   -ID on board  -Zosyn, Proventil, Spiriva, Duonebs q6h   -Per cardiology, Lasix was discontinued. Follow up new recommendations.   -Echo 12/15: LVEF 50-55%  -Sepsis ruled out > no leukocytosis, afebrile    #New Atrial Fibrillation, rate controlled   -Patient currently in NSR   -CHADSVASC: 4   -Toprol 12.5 q12h   -Therapeutic Lovenox for AC     #HLD  -Simvastatin     #Varicella Zoster Right Shoulder  -Contact and airborne precautions  -ID on board  -Acyclovir 800mg IV q8h, Benadryl PRN for pruritus     #HTN  -HCTZ held for hypotension     #ANTONIETTA  -Creatinine 0.9 >1.0 -> 1.7 -> 1.5   -Urine Urea 7575, creat 30, Na 94    #Asymptomatic Bacteriuria with RBCs  -UA positive 12/15 but culture negative 12/15    #Hypokalemia  -Repleted, monitor BMP     DVT ppx: Therapeutic Lovenox  GI ppx: Not indicated   Diet: Dysphagia 1 pureed thin liquids, aspiration precautions   Activity: increase as tolerated   FULL CODE   - Brother Lei Maravilla is [facility] [phone]  - Brother will decide on code status tomorrow 73F from [facility] w/ PMH of HTN presented to [facility] with worsening generalized weakness and SOB for 3 days.     #Acute Hypercapnic Respiratory Failure 2/2 Aspiration Pneumonia, Possible CHF   -Patient Intubated today 12/17 as she was saturating in late 80s  -Duplex report 12/14: negative for DVT   -ID on board  -Zosyn, Proventil, Spiriva, Duonebs q6h, started on Lavofloxacin today 12/17  -Per cardiology, Lasix was discontinued.  -Echo 12/15: LVEF 50-55%  -Sepsis ruled out > no leukocytosis, afebrile  -MRSA negative 12/17  -Blood culture 12/14 negative  -Urine Legionella and Strep Ag negative 12/15  -RVP negative 12/13  -CT chest suggestive for RLL PNA      #New Atrial Fibrillation, rate controlled   -Patient currently in NSR   -CHA2 DS2 VASC: 4   -Toprol 12.5 q12h   -Therapeutic Lovenox for AC     #HLD  -Simvastatin     #Varicella Zoster Right Shoulder  -Contact and airborne precautions dced 12/17 as per ID rec  -Acyclovir 800mg IV q8h, Benadryl PRN for pruritus     #HTN  -HCTZ held for hypotension     #ANTONIETTA improving  -Creatinine 0.9 12/17  -Urine Urea 7575, creat 30, Na 94    #Asymptomatic Bacteriuria with RBCs  -UA positive 12/15 but culture negative 12/15  -Repeat UA 12/17 positive    #Hypokalemia  -Repleted, monitor BMP   -3.6 12/17        DVT ppx: Therapeutic Lovenox  GI ppx: Not indicated   Diet: NPO  Activity: increase as tolerated   FULL CODE   - Brother Lei Maravilla is [facility] [phone]  - Brother will decide on code status 73F from [facility] w/ PMH of HTN presented to [facility] with worsening generalized weakness and SOB for 3 days.     #Acute Hypercapnic Respiratory Failure 2/2 Aspiration Pneumonia, Possible CHF   -Patient Intubated today 12/17 as she was saturating in late 80s  -Duplex report 12/14: negative for DVT   -ID on board  -Zosyn, Proventil, Spiriva, Duonebs q6h, started on Lavofloxacin today 12/17  -Per cardiology, Lasix was discontinued.  -Echo 12/15: LVEF 50-55%  -Sepsis ruled out > no leukocytosis, afebrile  -MRSA negative 12/17  -Blood culture 12/14 negative  -Urine Legionella and Strep Ag negative 12/15  -RVP negative 12/13  -CT chest suggestive for RLL PNA      #New Atrial Fibrillation, rate controlled   -Patient currently in NSR   -CHA2 DS2 VASC: 4   -Therapeutic Lovenox for AC     #HLD  -Simvastatin     #Varicella Zoster Right Shoulder  -Contact and airborne precautions dced 12/17 as per ID rec  -Acyclovir 800mg IV q8h, Benadryl PRN for pruritus     #HTN  -HCTZ held for hypotension     #ANTONIETTA improving  -Creatinine 0.9 12/17  -Urine Urea 7575, creat 30, Na 94    #Asymptomatic Bacteriuria with RBCs  -UA positive 12/15 but culture negative 12/15  -Repeat UA 12/17 positive    #Hypokalemia  -Repleted, monitor BMP   -3.6 12/17        DVT ppx: Therapeutic Lovenox  GI ppx: Not indicated   Diet: NPO  Activity: increase as tolerated   FULL CODE   - Brother Lei Maravilla is [facility] [phone]  - Brother will decide on code status

## 2018-12-17 NOTE — PROCEDURE NOTE - NSTRACHPOSTINTU_RESP_A_CORE
Appropriate capnography/Chest excursion noted/Positive end tidal Co2 noted/Breath sounds bilateral/Breath sounds equal

## 2018-12-18 NOTE — PROGRESS NOTE ADULT - SUBJECTIVE AND OBJECTIVE BOX
OVERNIGHT EVENTS: s/p intubation, wan on lovenox, no events overnight    Vital Signs Last 24 Hrs  T(C): 37.2 (18 Dec 2018 08:00), Max: 37.2 (18 Dec 2018 08:00)  T(F): 99 (18 Dec 2018 08:00), Max: 99 (18 Dec 2018 08:00)  HR: 77 (18 Dec 2018 08:00) (62 - 92)  BP: 97/51 (18 Dec 2018 08:00) (82/37 - 114/55)  BP(mean): 70 (18 Dec 2018 06:00) (57 - 80)  RR: 19 (18 Dec 2018 08:00) (16 - 64)  SpO2: 98% (18 Dec 2018 08:00) (94% - 100%)    PHYSICAL EXAMINATION:    GENERAL: moves 4 extremities    HEENT: Head is normocephalic and atraumatic. Extraocular muscles are intact. Mucous membranes are moist.    NECK: Supple.    LUNGS: b/l crackles    HEART: Regular rate and rhythm without murmur.    ABDOMEN: Soft, nontender, and nondistended.      EXTREMITIES: Without any cyanosis, clubbing, rash, lesions or edema.    NEUROLOGIC: Grossly intact.    SKIN: No ulceration or induration present.      LABS:                        9.5    9.57  )-----------( 239      ( 18 Dec 2018 04:48 )             30.4     12-18    146  |  97<L>  |  51<H>  ----------------------------<  96  3.8   |  41<HH>  |  1.0    Ca    8.8      18 Dec 2018 04:48  Mg     2.7     18    TPro  6.7  /  Alb  3.1<L>  /  TBili  0.7  /  DBili  x   /  AST  32  /  ALT  39  /  AlkPhos  63  12-18    PT/INR - ( 17 Dec 2018 23:45 )   PT: 15.70 sec;   INR: 1.37 ratio         PTT - ( 17 Dec 2018 23:45 )  PTT:52.5 sec  Urinalysis Basic - ( 17 Dec 2018 14:11 )    Color: Yellow / Appearance: Cloudy / S.020 / pH: x  Gluc: x / Ketone: Negative  / Bili: Negative / Urobili: 0.2 mg/dL   Blood: x / Protein: 30 mg/dL / Nitrite: Negative   Leuk Esterase: Moderate / RBC: >50 /HPF / WBC 3-5 /HPF   Sq Epi: x / Non Sq Epi: Occasional /HPF / Bacteria: x      ABG - ( 18 Dec 2018 04:30 )  pH, Arterial: 7.47  pH, Blood: x     /  pCO2: 64    /  pO2: 76    / HCO3: 46    / Base Excess: 20.2  /  SaO2: 97          370/16/40/5            Serum Pro-Brain Natriuretic Peptide: 320 pg/mL (18 @ 04:50)            18 @ 07:01  -  18 @ 07:00  --------------------------------------------------------  IN: 1138.5 mL / OUT: 1345 mL / NET: -206.5 mL        MICROBIOLOGY:  Culture Results:   No growth (12-15 @ 09:54)  Culture Results:   No growth to date. ( @ 17:09)      MEDICATIONS  (STANDING):  acyclovir IVPB 800 milliGRAM(s) IV Intermittent every 8 hours  ALBUTerol/ipratropium for Nebulization 3 milliLiter(s) Nebulizer every 6 hours  chlorhexidine 0.12% Liquid 15 milliLiter(s) Oral Mucosa two times a day  chlorhexidine 4% Liquid 1 Application(s) Topical <User Schedule>  enoxaparin Injectable 80 milliGRAM(s) SubCutaneous every 12 hours  levoFLOXacin IVPB      levoFLOXacin IVPB 500 milliGRAM(s) IV Intermittent every 24 hours  methylPREDNISolone sodium succinate Injectable 60 milliGRAM(s) IV Push two times a day  midazolam Injectable 2 milliGRAM(s) IV Push once  nystatin Powder 1 Application(s) Topical two times a day  piperacillin/tazobactam IVPB.      piperacillin/tazobactam IVPB. 2.25 Gram(s) IV Intermittent every 6 hours  propofol Infusion 10 MICROgram(s)/kG/Min (4.8 mL/Hr) IV Continuous <Continuous>  simvastatin 5 milliGRAM(s) Oral at bedtime    MEDICATIONS  (PRN):  diphenhydrAMINE 25 milliGRAM(s) Oral every 6 hours PRN Rash and/or Itching      RADIOLOGY & ADDITIONAL STUDIES:

## 2018-12-18 NOTE — DIETITIAN INITIAL EVALUATION ADULT. - DIET TYPE
EN was initiated today - Osmolite (?1.0 vs. 1.5) at 10ml/hr and adavance to 40ml/hr as tolerated, currently infusing ~10ml/hr. Previously on and off NPO x 7 days 2/2 respiratory status (need for BIPAP)/NPO with tube feedings EN was initiated today - Osmolite (?1.0 vs. 1.5) at 10ml/hr and adavance to 40ml/hr as tolerated, currently infusing ~10ml/hr (Osmolite 1.0). Previously on and off NPO x 7 days 2/2 respiratory status (need for BIPAP)/NPO with tube feedings

## 2018-12-18 NOTE — PROGRESS NOTE ADULT - ASSESSMENT
73F from [facility] w/ PMH of HTN presented to [facility] with worsening generalized weakness and SOB for 3 days.     #Acute Hypercapnic Respiratory Failure 2/2 Aspiration Pneumonia, Possible CHF   -Patient Intubated 12/17 as she was saturating in late 80s  -Duplex report 12/14: negative for DVT   -ID on board > 12/18 dced Acyclovir  -Zosyn, Proventil, Spiriva, Duonebs q6h, started on Levofloxacin 12/17  -Per cardiology, Lasix was discontinued.  -Echo 12/15: LVEF 50-55%  -Sepsis ruled out > no leukocytosis, afebrile  -MRSA negative 12/17  -Blood culture 12/14 negative  -Urine Legionella and Strep Ag negative 12/15  -RVP negative 12/13  -CT chest suggestive for RLL PNA  -Fu ESR/RF 12/18      #New Atrial Fibrillation, rate controlled   -Patient currently in NSR   -CHA2 DS2 VASC: 4   -Toprol 12.5 q12h   -Therapeutic Lovenox for AC stopped today for episode of 200-300 ml bleed yesterday 12/17 at the injection site and the Hb dropped from 11 to 9  -Started Heparin today 12/18    #HLD  -Simvastatin     #Varicella Zoster Right Shoulder  -Contact and airborne precautions dced 12/17 as per ID rec  -Acyclovir 800mg IV q8h, Benadryl PRN for pruritus > stopped today 12/18  -Stop Ab once bron cultures are back    #HTN  -HCTZ held for hypotension     #ANTONIETTA improving  -Creatinine 1.0 12/18  -Urine Urea 7575, creat 30, Na 94    #Asymptomatic Bacteriuria with RBCs  -UA positive 12/15 but culture negative 12/15  -Repeat UA 12/17 positive    #Hypokalemia  -Repleted, monitor BMP   -3.8 12/18        DVT ppx: Therapeutic Lovenox  GI ppx: Not indicated   Diet: NPO  Activity: increase as tolerated   FULL CODE 73F from [facility] w/ PMH of HTN presented to [facility] with worsening generalized weakness and SOB for 3 days.     #Acute Hypercapnic Respiratory Failure 2/2 Aspiration Pneumonia, Possible CHF   -Patient Intubated 12/17 as she was saturating in late 80s  -Duplex report 12/14: negative for DVT   -ID on board > 12/18 dced Acyclovir  -Zosyn, Proventil, Spiriva, Duonebs q6h, started on Levofloxacin 12/17  -Per cardiology, Lasix was discontinued.  -Echo 12/15: LVEF 50-55%  -Sepsis ruled out > no leukocytosis, afebrile  -MRSA negative 12/17  -Blood culture 12/14 negative  -Urine Legionella and Strep Ag negative 12/15  -RVP negative 12/13  -CT chest suggestive for RLL PNA  -Fu ESR/RF 12/18      #New Atrial Fibrillation, rate controlled   -Patient currently in NSR   -CHA2 DS2 VASC: 4   -Therapeutic Lovenox for AC stopped today for episode of 200-300 ml bleed yesterday 12/17 at the injection site and the Hb dropped from 11 to 9  -Started Heparin today 12/18    #HLD  -Simvastatin     #Varicella Zoster Right Shoulder  -Contact and airborne precautions dced 12/17 as per ID rec  -Acyclovir 800mg IV q8h, Benadryl PRN for pruritus > stopped today 12/18  -Stop Ab once bron cultures are back    #HTN  -HCTZ held for hypotension     #ANTONIETTA improving  -Creatinine 1.0 12/18  -Urine Urea 7575, creat 30, Na 94    #Asymptomatic Bacteriuria with RBCs  -UA positive 12/15 but culture negative 12/15  -Repeat UA 12/17 positive    #Hypokalemia  -Repleted, monitor BMP   -3.8 12/18        DVT ppx: Therapeutic Lovenox  GI ppx: Not indicated   Diet: NPO  Activity: increase as tolerated   FULL CODE

## 2018-12-18 NOTE — PROGRESS NOTE ADULT - SUBJECTIVE AND OBJECTIVE BOX
Patient is a 73y old Female who presented with a chief complaint of SOB and rash (18 Dec 2018 09:44)  Currently admitted to medicine with the primary diagnosis of Acute on chronic hypercapnic respiratory failure s/p intubation  IDALMIS / OHS  Pneumonia/Aspiration? fluid overload      Today is hospital day 7d.   CCU day 04, Pt was initially in ICU  and downgraded on  to 3c but was upgraded to CCU the same day because of worsening SOB  Intubated   Kent: In place  NG tube: No  Central Line: No  3 iv Lines in place  Bronchoscopy done today for bleed after intubation yesterday, specimen sent for culture      PAST MEDICAL & SURGICAL HISTORY  Hypertension  No significant past surgical history    SOCIAL HISTORY:  Negative for smoking/alcohol/drug use.     ALLERGIES:  No Known Allergies    MEDICATIONS:  STANDING MEDICATIONS  ALBUTerol/ipratropium for Nebulization 3 milliLiter(s) Nebulizer every 6 hours  chlorhexidine 0.12% Liquid 15 milliLiter(s) Oral Mucosa two times a day  chlorhexidine 4% Liquid 1 Application(s) Topical <User Schedule>  heparin  Injectable 5000 Unit(s) SubCutaneous every 8 hours  levoFLOXacin IVPB      levoFLOXacin IVPB 500 milliGRAM(s) IV Intermittent every 24 hours  methylPREDNISolone sodium succinate Injectable 60 milliGRAM(s) IV Push two times a day  midazolam Injectable 2 milliGRAM(s) IV Push once  nystatin Powder 1 Application(s) Topical two times a day  piperacillin/tazobactam IVPB.      piperacillin/tazobactam IVPB. 2.25 Gram(s) IV Intermittent every 6 hours  simvastatin 5 milliGRAM(s) Oral at bedtime    PRN MEDICATIONS  diphenhydrAMINE 25 milliGRAM(s) Oral every 6 hours PRN    Home Medications:  hydroCHLOROthiazide 25 mg oral tablet: 1 tab(s) orally once a day (12 Dec 2018 02:08)  metOLazone 5 mg oral tablet: 1  orally 3 times a week (12 Dec 2018 02:08)  metoprolol succinate 25 mg oral tablet, extended release: 1 tab(s) orally once a day (12 Dec 2018 02:08)      VITALS:   T(F): 99.5  HR: 74  BP: 104/53  RR: 17  SpO2: 98%    LABS:                        9.5    9.57  )-----------( 239      ( 18 Dec 2018 04:48 )             30.4     12-18    146  |  97<L>  |  51<H>  ----------------------------<  96  3.8   |  41<HH>  |  1.0    Ca    8.8      18 Dec 2018 04:48  Mg     2.7         TPro  6.7  /  Alb  3.1<L>  /  TBili  0.7  /  DBili  x   /  AST  32  /  ALT  39  /  AlkPhos  63      PT/INR - ( 17 Dec 2018 23:45 )   PT: 15.70 sec;   INR: 1.37 ratio         PTT - ( 17 Dec 2018 23:45 )  PTT:52.5 sec  Urinalysis Basic - ( 17 Dec 2018 14:11 )    Color: Yellow / Appearance: Cloudy / S.020 / pH: x  Gluc: x / Ketone: Negative  / Bili: Negative / Urobili: 0.2 mg/dL   Blood: x / Protein: 30 mg/dL / Nitrite: Negative   Leuk Esterase: Moderate / RBC: >50 /HPF / WBC 3-5 /HPF   Sq Epi: x / Non Sq Epi: Occasional /HPF / Bacteria: x      ABG - ( 18 Dec 2018 04:30 )  pH, Arterial: 7.47  pH, Blood: x     /  pCO2: 64    /  pO2: 76    / HCO3: 46    / Base Excess: 20.2  /  SaO2: 97            RADIOLOGY:  < from: Xray Chest 1 View-PORTABLE IMMEDIATE (18 @ 11:57) >  Impression:    No significant change from prior. Bilateral airspace opacities, worse at   the left base       from: Xray Chest 1 View- PORTABLE-Routine (18 @ 06:12) >  Impression:    No radiographic evidence of acute cardiopulmonary disease.      < from: Xray Chest 1 View- PORTABLE-Routine (12.15.18 @ 06:58) >  Impression:    Decreased bilateral basilar opacity. No pleural effusion or air leak      < from: [facility] Duplex Lower Ext Vein Scan, Bilat (18 @ 16:42) >  Impression:  No evidence of deep venous thrombosis or superficial thrombophlebitis in   bilateral lower extremities.      < from: Xray Chest 1 View- PORTABLE-Routine (18 @ 13:26) >  Impression:    Worsening basilar opacifications. Follow-up as needed.      < from: CT Chest No Cont (18 @ 18:08) >  IMPRESSION:  Mucoid debris in the proximal trachea (3:61) suggestive of aspiration.  Bilateral patchy airspace opacities greatest in the right lower lobe.   Correlate for infectious/inflammatory etiologies.  Prominent mediastinal lymph nodes up to 1.0 cm, probably reactive in   nature.  Cholelithiasis with suggestion of calcification within the wall of the   gallbladder (porcelain gallbladder).      < from: Xray Chest 1 View-PORTABLE IMMEDIATE (18 @ 20:16) >  IMPRESSION:   Limited exam due to patient body habitus. Apparent perihilar opacities.      < from: Transthoracic Echocardiogram (12.15.18 @ 11:18) >  Summary:   1. Left ventricular ejection fraction, by visual estimation, is 50 to   55%.   2. Normal left ventricular size and wall thicknesses, with normal   systolic function.   3. Spectral Doppler shows impaired relaxation pattern of left   ventricular myocardial filling (Grade I diastolic dysfunction).   4. Mild tricuspid regurgitation.   5. Mild aortic regurgitation.   6. Pulmonic valve regurgitation.   7. Estimated pulmonary artery systolic pressure is 52.5 mmHg assuming a   right atrial pressure of 8 mmHg, which is consistent with moderate   pulmonary hypertension.    PHYSICAL EXAM:  GEN: Intubated,   LUNGS: decreased breath sounds BL lower lung fields  HEART: S1/S2 present. RRR.   ABD: Soft, non-tender, non-distended. Bowel sounds present  NEURO: moving extremities    Intravenous access:   NG tube:   Kent cathter: Indwelling Urethral Catheter:     Connect To:  Straight Drainage/[place]    Indication:  Urinary Retention / Obstruction (18 @ 12:55) (not performed) [active]

## 2018-12-18 NOTE — DIETITIAN INITIAL EVALUATION ADULT. - ENERGY NEEDS
Estimated energy needs:   Estimated protein needs:   Estimated fluid needs: per CCU team Estimated energy needs: 860-1100kcal/d based on: 860- 1000kcal/d (60-70% [id number]) vs. 1000-1140kcal/d (22-24kcal/kg IBW) vs. 880-1120kcal/d (11-14kcal/act wt)   Estimated protein needs: 68-91gm/d (1.5-2.0gm/kg IBW) - pt w/ ANTONIETTA per MD    Estimated fluid needs: per CCU team

## 2018-12-18 NOTE — DIETITIAN INITIAL EVALUATION ADULT. - OTHER INFO
P/w SOB and rash. Primary diagnosis Acute on chronic hypercapnic respiratory failure 2/2 Aspiration Pneumonia, possible CHF, pt was intubated last night. S/p bronchoscopy today. ANTONIETTA. Reason for assessment: new intubation/EN and LOS.

## 2018-12-18 NOTE — PROGRESS NOTE ADULT - ASSESSMENT
IMPRESSION:    Acute on chronic hypercapnic respiratory failure s/p intubation  IDALMIS / OHS  Pneumonia/Aspiration?/ fluid overload  Elevated right hemidiaphragm      PLAN:     CNS: hold sedation, if needed versed, or precedex    HEENT: Oral care    PULMONARY:  HOB @ 45 degrees, no changes in vent settings, bronchoscopy    CARDIOVASCULAR: I = O    GI: GI prophylaxis. start feeding    RENAL:  Follow up lytes.  Correct as needed    INFECTIOUS DISEASE: Follow up cultures, zosyn, levaquin, solumedrol 60 q24h    HEMATOLOGICAL:  DVT prophylaxis. hold lovenox    ENDOCRINE:  Follow up FS.     MUSCULOSKELETAL: bed rest    Monitor in ICU

## 2018-12-18 NOTE — DIETITIAN INITIAL EVALUATION ADULT. - MD RECOMMEND
please change RN order to Vital HP @20ml/hr, increase by 10 ml q 4-6hrs to goal of 40ml/hr via OGT to provide (960kcal, 84 gm protein, 778ml h20) , additional fluids per LIP. Consider bowel regimen (? last BM 12/14). Will follow./other please change TF order to Vital HP @20ml/hr, increase by 10 ml q 4-6hrs to goal of 40ml/hr via OGT to provide (960kcal, 84 gm protein, 778ml h20) , additional fluids per LIP. Consider bowel regimen (? last BM 12/14). Will follow./other please change TF order to Vital HP @20ml/hr, increase by 10 ml q 4-6hrs to goal of 40ml/hr via OGT to provide (960kcal, 84 gm protein, 778ml h20) , additional fluids per LIP. Monitor Phos/K/Mg. Consider bowel regimen (? last BM 12/14). Will follow./other

## 2018-12-18 NOTE — DIETITIAN INITIAL EVALUATION ADULT. - PHYSICAL APPEARANCE
obese/Intubated. OGT in place. Off sedation and pressors. Tmax 37.5, MAP 70. BMI - 34.4, IBW-45.5. Last BM documented 12/14. Skin: rash/ecchymosis.

## 2018-12-18 NOTE — PROGRESS NOTE ADULT - SUBJECTIVE AND OBJECTIVE BOX
KARL, DENISE  73y, Female      OVERNIGHT EVENTS:  intubated, s/p bronch  afebrile    ROS negative except as per above    VITALS:  T(F): 99, Max: 99 (18 @ 08:00)  HR: 72  BP: 97/51  RR: 19Vital Signs Last 24 Hrs  T(C): 37.2 (18 Dec 2018 08:00), Max: 37.2 (18 Dec 2018 08:00)  T(F): 99 (18 Dec 2018 08:00), Max: 99 (18 Dec 2018 08:00)  HR: 72 (18 Dec 2018 08:00) (64 - 92)  BP: 97/51 (18 Dec 2018 08:00) (82/37 - 114/55)  BP(mean): 70 (18 Dec 2018 06:00) (57 - 80)  RR: 19 (18 Dec 2018 08:00) (16 - 64)  SpO2: 100% (18 Dec 2018 08:00) (94% - 100%)    PHYSICAL EXAM  Gen: intubated  HEENT: NCAT. EOMI. MMM.   Neck: Supple  CV: RRR, no murmurs  Lungs: decreased bases  Abd: Soft. NTND  Extr: wwp, trace edema  Skin: improved rash expanding multiple dermitomes C5-T1, now all crusted, L groin erythematous rash improving  Neuro:sedated  Lines: clean    TESTS & MEASUREMENTS:                        9.5    9.57  )-----------( 239      ( 18 Dec 2018 04:48 )             30.4         146  |  97<L>  |  51<H>  ----------------------------<  96  3.8   |  41<HH>  |  1.0    Ca    8.8      18 Dec 2018 04:48  Mg     2.7         TPro  6.7  /  Alb  3.1<L>  /  TBili  0.7  /  DBili  x   /  AST  32  /  ALT  39  /  AlkPhos  63      LIVER FUNCTIONS - ( 18 Dec 2018 04:48 )  Alb: 3.1 g/dL / Pro: 6.7 g/dL / ALK PHOS: 63 U/L / ALT: 39 U/L / AST: 32 U/L / GGT: x             Culture - Urine (collected 12-15-18 @ 09:54)  Source: .Urine Clean Catch (Midstream)  Final Report (18 @ 20:18):    No growth    Culture - Blood (collected 18 @ 17:09)  Source: .Blood None  Preliminary Report (18 @ 01:01):    No growth to date.    Culture - Blood (collected 18 @ 20:40)  Source: .Blood None  Gram Stain (18 @ 23:21):    Growth in aerobic bottle: Gram Positive Cocci in Clusters  Final Report (12-15-18 @ 18:25):    Growth in aerobic bottle: Coag Negative Staphylococcus "Susceptibilities    not performed"    "Due to technical problems, Proteus sp. will Not be reported as part of    the BCID panel until further notice"    ***Blood Panel PCR results on this specimen areavailable    approximately 3 hours after the Gram stain result.***    Gram stain, PCR, and/or culture results may not always    correspond due to difference in methodologies.    ************************************************************    This PCR assay wasperformed using [employer].    The following targets are tested for: Enterococcus,    vancomycin resistant enterococci, Listeria monocytogenes,    coagulase negative staphylococci, S. aureus,    methicillin resistant S. aureus, Streptococcus agalactiae    (Group B), S. pneumoniae, S. pyogenes (Group A),    Acinetobacter baumannii, Enterobacter cloacae, E. coli,    Klebsiella oxytoca, K. pneumoniae, Proteus sp.,    Serratia marcescens, Haemophilus influenzae,    Neisseria meningitidis, Pseudomonas aeruginosa, Candida    albicans, C. glabrata, C krusei, C parapsilosis,    C. tropicalis and the KPC resistance gene.  Organism: Blood Culture PCR (12-15-18 @ 18:25)  Organism: Blood Culture PCR (12-15-18 @ 18:25)      -  Coagulase negative Staphylococcus: Detec      Method Type: PCR    Culture - Blood (collected 18 @ 16:51)  Source: .Blood None  Final Report (18 @ 01:00):    No growth at 5 days.    Culture - Blood (collected 18 @ 19:26)  Source: .Blood Blood-Peripheral  Final Report (18 @ 01:01):    No growth at 5 days.    Culture - Blood (collected 18 @ 19:26)  Source: .Blood Blood-Peripheral  Final Report (18 @ 01:01):    No growth at 5 days.      Urinalysis Basic - ( 17 Dec 2018 14:11 )    Color: Yellow / Appearance: Cloudy / S.020 / pH: x  Gluc: x / Ketone: Negative  / Bili: Negative / Urobili: 0.2 mg/dL   Blood: x / Protein: 30 mg/dL / Nitrite: Negative   Leuk Esterase: Moderate / RBC: >50 /HPF / WBC 3-5 /HPF   Sq Epi: x / Non Sq Epi: Occasional /HPF / Bacteria: x        RADIOLOGY & ADDITIONAL TESTS:    ANTIBIOTICS:  acyclovir IVPB   116 mL/Hr IV Intermittent (18 @ 02:04)   116 mL/Hr IV Intermittent (18 @ 18:26)   116 mL/Hr IV Intermittent (18 @ 12:39)   116 mL/Hr IV Intermittent (18 @ 06:08)   116 mL/Hr IV Intermittent (12-15-18 @ 20:54)   116 mL/Hr IV Intermittent (12-15-18 @ 12:47)   116 mL/Hr IV Intermittent (12-15-18 @ 03:07)   116 mL/Hr IV Intermittent (18 @ 21:46)   116 mL/Hr IV Intermittent (18 @ 12:29)   116 mL/Hr IV Intermittent (18 @ 03:54)   116 mL/Hr IV Intermittent (18 @ 20:15)   116 mL/Hr IV Intermittent (18 @ 12:57)   116 mL/Hr IV Intermittent (18 @ 02:21)   116 mL/Hr IV Intermittent (18 @ 20:30)    acyclovir IVPB   266 mL/Hr IV Intermittent (18 @ 05:06)   266 mL/Hr IV Intermittent (18 @ 21:47)    cefTRIAXone   IVPB   100 mL/Hr IV Intermittent (18 @ 14:54)    cefTRIAXone   IVPB   100 mL/Hr IV Intermittent (18 @ 15:16)    levoFLOXacin IVPB   100 mL/Hr IV Intermittent (18 @ 17:17)    levoFLOXacin IVPB   100 mL/Hr IV Intermittent (18 @ 15:16)    levoFLOXacin IVPB   100 mL/Hr IV Intermittent (18 @ 12:50)    piperacillin/tazobactam IVPB.   200 mL/Hr IV Intermittent (18 @ 17:09)    piperacillin/tazobactam IVPB.   200 mL/Hr IV Intermittent (18 @ 05:06)   200 mL/Hr IV Intermittent (18 @ 23:39)   200 mL/Hr IV Intermittent (18 @ 18:18)   200 mL/Hr IV Intermittent (18 @ 11:28)   200 mL/Hr IV Intermittent (18 @ 05:39)   200 mL/Hr IV Intermittent (18 @ 00:45)   200 mL/Hr IV Intermittent (18 @ 18:25)   200 mL/Hr IV Intermittent (18 @ 12:40)   200 mL/Hr IV Intermittent (18 @ 06:11)   200 mL/Hr IV Intermittent (18 @ 00:32)   200 mL/Hr IV Intermittent (12-15-18 @ 18:33)   200 mL/Hr IV Intermittent (12-15-18 @ 12:47)   200 mL/Hr IV Intermittent (12-15-18 @ 05:31)   200 mL/Hr IV Intermittent (12-15-18 @ 00:33)    valACYclovir   1000 milliGRAM(s) Oral (18 @ 11:31)        acyclovir IVPB 800 milliGRAM(s) IV Intermittent every 8 hours  levoFLOXacin IVPB      levoFLOXacin IVPB 500 milliGRAM(s) IV Intermittent every 24 hours  piperacillin/tazobactam IVPB.      piperacillin/tazobactam IVPB. 2.25 Gram(s) IV Intermittent every 6 hours KARL, DENISE  73y, Female      OVERNIGHT EVENTS:  intubated, s/p bronch  afebrile    ROS negative except as per above    VITALS:  T(F): 99, Max: 99 (-18 @ 08:00)  HR: 72  BP: 97/51  RR: 19Vital Signs Last 24 Hrs  T(C): 37.2 (18 Dec 2018 08:00), Max: 37.2 (18 Dec 2018 08:00)  T(F): 99 (18 Dec 2018 08:00), Max: 99 (18 Dec 2018 08:00)  HR: 72 (18 Dec 2018 08:00) (64 - 92)  BP: 97/51 (18 Dec 2018 08:00) (82/37 - 114/55)  BP(mean): 70 (18 Dec 2018 06:00) (57 - 80)  RR: 19 (18 Dec 2018 08:00) (16 - 64)  SpO2: 100% (18 Dec 2018 08:00) (94% - 100%)    PHYSICAL EXAM  Gen: intubated  HEENT: NCAT. EOMI. MMM.   Neck: Supple  CV: RRR, no murmurs  Lungs: decreased bases  Abd: Soft. NTND  Extr: wwp, trace edema  Skin: improved rash expanding multiple dermitomes C5-T1, now all crusted, L groin erythematous rash improving, nasal bridge wound 2/2 BIPAP  Neuro: awake  Lines: clean    TESTS & MEASUREMENTS:                        9.5    9.57  )-----------( 239      ( 18 Dec 2018 04:48 )             30.4     12-18    146  |  97<L>  |  51<H>  ----------------------------<  96  3.8   |  41<HH>  |  1.0    Ca    8.8      18 Dec 2018 04:48  Mg     2.7     -    TPro  6.7  /  Alb  3.1<L>  /  TBili  0.7  /  DBili  x   /  AST  32  /  ALT  39  /  AlkPhos  63  12-18    LIVER FUNCTIONS - ( 18 Dec 2018 04:48 )  Alb: 3.1 g/dL / Pro: 6.7 g/dL / ALK PHOS: 63 U/L / ALT: 39 U/L / AST: 32 U/L / GGT: x             Culture - Urine (collected 12-15-18 @ 09:54)  Source: .Urine Clean Catch (Midstream)  Final Report (18 @ 20:18):    No growth    Culture - Blood (collected 18 @ 17:09)  Source: .Blood None  Preliminary Report (18 @ 01:01):    No growth to date.    Culture - Blood (collected 18 @ 20:40)  Source: .Blood None  Gram Stain (18 @ 23:21):    Growth in aerobic bottle: Gram Positive Cocci in Clusters  Final Report (12-15-18 @ 18:25):    Growth in aerobic bottle: Coag Negative Staphylococcus "Susceptibilities    not performed"    "Due to technical problems, Proteus sp. will Not be reported as part of    the BCID panel until further notice"    ***Blood Panel PCR results on this specimen areavailable    approximately 3 hours after the Gram stain result.***    Gram stain, PCR, and/or culture results may not always    correspond due to difference in methodologies.    ************************************************************    This PCR assay wasperformed using [employer].    The following targets are tested for: Enterococcus,    vancomycin resistant enterococci, Listeria monocytogenes,    coagulase negative staphylococci, S. aureus,    methicillin resistant S. aureus, Streptococcus agalactiae    (Group B), S. pneumoniae, S. pyogenes (Group A),    Acinetobacter baumannii, Enterobacter cloacae, E. coli,    Klebsiella oxytoca, K. pneumoniae, Proteus sp.,    Serratia marcescens, Haemophilus influenzae,    Neisseria meningitidis, Pseudomonas aeruginosa, Candida    albicans, C. glabrata, C krusei, C parapsilosis,    C. tropicalis and the KPC resistance gene.  Organism: Blood Culture PCR (12-15-18 @ 18:25)  Organism: Blood Culture PCR (12-15-18 @ 18:25)      -  Coagulase negative Staphylococcus: Detec      Method Type: PCR    Culture - Blood (collected 18 @ 16:51)  Source: .Blood None  Final Report (18 @ 01:00):    No growth at 5 days.    Culture - Blood (collected 18 @ 19:26)  Source: .Blood Blood-Peripheral  Final Report (18 @ 01:01):    No growth at 5 days.    Culture - Blood (collected 18 @ 19:26)  Source: .Blood Blood-Peripheral  Final Report (18 @ 01:01):    No growth at 5 days.      Urinalysis Basic - ( 17 Dec 2018 14:11 )    Color: Yellow / Appearance: Cloudy / S.020 / pH: x  Gluc: x / Ketone: Negative  / Bili: Negative / Urobili: 0.2 mg/dL   Blood: x / Protein: 30 mg/dL / Nitrite: Negative   Leuk Esterase: Moderate / RBC: >50 /HPF / WBC 3-5 /HPF   Sq Epi: x / Non Sq Epi: Occasional /HPF / Bacteria: x        RADIOLOGY & ADDITIONAL TESTS:    ANTIBIOTICS:  acyclovir IVPB   116 mL/Hr IV Intermittent (18 @ 02:04)   116 mL/Hr IV Intermittent (18 @ 18:26)   116 mL/Hr IV Intermittent (18 @ 12:39)   116 mL/Hr IV Intermittent (18 @ 06:08)   116 mL/Hr IV Intermittent (12-15-18 @ 20:54)   116 mL/Hr IV Intermittent (12-15-18 @ 12:47)   116 mL/Hr IV Intermittent (12-15-18 @ 03:07)   116 mL/Hr IV Intermittent (18 @ 21:46)   116 mL/Hr IV Intermittent (18 @ 12:29)   116 mL/Hr IV Intermittent (18 @ 03:54)   116 mL/Hr IV Intermittent (18 @ 20:15)   116 mL/Hr IV Intermittent (18 @ 12:57)   116 mL/Hr IV Intermittent (18 @ 02:21)   116 mL/Hr IV Intermittent (18 @ 20:30)    acyclovir IVPB   266 mL/Hr IV Intermittent (18 @ 05:06)   266 mL/Hr IV Intermittent (18 @ 21:47)    cefTRIAXone   IVPB   100 mL/Hr IV Intermittent (18 @ 14:54)    cefTRIAXone   IVPB   100 mL/Hr IV Intermittent (18 @ 15:16)    levoFLOXacin IVPB   100 mL/Hr IV Intermittent (18 @ 17:17)    levoFLOXacin IVPB   100 mL/Hr IV Intermittent (18 @ 15:16)    levoFLOXacin IVPB   100 mL/Hr IV Intermittent (18 @ 12:50)    piperacillin/tazobactam IVPB.   200 mL/Hr IV Intermittent (18 @ 17:09)    piperacillin/tazobactam IVPB.   200 mL/Hr IV Intermittent (18 @ 05:06)   200 mL/Hr IV Intermittent (18 @ 23:39)   200 mL/Hr IV Intermittent (18 @ 18:18)   200 mL/Hr IV Intermittent (18 @ 11:28)   200 mL/Hr IV Intermittent (18 @ 05:39)   200 mL/Hr IV Intermittent (18 @ 00:45)   200 mL/Hr IV Intermittent (18 @ 18:25)   200 mL/Hr IV Intermittent (18 @ 12:40)   200 mL/Hr IV Intermittent (18 @ 06:11)   200 mL/Hr IV Intermittent (18 @ 00:32)   200 mL/Hr IV Intermittent (12-15-18 @ 18:33)   200 mL/Hr IV Intermittent (12-15-18 @ 12:47)   200 mL/Hr IV Intermittent (12-15-18 @ 05:31)   200 mL/Hr IV Intermittent (12-15-18 @ 00:33)    valACYclovir   1000 milliGRAM(s) Oral (18 @ 11:31)        acyclovir IVPB 800 milliGRAM(s) IV Intermittent every 8 hours  levoFLOXacin IVPB      levoFLOXacin IVPB 500 milliGRAM(s) IV Intermittent every 24 hours  piperacillin/tazobactam IVPB.      piperacillin/tazobactam IVPB. 2.25 Gram(s) IV Intermittent every 6 hours

## 2018-12-18 NOTE — PROCEDURE NOTE - ADDITIONAL PROCEDURE DETAILS
Bloody secretions were noticed in the upper airway mainly above the level of the emily. Left and right sides were inspected and no endobronchial lesions, airway edema or inflammation was seen. Patient tolerated well. Bronchial wash was done on the left and right and sent for cytology/microbiology

## 2018-12-18 NOTE — PROGRESS NOTE ADULT - ASSESSMENT
73F    Hypertension    Admitted for hypoxemia, Afib RVR, , elevated d-dimer  Sepsis ruled out on admission  Afebrile  No leukocytosis  Lactic acidosis to 3    Rash on C4/5 area, vesicles? in axilla- cannot r/o Zoster  also L erythematous groin rash with blisters  CXR edema, likely fluid overload in the setting of Afib RVR, lower suspicion for PNA  CT chest Mucoid debris in the proximal trachea suggestive of aspiration. Bilateral patchy airspace opacities greatest in the right lower lobe.   Correlate for infectious/inflammatory etiologies. Porcelain gallbladder      - D/C IV ACV  - on zosyn/levaquin for PNA, consider d/c if bronch cultures are negative as no fever, leukocytosis and e/o fluid overload, possible aspiration  - on steroids    Spectra [id number]

## 2018-12-19 NOTE — PROGRESS NOTE ADULT - SUBJECTIVE AND OBJECTIVE BOX
Patient is a 73y old Female who presented with a chief complaint of SOB and rash (19 Dec 2018 09:14)  Currently admitted to medicine with the primary diagnosis of Acute hypercapnic resp failure s/p intubation on /Aspiration pneumonia     Today is hospital day 8d. This morning she is resting comfortably in bed and reports no new issues or overnight events.   CCU day 05, Pt was initially in ICU  and downgraded on  to 3c but was upgraded to CCU the same day because of worsening SOB  Intubated   Kent: In place  NG tube: OG tube in place  Central Line: No  3 iv Lines in place      PAST MEDICAL & SURGICAL HISTORY  Hypertension  No significant past surgical history    SOCIAL HISTORY:  Negative for smoking/alcohol/drug use.     ALLERGIES:  No Known Allergies    MEDICATIONS:  STANDING MEDICATIONS  ALBUTerol/ipratropium for Nebulization 3 milliLiter(s) Nebulizer every 6 hours  chlorhexidine 0.12% Liquid 15 milliLiter(s) Oral Mucosa two times a day  chlorhexidine 4% Liquid 1 Application(s) Topical <User Schedule>  dexmedetomidine Infusion 0.05 MICROgram(s)/kG/Hr IV Continuous <Continuous>  levoFLOXacin IVPB      levoFLOXacin IVPB 500 milliGRAM(s) IV Intermittent every 24 hours  methylPREDNISolone sodium succinate Injectable 60 milliGRAM(s) IV Push daily  nystatin Powder 1 Application(s) Topical two times a day  pantoprazole  Injectable 40 milliGRAM(s) IV Push two times a day  piperacillin/tazobactam IVPB.      piperacillin/tazobactam IVPB. 2.25 Gram(s) IV Intermittent every 6 hours  potassium chloride  20 mEq/100 mL IVPB 20 milliEquivalent(s) IV Intermittent once  simvastatin 5 milliGRAM(s) Oral at bedtime  sodium chloride 0.45%. 1000 milliLiter(s) IV Continuous <Continuous>    PRN MEDICATIONS  fentaNYL    Injectable 50 MICROGram(s) IV Push two times a day PRN    Home Medications:  hydroCHLOROthiazide 25 mg oral tablet: 1 tab(s) orally once a day (12 Dec 2018 02:08)  metOLazone 5 mg oral tablet: 1  orally 3 times a week (12 Dec 2018 02:08)  metoprolol succinate 25 mg oral tablet, extended release: 1 tab(s) orally once a day (12 Dec 2018 02:08)    VITALS:   T(F): 98.8  HR: 65  BP: 78/39  RR: 20  SpO2: 100%    LABS:                        7.9    9.26  )-----------( 196      ( 19 Dec 2018 04:31 )             25.3     12-19    147<H>  |  100  |  64<HH>  ----------------------------<  143<H>  3.2<L>   |  40<H>  |  1.1    Ca    8.7      19 Dec 2018 04:31  Mg     2.7         TPro  6.2  /  Alb  3.1<L>  /  TBili  0.6  /  DBili  x   /  AST  34  /  ALT  35  /  AlkPhos  57      PT/INR - ( 17 Dec 2018 23:45 )   PT: 15.70 sec;   INR: 1.37 ratio         PTT - ( 17 Dec 2018 23:45 )  PTT:52.5 sec  Urinalysis Basic - ( 17 Dec 2018 14:11 )    Color: Yellow / Appearance: Cloudy / S.020 / pH: x  Gluc: x / Ketone: Negative  / Bili: Negative / Urobili: 0.2 mg/dL   Blood: x / Protein: 30 mg/dL / Nitrite: Negative   Leuk Esterase: Moderate / RBC: >50 /HPF / WBC 3-5 /HPF   Sq Epi: x / Non Sq Epi: Occasional /HPF / Bacteria: x    ABG - ( 19 Dec 2018 07:36 )  pH, Arterial: 7.57  pH, Blood: x     /  pCO2: 47    /  pO2: 140   / HCO3: 43    / Base Excess: 19.1  /  SaO2: 100         Culture - Fungal, Bronchial (collected 18 Dec 2018 10:22)  Source: .Broncial None  Preliminary Report (19 Dec 2018 06:32):    Testing in progress    Culture - Bronchial (collected 18 Dec 2018 10:22)  Source: Bronch Wash None  Gram Stain (18 Dec 2018 22:22):    Numerous polymorphonuclear leukocytes per low power field    Rare Squamous epithelial cells per low power field    No organisms seen per oil power field    Culture - Urine (collected 17 Dec 2018 14:11)  Source: .Urine Clean Catch (Midstream)  Final Report (19 Dec 2018 06:23):  No growth      RADIOLOGY:  < from: Xray Chest 1 View- PORTABLE-Routine (18 @ 04:49) >  Impression:    Stable left greater than right opacities.      < from: Xray Chest 1 View- PORTABLE-Routine (12.18.18 @ 06:31) >  Impression:    Resolving left lower lobe opacity.      < from: Xray Chest 1 View-PORTABLE IMMEDIATE (12.17.18 @ 11:57) >  Impression:    No significant change from prior. Bilateral airspace opacities, worse at   the left base       from: Xray Chest 1 View- PORTABLE-Routine (12.16.18 @ 06:12) >  Impression:    No radiographic evidence of acute cardiopulmonary disease.      < from: Xray Chest 1 View- PORTABLE-Routine (12.15.18 @ 06:58) >  Impression:    Decreased bilateral basilar opacity. No pleural effusion or air leak      < from: [facility] Duplex Lower Ext Vein Scan, Bilat (12.14.18 @ 16:42) >  Impression:  No evidence of deep venous thrombosis or superficial thrombophlebitis in   bilateral lower extremities.      < from: Xray Chest 1 View- PORTABLE-Routine (12.14.18 @ 13:26) >  Impression:    Worsening basilar opacifications. Follow-up as needed.      < from: CT Chest No Cont (12.12.18 @ 18:08) >  IMPRESSION:  Mucoid debris in the proximal trachea (3:61) suggestive of aspiration.  Bilateral patchy airspace opacities greatest in the right lower lobe.   Correlate for infectious/inflammatory etiologies.  Prominent mediastinal lymph nodes up to 1.0 cm, probably reactive in   nature.  Cholelithiasis with suggestion of calcification within the wall of the   gallbladder (porcelain gallbladder).      < from: Xray Chest 1 View-PORTABLE IMMEDIATE (12..18 @ 20:16) >  IMPRESSION:   Limited exam due to patient body habitus. Apparent perihilar opacities.      < from: Transthoracic Echocardiogram (1215.18 @ 11:18) >  Summary:   1. Left ventricular ejection fraction, by visual estimation, is 50 to   55%.   2. Normal left ventricular size and wall thicknesses, with normal   systolic function.   3. Spectral Doppler shows impaired relaxation pattern of left   ventricular myocardial filling (Grade I diastolic dysfunction).   4. Mild tricuspid regurgitation.   5. Mild aortic regurgitation.   6. Pulmonic valve regurgitation.   7. Estimated pulmonary artery systolic pressure is 52.5 mmHg assuming a   right atrial pressure of 8 mmHg, which is consistent with moderate   pulmonary hypertension.    PHYSICAL EXAM:  GEN: Intubated  LUNGS: Bibasilar crackles with decreased breath sounds  HEART: S1/S2 present. RRR.   ABD: Soft, non-tender, non-distended. Bowel sounds present  NEURO: Intubated but follows commands when off sedation Patient is a 73y old Female who presented with a chief complaint of SOB and rash (19 Dec 2018 09:14)  Currently admitted to medicine with the primary diagnosis of Acute hypercapnic resp failure s/p intubation on /Aspiration pneumonia     Today is hospital day 8d. Pt's Hb dropped from 13.4 on admission to 7.9 today . Considering her history of bleed on  overnight > about 300ml from Lovenox injection site, CT A/P was ordered to ro retroperitoneal bleed. result awaiting. CT images showed fecal lilian, so gave her enema and added bowel regimen.  CCU day 05, Pt was initially in ICU  and downgraded on  to 3c but was upgraded to CCU the same day because of worsening SOB  Intubated   Kent: In place  NG tube: OG tube in place  Central Line: No  3 iv Lines in place      PAST MEDICAL & SURGICAL HISTORY  Hypertension  No significant past surgical history    SOCIAL HISTORY:  Negative for smoking/alcohol/drug use.     ALLERGIES:  No Known Allergies    MEDICATIONS:  STANDING MEDICATIONS  ALBUTerol/ipratropium for Nebulization 3 milliLiter(s) Nebulizer every 6 hours  chlorhexidine 0.12% Liquid 15 milliLiter(s) Oral Mucosa two times a day  chlorhexidine 4% Liquid 1 Application(s) Topical <User Schedule>  dexmedetomidine Infusion 0.05 MICROgram(s)/kG/Hr IV Continuous <Continuous>  levoFLOXacin IVPB      levoFLOXacin IVPB 500 milliGRAM(s) IV Intermittent every 24 hours  methylPREDNISolone sodium succinate Injectable 60 milliGRAM(s) IV Push daily  nystatin Powder 1 Application(s) Topical two times a day  pantoprazole  Injectable 40 milliGRAM(s) IV Push two times a day  piperacillin/tazobactam IVPB.      piperacillin/tazobactam IVPB. 2.25 Gram(s) IV Intermittent every 6 hours  potassium chloride  20 mEq/100 mL IVPB 20 milliEquivalent(s) IV Intermittent once  simvastatin 5 milliGRAM(s) Oral at bedtime  sodium chloride 0.45%. 1000 milliLiter(s) IV Continuous <Continuous>    PRN MEDICATIONS  fentaNYL    Injectable 50 MICROGram(s) IV Push two times a day PRN    Home Medications:  hydroCHLOROthiazide 25 mg oral tablet: 1 tab(s) orally once a day (12 Dec 2018 02:08)  metOLazone 5 mg oral tablet: 1  orally 3 times a week (12 Dec 2018 02:08)  metoprolol succinate 25 mg oral tablet, extended release: 1 tab(s) orally once a day (12 Dec 2018 02:08)    VITALS:   T(F): 98.8  HR: 65  BP: 78/39  RR: 20  SpO2: 100%    LABS:                        7.9    9.26  )-----------( 196      ( 19 Dec 2018 04:31 )             25.3     12    147<H>  |  100  |  64<HH>  ----------------------------<  143<H>  3.2<L>   |  40<H>  |  1.1    Ca    8.7      19 Dec 2018 04:31  Mg     2.7         TPro  6.2  /  Alb  3.1<L>  /  TBili  0.6  /  DBili  x   /  AST  34  /  ALT  35  /  AlkPhos  57      PT/INR - ( 17 Dec 2018 23:45 )   PT: 15.70 sec;   INR: 1.37 ratio         PTT - ( 17 Dec 2018 23:45 )  PTT:52.5 sec  Urinalysis Basic - ( 17 Dec 2018 14:11 )    Color: Yellow / Appearance: Cloudy / S.020 / pH: x  Gluc: x / Ketone: Negative  / Bili: Negative / Urobili: 0.2 mg/dL   Blood: x / Protein: 30 mg/dL / Nitrite: Negative   Leuk Esterase: Moderate / RBC: >50 /HPF / WBC 3-5 /HPF   Sq Epi: x / Non Sq Epi: Occasional /HPF / Bacteria: x    ABG - ( 19 Dec 2018 07:36 )  pH, Arterial: 7.57  pH, Blood: x     /  pCO2: 47    /  pO2: 140   / HCO3: 43    / Base Excess: 19.1  /  SaO2: 100         Culture - Fungal, Bronchial (collected 18 Dec 2018 10:22)  Source: .Broncial None  Preliminary Report (19 Dec 2018 06:32):    Testing in progress    Culture - Bronchial (collected 18 Dec 2018 10:22)  Source: Bronch Wash None  Gram Stain (18 Dec 2018 22:22):    Numerous polymorphonuclear leukocytes per low power field    Rare Squamous epithelial cells per low power field    No organisms seen per oil power field    Culture - Urine (collected 17 Dec 2018 14:11)  Source: .Urine Clean Catch (Midstream)  Final Report (19 Dec 2018 06:23):  No growth      RADIOLOGY:  < from: Xray Chest 1 View- PORTABLE-Routine (..18 @ 04:49) >  Impression:    Stable left greater than right opacities.      < from: Xray Chest 1 View- PORTABLE-Routine (.18.18 @ 06:31) >  Impression:    Resolving left lower lobe opacity.      < from: Xray Chest 1 View-PORTABLE IMMEDIATE (.17.18 @ 11:57) >  Impression:    No significant change from prior. Bilateral airspace opacities, worse at   the left base       from: Xray Chest 1 View- PORTABLE-Routine (.16.18 @ 06:12) >  Impression:    No radiographic evidence of acute cardiopulmonary disease.      < from: Xray Chest 1 View- PORTABLE-Routine (.15.18 @ 06:58) >  Impression:    Decreased bilateral basilar opacity. No pleural effusion or air leak      < from: [facility] Duplex Lower Ext Vein Scan, Bilat (..18 @ 16:42) >  Impression:  No evidence of deep venous thrombosis or superficial thrombophlebitis in   bilateral lower extremities.      < from: Xray Chest 1 View- PORTABLE-Routine (.14.18 @ 13:26) >  Impression:    Worsening basilar opacifications. Follow-up as needed.      < from: CT Chest No Cont (..18 @ 18:08) >  IMPRESSION:  Mucoid debris in the proximal trachea (3:61) suggestive of aspiration.  Bilateral patchy airspace opacities greatest in the right lower lobe.   Correlate for infectious/inflammatory etiologies.  Prominent mediastinal lymph nodes up to 1.0 cm, probably reactive in   nature.  Cholelithiasis with suggestion of calcification within the wall of the   gallbladder (porcelain gallbladder).      < from: Xray Chest 1 View-PORTABLE IMMEDIATE (..18 @ 20:16) >  IMPRESSION:   Limited exam due to patient body habitus. Apparent perihilar opacities.      < from: Transthoracic Echocardiogram (15.18 @ 11:18) >  Summary:   1. Left ventricular ejection fraction, by visual estimation, is 50 to   55%.   2. Normal left ventricular size and wall thicknesses, with normal   systolic function.   3. Spectral Doppler shows impaired relaxation pattern of left   ventricular myocardial filling (Grade I diastolic dysfunction).   4. Mild tricuspid regurgitation.   5. Mild aortic regurgitation.   6. Pulmonic valve regurgitation.   7. Estimated pulmonary artery systolic pressure is 52.5 mmHg assuming a   right atrial pressure of 8 mmHg, which is consistent with moderate   pulmonary hypertension.    PHYSICAL EXAM:  GEN: Intubated  LUNGS: Bibasilar crackles with decreased breath sounds  HEART: S1/S2 present. RRR.   ABD: Soft, non-tender, non-distended. Bowel sounds present  NEURO: Intubated but follows commands when off sedation

## 2018-12-19 NOTE — PROGRESS NOTE ADULT - ASSESSMENT
IMPRESSION:    Acute on chronic hypercapnic respiratory failure s/p intubation  IDALMIS / OHS  Pneumonia/Aspiration?/ fluid overload  Elevated right hemidiaphragm      PLAN:     CNS: hold sedation, precedex, fentanyl    HEENT: Oral care    PULMONARY:  HOB @ 45 degrees, no changes in vent settings, bronchoscopy result, dec TV, REPEAT ABG    CARDIOVASCULAR: IVF    GI: GI prophylaxis. hold feeding, ct A/P    RENAL:  Follow up lytes.  Correct as needed    INFECTIOUS DISEASE: Follow up cultures, zosyn, levaquin, solumedrol 60 q24h    HEMATOLOGICAL:  DVT prophylaxis. hold lovenox    ENDOCRINE:  Follow up FS.     MUSCULOSKELETAL: bed rest  POOR PROGNOSIS  Monitor in ICU

## 2018-12-19 NOTE — PROGRESS NOTE ADULT - SUBJECTIVE AND OBJECTIVE BOX
OVERNIGHT EVENTS: on precedex, no pressors, NO BM, DEC HB    Vital Signs Last 24 Hrs  T(C): 36.8 (19 Dec 2018 04:00), Max: 37.6 (18 Dec 2018 12:00)  T(F): 98.2 (19 Dec 2018 04:00), Max: 99.7 (18 Dec 2018 12:00)  HR: 68 (19 Dec 2018 08:00) (60 - 92)  BP: 105/45 (19 Dec 2018 08:00) (80/44 - 108/56)  BP(mean): 64 (19 Dec 2018 08:00) (50 - 79)  RR: 20 (19 Dec 2018 08:00) (17 - 27)  SpO2: 99% (19 Dec 2018 08:00) (96% - 100%)    PHYSICAL EXAMINATION:    GENERAL: AWAKE, FOLLOWS COMMANDS    HEENT: Head is normocephalic and atraumatic. Extraocular muscles are intact. Mucous membranes are moist.    NECK: Supple.    LUNGS: DEC BS L SIDE    HEART: Regular rate and rhythm without murmur.    ABDOMEN: Soft, nontender, and nondistended.      EXTREMITIES: SWELLING+    NEUROLOGIC: Grossly intact.    SKIN: No ulceration or induration present.      LABS:                        7.9    9.26  )-----------( 196      ( 19 Dec 2018 04:31 )             25.3     12-19    147<H>  |  100  |  64<HH>  ----------------------------<  143<H>  3.2<L>   |  40<H>  |  1.1    Ca    8.7      19 Dec 2018 04:31  Mg     2.7     12-18    TPro  6.2  /  Alb  3.1<L>  /  TBili  0.6  /  DBili  x   /  AST  34  /  ALT  35  /  AlkPhos  57  12-19    PT/INR - ( 17 Dec 2018 23:45 )   PT: 15.70 sec;   INR: 1.37 ratio         PTT - ( 17 Dec 2018 23:45 )  PTT:52.5 sec  Urinalysis Basic - ( 17 Dec 2018 14:11 )    Color: Yellow / Appearance: Cloudy / S.020 / pH: x  Gluc: x / Ketone: Negative  / Bili: Negative / Urobili: 0.2 mg/dL   Blood: x / Protein: 30 mg/dL / Nitrite: Negative   Leuk Esterase: Moderate / RBC: >50 /HPF / WBC 3-5 /HPF   Sq Epi: x / Non Sq Epi: Occasional /HPF / Bacteria: x      ABG - ( 19 Dec 2018 07:36 )  pH, Arterial: 7.57  pH, Blood: x     /  pCO2: 47    /  pO2: 140   / HCO3: 43    / Base Excess: 19.1  /  SaO2: 100         370/16/40/5                      18 @ 07:01  -  18 @ 07:00  --------------------------------------------------------  IN: 1282.7 mL / OUT: 820 mL / NET: 462.7 mL    18 @ 07:01  -  18 @ 09:14  --------------------------------------------------------  IN: 92 mL / OUT: 60 mL / NET: 32 mL        MICROBIOLOGY:  Culture Results:   Testing in progress ( @ 10:22)  Culture Results:   No growth ( @ 14:11)  Culture Results:   No growth (12-15 @ 09:54)      MEDICATIONS  (STANDING):  ALBUTerol/ipratropium for Nebulization 3 milliLiter(s) Nebulizer every 6 hours  chlorhexidine 0.12% Liquid 15 milliLiter(s) Oral Mucosa two times a day  chlorhexidine 4% Liquid 1 Application(s) Topical <User Schedule>  dexmedetomidine Infusion 0.05 MICROgram(s)/kG/Hr (1 mL/Hr) IV Continuous <Continuous>  levoFLOXacin IVPB      levoFLOXacin IVPB 500 milliGRAM(s) IV Intermittent every 24 hours  methylPREDNISolone sodium succinate Injectable 60 milliGRAM(s) IV Push daily  nystatin Powder 1 Application(s) Topical two times a day  pantoprazole  Injectable 40 milliGRAM(s) IV Push two times a day  piperacillin/tazobactam IVPB.      piperacillin/tazobactam IVPB. 2.25 Gram(s) IV Intermittent every 6 hours  potassium chloride  20 mEq/100 mL IVPB 20 milliEquivalent(s) IV Intermittent every 2 hours  simvastatin 5 milliGRAM(s) Oral at bedtime    MEDICATIONS  (PRN):      RADIOLOGY & ADDITIONAL STUDIES:

## 2018-12-19 NOTE — PROGRESS NOTE ADULT - ASSESSMENT
73F from [facility] w/ PMH of HTN presented to [facility] with worsening generalized weakness and SOB for 3 days.     #Acute Hypercapnic Respiratory Failure 2/2 Aspiration Pneumonia, Possible CHF   -Patient Intubated 12/17 as she was saturating in late 80s  -Duplex report 12/14: negative for DVT   -ID on board > 12/18 dced Acyclovir  -Zosyn, Proventil, Spiriva, Duonebs q6h, started on Levofloxacin 12/17  -Per cardiology, Lasix was discontinued.  -Echo 12/15: LVEF 50-55%  -Sepsis ruled out > no leukocytosis, afebrile  -MRSA negative 12/17  -Blood culture 12/14 negative  -Urine Legionella and Strep Ag negative 12/15  -RVP negative 12/13  -CT chest suggestive for RLL PNA  -ESR 86, CRP 1.72, RF <10 12/18      #New Atrial Fibrillation, rate controlled   -Patient currently in NSR   -CHA2 DS2 VASC: 4   -Toprol 12.5 q12h   -Therapeutic Lovenox for AC stopped for episode of 200-300 ml bleed on 12/17 at the injection site and the Hb dropped from 11 to 9 > CT A/P ordered to r/o retroperitoneal bleed. Bowel regimen added  -Started Heparin 12/18    #HLD  -Simvastatin     #Varicella Zoster Right Shoulder  -Contact and airborne precautions dced 12/17 as per ID rec  -Acyclovir 800mg IV q8h, Benadryl PRN for pruritus > stopped today 12/18  -Stop Ab once bron cultures are back    #HTN  -HCTZ held for hypotension     #ANTONIETTA improving  -Creatinine 1.0 12/18  -Urine Urea 7575, creat 30, Na 94    #Asymptomatic Bacteriuria with RBCs  -UA positive 12/15 but culture negative 12/15  -Repeat UA 12/17 positive    #Hypokalemia  -Repleted, monitor BMP   -3.2 12/19        DVT ppx: Therapeutic Lovenox  GI ppx: Not indicated   Diet: NPO  Activity: increase as tolerated   FULL CODE 73F from [facility] w/ PMH of HTN presented to [facility] with worsening generalized weakness and SOB for 3 days.     #Acute Hypercapnic Respiratory Failure 2/2 Aspiration Pneumonia, Possible CHF   -Patient Intubated 12/17 as she was saturating in late 80s  -Duplex report 12/14: negative for DVT   -ID on board > 12/18 dced Acyclovir  -Zosyn, Proventil, Spiriva, Duonebs q6h, started on Levofloxacin 12/17  -Per cardiology, Lasix was discontinued.  -Echo 12/15: LVEF 50-55%  -Sepsis ruled out > no leukocytosis, afebrile  -MRSA negative 12/17  -Blood culture 12/14 negative  -Urine Legionella and Strep Ag negative 12/15  -RVP negative 12/13  -CT chest suggestive for RLL PNA  -ESR 86, CRP 1.72, RF <10 12/18      #New Atrial Fibrillation, rate controlled   -Patient currently in NSR   -CHA2 DS2 VASC: 4   -Therapeutic Lovenox for AC stopped for episode of 200-300 ml bleed on 12/17 at the injection site and the Hb dropped from 11 to 9 > CT A/P ordered to r/o retroperitoneal bleed. Bowel regimen added  -Started Heparin 12/18    #HLD  -Simvastatin     #Varicella Zoster Right Shoulder  -Contact and airborne precautions dced 12/17 as per ID rec  -Acyclovir 800mg IV q8h, Benadryl PRN for pruritus > stopped today 12/18  -Stop Ab once bron cultures are back    #HTN  -HCTZ held for hypotension     #ANTONIETTA improving  -Creatinine 1.0 12/18  -Urine Urea 7575, creat 30, Na 94    #Asymptomatic Bacteriuria with RBCs  -UA positive 12/15 but culture negative 12/15  -Repeat UA 12/17 positive    #Hypokalemia  -Repleted, monitor BMP   -3.2 12/19        DVT ppx: Therapeutic Lovenox  GI ppx: Not indicated   Diet: NPO  Activity: increase as tolerated   FULL CODE

## 2018-12-20 NOTE — PROGRESS NOTE ADULT - SUBJECTIVE AND OBJECTIVE BOX
KARL, DENISE  73y, Female      OVERNIGHT EVENTS:  afebrile  bronch cx NGTD  bloody secretions from trach, downtrending hgb  CT AP no bleed    ROS negative except as per above    VITALS:  T(F): 98.6, Max: 98.8 (12-19-18 @ 12:00)  HR: 66  BP: 106/45  RR: 21Vital Signs Last 24 Hrs  T(C): 37 (20 Dec 2018 08:00), Max: 37.1 (19 Dec 2018 12:00)  T(F): 98.6 (20 Dec 2018 08:00), Max: 98.8 (19 Dec 2018 12:00)  HR: 66 (20 Dec 2018 08:00) (60 - 80)  BP: 106/45 (20 Dec 2018 08:00) (78/39 - 110/51)  BP(mean): 62 (20 Dec 2018 08:00) (50 - 76)  RR: 21 (20 Dec 2018 08:00) (16 - 37)  SpO2: 98% (20 Dec 2018 08:00) (96% - 100%)      PHYSICAL EXAM  Gen: intubated  HEENT: NCAT. EOMI. MMM.   Neck: Supple  CV: RRR, no murmurs  Lungs: decreased bases  Abd: Soft. NTND  Extr: wwp, trace edema  Skin: crusted rash  Neuro: awake  Lines: clean    TESTS & MEASUREMENTS:                        6.7    12.80 )-----------( 186      ( 20 Dec 2018 04:28 )             21.8     12-20    143  |  99  |  43<H>  ----------------------------<  116<H>  3.7   |  35<H>  |  0.9    Ca    8.2<L>      20 Dec 2018 04:28  Mg     2.2     12-20    TPro  5.7<L>  /  Alb  2.8<L>  /  TBili  0.7  /  DBili  x   /  AST  46<H>  /  ALT  41  /  AlkPhos  50  12-20    LIVER FUNCTIONS - ( 20 Dec 2018 04:28 )  Alb: 2.8 g/dL / Pro: 5.7 g/dL / ALK PHOS: 50 U/L / ALT: 41 U/L / AST: 46 U/L / GGT: x             Culture - Blood (collected 12-18-18 @ 12:06)  Source: .Blood None  Preliminary Report (12-19-18 @ 23:01):    No growth to date.    Culture - Fungal, Bronchial (collected 12-18-18 @ 10:22)  Source: .Broncial None  Preliminary Report (12-19-18 @ 06:32):    Testing in progress    Culture - Acid Fast - Bronchial w/Smear (collected 12-18-18 @ 10:22)  Source: .Broncial None    Culture - Bronchial (collected 12-18-18 @ 10:22)  Source: Bronch Wash None  Gram Stain (12-18-18 @ 22:22):    Numerous polymorphonuclear leukocytes per low power field    Rare Squamous epithelial cells per low power field    No organisms seen per oil power field  Preliminary Report (12-19-18 @ 17:19):    Normal Respiratory Windy present    Culture - Urine (collected 12-17-18 @ 14:11)  Source: .Urine Clean Catch (Midstream)  Final Report (12-19-18 @ 06:23):    No growth    Culture - Urine (collected 12-15-18 @ 09:54)  Source: .Urine Clean Catch (Midstream)  Final Report (12-16-18 @ 20:18):    No growth    Culture - Blood (collected 12-14-18 @ 17:09)  Source: .Blood None  Final Report (12-20-18 @ 01:01):    No growth at 5 days.          RADIOLOGY & ADDITIONAL TESTS:    ANTIBIOTICS:  acyclovir IVPB   116 mL/Hr IV Intermittent (12-17-18 @ 02:04)   116 mL/Hr IV Intermittent (12-16-18 @ 18:26)   116 mL/Hr IV Intermittent (12-16-18 @ 12:39)   116 mL/Hr IV Intermittent (12-16-18 @ 06:08)   116 mL/Hr IV Intermittent (12-15-18 @ 20:54)   116 mL/Hr IV Intermittent (12-15-18 @ 12:47)   116 mL/Hr IV Intermittent (12-15-18 @ 03:07)   116 mL/Hr IV Intermittent (12-14-18 @ 21:46)   116 mL/Hr IV Intermittent (12-14-18 @ 12:29)   116 mL/Hr IV Intermittent (12-14-18 @ 03:54)   116 mL/Hr IV Intermittent (12-13-18 @ 20:15)   116 mL/Hr IV Intermittent (12-13-18 @ 12:57)   116 mL/Hr IV Intermittent (12-13-18 @ 02:21)   116 mL/Hr IV Intermittent (12-12-18 @ 20:30)    acyclovir IVPB   266 mL/Hr IV Intermittent (12-18-18 @ 05:06)   266 mL/Hr IV Intermittent (12-17-18 @ 21:47)    cefTRIAXone   IVPB   100 mL/Hr IV Intermittent (12-12-18 @ 14:54)    cefTRIAXone   IVPB   100 mL/Hr IV Intermittent (12-13-18 @ 15:16)    levoFLOXacin IVPB   100 mL/Hr IV Intermittent (12-12-18 @ 17:17)    levoFLOXacin IVPB   100 mL/Hr IV Intermittent (12-13-18 @ 15:16)    levoFLOXacin IVPB   100 mL/Hr IV Intermittent (12-17-18 @ 12:50)    levoFLOXacin IVPB   100 mL/Hr IV Intermittent (12-19-18 @ 10:51)   100 mL/Hr IV Intermittent (12-18-18 @ 10:49)    piperacillin/tazobactam IVPB.   200 mL/Hr IV Intermittent (12-14-18 @ 17:09)    piperacillin/tazobactam IVPB.   200 mL/Hr IV Intermittent (12-20-18 @ 05:44)   200 mL/Hr IV Intermittent (12-19-18 @ 23:47)   200 mL/Hr IV Intermittent (12-19-18 @ 17:33)   200 mL/Hr IV Intermittent (12-19-18 @ 11:46)   200 mL/Hr IV Intermittent (12-19-18 @ 05:03)   200 mL/Hr IV Intermittent (12-19-18 @ 00:14)   200 mL/Hr IV Intermittent (12-18-18 @ 17:19)   200 mL/Hr IV Intermittent (12-18-18 @ 11:40)   200 mL/Hr IV Intermittent (12-18-18 @ 05:06)   200 mL/Hr IV Intermittent (12-17-18 @ 23:39)   200 mL/Hr IV Intermittent (12-17-18 @ 18:18)   200 mL/Hr IV Intermittent (12-17-18 @ 11:28)   200 mL/Hr IV Intermittent (12-17-18 @ 05:39)   200 mL/Hr IV Intermittent (12-17-18 @ 00:45)   200 mL/Hr IV Intermittent (12-16-18 @ 18:25)   200 mL/Hr IV Intermittent (12-16-18 @ 12:40)   200 mL/Hr IV Intermittent (12-16-18 @ 06:11)   200 mL/Hr IV Intermittent (12-16-18 @ 00:32)   200 mL/Hr IV Intermittent (12-15-18 @ 18:33)   200 mL/Hr IV Intermittent (12-15-18 @ 12:47)   200 mL/Hr IV Intermittent (12-15-18 @ 05:31)   200 mL/Hr IV Intermittent (12-15-18 @ 00:33)    valACYclovir   1000 milliGRAM(s) Oral (12-12-18 @ 11:31)        levoFLOXacin IVPB      levoFLOXacin IVPB 500 milliGRAM(s) IV Intermittent every 24 hours  piperacillin/tazobactam IVPB.      piperacillin/tazobactam IVPB. 2.25 Gram(s) IV Intermittent every 6 hours KARL, DENISE  73y, Female      OVERNIGHT EVENTS:  afebrile  bronch cx NGTD  bloody secretions from trach, downtrending hgb  CT AP no bleed    ROS negative except as per above    VITALS:  T(F): 98.6, Max: 98.8 (12-19-18 @ 12:00)  HR: 66  BP: 106/45  RR: 21Vital Signs Last 24 Hrs  T(C): 37 (20 Dec 2018 08:00), Max: 37.1 (19 Dec 2018 12:00)  T(F): 98.6 (20 Dec 2018 08:00), Max: 98.8 (19 Dec 2018 12:00)  HR: 66 (20 Dec 2018 08:00) (60 - 80)  BP: 106/45 (20 Dec 2018 08:00) (78/39 - 110/51)  BP(mean): 62 (20 Dec 2018 08:00) (50 - 76)  RR: 21 (20 Dec 2018 08:00) (16 - 37)  SpO2: 98% (20 Dec 2018 08:00) (96% - 100%)      PHYSICAL EXAM  Gen: intubated  HEENT: NCAT. EOMI. MMM.   Neck: Supple  CV: RRR, no murmurs  Lungs: decreased bases, ET with bloody secretions  Abd: Soft. NTND  Extr: wwp, trace edema  Skin: crusted rash  Neuro: awake  Lines: clean    TESTS & MEASUREMENTS:                        6.7    12.80 )-----------( 186      ( 20 Dec 2018 04:28 )             21.8     12-20    143  |  99  |  43<H>  ----------------------------<  116<H>  3.7   |  35<H>  |  0.9    Ca    8.2<L>      20 Dec 2018 04:28  Mg     2.2     12-20    TPro  5.7<L>  /  Alb  2.8<L>  /  TBili  0.7  /  DBili  x   /  AST  46<H>  /  ALT  41  /  AlkPhos  50  12-20    LIVER FUNCTIONS - ( 20 Dec 2018 04:28 )  Alb: 2.8 g/dL / Pro: 5.7 g/dL / ALK PHOS: 50 U/L / ALT: 41 U/L / AST: 46 U/L / GGT: x             Culture - Blood (collected 12-18-18 @ 12:06)  Source: .Blood None  Preliminary Report (12-19-18 @ 23:01):    No growth to date.    Culture - Fungal, Bronchial (collected 12-18-18 @ 10:22)  Source: .Broncial None  Preliminary Report (12-19-18 @ 06:32):    Testing in progress    Culture - Acid Fast - Bronchial w/Smear (collected 12-18-18 @ 10:22)  Source: .Broncial None    Culture - Bronchial (collected 12-18-18 @ 10:22)  Source: Bronch Wash None  Gram Stain (12-18-18 @ 22:22):    Numerous polymorphonuclear leukocytes per low power field    Rare Squamous epithelial cells per low power field    No organisms seen per oil power field  Preliminary Report (12-19-18 @ 17:19):    Normal Respiratory Windy present    Culture - Urine (collected 12-17-18 @ 14:11)  Source: .Urine Clean Catch (Midstream)  Final Report (12-19-18 @ 06:23):    No growth    Culture - Urine (collected 12-15-18 @ 09:54)  Source: .Urine Clean Catch (Midstream)  Final Report (12-16-18 @ 20:18):    No growth    Culture - Blood (collected 12-14-18 @ 17:09)  Source: .Blood None  Final Report (12-20-18 @ 01:01):    No growth at 5 days.          RADIOLOGY & ADDITIONAL TESTS:    ANTIBIOTICS:  acyclovir IVPB   116 mL/Hr IV Intermittent (12-17-18 @ 02:04)   116 mL/Hr IV Intermittent (12-16-18 @ 18:26)   116 mL/Hr IV Intermittent (12-16-18 @ 12:39)   116 mL/Hr IV Intermittent (12-16-18 @ 06:08)   116 mL/Hr IV Intermittent (12-15-18 @ 20:54)   116 mL/Hr IV Intermittent (12-15-18 @ 12:47)   116 mL/Hr IV Intermittent (12-15-18 @ 03:07)   116 mL/Hr IV Intermittent (12-14-18 @ 21:46)   116 mL/Hr IV Intermittent (12-14-18 @ 12:29)   116 mL/Hr IV Intermittent (12-14-18 @ 03:54)   116 mL/Hr IV Intermittent (12-13-18 @ 20:15)   116 mL/Hr IV Intermittent (12-13-18 @ 12:57)   116 mL/Hr IV Intermittent (12-13-18 @ 02:21)   116 mL/Hr IV Intermittent (12-12-18 @ 20:30)    acyclovir IVPB   266 mL/Hr IV Intermittent (12-18-18 @ 05:06)   266 mL/Hr IV Intermittent (12-17-18 @ 21:47)    cefTRIAXone   IVPB   100 mL/Hr IV Intermittent (12-12-18 @ 14:54)    cefTRIAXone   IVPB   100 mL/Hr IV Intermittent (12-13-18 @ 15:16)    levoFLOXacin IVPB   100 mL/Hr IV Intermittent (12-12-18 @ 17:17)    levoFLOXacin IVPB   100 mL/Hr IV Intermittent (12-13-18 @ 15:16)    levoFLOXacin IVPB   100 mL/Hr IV Intermittent (12-17-18 @ 12:50)    levoFLOXacin IVPB   100 mL/Hr IV Intermittent (12-19-18 @ 10:51)   100 mL/Hr IV Intermittent (12-18-18 @ 10:49)    piperacillin/tazobactam IVPB.   200 mL/Hr IV Intermittent (12-14-18 @ 17:09)    piperacillin/tazobactam IVPB.   200 mL/Hr IV Intermittent (12-20-18 @ 05:44)   200 mL/Hr IV Intermittent (12-19-18 @ 23:47)   200 mL/Hr IV Intermittent (12-19-18 @ 17:33)   200 mL/Hr IV Intermittent (12-19-18 @ 11:46)   200 mL/Hr IV Intermittent (12-19-18 @ 05:03)   200 mL/Hr IV Intermittent (12-19-18 @ 00:14)   200 mL/Hr IV Intermittent (12-18-18 @ 17:19)   200 mL/Hr IV Intermittent (12-18-18 @ 11:40)   200 mL/Hr IV Intermittent (12-18-18 @ 05:06)   200 mL/Hr IV Intermittent (12-17-18 @ 23:39)   200 mL/Hr IV Intermittent (12-17-18 @ 18:18)   200 mL/Hr IV Intermittent (12-17-18 @ 11:28)   200 mL/Hr IV Intermittent (12-17-18 @ 05:39)   200 mL/Hr IV Intermittent (12-17-18 @ 00:45)   200 mL/Hr IV Intermittent (12-16-18 @ 18:25)   200 mL/Hr IV Intermittent (12-16-18 @ 12:40)   200 mL/Hr IV Intermittent (12-16-18 @ 06:11)   200 mL/Hr IV Intermittent (12-16-18 @ 00:32)   200 mL/Hr IV Intermittent (12-15-18 @ 18:33)   200 mL/Hr IV Intermittent (12-15-18 @ 12:47)   200 mL/Hr IV Intermittent (12-15-18 @ 05:31)   200 mL/Hr IV Intermittent (12-15-18 @ 00:33)    valACYclovir   1000 milliGRAM(s) Oral (12-12-18 @ 11:31)        levoFLOXacin IVPB      levoFLOXacin IVPB 500 milliGRAM(s) IV Intermittent every 24 hours  piperacillin/tazobactam IVPB.      piperacillin/tazobactam IVPB. 2.25 Gram(s) IV Intermittent every 6 hours

## 2018-12-20 NOTE — CONSULT NOTE ADULT - SUBJECTIVE AND OBJECTIVE BOX
Patient is a 73y old  Female who presents with a chief complaint of SOB and rash on 18.    HPI:  72 yo F from [facility] w/ pmhx of HTN reports with worsening generalized weakness and SOB for 3 days. Pt was at urgent care for evaluation of rash on right shoulder and chest, thought to be herpes zoster, was found to have O2 sat of 88% on RA and sent to the ED for further evaluation. Pt reports b/l leg swelling for a few months. Pt's denies CP, palpitations, orthopnea or PND. Rash on right shoulder, chest and upper arm started on 18, pt admits to pruritus, denies pain.   No fever, chills, cough, URI symptoms, dizziness, abd pain, n/v/d, dysuria.     - on arrival to ED, Pt was found to have A fib with RVR HR 130s and O2 sat 69% on RA. Pt improved after Bipap and IV lasix 40mg, pro-  and CXR with pulmonary edema, intubated  , DVT neg, lovenox for afib/AC stopped for site bleed Hgb dropped 11 to 7 CT A/P no RP bleed, heparin started .  Further Hgb drop,  bronchoscopy BAL positive, DAH, cultures thus far no growth, Gram stain neg, s/p pRBCs. CT chest RLL pneumonia on levoquin. Labs ESR 86, CRP 1.72, RF<10.   Patient remains sedated and intubated, brother and nursing at NH noncontributory history of rheum disease, no new medications, sick contacts. Complains of weakness and shortness of breath but no cough, fevers, or hemoptysis reported.        MEDICATIONS  (STANDING):  ALBUTerol    90 MICROgram(s) HFA Inhaler 1 Puff(s) Inhalation every 6 hours  chlorhexidine 0.12% Liquid 15 milliLiter(s) Oral Mucosa two times a day  chlorhexidine 4% Liquid 1 Application(s) Topical <User Schedule>  doxycycline IVPB      ipratropium 17 MICROgram(s) HFA Inhaler 1 Puff(s) Inhalation every 6 hours  lactulose Syrup 10 Gram(s) Oral daily  methylPREDNISolone sodium succinate Injectable 125 milliGRAM(s) IV Push every 6 hours  norepinephrine Infusion 0.05 MICROgram(s)/kG/Min (7.5 mL/Hr) IV Continuous <Continuous>  nystatin Powder 1 Application(s) Topical two times a day  pantoprazole  Injectable 40 milliGRAM(s) IV Push two times a day  propofol Infusion 5 MICROgram(s)/kG/Min (2.4 mL/Hr) IV Continuous <Continuous>  simvastatin 5 milliGRAM(s) Oral at bedtime    MEDICATIONS  (PRN):  fentaNYL    Injectable 50 MICROGram(s) IV Push two times a day PRN Moderate Pain (4 - 6)  guaiFENesin    Syrup 200 milliGRAM(s) Oral every 6 hours PRN Cough    Allergies    No Known Allergies    PAST MEDICAL & SURGICAL HISTORY:  Hypertension  No significant past surgical history      FAMILY HISTORY:   not able to get from NH records or brother    SOCIAL HISTORY:  nonsmoker non drinker    Vital Signs Last 24 Hrs  T(C): 36.9 (20 Dec 2018 20:00), Max: 37.2 (20 Dec 2018 10:00)  T(F): 98.4 (20 Dec 2018 20:00), Max: 99 (20 Dec 2018 10:00)  HR: 62 (20 Dec 2018 20:00) (58 - 80)  BP: 99/48 (20 Dec 2018 20:00) (87/42 - 113/50)  BP(mean): 63 (20 Dec 2018 20:00) (55 - 74)  RR: 19 (20 Dec 2018 20:00) (16 - 32)  SpO2: 98% (20 Dec 2018 20:00) (96% - 100%)  Daily Weight in k.6 (20 Dec 2018 06:05)    PHYSICAL EXAM:  All physical exam findings normal, except for those marked:  General Appearance:  Skin 		WNL: ulcers, indurations, nodules or tightening, normal nail bed   .		capillaries  .		[] Abnormal: right anterior chest and shoulder with crusted lesions c/w herpes zoster  Eyes		WNL: normal conjunctiva and lids  .		  ENT		WNL: normal appearance of ears, nose lips intubated  .	  Neck: 		WNL: no masses  .		  Cardiovascular: WNL: normal auscultation, no peripheral edema  .	  Respiratory:       intubated    Musculoskeletal:	WNL: normal digits no synovitis, no deformities noted, no joint effusions noted    Lab Results:                        8.2    11.64 )-----------( 201      ( 20 Dec 2018 14:20 )             25.3     12-20    143  |  99  |  43<H>  ----------------------------<  116<H>  3.7   |  35<H>  |  0.9    Ca    8.2<L>      20 Dec 2018 04:28  Mg     2.2         TPro  5.7<L>  /  Alb  2.8<L>  /  TBili  0.7  /  DBili  x   /  AST  46<H>  /  ALT  41  /  AlkPhos  50      CRP, ESR: Sedimentation Rate, Erythrocyte: 86 mm/hr (18 @ 12:06)  C-Reactive Protein, Serum: 1.72 mg/dL (18 @ 12:06)    Urinalysis Basic - ( 20 Dec 2018 16:30 )    Color: Yellow / Appearance: Clear / S.020 / pH: x  Gluc: x / Ketone: Negative  / Bili: Negative / Urobili: 0.2 mg/dL   Blood: x / Protein: Trace mg/dL / Nitrite: Negative   Leuk Esterase: Moderate / RBC: x / WBC 26-50 /HPF   Sq Epi: x / Non Sq Epi: Occasional /HPF / Bacteria: x    IMAGING REVIEWED and NOTED

## 2018-12-20 NOTE — PROGRESS NOTE ADULT - ASSESSMENT
73F from [facility] w/ PMH of HTN presented to [facility] with worsening generalized weakness and SOB for 3 days.     #Acute Hypercapnic Respiratory Failure 2/2 Diffuse alveolar hemorrhage ???  -Patient Intubated 12/17 as she was saturating in late 80s  -Duplex report 12/14: negative for DVT   -ID on board > 12/18 dced Acyclovir  -Proventil, Spiriva, Duonebs q6h, Zosyn, started on Levofloxacin 12/17 >> stopped zosyn and Levo today 12/20 and started on Doxy  -Echo 12/15: LVEF 50-55%  -Sepsis ruled out > no leukocytosis, afebrile  -MRSA negative 12/17  -Blood culture 12/14 negative  -Urine Legionella and Strep Ag negative 12/15  -RVP negative 12/13  -CT chest suggestive for RLL PNA  -ESR 86, CRP 1.72, RF <10 12/18  -Rheuma workup pending  -Bronchoscopy done today 12/20 > BAL done and sent for testing > gross bloody appearance.  -Cental line placement today 12/20      #New Atrial Fibrillation, rate controlled > Most likely ischemic response?  -Patient currently in NSR   -CHA2 DS2 VASC: 4   -Therapeutic Lovenox for AC stopped for episode of 200-300 ml bleed on 12/17 at the injection site and the Hb dropped from 11 to 9 > CT A/P ordered to r/o retroperitoneal bleed. Bowel regimen added  -Started Heparin 12/18    #HLD  -Simvastatin     #Varicella Zoster Right Shoulder  -Contact and airborne precautions dced 12/17 as per ID rec  -Acyclovir 800mg IV q8h, Benadryl PRN for pruritus > stopped 12/18    #HTN  -HCTZ held for hypotension     #ANTONIETTA improving  -Creatinine 1.0 12/18  -Urine Urea 7575, creat 30, Na 94    #Asymptomatic Bacteriuria with RBCs  -UA positive 12/15 but culture negative 12/15  -Repeat UA 12/17 positive    #Hypokalemia  -Repleted, monitor BMP   -3.2 12/19 > 3.7 12/20        DVT ppx: Therapeutic Lovenox  GI ppx: Not indicated   Diet: NPO  Activity: increase as tolerated   FULL CODE 73F from [facility] w/ PMH of HTN presented to [facility] with worsening generalized weakness and SOB for 3 days.     #Acute Hypercapnic Respiratory Failure 2/2 Diffuse alveolar hemorrhage ???  -Patient Intubated 12/17 as she was saturating in late 80s  -Duplex report 12/14: negative for DVT   -ID on board > 12/18 dced Acyclovir  -Proventil, Spiriva, Duonebs q6h, Zosyn, started on Levofloxacin 12/17 >> stopped zosyn and Levo today 12/20 and started on Doxy  -Echo 12/15: LVEF 50-55%  -Sepsis ruled out > no leukocytosis, afebrile  -MRSA negative 12/17  -Blood culture 12/14 negative  -Urine Legionella and Strep Ag negative 12/15  -RVP negative 12/13  -CT chest suggestive for RLL PNA  -ESR 86, CRP 1.72, RF <10 12/18  -Rheuma workup pending  -Bronchoscopy done today 12/20 > BAL done and sent for testing > Increasing Aliquot  -Cental line placement today 12/20  -Rheuma on board  -Repeat UA ordered  -Increased Steroids to 250 Q6 12/20      #New Atrial Fibrillation, rate controlled > Most likely ischemic response?  -Patient currently in NSR   -CHA2 DS2 VASC: 4   -Therapeutic Lovenox for AC stopped for episode of 200-300 ml bleed on 12/17 at the injection site and the Hb dropped from 11 to 9 > CT A/P ordered to r/o retroperitoneal bleed. Bowel regimen added  -Started Heparin 12/18    #HLD  -Simvastatin     #Varicella Zoster Right Shoulder  -Contact and airborne precautions dced 12/17 as per ID rec  -Acyclovir 800mg IV q8h, Benadryl PRN for pruritus > stopped 12/18    #HTN  -HCTZ held for hypotension     #ANTONIETTA improving  -Creatinine 1.0 12/18  -Urine Urea 7575, creat 30, Na 94    #Asymptomatic Bacteriuria with RBCs  -UA positive 12/15 but culture negative 12/15  -Repeat UA 12/17 positive    #Hypokalemia  -Repleted, monitor BMP   -3.2 12/19 > 3.7 12/20        DVT ppx: Therapeutic Lovenox  GI ppx: Not indicated   Diet: NPO  Activity: increase as tolerated   FULL CODE

## 2018-12-20 NOTE — PROCEDURE NOTE - NSINFORMCONSENT_GEN_A_CORE
Benefits, risks, and possible complications of procedure explained to patient/caregiver who verbalized understanding and gave verbal consent.
Benefits, risks, and possible complications of procedure explained to patient/caregiver who verbalized understanding and gave verbal consent.
HCP Lei Maravilla/Benefits, risks, and possible complications of procedure explained to patient/caregiver who verbalized understanding and gave verbal consent.

## 2018-12-20 NOTE — PROGRESS NOTE ADULT - SUBJECTIVE AND OBJECTIVE BOX
Patient is a 73y old Female who presented with a chief complaint of SOB and rash (20 Dec 2018 09:30)  Currently admitted to medicine with the primary diagnosis of Diffuse alveolar hemorrhage     Today is hospital day 9d. Pt was initially in ICU  and downgraded on 12/14 to 3c but was upgraded to CCU the same day because of worsening SOB  CCU/ICU day: 06  Intubated: Day 04 > intubate dn 12/17  Central Line: Central line was put in today on right side in IJ 12/20  [place]: Yes  NG: OG tube in place  Drips: propofol, NE  Iv Lines: 3 peripheral    PAST MEDICAL & SURGICAL HISTORY  Hypertension  No significant past surgical history    SOCIAL HISTORY:  Negative for smoking/alcohol/drug use.     ALLERGIES:  No Known Allergies    MEDICATIONS:  STANDING MEDICATIONS  ALBUTerol    90 MICROgram(s) HFA Inhaler 1 Puff(s) Inhalation every 6 hours  chlorhexidine 0.12% Liquid 15 milliLiter(s) Oral Mucosa two times a day  chlorhexidine 4% Liquid 1 Application(s) Topical <User Schedule>  ipratropium 17 MICROgram(s) HFA Inhaler 1 Puff(s) Inhalation every 6 hours  lactulose Syrup 10 Gram(s) Oral daily  levoFLOXacin IVPB      levoFLOXacin IVPB 500 milliGRAM(s) IV Intermittent every 24 hours  methylPREDNISolone sodium succinate Injectable 125 milliGRAM(s) IV Push three times a day  norepinephrine Infusion 0.05 MICROgram(s)/kG/Min IV Continuous <Continuous>  nystatin Powder 1 Application(s) Topical two times a day  pantoprazole  Injectable 40 milliGRAM(s) IV Push two times a day  piperacillin/tazobactam IVPB.      piperacillin/tazobactam IVPB. 2.25 Gram(s) IV Intermittent every 6 hours  propofol Infusion 5 MICROgram(s)/kG/Min IV Continuous <Continuous>  simvastatin 5 milliGRAM(s) Oral at bedtime    PRN MEDICATIONS  fentaNYL    Injectable 50 MICROGram(s) IV Push two times a day PRN  guaiFENesin    Syrup 200 milliGRAM(s) Oral every 6 hours PRN    Home Medications:  hydroCHLOROthiazide 25 mg oral tablet: 1 tab(s) orally once a day (12 Dec 2018 02:08)  metOLazone 5 mg oral tablet: 1  orally 3 times a week (12 Dec 2018 02:08)  metoprolol succinate 25 mg oral tablet, extended release: 1 tab(s) orally once a day (12 Dec 2018 02:08)    VITALS:   T(F): 99  HR: 64  BP: 87/42  RR: 20  SpO2: 99%    LABS:                        6.7    12.80 )-----------( 186      ( 20 Dec 2018 04:28 )             21.8     12-20    143  |  99  |  43<H>  ----------------------------<  116<H>  3.7   |  35<H>  |  0.9    Ca    8.2<L>      20 Dec 2018 04:28  Mg     2.2     12-20    TPro  5.7<L>  /  Alb  2.8<L>  /  TBili  0.7  /  DBili  x   /  AST  46<H>  /  ALT  41  /  AlkPhos  50  12-20    ABG - ( 20 Dec 2018 07:55 )  pH, Arterial: 7.47  pH, Blood: x     /  pCO2: 53    /  pO2: 82    / HCO3: 38    / Base Excess: 13.4  /  SaO2: 98        Culture - Blood (collected 18 Dec 2018 12:06)  Source: .Blood None  Preliminary Report (19 Dec 2018 23:01):    No growth to date.    Culture - Fungal, Bronchial (collected 18 Dec 2018 10:22)  Source: .Broncial None  Preliminary Report (19 Dec 2018 06:32):    Testing in progress    Culture - Acid Fast - Bronchial w/Smear (collected 18 Dec 2018 10:22)  Source: .Broncial None    Culture - Bronchial (collected 18 Dec 2018 10:22)  Source: Bronch Wash None  Gram Stain (18 Dec 2018 22:22):    Numerous polymorphonuclear leukocytes per low power field    Rare Squamous epithelial cells per low power field    No organisms seen per oil power field  Preliminary Report (19 Dec 2018 17:19):   Normal Respiratory Windy present    Culture - Urine (collected 17 Dec 2018 14:11)  Source: .Urine Clean Catch (Midstream)  Final Report (19 Dec 2018 06:23):   No growth      RADIOLOGY:  < from: Xray Chest 1 View- PORTABLE-Routine (12.19.18 @ 04:49) >  Impression:    Stable left greater than right opacities.      < from: Xray Chest 1 View- PORTABLE-Routine (12.18.18 @ 06:31) >  Impression:    Resolving left lower lobe opacity.      < from: Xray Chest 1 View-PORTABLE IMMEDIATE (12.17.18 @ 11:57) >  Impression:    No significant change from prior. Bilateral airspace opacities, worse at   the left base       from: Xray Chest 1 View- PORTABLE-Routine (12.16.18 @ 06:12) >  Impression:    No radiographic evidence of acute cardiopulmonary disease.      < from: Xray Chest 1 View- PORTABLE-Routine (12.15.18 @ 06:58) >  Impression:    Decreased bilateral basilar opacity. No pleural effusion or air leak      < from: [facility] Duplex Lower Ext Vein Scan, Bilat (12.14.18 @ 16:42) >  Impression:  No evidence of deep venous thrombosis or superficial thrombophlebitis in   bilateral lower extremities.      < from: Xray Chest 1 View- PORTABLE-Routine (12.14.18 @ 13:26) >  Impression:    Worsening basilar opacifications. Follow-up as needed.      < from: CT Chest No Cont (12.12.18 @ 18:08) >  IMPRESSION:  Mucoid debris in the proximal trachea (3:61) suggestive of aspiration.  Bilateral patchy airspace opacities greatest in the right lower lobe.   Correlate for infectious/inflammatory etiologies.  Prominent mediastinal lymph nodes up to 1.0 cm, probably reactive in   nature.  Cholelithiasis with suggestion of calcification within the wall of the   gallbladder (porcelain gallbladder).      < from: Xray Chest 1 View-PORTABLE IMMEDIATE (12.11.18 @ 20:16) >  IMPRESSION:   Limited exam due to patient body habitus. Apparent perihilar opacities.      < from: Transthoracic Echocardiogram (12.15.18 @ 11:18) >  Summary:   1. Left ventricular ejection fraction, by visual estimation, is 50 to   55%.   2. Normal left ventricular size and wall thicknesses, with normal   systolic function.   3. Spectral Doppler shows impaired relaxation pattern of left   ventricular myocardial filling (Grade I diastolic dysfunction).   4. Mild tricuspid regurgitation.   5. Mild aortic regurgitation.   6. Pulmonic valve regurgitation.   7. Estimated pulmonary artery systolic pressure is 52.5 mmHg assuming a   right atrial pressure of 8 mmHg, which is consistent with moderate   pulmonary hypertension.      PHYSICAL EXAM:  GEN: No acute distress, Intubated  LUNGS: decreased breath sounds BL  HEART: S1/S2 present. RRR.   ABD: Soft, non-tender, non-distended. Bowel sounds present  NEURO: Intubated

## 2018-12-20 NOTE — PROGRESS NOTE ADULT - ASSESSMENT
IMPRESSION:    Acute on chronic hypercapnic respiratory failure s/p intubation  IDALMIS / OHS  Pneumonia/Aspiration?/ fluid overload  Elevated right hemidiaphragm      PLAN:     CNS:  On Precedex and Propofol    HEENT: Oral care    PULMONARY:  HOB @ 45 degrees, no changes in vent settings, F/U bronchoscopy result    CARDIOVASCULAR: IVF    GI: GI prophylaxis. Hold feeding. Check guaiac     RENAL:  Follow up lytes.  Correct as needed    INFECTIOUS DISEASE: Follow up cultures, zosyn, levaquin, solumedrol 60 q24h    HEMATOLOGICAL:  DVT prophylaxis. hold lovenox    ENDOCRINE:  Follow up FS.     MUSCULOSKELETAL: bed rest  POOR PROGNOSIS  Monitor in ICU IMPRESSION:    Acute on chronic hypercapnic respiratory failure s/p intubation  IDALMIS / OHS  Pneumonia/Aspiration?/ fluid overload  Elevated right hemidiaphragm  Dec HB s.p transfusion      PLAN:     CNS:  On Precedex and Propofol or fentanyl    HEENT: Oral care    PULMONARY:  HOB @ 45 degrees, no changes in vent settings, F/U bronchoscopy result    CARDIOVASCULAR: IVF    GI: GI prophylaxis. Check guaiac. restart feeding    RENAL:  Follow up lytes.  Correct as needed    INFECTIOUS DISEASE: Follow up cultures, zosyn, levaquin, solumedrol 60 q24h    HEMATOLOGICAL:  DVT prophylaxis. hold lovenox    ENDOCRINE:  Follow up FS.     MUSCULOSKELETAL: bed rest  POOR PROGNOSIS  Monitor in ICU  need TLC

## 2018-12-20 NOTE — PROGRESS NOTE ADULT - SUBJECTIVE AND OBJECTIVE BOX
Patient is a 73y old  Female who presents with a chief complaint of SOB and rash (19 Dec 2018 13:14)        Over Night Events: Drop in Hb        ROS:  See HPI    PHYSICAL EXAM    ICU Vital Signs Last 24 Hrs  T(C): 37 (20 Dec 2018 06:00), Max: 37.1 (19 Dec 2018 12:00)  T(F): 98.6 (20 Dec 2018 06:00), Max: 98.8 (19 Dec 2018 12:00)  HR: 68 (20 Dec 2018 06:05) (60 - 80)  BP: 104/45 (20 Dec 2018 06:05) (78/39 - 110/51)  BP(mean): 65 (20 Dec 2018 06:05) (50 - 76)  RR: 23 (20 Dec 2018 06:05) (16 - 37)  SpO2: 98% (20 Dec 2018 06:05) (96% - 100%)      GENERAL: AWAKE, FOLLOWS COMMANDS  HEENT: Head is normocephalic and atraumatic. Extraocular muscles are intact. Mucous membranes are moist.  NECK: Supple.  LUNGS: DEC BS L SIDE  HEART: Regular rate and rhythm without murmur.  ABDOMEN: Soft, nontender, and nondistended.    EXTREMITIES: SWELLING+  NEUROLOGIC: Grossly intact.  SKIN: No ulceration or induration present.        12-19-18 @ 07:01  -  12-20-18 @ 07:00  --------------------------------------------------------  IN:    dexmedetomidine Infusion: 20 mL    Enteral Tube Flush: 60 mL    norepinephrine Infusion: 26.5 mL    propofol Infusion: 159 mL    sodium chloride 0.45%.: 1575 mL    Vital High Protein: 80 mL  Total IN: 1920.5 mL    OUT:    Ureteral Catheter: 1410 mL  Total OUT: 1410 mL    Total NET: 510.5 mL          LABS:                            6.7    12.80 )-----------( 186      ( 20 Dec 2018 04:28 )             21.8                                               12-20    143  |  99  |  43<H>  ----------------------------<  116<H>  3.7   |  35<H>  |  0.9    Ca    8.2<L>      20 Dec 2018 04:28  Mg     2.2     12-20    TPro  5.7<L>  /  Alb  2.8<L>  /  TBili  0.7  /  DBili  x   /  AST  46<H>  /  ALT  41  /  AlkPhos  50  12-20                                                                                           LIVER FUNCTIONS - ( 20 Dec 2018 04:28 )  Alb: 2.8 g/dL / Pro: 5.7 g/dL / ALK PHOS: 50 U/L / ALT: 41 U/L / AST: 46 U/L / GGT: x                                                  Culture - Blood (collected 18 Dec 2018 12:06)  Source: .Blood None  Preliminary Report (19 Dec 2018 23:01):    No growth to date.    Culture - Fungal, Bronchial (collected 18 Dec 2018 10:22)  Source: .Broncial None  Preliminary Report (19 Dec 2018 06:32):    Testing in progress    Culture - Acid Fast - Bronchial w/Smear (collected 18 Dec 2018 10:22)  Source: .Broncial None    Culture - Bronchial (collected 18 Dec 2018 10:22)  Source: Bronch Wash None  Gram Stain (18 Dec 2018 22:22):    Numerous polymorphonuclear leukocytes per low power field    Rare Squamous epithelial cells per low power field    No organisms seen per oil power field  Preliminary Report (19 Dec 2018 17:19):    Normal Respiratory Windy present    Culture - Urine (collected 17 Dec 2018 14:11)  Source: .Urine Clean Catch (Midstream)  Final Report (19 Dec 2018 06:23):    No growth                                                   Mode: AC/ CMV (Assist Control/ Continuous Mandatory Ventilation)  RR (machine): 16  TV (machine): 350  FiO2: 40  PEEP: 5  ITime: 1  MAP: 11  PIP: 25                                      ABG - ( 19 Dec 2018 14:29 )  pH, Arterial: 7.48  pH, Blood: x     /  pCO2: 54    /  pO2: 79    / HCO3: 41    / Base Excess: 16.0  /  SaO2: 98          MEDICATIONS  (STANDING):  ALBUTerol    90 MICROgram(s) HFA Inhaler 1 Puff(s) Inhalation every 6 hours  chlorhexidine 0.12% Liquid 15 milliLiter(s) Oral Mucosa two times a day  chlorhexidine 4% Liquid 1 Application(s) Topical <User Schedule>  dexmedetomidine Infusion 0.05 MICROgram(s)/kG/Hr (1 mL/Hr) IV Continuous <Continuous>  ipratropium 17 MICROgram(s) HFA Inhaler 1 Puff(s) Inhalation every 6 hours  lactulose Syrup 10 Gram(s) Oral daily  levoFLOXacin IVPB      levoFLOXacin IVPB 500 milliGRAM(s) IV Intermittent every 24 hours  methylPREDNISolone sodium succinate Injectable 60 milliGRAM(s) IV Push daily  norepinephrine Infusion 0.05 MICROgram(s)/kG/Min (7.5 mL/Hr) IV Continuous <Continuous>  nystatin Powder 1 Application(s) Topical two times a day  pantoprazole  Injectable 40 milliGRAM(s) IV Push two times a day  piperacillin/tazobactam IVPB.      piperacillin/tazobactam IVPB. 2.25 Gram(s) IV Intermittent every 6 hours  propofol Infusion 5 MICROgram(s)/kG/Min (2.4 mL/Hr) IV Continuous <Continuous>  simvastatin 5 milliGRAM(s) Oral at bedtime  sodium chloride 0.45%. 1000 milliLiter(s) (75 mL/Hr) IV Continuous <Continuous>    MEDICATIONS  (PRN):  fentaNYL    Injectable 50 MICROGram(s) IV Push two times a day PRN Moderate Pain (4 - 6)  guaiFENesin    Syrup 200 milliGRAM(s) Oral every 6 hours PRN Cough Patient is a 73y old  Female who presents with a chief complaint of SOB and rash (19 Dec 2018 13:14)        Over Night Events: Drop in Hb. SP CT A/P no bleed worsening opacities LLL, now on pressors        ROS:  See HPI    PHYSICAL EXAM    ICU Vital Signs Last 24 Hrs  T(C): 37 (20 Dec 2018 06:00), Max: 37.1 (19 Dec 2018 12:00)  T(F): 98.6 (20 Dec 2018 06:00), Max: 98.8 (19 Dec 2018 12:00)  HR: 68 (20 Dec 2018 06:05) (60 - 80)  BP: 104/45 (20 Dec 2018 06:05) (78/39 - 110/51)  BP(mean): 65 (20 Dec 2018 06:05) (50 - 76)  RR: 23 (20 Dec 2018 06:05) (16 - 37)  SpO2: 98% (20 Dec 2018 06:05) (96% - 100%)      GENERAL: AWAKE, FOLLOWS COMMANDS  HEENT: Head is normocephalic and atraumatic. Extraocular muscles are intact. Mucous membranes are moist.  NECK: Supple.  LUNGS: DEC BS L SIDE  HEART: Regular rate and rhythm without murmur.  ABDOMEN: Soft, nontender, and nondistended.    EXTREMITIES: SWELLING+  NEUROLOGIC: Grossly intact.  SKIN: No ulceration or induration present.        12-19-18 @ 07:01  -  12-20-18 @ 07:00  --------------------------------------------------------  IN:    dexmedetomidine Infusion: 20 mL    Enteral Tube Flush: 60 mL    norepinephrine Infusion: 26.5 mL    propofol Infusion: 159 mL    sodium chloride 0.45%.: 1575 mL    Vital High Protein: 80 mL  Total IN: 1920.5 mL    OUT:    Ureteral Catheter: 1410 mL  Total OUT: 1410 mL    Total NET: 510.5 mL          LABS:                            6.7    12.80 )-----------( 186      ( 20 Dec 2018 04:28 )             21.8                                               12-20    143  |  99  |  43<H>  ----------------------------<  116<H>  3.7   |  35<H>  |  0.9    Ca    8.2<L>      20 Dec 2018 04:28  Mg     2.2     12-20    TPro  5.7<L>  /  Alb  2.8<L>  /  TBili  0.7  /  DBili  x   /  AST  46<H>  /  ALT  41  /  AlkPhos  50  12-20                                                                                           LIVER FUNCTIONS - ( 20 Dec 2018 04:28 )  Alb: 2.8 g/dL / Pro: 5.7 g/dL / ALK PHOS: 50 U/L / ALT: 41 U/L / AST: 46 U/L / GGT: x                                                  Culture - Blood (collected 18 Dec 2018 12:06)  Source: .Blood None  Preliminary Report (19 Dec 2018 23:01):    No growth to date.    Culture - Fungal, Bronchial (collected 18 Dec 2018 10:22)  Source: .Broncial None  Preliminary Report (19 Dec 2018 06:32):    Testing in progress    Culture - Acid Fast - Bronchial w/Smear (collected 18 Dec 2018 10:22)  Source: .Broncial None    Culture - Bronchial (collected 18 Dec 2018 10:22)  Source: Bronch Wash None  Gram Stain (18 Dec 2018 22:22):    Numerous polymorphonuclear leukocytes per low power field    Rare Squamous epithelial cells per low power field    No organisms seen per oil power field  Preliminary Report (19 Dec 2018 17:19):    Normal Respiratory Windy present    Culture - Urine (collected 17 Dec 2018 14:11)  Source: .Urine Clean Catch (Midstream)  Final Report (19 Dec 2018 06:23):    No growth                                                   Mode: AC/ CMV (Assist Control/ Continuous Mandatory Ventilation)  RR (machine): 16  TV (machine): 350  FiO2: 40  PEEP: 5  ITime: 1  MAP: 11  PIP: 25                                      ABG - ( 19 Dec 2018 14:29 )  pH, Arterial: 7.48  pH, Blood: x     /  pCO2: 54    /  pO2: 79    / HCO3: 41    / Base Excess: 16.0  /  SaO2: 98          MEDICATIONS  (STANDING):  ALBUTerol    90 MICROgram(s) HFA Inhaler 1 Puff(s) Inhalation every 6 hours  chlorhexidine 0.12% Liquid 15 milliLiter(s) Oral Mucosa two times a day  chlorhexidine 4% Liquid 1 Application(s) Topical <User Schedule>  dexmedetomidine Infusion 0.05 MICROgram(s)/kG/Hr (1 mL/Hr) IV Continuous <Continuous>  ipratropium 17 MICROgram(s) HFA Inhaler 1 Puff(s) Inhalation every 6 hours  lactulose Syrup 10 Gram(s) Oral daily  levoFLOXacin IVPB      levoFLOXacin IVPB 500 milliGRAM(s) IV Intermittent every 24 hours  methylPREDNISolone sodium succinate Injectable 60 milliGRAM(s) IV Push daily  norepinephrine Infusion 0.05 MICROgram(s)/kG/Min (7.5 mL/Hr) IV Continuous <Continuous>  nystatin Powder 1 Application(s) Topical two times a day  pantoprazole  Injectable 40 milliGRAM(s) IV Push two times a day  piperacillin/tazobactam IVPB.      piperacillin/tazobactam IVPB. 2.25 Gram(s) IV Intermittent every 6 hours  propofol Infusion 5 MICROgram(s)/kG/Min (2.4 mL/Hr) IV Continuous <Continuous>  simvastatin 5 milliGRAM(s) Oral at bedtime  sodium chloride 0.45%. 1000 milliLiter(s) (75 mL/Hr) IV Continuous <Continuous>    MEDICATIONS  (PRN):  fentaNYL    Injectable 50 MICROGram(s) IV Push two times a day PRN Moderate Pain (4 - 6)  guaiFENesin    Syrup 200 milliGRAM(s) Oral every 6 hours PRN Cough

## 2018-12-20 NOTE — PROGRESS NOTE ADULT - ASSESSMENT
73F    Hypertension    Admitted for hypoxemia, Afib RVR, , elevated d-dimer  Sepsis ruled out on admission  Afebrile  No leukocytosis  Lactic acidosis to 3    Rash on C4/5 area, vesicles? in axilla- cannot r/o Zoster  also L erythematous groin rash with blisters  CXR edema, likely fluid overload in the setting of Afib RVR  CT chest Mucoid debris in the proximal trachea suggestive of aspiration. Bilateral patchy airspace opacities greatest in the right lower lobe.   Correlate for infectious/inflammatory etiologies. Porcelain gallbladder  MRSA PCR neg    Bronch cx NGTD  bloody secretions from trach, downtrending hgb; PLT wnl. CT AP no bleed    - SARITHA/ANCA  - on zosyn/levaquin for PNA, consider d/c as all bronch cultures are negative and no fever  - on steroids    Spectra [id number]    Hypertension    Admitted for hypoxemia, Afib RVR, , elevated d-dimer  Sepsis ruled out on admission  Afebrile  No leukocytosis  Lactic acidosis to 3    Rash on C4/5 area, vesicles? in axilla- cannot r/o Zoster  also L erythematous groin rash with blisters  CXR edema, likely fluid overload in the setting of Afib RVR  CT chest Mucoid debris in the proximal trachea suggestive of aspiration. Bilateral patchy airspace opacities greatest in the right lower lobe.   Correlate for infectious/inflammatory etiologies. Porcelain gallbladder  MRSA PCR neg    Bronch cx NGTD  bloody secretions from trach, ?DAH, downtrending hgb; PLT wnl. CT AP no bleed    - SARITHA/ANCA  - on zosyn/levaquin for PNA, consider d/c as all bronch cultures are negative and no fever  - on steroids    Spectra [id number]

## 2018-12-20 NOTE — PROCEDURE NOTE - NSPROCDETAILS_GEN_ALL_CORE
patient pre-oxygenated, tube inserted, placement confirmed
sterile technique, catheter placed/guidewire recovered/lumen(s) aspirated and flushed/ultrasound guidance/sterile dressing applied

## 2018-12-20 NOTE — CONSULT NOTE ADULT - ASSESSMENT
73 year old lady with pmhx of hypertension, with initial complaint of generalized weakness and SOB x 3 days and herpes zoster on right shoulder, admitted with afib and desat, intubated, decreasing Hgb, increased bilateral opacities on CXR, bronchoscopy BAL suggestive of diffuse alveolar hemorrhage. No evidence for infection, normal WBC, afebrile although RLL PNA? on Levoquin.  Acute renal injury resolving. Rheumatologic DDX for DAH is long but no evidence to suggest SLE/SS/RA in this patient. No evidence on exam or pmhx.  No obvious renal involvement.  Would check serologies and increase corticosteroids while waiting for BAL cultures and serologies.  Check C/P ANCAs, SARITHA, EDIN, C3/4, ds DNAabs, Hepatitis panel, cryos, CK/Aldolase, anti GBM, ESR, CRP.  Patient seen this afternoon 2pm and case and plan discussed with resident and pulm fellow.  I will follow with you, on call this weekend.   Hilda Fernández MD  [phone]

## 2018-12-21 NOTE — PROGRESS NOTE ADULT - SUBJECTIVE AND OBJECTIVE BOX
Patient is a 73y old Female who presented with a chief complaint of SOB and rash (21 Dec 2018 08:17)  Currently admitted to medicine with the primary diagnosis of Shortness of breath     Today is hospital day 10d. Pt was initially in ICU  and downgraded on  to 3c but was upgraded to CCU the same day because of worsening SOB  CCU/ICU day: 07  Intubated: on   Central Line: right side IJ  Kent: yes  NG: OG tube  Drips: NE and propofol as of now  Iv Lines: 3 lines    PAST MEDICAL & SURGICAL HISTORY  Hypertension  No significant past surgical history    SOCIAL HISTORY:  Negative for smoking/alcohol/drug use.     ALLERGIES:  No Known Allergies    MEDICATIONS:  STANDING MEDICATIONS  chlorhexidine 0.12% Liquid 15 milliLiter(s) Oral Mucosa two times a day  chlorhexidine 4% Liquid 1 Application(s) Topical <User Schedule>  doxycycline IVPB      doxycycline IVPB 100 milliGRAM(s) IV Intermittent every 12 hours  lactulose Syrup 10 Gram(s) Oral daily  methylPREDNISolone sodium succinate Injectable 125 milliGRAM(s) IV Push every 6 hours  norepinephrine Infusion 0.05 MICROgram(s)/kG/Min IV Continuous <Continuous>  nystatin Powder 1 Application(s) Topical two times a day  pantoprazole  Injectable 40 milliGRAM(s) IV Push two times a day  propofol Infusion 5 MICROgram(s)/kG/Min IV Continuous <Continuous>  simvastatin 5 milliGRAM(s) Oral at bedtime    PRN MEDICATIONS  fentaNYL    Injectable 50 MICROGram(s) IV Push two times a day PRN  guaiFENesin    Syrup 200 milliGRAM(s) Oral every 6 hours PRN    Home Medications:  hydroCHLOROthiazide 25 mg oral tablet: 1 tab(s) orally once a day (12 Dec 2018 02:08)  metOLazone 5 mg oral tablet: 1  orally 3 times a week (12 Dec 2018 02:08)  metoprolol succinate 25 mg oral tablet, extended release: 1 tab(s) orally once a day (12 Dec 2018 02:08)    VITALS:   T(F): 98  HR: 62  BP: 96/40  RR: 36  SpO2: 99%    LABS:                        7.8    9.56  )-----------( 180      ( 21 Dec 2018 04:30 )             24.2     12-    144  |  101  |  44<H>  ----------------------------<  196<H>  3.8   |  36<H>  |  0.9    Ca    8.2<L>      21 Dec 2018 04:30  Mg     2.2     12-20    TPro  6.3  /  Alb  3.0<L>  /  TBili  0.7  /  DBili  x   /  AST  30  /  ALT  37  /  AlkPhos  56  12-21      Urinalysis Basic - ( 20 Dec 2018 16:30 )    Color: Yellow / Appearance: Clear / S.020 / pH: x  Gluc: x / Ketone: Negative  / Bili: Negative / Urobili: 0.2 mg/dL   Blood: x / Protein: Trace mg/dL / Nitrite: Negative   Leuk Esterase: Moderate / RBC: x / WBC 26-50 /HPF   Sq Epi: x / Non Sq Epi: Occasional /HPF / Bacteria: x      ABG - ( 21 Dec 2018 06:11 )  pH, Arterial: 7.40  pH, Blood: x     /  pCO2: 61    /  pO2: 90    / HCO3: 38    / Base Excess: 11.7  /  SaO2: 97          Culture - Blood (collected 18 Dec 2018 12:06)  Source: .Blood None  Preliminary Report (19 Dec 2018 23:01):  No growth to date.      RADIOLOGY:  < from: Xray Chest 1 View- PORTABLE-Routine (.18 @ 05:01) >  Impression:    Stable left greater than right parenchymal opacities.  Support devices, in satisfactory position.    < from: Xray Chest 1 View- PORTABLE-Routine (.19.18 @ 04:49) >  Impression:    Stable left greater than right opacities.      < from: Xray Chest 1 View- PORTABLE-Routine (12.18.18 @ 06:31) >  Impression:    Resolving left lower lobe opacity.      < from: Xray Chest 1 View-PORTABLE IMMEDIATE (12.17.18 @ 11:57) >  Impression:    No significant change from prior. Bilateral airspace opacities, worse at   the left base       from: Xray Chest 1 View- PORTABLE-Routine (12.16.18 @ 06:12) >  Impression:    No radiographic evidence of acute cardiopulmonary disease.      < from: Xray Chest 1 View- PORTABLE-Routine (12.15.18 @ 06:58) >  Impression:    Decreased bilateral basilar opacity. No pleural effusion or air leak      < from: [facility] Duplex Lower Ext Vein Scan, Bilat (12.14.18 @ 16:42) >  Impression:  No evidence of deep venous thrombosis or superficial thrombophlebitis in   bilateral lower extremities.      < from: Xray Chest 1 View- PORTABLE-Routine (18 @ 13:26) >  Impression:    Worsening basilar opacifications. Follow-up as needed.      < from: CT Chest No Cont (18 @ 18:08) >  IMPRESSION:  Mucoid debris in the proximal trachea (3:61) suggestive of aspiration.  Bilateral patchy airspace opacities greatest in the right lower lobe.   Correlate for infectious/inflammatory etiologies.  Prominent mediastinal lymph nodes up to 1.0 cm, probably reactive in   nature.  Cholelithiasis with suggestion of calcification within the wall of the   gallbladder (porcelain gallbladder).      < from: Xray Chest 1 View-PORTABLE IMMEDIATE (18 @ 20:16) >  IMPRESSION:   Limited exam due to patient body habitus. Apparent perihilar opacities.      < from: Transthoracic Echocardiogram (12.15.18 @ 11:18) >  Summary:   1. Left ventricular ejection fraction, by visual estimation, is 50 to   55%.   2. Normal left ventricular size and wall thicknesses, with normal   systolic function.   3. Spectral Doppler shows impaired relaxation pattern of left   ventricular myocardial filling (Grade I diastolic dysfunction).   4. Mild tricuspid regurgitation.   5. Mild aortic regurgitation.   6. Pulmonic valve regurgitation.   7. Estimated pulmonary artery systolic pressure is 52.5 mmHg assuming a   right atrial pressure of 8 mmHg, which is consistent with moderate   pulmonary hypertension.      PHYSICAL EXAM:  GEN: No acute distress, Intubated  LUNGS: decreased breath sounds BL  HEART: S1/S2 present. RRR.   ABD: Soft, non-tender, non-distended. Bowel sounds present  NEURO: Intubated but follows commands

## 2018-12-21 NOTE — PROGRESS NOTE ADULT - SUBJECTIVE AND OBJECTIVE BOX
Patient is a 73y old  Female who presents with a chief complaint of SOB and rash (20 Dec 2018 21:21)        Over Night Events: s/p bronch yesterday. On low dose levophed.         ROS:  See HPI    PHYSICAL EXAM    ICU Vital Signs Last 24 Hrs  T(C): 36.7 (21 Dec 2018 08:00), Max: 37.2 (20 Dec 2018 10:00)  T(F): 98.1 (21 Dec 2018 08:00), Max: 99 (20 Dec 2018 10:00)  HR: 60 (21 Dec 2018 08:00) (50 - 72)  BP: 99/43 (21 Dec 2018 08:00) (86/43 - 113/50)  BP(mean): 58 (21 Dec 2018 08:00) (55 - 74)  RR: 24 (21 Dec 2018 08:00) (16 - 33)  SpO2: 100% (21 Dec 2018 08:00) (96% - 100%)        GENERAL: AWAKE, FOLLOWS COMMANDS  HEENT: Head is normocephalic and atraumatic. Extraocular muscles are intact. Mucous membranes are moist.  NECK: Supple.  LUNGS: DEC BS L SIDE  HEART: Regular rate and rhythm without murmur.  ABDOMEN: Soft, nontender, and nondistended.    EXTREMITIES: SWELLING+  NEUROLOGIC: Grossly intact.  SKIN: No ulceration or induration present.      18 @ 07:01  -  18 @ 07:00  --------------------------------------------------------  IN:    Enteral Tube Flush: 20 mL    IV PiggyBack: 400 mL    norepinephrine Infusion: 32.8 mL    Packed Red Blood Cells: 375 mL    propofol Infusion: 220 mL    Vital High Protein: 360 mL  Total IN: 1407.8 mL    OUT:    Indwelling Catheter - Urethral: 1105 mL  Total OUT: 1105 mL    Total NET: 302.8 mL          LABS:                            7.8    9.56  )-----------( 180      ( 21 Dec 2018 04:30 )             24.2                                                   144  |  101  |  44<H>  ----------------------------<  196<H>  3.8   |  36<H>  |  0.9    Ca    8.2<L>      21 Dec 2018 04:30  Mg     2.2     12-20    TPro  6.3  /  Alb  3.0<L>  /  TBili  0.7  /  DBili  x   /  AST  30  /  ALT  37  /  AlkPhos  56  12-21                                             Urinalysis Basic - ( 20 Dec 2018 16:30 )    Color: Yellow / Appearance: Clear / S.020 / pH: x  Gluc: x / Ketone: Negative  / Bili: Negative / Urobili: 0.2 mg/dL   Blood: x / Protein: Trace mg/dL / Nitrite: Negative   Leuk Esterase: Moderate / RBC: x / WBC 26-50 /HPF   Sq Epi: x / Non Sq Epi: Occasional /HPF / Bacteria: x                                                  LIVER FUNCTIONS - ( 21 Dec 2018 04:30 )  Alb: 3.0 g/dL / Pro: 6.3 g/dL / ALK PHOS: 56 U/L / ALT: 37 U/L / AST: 30 U/L / GGT: x                                                  Culture - Blood (collected 18 Dec 2018 12:06)  Source: .Blood None  Preliminary Report (19 Dec 2018 23:01):    No growth to date.    Culture - Fungal, Bronchial (collected 18 Dec 2018 10:22)  Source: .Broncial None  Preliminary Report (19 Dec 2018 06:32):    Testing in progress    Culture - Acid Fast - Bronchial w/Smear (collected 18 Dec 2018 10:22)  Source: .Broncial None    Culture - Bronchial (collected 18 Dec 2018 10:22)  Source: Bronch Wash None  Gram Stain (18 Dec 2018 22:22):    Numerous polymorphonuclear leukocytes per low power field    Rare Squamous epithelial cells per low power field    No organisms seen per oil power field  Final Report (20 Dec 2018 19:55):    Normal Respiratory Windy present                                                   Mode: AC/ CMV (Assist Control/ Continuous Mandatory Ventilation)  RR (machine): 16  TV (machine): 350  FiO2: 40  PEEP: 5  ITime: 1  MAP: 10  PIP: 22                                      ABG - ( 21 Dec 2018 06:11 )  pH, Arterial: 7.40  pH, Blood: x     /  pCO2: 61    /  pO2: 90    / HCO3: 38    / Base Excess: 11.7  /  SaO2: 97                  MEDICATIONS  (STANDING):  ALBUTerol    90 MICROgram(s) HFA Inhaler 1 Puff(s) Inhalation every 6 hours  chlorhexidine 0.12% Liquid 15 milliLiter(s) Oral Mucosa two times a day  chlorhexidine 4% Liquid 1 Application(s) Topical <User Schedule>  doxycycline IVPB      doxycycline IVPB 100 milliGRAM(s) IV Intermittent every 12 hours  ipratropium 17 MICROgram(s) HFA Inhaler 1 Puff(s) Inhalation every 6 hours  lactulose Syrup 10 Gram(s) Oral daily  methylPREDNISolone sodium succinate Injectable 125 milliGRAM(s) IV Push every 6 hours  norepinephrine Infusion 0.05 MICROgram(s)/kG/Min (7.5 mL/Hr) IV Continuous <Continuous>  nystatin Powder 1 Application(s) Topical two times a day  pantoprazole  Injectable 40 milliGRAM(s) IV Push two times a day  propofol Infusion 5 MICROgram(s)/kG/Min (2.4 mL/Hr) IV Continuous <Continuous>  simvastatin 5 milliGRAM(s) Oral at bedtime    MEDICATIONS  (PRN):  fentaNYL    Injectable 50 MICROGram(s) IV Push two times a day PRN Moderate Pain (4 - 6)  guaiFENesin    Syrup 200 milliGRAM(s) Oral every 6 hours PRN Cough Patient is a 73y old  Female who presents with a chief complaint of SOB and rash (20 Dec 2018 21:21)        Over Night Events: s/p bronch yesterday favor alveolar hemorrhage. On low dose levophed. 1 unit PRBC, started on high dose steroids, s/p rheumato        ROS:  See HPI    PHYSICAL EXAM    ICU Vital Signs Last 24 Hrs  T(C): 36.7 (21 Dec 2018 08:00), Max: 37.2 (20 Dec 2018 10:00)  T(F): 98.1 (21 Dec 2018 08:00), Max: 99 (20 Dec 2018 10:00)  HR: 60 (21 Dec 2018 08:00) (50 - 72)  BP: 99/43 (21 Dec 2018 08:00) (86/43 - 113/50)  BP(mean): 58 (21 Dec 2018 08:00) (55 - 74)  RR: 24 (21 Dec 2018 08:00) (16 - 33)  SpO2: 100% (21 Dec 2018 08:00) (96% - 100%)        GENERAL: AWAKE, FOLLOWS COMMANDS  HEENT: Head is normocephalic and atraumatic. Extraocular muscles are intact. Mucous membranes are moist.  NECK: Supple.  LUNGS: DEC BS L SIDE  HEART: Regular rate and rhythm without murmur.  ABDOMEN: Soft, nontender, and nondistended.    EXTREMITIES: SWELLING+  NEUROLOGIC: Grossly intact.  SKIN: No ulceration or induration present.      18 @ 07:01  -  18 @ 07:00  --------------------------------------------------------  IN:    Enteral Tube Flush: 20 mL    IV PiggyBack: 400 mL    norepinephrine Infusion: 32.8 mL    Packed Red Blood Cells: 375 mL    propofol Infusion: 220 mL    Vital High Protein: 360 mL  Total IN: 1407.8 mL    OUT:    Indwelling Catheter - Urethral: 1105 mL  Total OUT: 1105 mL    Total NET: 302.8 mL          LABS:                            7.8    9.56  )-----------( 180      ( 21 Dec 2018 04:30 )             24.2                                                   144  |  101  |  44<H>  ----------------------------<  196<H>  3.8   |  36<H>  |  0.9    Ca    8.2<L>      21 Dec 2018 04:30  Mg     2.2     12-20    TPro  6.3  /  Alb  3.0<L>  /  TBili  0.7  /  DBili  x   /  AST  30  /  ALT  37  /  AlkPhos  56  12-21                                             Urinalysis Basic - ( 20 Dec 2018 16:30 )    Color: Yellow / Appearance: Clear / S.020 / pH: x  Gluc: x / Ketone: Negative  / Bili: Negative / Urobili: 0.2 mg/dL   Blood: x / Protein: Trace mg/dL / Nitrite: Negative   Leuk Esterase: Moderate / RBC: x / WBC 26-50 /HPF   Sq Epi: x / Non Sq Epi: Occasional /HPF / Bacteria: x                                                  LIVER FUNCTIONS - ( 21 Dec 2018 04:30 )  Alb: 3.0 g/dL / Pro: 6.3 g/dL / ALK PHOS: 56 U/L / ALT: 37 U/L / AST: 30 U/L / GGT: x                                                  Culture - Blood (collected 18 Dec 2018 12:06)  Source: .Blood None  Preliminary Report (19 Dec 2018 23:01):    No growth to date.    Culture - Fungal, Bronchial (collected 18 Dec 2018 10:22)  Source: .Broncial None  Preliminary Report (19 Dec 2018 06:32):    Testing in progress    Culture - Acid Fast - Bronchial w/Smear (collected 18 Dec 2018 10:22)  Source: .Broncial None    Culture - Bronchial (collected 18 Dec 2018 10:22)  Source: Bronch Wash None  Gram Stain (18 Dec 2018 22:22):    Numerous polymorphonuclear leukocytes per low power field    Rare Squamous epithelial cells per low power field    No organisms seen per oil power field  Final Report (20 Dec 2018 19:55):    Normal Respiratory Windy present                                                   Mode: AC/ CMV (Assist Control/ Continuous Mandatory Ventilation)  RR (machine): 16  TV (machine): 350  FiO2: 40  PEEP: 5  ITime: 1  MAP: 10  PIP: 22                                      ABG - ( 21 Dec 2018 06:11 )  pH, Arterial: 7.40  pH, Blood: x     /  pCO2: 61    /  pO2: 90    / HCO3: 38    / Base Excess: 11.7  /  SaO2: 97                  MEDICATIONS  (STANDING):  ALBUTerol    90 MICROgram(s) HFA Inhaler 1 Puff(s) Inhalation every 6 hours  chlorhexidine 0.12% Liquid 15 milliLiter(s) Oral Mucosa two times a day  chlorhexidine 4% Liquid 1 Application(s) Topical <User Schedule>  doxycycline IVPB      doxycycline IVPB 100 milliGRAM(s) IV Intermittent every 12 hours  ipratropium 17 MICROgram(s) HFA Inhaler 1 Puff(s) Inhalation every 6 hours  lactulose Syrup 10 Gram(s) Oral daily  methylPREDNISolone sodium succinate Injectable 125 milliGRAM(s) IV Push every 6 hours  norepinephrine Infusion 0.05 MICROgram(s)/kG/Min (7.5 mL/Hr) IV Continuous <Continuous>  nystatin Powder 1 Application(s) Topical two times a day  pantoprazole  Injectable 40 milliGRAM(s) IV Push two times a day  propofol Infusion 5 MICROgram(s)/kG/Min (2.4 mL/Hr) IV Continuous <Continuous>  simvastatin 5 milliGRAM(s) Oral at bedtime    MEDICATIONS  (PRN):  fentaNYL    Injectable 50 MICROGram(s) IV Push two times a day PRN Moderate Pain (4 - 6)  guaiFENesin    Syrup 200 milliGRAM(s) Oral every 6 hours PRN Cough

## 2018-12-21 NOTE — CHART NOTE - NSCHARTNOTEFT_GEN_A_CORE
Registered Dietitian Follow-Up     Patient Profile Reviewed                           Yes [x]   No []     Nutrition History Previously Obtained        Yes []  No [x] Pt intubated at initial and remains intubated at this time.        Pertinent Subjective Information:  -Pt remain intubated, OGT in place. RN noted pt tolerating TF at goal rate today, was NPO yesterday (12/20) d/t bronchoscopy as pt was experiencing bloody bronchial secretions. RN reports no further bloody secretions. Lactulose in place with LBM 12/21. Propofol reinitiated. See RD TF recommendations at end of document.      Pertinent Medical Interventions:  s/p R IJ central line placement 12/20  Acute Hypercapnic Respiratory Failure likely 2/2 Lovenox induced Diffuse alveolar hemorrhage--intubated 12/17, s/p bronchoscopy 12/20    Sepsis ruled out > no leukocytosis, afebrile  New Afib--200-300 ml bleed on 12/17 at the injection site and the Hb dropped.   CT A/P ordered to r/o retroperitoneal bleed--as per LIP no retroperitoneal bleed.   ANTONIETTA--improving.   Hypokalemia--improving   Asymptomatic Bacteriuria with RBCs--Repeat UA negative for rbcs 12/20     Diet order: Vital HP @40ml/hr via OGT + propofol 8mL/hr (including propofol provides = 1171kcal, 84g protein, 778mL h20)      Anthropometrics:  - Ht. 152.4cm   - Wt. 81.5kg (12/21)   - %wt change--vs admit wt of 80kg. likely d/t fluid retention will continue to monitor and adjust PRN.  - BMI 35 (using 12/21 wt)  - IBW     Pertinent Lab Data: (12/21/18) RBC 2.68, H/H 7.8/24.2, BUN 44, serum glucose 196, corrected Ca 9.0     Pertinent Meds: propofol 8mL/hr (211 fat calories), abx, solumedrol, fentanyl, lactulose, levophed, protonix, simvastatin      Physical Findings:  - Appearance: intubated   - GI function: fecal incontinence noted LBM 12/21, lactulose in place   - Tubes: intubated, OGT   - Oral/Mouth cavity: OGT in place   - Skin: 1+ pitting edema b/l hands; rash/ BS 14      Nutrition Requirements  Weight Used: 80kg CBW, 45.5kg IBW (continued from RD note 12/18)     Estimated energy needs: 860-1100kcal/d (based on: 860- 1000kcal/d (60-70% [id number]) vs. 1000-1140kcal/d (22-24kcal/kg IBW) vs. 880-1120kcal/d (11-14kcal/act wt))  Estimated protein needs: 68-91gm/d (1.5-2.0gm/kg IBW) - pt w/ ANTONIETTA per MD    Estimated fluid needs: per CCU team     Nutrient Intake: TF regimen provides 106% kcal and 92% protein needs      [x] Previous Nutrition Diagnosis: less than optimal enteral nutrition infusion--adjusted to excessive enteral nutrition infusion d/t current TF regimen with propofol             [x] Ongoing          [] Resolved    Nutrition Intervention: enteral nutrition   Recommend:  1. Adjust TF regimen to continuous feeds of Vital HP @ 35mL/hr x24hrs + ProSource TF Q24H.   -TF at goal rate (including propofol and Prosource) will provide 1096kcal, 85g protein, 750mL free H2O and 59% RDIs.   -Maintain aspiration precautions.   -Flushes per CCU team   2. consider adding daily multivitamin as TF regimen only provides 59% RDIs.      Goal/Expected Outcome: TF to provide >85% but <105% estimated needs upon f/u in 4 days.      Indicator/Monitoring: RD will monitor diet order, TF tolerance, body composition, nutrition related labs, NFPF (GI abnormalities)

## 2018-12-21 NOTE — PROGRESS NOTE ADULT - ASSESSMENT
IMPRESSION:    Acute on chronic hypercapnic respiratory failure s/p intubation  IDALMIS / OHS  Pneumonia/Aspiration?/ fluid overload  Elevated right hemidiaphragm  Dec HB s.p transfusion      PLAN:     CNS:  Keep Precedex. Add Propofol or fentanyl if needed    HEENT: Oral care    PULMONARY:  HOB @ 45 degrees, no changes in vent settings, F/U bronchoscopy result    CARDIOVASCULAR: D/C IVF    GI: GI prophylaxis. Continue feeding    RENAL:  Follow up lytes.  Correct as needed    INFECTIOUS DISEASE: Follow up cultures, zosyn, levaquin    HEMATOLOGICAL:  DVT prophylaxis. hold lovenox    ENDOCRINE:  Follow up rheumatological work up. Solumedrol 125mg q6h    MUSCULOSKELETAL: bed rest  POOR PROGNOSIS  Monitor in ICU IMPRESSION:    Acute on chronic hypercapnic respiratory failure s/p intubation s/p bronchoscopy alveolar hem, ? etiology was on lovenox  IDALMIS / OHS  Pneumonia/Aspiration?/ fluid overload  Elevated right hemidiaphragm  Dec HB s.p transfusion      PLAN:     CNS:  if needed propofol, SAT    HEENT: Oral care    PULMONARY:  HOB @ 45 degrees, no changes in vent settings, SBT    CARDIOVASCULAR: KEEP OFF AC    GI: GI prophylaxis. Continue feeding    RENAL:  Follow up lytes.  Correct as needed    INFECTIOUS DISEASE: Follow up cultures, DOXY    HEMATOLOGICAL:  DVT prophylaxis. IF CBC STABLE HEP SQ    ENDOCRINE:  Follow up rheumatological work up. Solumedrol 125mg q6h for 3 days than lower    POOR PROGNOSIS  Monitor in ICU

## 2018-12-21 NOTE — PROGRESS NOTE ADULT - ASSESSMENT
73F from [facility] w/ PMH of HTN presented to [facility] with worsening generalized weakness and SOB for 3 days.     #Acute Hypercapnic Respiratory Failure likely 2/2 Lovenox induced Diffuse alveolar hemorrhage   -Patient Intubated 12/17 as she was saturating in late 80s  -Duplex report 12/14: negative for DVT   -ID on board > 12/18 dced Acyclovir  -Proventil, Spiriva, Duonebs q6h, Zosyn, started on Levofloxacin 12/17 >> stopped zosyn and Levo 12/20 and started on Doxy  -Sepsis ruled out > no leukocytosis, afebrile  -RVP negative 12/13  -Blood culture 12/14 negative  -Urine Legionella and Strep Ag negative 12/15  -Echo 12/15: LVEF 50-55%  -CT chest suggestive for RLL PNA  -MRSA negative 12/17  -ESR 86, CRP 1.72, RF <10 12/18  -Rheuma workup pending  -Bronchoscopy done today 12/20 > BAL done and sent for testing > Increasing Aliquot  -Cental line placement 12/20  -Rheuma on board  -Repeat UA showed No RBCs  -increased steroids to 125 Q6 12/20  -Less bloody bronchial secretions      #New Atrial Fibrillation, rate controlled > Most likely ischemic response?  -Patient currently in NSR   -CHA2 DS2 VASC: 4   -Therapeutic Lovenox for AC stopped for episode of 200-300 ml bleed on 12/17 at the injection site and the Hb dropped from 11 to 9 > CT A/P ordered to r/o retroperitoneal bleed. Bowel regimen added    #HLD  -Simvastatin     #Varicella Zoster Right Shoulder: improving  -Contact and airborne precautions dced 12/17 as per ID rec  -Acyclovir 800mg IV q8h, Benadryl PRN for pruritus > stopped 12/18    #HTN  -HCTZ held for hypotension     #ANTONIETTA improving  -Creatinine 1.0 12/18  -Urine Urea 7575, creat 30, Na 94    #Asymptomatic Bacteriuria with RBCs  -UA positive 12/15 but culture negative 12/15  -Repeat UA 12/17 positive  -Repeat UA negative for rbcs 12/20    #Hypokalemia: improving  -Repleted, monitor BMP   -3.8 12/21        DVT ppx: Therapeutic Lovenox  GI ppx: Not indicated   Diet: NPO  Activity: increase as tolerated   FULL CODE

## 2018-12-22 NOTE — PROGRESS NOTE ADULT - ASSESSMENT
IMPRESSION:    Acute on chronic hypercapnic respiratory failure s/p intubation s/p bronchoscopy alveolar hem, ? etiology was on lovenox  IDALMIS / OHS  Pneumonia/Aspiration?/ fluid overload  Elevated right hemidiaphragm  Dec HB s.p transfusion  anemia   hypernatremia   PLAN:     CNS:  if needed propofol, SAT    HEENT: Oral care    PULMONARY:  HOB @ 45 degrees, no changes in vent settings, INCREASE RATE TO 18    CARDIOVASCULAR: KEEP OFF AC    GI: GI prophylaxis. Continue feeding start free waTER PER nG 250CC q 4 HRS     RENAL:  Follow up lytes.  Correct as needed    INFECTIOUS DISEASE: Follow up cultures, DOXY    HEMATOLOGICAL:  DVT prophylaxis. give 1unit now and repeat cbc target hb . 7.5     ENDOCRINE:  Follow up rheumatological work up. Solumedrol 125mg q6h for 3 days than lower    POOR PROGNOSIS  Monitor in ICU

## 2018-12-22 NOTE — PROVIDER CONTACT NOTE (EICU) - SITUATION
Hgb <7 on morning labwork but ABG Hgb >10. Repeat STAT CBC sent as ordered. Per Dr. Ortiz during rounds, transfuse if repeat <7.

## 2018-12-22 NOTE — AIRWAY PLACEMENT NOTE ADULT - POST AIRWAY PLACEMENT ASSESSMENT:
breath sounds bilateral/chest excursion noted/breath sounds equal/positive end tidal CO2 noted/CXR pending

## 2018-12-22 NOTE — PROGRESS NOTE ADULT - SUBJECTIVE AND OBJECTIVE BOX
Patient is a 73y old  Female who presents with a chief complaint of SOB and rash (22 Dec 2018 08:28)      Over Night Events:  Patient seen and examined failed weaning trial still on levop      ROS:  See HPI    PHYSICAL EXAM    ICU Vital Signs Last 24 Hrs  T(C): 35.6 (22 Dec 2018 08:00), Max: 37.2 (21 Dec 2018 16:00)  T(F): 96.1 (22 Dec 2018 08:00), Max: 99 (21 Dec 2018 16:00)  HR: 50 (22 Dec 2018 09:00) (50 - 68)  BP: 109/45 (22 Dec 2018 09:00) (80/35 - 115/47)  BP(mean): 61 (22 Dec 2018 09:00) (48 - 81)  ABP: --  ABP(mean): --  RR: 18 (22 Dec 2018 09:00) (17 - 49)  SpO2: 100% (22 Dec 2018 09:00) (97% - 100%)      General: on sedation   HEENT:  ET tube irvin              Lymph Nodes: NO cervical LN   Lungs: Bilateral crackles   Cardiovascular: Regular   Abdomen: Soft, Positive BS  Extremities: No clubbing ,  Skin: warm   Neurological: on sedation   Musculoskeletal: move all ext     I&O's Detail    21 Dec 2018 07:01  -  22 Dec 2018 07:00  --------------------------------------------------------  IN:    Enteral Tube Flush: 60 mL    IV PiggyBack: 200 mL    norepinephrine Infusion: 43.2 mL    propofol Infusion: 172 mL    Vital High Protein: 860 mL  Total IN: 1335.2 mL    OUT:    Indwelling Catheter - Urethral: 1193 mL  Total OUT: 1193 mL    Total NET: 142.2 mL      22 Dec 2018 07:01  -  22 Dec 2018 09:19  --------------------------------------------------------  IN:    norepinephrine Infusion: 3.6 mL    propofol Infusion: 10.4 mL    Vital High Protein: 70 mL  Total IN: 84 mL    OUT:    Indwelling Catheter - Urethral: 75 mL  Total OUT: 75 mL    Total NET: 9 mL          LABS:                          6.3    8.92  )-----------( 171      ( 22 Dec 2018 07:14 )             19.5         22 Dec 2018 07:14    146    |  102    |  55     ----------------------------<  207    3.4     |  36     |  0.9      Ca    8.7        22 Dec 2018 07:14  Mg     2.4       22 Dec 2018 07:14    TPro  6.4    /  Alb  3.3    /  TBili  0.7    /  DBili  x      /  AST  26     /  ALT  41     /  AlkPhos  52     22 Dec 2018 07:14  Amylase x     lipase x                                                 PT/INR - ( 22 Dec 2018 07:14 )   PT: 18.20 sec;   INR: 1.59 ratio         PTT - ( 22 Dec 2018 07:14 )  PTT:28.3 sec                                       Urinalysis Basic - ( 20 Dec 2018 16:30 )    Color: Yellow / Appearance: Clear / S.020 / pH: x  Gluc: x / Ketone: Negative  / Bili: Negative / Urobili: 0.2 mg/dL   Blood: x / Protein: Trace mg/dL / Nitrite: Negative   Leuk Esterase: Moderate / RBC: x / WBC 26-50 /HPF   Sq Epi: x / Non Sq Epi: Occasional /HPF / Bacteria: x          CARDIAC MARKERS ( 21 Dec 2018 11:00 )  x     / x     / 120 U/L / x     / x                                                            Culture - Acid Fast - Bronchial w/Smear (collected 20 Dec 2018 13:00)  Source: .Broncial None    Culture - Bronchial (collected 20 Dec 2018 13:00)  Source: .Broncial None  Preliminary Report (21 Dec 2018 18:15):    No growth to date.                                                   Mode: AC/ CMV (Assist Control/ Continuous Mandatory Ventilation)  RR (machine): 16  TV (machine): 350  FiO2: 40  PEEP: 5  ITime: 1  MAP: 9  PIP: 22                                      ABG - ( 21 Dec 2018 06:11 )  pH, Arterial: 7.40  pH, Blood: x     /  pCO2: 61    /  pO2: 90    / HCO3: 38    / Base Excess: 11.7  /  SaO2: 97        7.37/70/103          MEDICATIONS  (STANDING):  chlorhexidine 0.12% Liquid 15 milliLiter(s) Oral Mucosa two times a day  chlorhexidine 4% Liquid 1 Application(s) Topical <User Schedule>  doxycycline IVPB      doxycycline IVPB 100 milliGRAM(s) IV Intermittent every 12 hours  lactulose Syrup 10 Gram(s) Oral daily  methylPREDNISolone sodium succinate Injectable 125 milliGRAM(s) IV Push every 6 hours  norepinephrine Infusion 0.05 MICROgram(s)/kG/Min (7.5 mL/Hr) IV Continuous <Continuous>  nystatin Powder 1 Application(s) Topical two times a day  pantoprazole  Injectable 40 milliGRAM(s) IV Push two times a day  potassium chloride  20 mEq/100 mL IVPB 20 milliEquivalent(s) IV Intermittent once  propofol Infusion 5 MICROgram(s)/kG/Min (2.4 mL/Hr) IV Continuous <Continuous>  simvastatin 5 milliGRAM(s) Oral at bedtime    MEDICATIONS  (PRN):  fentaNYL    Injectable 50 MICROGram(s) IV Push two times a day PRN Moderate Pain (4 - 6)  guaiFENesin    Syrup 200 milliGRAM(s) Oral every 6 hours PRN Cough          Xrays:  TLC:  OG:  ET tube:                                                                                    left opacity    ECHO:

## 2018-12-22 NOTE — PROGRESS NOTE ADULT - SUBJECTIVE AND OBJECTIVE BOX
DENISE BARAJAS 73y Female  MRN#: [id number]     SUBJECTIVE  Patient is a 73y old Female who presents with a chief complaint of SOB and rash (21 Dec 2018 10:32)  Currently admitted to medicine with the primary diagnosis of Shortness of breath      OBJECTIVE  PAST MEDICAL & SURGICAL HISTORY  Hypertension  No significant past surgical history    ALLERGIES:  No Known Allergies    MEDICATIONS:  STANDING MEDICATIONS  chlorhexidine 0.12% Liquid 15 milliLiter(s) Oral Mucosa two times a day  chlorhexidine 4% Liquid 1 Application(s) Topical <User Schedule>  doxycycline IVPB      doxycycline IVPB 100 milliGRAM(s) IV Intermittent every 12 hours  lactulose Syrup 10 Gram(s) Oral daily  methylPREDNISolone sodium succinate Injectable 125 milliGRAM(s) IV Push every 6 hours  norepinephrine Infusion 0.05 MICROgram(s)/kG/Min IV Continuous <Continuous>  nystatin Powder 1 Application(s) Topical two times a day  pantoprazole  Injectable 40 milliGRAM(s) IV Push two times a day  potassium chloride  20 mEq/100 mL IVPB 20 milliEquivalent(s) IV Intermittent once  propofol Infusion 5 MICROgram(s)/kG/Min IV Continuous <Continuous>  simvastatin 5 milliGRAM(s) Oral at bedtime    PRN MEDICATIONS  fentaNYL    Injectable 50 MICROGram(s) IV Push two times a day PRN  guaiFENesin    Syrup 200 milliGRAM(s) Oral every 6 hours PRN      VITAL SIGNS: Last 24 Hours  T(C): 35.9 (22 Dec 2018 04:00), Max: 37.2 (21 Dec 2018 16:00)  T(F): 96.6 (22 Dec 2018 04:00), Max: 99 (21 Dec 2018 16:00)  HR: 54 (22 Dec 2018 06:00) (52 - 70)  BP: 107/49 (22 Dec 2018 06:00) (80/35 - 116/48)  BP(mean): 71 (22 Dec 2018 06:00) (48 - 81)  RR: 39 (22 Dec 2018 06:00) (20 - 49)  SpO2: 100% (22 Dec 2018 06:00) (97% - 100%)    LABS:                        6.3    8.92  )-----------( 171      ( 22 Dec 2018 07:14 )             19.5     12-22    146  |  102  |  55<H>  ----------------------------<  207<H>  3.4<L>   |  36<H>  |  0.9    Ca    8.7      22 Dec 2018 07:14  Mg     2.4         TPro  6.4  /  Alb  3.3<L>  /  TBili  0.7  /  DBili  x   /  AST  26  /  ALT  41  /  AlkPhos  52  12    PT/INR - ( 22 Dec 2018 07:14 )   PT: 18.20 sec;   INR: 1.59 ratio         PTT - ( 22 Dec 2018 07:14 )  PTT:28.3 sec  Urinalysis Basic - ( 20 Dec 2018 16:30 )    Color: Yellow / Appearance: Clear / S.020 / pH: x  Gluc: x / Ketone: Negative  / Bili: Negative / Urobili: 0.2 mg/dL   Blood: x / Protein: Trace mg/dL / Nitrite: Negative   Leuk Esterase: Moderate / RBC: x / WBC 26-50 /HPF   Sq Epi: x / Non Sq Epi: Occasional /HPF / Bacteria: x      ABG - ( 21 Dec 2018 06:11 )  pH, Arterial: 7.40  pH, Blood: x     /  pCO2: 61    /  pO2: 90    / HCO3: 38    / Base Excess: 11.7  /  SaO2: 97          Sedimentation Rate, Erythrocyte: >140 mm/hr <H> (18 @ 11:00)  Creatine Kinase, Serum: 120 U/L (18 @ 11:00)      Culture - Acid Fast - Bronchial w/Smear (collected 20 Dec 2018 13:00)  Source: .Broncial None    Culture - Bronchial (collected 20 Dec 2018 13:00)  Source: .Broncial None  Preliminary Report (21 Dec 2018 18:15):    No growth to date.      CARDIAC MARKERS ( 21 Dec 2018 11:00 )  x     / x     / 120 U/L / x     / x          PHYSICAL EXAM:    ASSESSMENT & PLAN DENISE BARAJAS 73y Female  MRN#: [id number]     SUBJECTIVE  Patient is a 73y old Female who presents with a chief complaint of SOB and rash (21 Dec 2018 10:32)  Currently admitted to medicine with the primary diagnosis of Shortness of breath      OBJECTIVE  PAST MEDICAL & SURGICAL HISTORY  Hypertension  No significant past surgical history    ALLERGIES:  No Known Allergies    MEDICATIONS:  STANDING MEDICATIONS  chlorhexidine 0.12% Liquid 15 milliLiter(s) Oral Mucosa two times a day  chlorhexidine 4% Liquid 1 Application(s) Topical <User Schedule>  doxycycline IVPB      doxycycline IVPB 100 milliGRAM(s) IV Intermittent every 12 hours  lactulose Syrup 10 Gram(s) Oral daily  methylPREDNISolone sodium succinate Injectable 125 milliGRAM(s) IV Push every 6 hours  norepinephrine Infusion 0.05 MICROgram(s)/kG/Min IV Continuous <Continuous>  nystatin Powder 1 Application(s) Topical two times a day  pantoprazole  Injectable 40 milliGRAM(s) IV Push two times a day  potassium chloride  20 mEq/100 mL IVPB 20 milliEquivalent(s) IV Intermittent once  propofol Infusion 5 MICROgram(s)/kG/Min IV Continuous <Continuous>  simvastatin 5 milliGRAM(s) Oral at bedtime    PRN MEDICATIONS  fentaNYL    Injectable 50 MICROGram(s) IV Push two times a day PRN  guaiFENesin    Syrup 200 milliGRAM(s) Oral every 6 hours PRN      VITAL SIGNS: Last 24 Hours  T(C): 35.9 (22 Dec 2018 04:00), Max: 37.2 (21 Dec 2018 16:00)  T(F): 96.6 (22 Dec 2018 04:00), Max: 99 (21 Dec 2018 16:00)  HR: 54 (22 Dec 2018 06:00) (52 - 70)  BP: 107/49 (22 Dec 2018 06:00) (80/35 - 116/48)  BP(mean): 71 (22 Dec 2018 06:00) (48 - 81)  RR: 39 (22 Dec 2018 06:00) (20 - 49)  SpO2: 100% (22 Dec 2018 06:00) (97% - 100%)    LABS:                        6.3    8.92  )-----------( 171      ( 22 Dec 2018 07:14 )             19.5     12-22    146  |  102  |  55<H>  ----------------------------<  207<H>  3.4<L>   |  36<H>  |  0.9    Ca    8.7      22 Dec 2018 07:14  Mg     2.4         TPro  6.4  /  Alb  3.3<L>  /  TBili  0.7  /  DBili  x   /  AST  26  /  ALT  41  /  AlkPhos  52      PT/INR - ( 22 Dec 2018 07:14 )   PT: 18.20 sec;   INR: 1.59 ratio         PTT - ( 22 Dec 2018 07:14 )  PTT:28.3 sec  Urinalysis Basic - ( 20 Dec 2018 16:30 )    Color: Yellow / Appearance: Clear / S.020 / pH: x  Gluc: x / Ketone: Negative  / Bili: Negative / Urobili: 0.2 mg/dL   Blood: x / Protein: Trace mg/dL / Nitrite: Negative   Leuk Esterase: Moderate / RBC: x / WBC 26-50 /HPF   Sq Epi: x / Non Sq Epi: Occasional /HPF / Bacteria: x      ABG - ( 21 Dec 2018 06:11 )  pH, Arterial: 7.40  pH, Blood: x     /  pCO2: 61    /  pO2: 90    / HCO3: 38    / Base Excess: 11.7  /  SaO2: 97          Sedimentation Rate, Erythrocyte: >140 mm/hr <H> (18 @ 11:00)  Creatine Kinase, Serum: 120 U/L (18 @ 11:00)      Culture - Acid Fast - Bronchial w/Smear (collected 20 Dec 2018 13:00)  Source: .Broncial None    Culture - Bronchial (collected 20 Dec 2018 13:00)  Source: .Broncial None  Preliminary Report (21 Dec 2018 18:15):    No growth to date.      CARDIAC MARKERS ( 21 Dec 2018 11:00 )  x     / x     / 120 U/L / x     / x          PHYSICAL EXAM:  GEN: No acute distress, Intubated  LUNGS: decreased breath sounds BL  HEART: S1/S2 present. RRR.   ABD: Soft, non-tender, non-distended. Bowel sounds present  NEURO: Intubated but follows commands    ASSESSMENT & PLAN  73F from [facility] w/ PMH of HTN presented to [facility] with worsening generalized weakness and SOB for 3 days.     #Acute Hypercapnic Respiratory Failure likely 2/2 Lovenox induced Diffuse alveolar hemorrhage   -Patient Intubated  as she was saturating in late 80s  -Duplex report : negative for DVT   -ID on board >  dced Acyclovir  -Proventil, Spiriva, Duonebs q6h, Zosyn, started on Levofloxacin  >> stopped zosyn and Levo  and started on Doxy  -Sepsis ruled out > no leukocytosis, afebrile  -RVP negative   -Blood culture  negative  -Urine Legionella and Strep Ag negative 12/15  -Echo 12/15: LVEF 50-55%  -CT chest suggestive for RLL PNA  -MRSA negative   -ESR 86, CRP 1.72, RF <10   -Rheuma workup pending  -Bronchoscopy done today  > BAL done and sent for testing > Increasing Aliquot  -Cental line placement   -Rheuma on board  -Repeat UA showed No RBCs  -increased steroids to 125 Q6   -Less bloody bronchial secretions    -Follow up with rheumatology work-up.  -HB 6.3 today, 1 unit of prbc ordered.    #New Atrial Fibrillation, rate controlled > Most likely ischemic response?  -Patient currently in NSR   -CHA2 DS2 VASC: 4   -Therapeutic Lovenox for AC stopped for episode of 200-300 ml bleed on  at the injection site and the Hb dropped from 11 to 9 > CT A/P ordered to r/o retroperitoneal bleed. Bowel regimen added    #HLD  -Simvastatin     #Varicella Zoster Right Shoulder: improving  -Contact and airborne precautions dced  as per ID rec  -Acyclovir 800mg IV q8h, Benadryl PRN for pruritus > stopped     #HTN  -HCTZ held for hypotension     #ANTONIETTA improving  -Creatinine 1.0   -Urine Urea 7575, creat 30, Na 94    #Asymptomatic Bacteriuria with RBCs  -UA positive 12/15 but culture negative 12/15  -Repeat UA  positive  -Repeat UA negative for rbcs     #Hypokalemia: improving  -Repleted, monitor BMP   -3.8         DVT ppx: Therapeutic Lovenox  GI ppx: Not indicated   Diet: NPO  Activity: increase as tolerated   FULL CODE

## 2018-12-23 NOTE — PROGRESS NOTE ADULT - ASSESSMENT
73F from [facility] w/ PMH of HTN presented to [facility] with worsening generalized weakness and SOB for 3 days.     #Acute Hypercapnic Respiratory Failure likely 2/2 Lovenox induced Diffuse alveolar hemorrhage   -Patient Intubated 12/17 as she was saturating in late 80s  -Duplex report 12/14: negative for DVT   -ID on board > 12/18 dced Acyclovir  -Proventil, Spiriva, Duonebs q6h, Zosyn, started on Levofloxacin 12/17 >> stopped zosyn and Levo 12/20 and started on Doxy  -Sepsis ruled out > no leukocytosis, afebrile  -RVP negative 12/13  -Blood culture 12/14 negative  -Urine Legionella and Strep Ag negative 12/15  -Echo 12/15: LVEF 50-55%  -CT chest suggestive for RLL PNA  -MRSA negative 12/17  -ESR 86, CRP 1.72, RF <10 12/18  -RF factor, SARITHA, ANCA, Hep panel, complements 3/4 negative  -Bronchoscopy done today 12/20 > BAL done and sent for testing > Increasing Aliquot  -Cental line placement 12/20  -Rheuma on board  -Repeat UA showed No RBCs  -steroids were increased to 125 Q6 12/20 and decreased today 12/23 to Q8  -Less bloody bronchial secretions      #New Atrial Fibrillation, rate controlled > Most likely ischemic response?  -Patient currently in NSR   -CHA2 DS2 VASC: 4   -Therapeutic Lovenox for AC stopped for episode of 200-300 ml bleed on 12/17 at the injection site and the Hb dropped from 11 to 9 > CT A/P ordered to r/o retroperitoneal bleed. Bowel regimen added    #HLD  -Simvastatin     #HTN  -HCTZ held for hypotension     #Varicella Zoster Right Shoulder: improving  -Contact and airborne precautions dced 12/17 as per ID rec  -Acyclovir 800mg IV q8h, Benadryl PRN for pruritus > stopped 12/18    #ANTONIETTA improving  -Creatinine 1.0 12/18  -Urine Urea 7575, creat 30, Na 94    #Asymptomatic Bacteriuria with RBCs  -UA positive 12/15 but culture negative 12/15  -Repeat UA 12/17 positive  -Repeat UA negative for rbcs 12/20    #Hypokalemia: improving  -Repleted, monitor BMP   -3.8 12/23        DVT ppx: Therapeutic Lovenox  GI ppx: Not indicated   Diet: NPO  Activity: increase as tolerated   FULL CODE

## 2018-12-23 NOTE — PROGRESS NOTE ADULT - SUBJECTIVE AND OBJECTIVE BOX
Patient is a 73y old Female who presented with a chief complaint of SOB and rash (23 Dec 2018 08:55)  Currently admitted to medicine with the primary diagnosis of Shortness of breath     Today is hospital day 12d. Pt was initially in ICU  and downgraded on 12/14 to 3c but was upgraded to CCU the same day because of worsening SOB  CCU/ICU day: 09  Intubated: on 12/17  Central Line: right side IJ  Kent: yes  NG: OG tube  Drips: NE and propofol as of now  Iv Lines: 3 lines    PAST MEDICAL & SURGICAL HISTORY  Hypertension  No significant past surgical history    SOCIAL HISTORY:  Negative for smoking/alcohol/drug use.     ALLERGIES:  No Known Allergies    MEDICATIONS:  STANDING MEDICATIONS  chlorhexidine 0.12% Liquid 15 milliLiter(s) Oral Mucosa two times a day  chlorhexidine 4% Liquid 1 Application(s) Topical <User Schedule>  dextrose 5%. 1000 milliLiter(s) IV Continuous <Continuous>  doxycycline IVPB      doxycycline IVPB 100 milliGRAM(s) IV Intermittent every 12 hours  lactulose Syrup 10 Gram(s) Oral daily  methylPREDNISolone sodium succinate Injectable 60 milliGRAM(s) IV Push every 8 hours  norepinephrine Infusion 0.05 MICROgram(s)/kG/Min IV Continuous <Continuous>  nystatin Powder 1 Application(s) Topical two times a day  pantoprazole  Injectable 40 milliGRAM(s) IV Push two times a day  propofol Infusion 5 MICROgram(s)/kG/Min IV Continuous <Continuous>  simvastatin 5 milliGRAM(s) Oral at bedtime    PRN MEDICATIONS  fentaNYL    Injectable 50 MICROGram(s) IV Push two times a day PRN  guaiFENesin    Syrup 200 milliGRAM(s) Oral every 6 hours PRN    Home Medications:  hydroCHLOROthiazide 25 mg oral tablet: 1 tab(s) orally once a day (12 Dec 2018 02:08)  metOLazone 5 mg oral tablet: 1  orally 3 times a week (12 Dec 2018 02:08)  metoprolol succinate 25 mg oral tablet, extended release: 1 tab(s) orally once a day (12 Dec 2018 02:08)    VITALS:   T(F): 98.8  HR: 58  BP: 90/42  RR: 39  SpO2: 100%    LABS:                        8.0    12.25 )-----------( 249      ( 23 Dec 2018 04:30 )             25.7     12-23    146  |  101  |  59<H>  ----------------------------<  174<H>  3.8   |  37<H>  |  0.8    Ca    8.9      23 Dec 2018 04:30  Mg     2.4     12-22    TPro  6.4  /  Alb  3.3<L>  /  TBili  0.7  /  DBili  x   /  AST  26  /  ALT  41  /  AlkPhos  52  12-22    PT/INR - ( 22 Dec 2018 07:14 )   PT: 18.20 sec;   INR: 1.59 ratio         PTT - ( 22 Dec 2018 07:14 )  PTT:28.3 sec    ABG - ( 23 Dec 2018 09:43 )  pH, Arterial: 7.38  pH, Blood: x     /  pCO2: 68    /  pO2: 77    / HCO3: 40    / Base Excess: 13.1  /  SaO2: 96          RADIOLOGY:  < from: Xray Chest 1 View- PORTABLE-Routine (12.23.18 @ 05:54) >  Impression:    Low lung volumes with stable left lung consolidation.  Stable support apparatus.      < from: Xray Chest 1 View- PORTABLE-Routine (12.21.18 @ 05:01) >  Impression:    Stable left greater than right parenchymal opacities.  Support devices, in satisfactory position.    < from: Xray Chest 1 View- PORTABLE-Routine (12.19.18 @ 04:49) >  Impression:    Stable left greater than right opacities.      < from: Xray Chest 1 View- PORTABLE-Routine (12.18.18 @ 06:31) >  Impression:    Resolving left lower lobe opacity.      < from: Xray Chest 1 View-PORTABLE IMMEDIATE (12.17.18 @ 11:57) >  Impression:    No significant change from prior. Bilateral airspace opacities, worse at   the left base       from: Xray Chest 1 View- PORTABLE-Routine (12.16.18 @ 06:12) >  Impression:    No radiographic evidence of acute cardiopulmonary disease.      < from: Xray Chest 1 View- PORTABLE-Routine (12.15.18 @ 06:58) >  Impression:    Decreased bilateral basilar opacity. No pleural effusion or air leak      < from: [facility] Duplex Lower Ext Vein Scan, Bilat (12.14.18 @ 16:42) >  Impression:  No evidence of deep venous thrombosis or superficial thrombophlebitis in   bilateral lower extremities.      < from: Xray Chest 1 View- PORTABLE-Routine (12.14.18 @ 13:26) >  Impression:    Worsening basilar opacifications. Follow-up as needed.      < from: CT Chest No Cont (12.12.18 @ 18:08) >  IMPRESSION:  Mucoid debris in the proximal trachea (3:61) suggestive of aspiration.  Bilateral patchy airspace opacities greatest in the right lower lobe.   Correlate for infectious/inflammatory etiologies.  Prominent mediastinal lymph nodes up to 1.0 cm, probably reactive in   nature.  Cholelithiasis with suggestion of calcification within the wall of the   gallbladder (porcelain gallbladder).      < from: Xray Chest 1 View-PORTABLE IMMEDIATE (12.11.18 @ 20:16) >  IMPRESSION:   Limited exam due to patient body habitus. Apparent perihilar opacities.      < from: Transthoracic Echocardiogram (12.15.18 @ 11:18) >  Summary:   1. Left ventricular ejection fraction, by visual estimation, is 50 to   55%.   2. Normal left ventricular size and wall thicknesses, with normal   systolic function.   3. Spectral Doppler shows impaired relaxation pattern of left   ventricular myocardial filling (Grade I diastolic dysfunction).   4. Mild tricuspid regurgitation.   5. Mild aortic regurgitation.   6. Pulmonic valve regurgitation.   7. Estimated pulmonary artery systolic pressure is 52.5 mmHg assuming a   right atrial pressure of 8 mmHg, which is consistent with moderate   pulmonary hypertension.  PHYSICAL EXAM:  GEN: No acute distress, Intubated  LUNGS: decreased breath sounds BL  HEART: S1/S2 present. RRR.   ABD: Soft, non-tender, non-distended. Bowel sounds present  NEURO: Intubated but follows commands

## 2018-12-23 NOTE — PROGRESS NOTE ADULT - ASSESSMENT
IMPRESSION:    Acute on chronic hypercapnic respiratory failure s/p intubation s/p bronchoscopy alveolar hem, ? etiology was on lovenox  IDALMIS / OHS  Pneumonia/Aspiration?/ fluid overload  Elevated right hemidiaphragm  Dec HB s.p transfusion  anemia   hypernatremia   PLAN:     CNS:  if needed propofol, SAT    HEENT: Oral care    PULMONARY:  HOB @ 45 degrees, pull ET tube 2.5 cm out   will do weaning trial   solumderol 60 mg Q 8hrs   CARDIOVASCULAR: KEEP OFF AC  d5w 250cc/hr     GI: GI prophylaxis. Continue feeding start free waTER PER nG 250CC q 4 HRS     RENAL:  Follow up lytes.  Correct as neededBMP     INFECTIOUS DISEASE: Follow up cultures, DOXY    HEMATOLOGICAL:  DVT prophylaxis. follow h/H    ENDOCRINE:  Follow up rheumatological work up. Solumedrol 125mg q6h for 3 days than lower    POOR PROGNOSIS  Monitor in ICU

## 2018-12-23 NOTE — PROGRESS NOTE ADULT - SUBJECTIVE AND OBJECTIVE BOX
Patient is a 73y old  Female who presents with a chief complaint of SOB and rash (22 Dec 2018 09:18)      Over Night Events:  Patient seen and examined no blood transfusing h/h stable       ROS:  See HPI    PHYSICAL EXAM    ICU Vital Signs Last 24 Hrs  T(C): 36.6 (23 Dec 2018 08:00), Max: 36.6 (23 Dec 2018 08:00)  T(F): 97.9 (23 Dec 2018 08:00), Max: 97.9 (23 Dec 2018 08:00)  HR: 52 (23 Dec 2018 08:00) (50 - 72)  BP: 101/42 (23 Dec 2018 08:00) (69/57 - 123/52)  BP(mean): 60 (23 Dec 2018 08:00) (56 - 83)  ABP: --  ABP(mean): --  RR: 18 (23 Dec 2018 08:00) (17 - 38)  SpO2: 99% (23 Dec 2018 08:00) (93% - 100%)      General: Aox3  HEENT:    irvin           Et tube   Lymph Nodes: NO cervical LN   Lungs: Bilateral BS  Cardiovascular: Regular   Abdomen: Soft, Positive BS  Extremities: No clubbing   Skin: warm   Neurological: no  focal deficit   Musculoskeletal: move all ext     I&O's Detail    22 Dec 2018 07:01  -  23 Dec 2018 07:00  --------------------------------------------------------  IN:    Free Water: 1000 mL    IV PiggyBack: 200 mL    norepinephrine Infusion: 25.6 mL    propofol Infusion: 117 mL    Vital High Protein: 595 mL  Total IN: 1937.6 mL    OUT:    Indwelling Catheter - Urethral: 1280 mL  Total OUT: 1280 mL    Total NET: 657.6 mL      23 Dec 2018 07:01  -  23 Dec 2018 08:55  --------------------------------------------------------  IN:    Free Water: 250 mL    norepinephrine Infusion: 1 mL    propofol Infusion: 5 mL    Vital High Protein: 35 mL  Total IN: 291 mL    OUT:    Indwelling Catheter - Urethral: 80 mL  Total OUT: 80 mL    Total NET: 211 mL          LABS:                          8.0    12.25 )-----------( 249      ( 23 Dec 2018 04:30 )             25.7         23 Dec 2018 04:30    146    |  101    |  59     ----------------------------<  174    3.8     |  37     |  0.8      Ca    8.9        23 Dec 2018 04:30  Mg     2.4       22 Dec 2018 07:14                                               PT/INR - ( 22 Dec 2018 07:14 )   PT: 18.20 sec;   INR: 1.59 ratio         PTT - ( 22 Dec 2018 07:14 )  PTT:28.3 sec                                             CARDIAC MARKERS ( 21 Dec 2018 11:00 )  x     / x     / 120 U/L / x     / x                                                            Culture - Acid Fast - Bronchial w/Smear (collected 20 Dec 2018 13:00)  Source: .Broncial None    Culture - Bronchial (collected 20 Dec 2018 13:00)  Source: .Broncial None  Final Report (22 Dec 2018 18:13):    No growth at 48 hours                                                   Mode: AC/ CMV (Assist Control/ Continuous Mandatory Ventilation)  RR (machine): 18  TV (machine): 350  FiO2: 40  PEEP: 5  ITime: 1  MAP: 13  PIP: 33                                      ABG - ( 23 Dec 2018 08:05 )  pH, Arterial: 7.44  pH, Blood: x     /  pCO2: 60    /  pO2: 93    / HCO3: 40    / Base Excess: 14.3  /  SaO2: 99                  MEDICATIONS  (STANDING):  chlorhexidine 0.12% Liquid 15 milliLiter(s) Oral Mucosa two times a day  chlorhexidine 4% Liquid 1 Application(s) Topical <User Schedule>  doxycycline IVPB      doxycycline IVPB 100 milliGRAM(s) IV Intermittent every 12 hours  lactulose Syrup 10 Gram(s) Oral daily  methylPREDNISolone sodium succinate Injectable 125 milliGRAM(s) IV Push every 6 hours  norepinephrine Infusion 0.05 MICROgram(s)/kG/Min (7.5 mL/Hr) IV Continuous <Continuous>  nystatin Powder 1 Application(s) Topical two times a day  pantoprazole  Injectable 40 milliGRAM(s) IV Push two times a day  propofol Infusion 5 MICROgram(s)/kG/Min (2.4 mL/Hr) IV Continuous <Continuous>  simvastatin 5 milliGRAM(s) Oral at bedtime    MEDICATIONS  (PRN):  fentaNYL    Injectable 50 MICROGram(s) IV Push two times a day PRN Moderate Pain (4 - 6)  guaiFENesin    Syrup 200 milliGRAM(s) Oral every 6 hours PRN Cough          Xrays:  TLC:  OG:  ET tube:                                                                                    left opacity    ECHO:

## 2018-12-24 NOTE — PROGRESS NOTE ADULT - SUBJECTIVE AND OBJECTIVE BOX
DENISE BARAJAS  73y, Female      OVERNIGHT EVENTS:  afebrile  all cultures NGTD  failed weaning trial  levophed    ROS negative except as per above    VITALS:  T(F): 98.6, Max: 99.1 (12-23-18 @ 16:00)  HR: 63  BP: 99/52  RR: 35Vital Signs Last 24 Hrs  T(C): 37 (24 Dec 2018 11:00), Max: 37.3 (23 Dec 2018 16:00)  T(F): 98.6 (24 Dec 2018 11:00), Max: 99.1 (23 Dec 2018 16:00)  HR: 63 (24 Dec 2018 12:00) (54 - 122)  BP: 99/52 (24 Dec 2018 12:00) (81/65 - 112/47)  BP(mean): 65 (24 Dec 2018 12:00) (55 - 78)  RR: 35 (24 Dec 2018 12:00) (18 - 53)  SpO2: 98% (24 Dec 2018 12:00) (95% - 100%)    PHYSICAL EXAM  Gen: intubated  HEENT: NCAT. EOMI. MMM.   Neck: Supple  CV: RRR, no murmurs  Lungs: decreased bases  Abd: Soft. NTND  Extr: wwp, trace edema  Skin: crusted rash  Neuro: awake  Lines: clean    TESTS & MEASUREMENTS:                        7.4    13.65 )-----------( 232      ( 24 Dec 2018 07:37 )             23.1     12-24    135  |  92<L>  |  58<H>  ----------------------------<  164<H>  4.0   |  37<H>  |  0.8    Ca    8.3<L>      24 Dec 2018 07:37    TPro  5.7<L>  /  Alb  2.9<L>  /  TBili  0.9  /  DBili  x   /  AST  24  /  ALT  51<H>  /  AlkPhos  49  12-24    LIVER FUNCTIONS - ( 24 Dec 2018 07:37 )  Alb: 2.9 g/dL / Pro: 5.7 g/dL / ALK PHOS: 49 U/L / ALT: 51 U/L / AST: 24 U/L / GGT: x             Culture - Acid Fast - Bronchial w/Smear (collected 12-20-18 @ 13:00)  Source: .Broncial None    Culture - Bronchial (collected 12-20-18 @ 13:00)  Source: .Broncial None  Final Report (12-22-18 @ 18:13):    No growth at 48 hours    Culture - Blood (collected 12-18-18 @ 12:06)  Source: .Blood None  Final Report (12-23-18 @ 23:00):    No growth at 5 days.    Culture - Fungal, Bronchial (collected 12-18-18 @ 10:22)  Source: .Broncial None  Preliminary Report (12-19-18 @ 06:32):    Testing in progress    Culture - Acid Fast - Bronchial w/Smear (collected 12-18-18 @ 10:22)  Source: .Broncial None    Culture - Bronchial (collected 12-18-18 @ 10:22)  Source: Bronch Wash None  Gram Stain (12-18-18 @ 22:22):    Numerous polymorphonuclear leukocytes per low power field    Rare Squamous epithelial cells per low power field    No organisms seen per oil power field  Final Report (12-20-18 @ 19:55):    Normal Respiratory Windy present    Culture - Urine (collected 12-17-18 @ 14:11)  Source: .Urine Clean Catch (Midstream)  Final Report (12-19-18 @ 06:23):    No growth          RADIOLOGY & ADDITIONAL TESTS:    ANTIBIOTICS:  acyclovir IVPB   116 mL/Hr IV Intermittent (12-17-18 @ 02:04)   116 mL/Hr IV Intermittent (12-16-18 @ 18:26)   116 mL/Hr IV Intermittent (12-16-18 @ 12:39)   116 mL/Hr IV Intermittent (12-16-18 @ 06:08)   116 mL/Hr IV Intermittent (12-15-18 @ 20:54)   116 mL/Hr IV Intermittent (12-15-18 @ 12:47)   116 mL/Hr IV Intermittent (12-15-18 @ 03:07)   116 mL/Hr IV Intermittent (12-14-18 @ 21:46)   116 mL/Hr IV Intermittent (12-14-18 @ 12:29)   116 mL/Hr IV Intermittent (12-14-18 @ 03:54)   116 mL/Hr IV Intermittent (12-13-18 @ 20:15)   116 mL/Hr IV Intermittent (12-13-18 @ 12:57)   116 mL/Hr IV Intermittent (12-13-18 @ 02:21)   116 mL/Hr IV Intermittent (12-12-18 @ 20:30)    acyclovir IVPB   266 mL/Hr IV Intermittent (12-18-18 @ 05:06)   266 mL/Hr IV Intermittent (12-17-18 @ 21:47)    cefTRIAXone   IVPB   100 mL/Hr IV Intermittent (12-12-18 @ 14:54)    cefTRIAXone   IVPB   100 mL/Hr IV Intermittent (12-13-18 @ 15:16)    doxycycline IVPB   110 mL/Hr IV Intermittent (12-20-18 @ 15:33)    doxycycline IVPB   110 mL/Hr IV Intermittent (12-24-18 @ 05:54)   110 mL/Hr IV Intermittent (12-23-18 @ 18:14)   110 mL/Hr IV Intermittent (12-23-18 @ 05:52)   110 mL/Hr IV Intermittent (12-22-18 @ 18:44)   110 mL/Hr IV Intermittent (12-22-18 @ 05:23)   110 mL/Hr IV Intermittent (12-21-18 @ 17:24)   110 mL/Hr IV Intermittent (12-21-18 @ 05:28)    levoFLOXacin IVPB   100 mL/Hr IV Intermittent (12-12-18 @ 17:17)    levoFLOXacin IVPB   100 mL/Hr IV Intermittent (12-13-18 @ 15:16)    levoFLOXacin IVPB   100 mL/Hr IV Intermittent (12-17-18 @ 12:50)    levoFLOXacin IVPB   100 mL/Hr IV Intermittent (12-20-18 @ 09:31)   100 mL/Hr IV Intermittent (12-19-18 @ 10:51)   100 mL/Hr IV Intermittent (12-18-18 @ 10:49)    piperacillin/tazobactam IVPB.   200 mL/Hr IV Intermittent (12-14-18 @ 17:09)    piperacillin/tazobactam IVPB.   200 mL/Hr IV Intermittent (12-20-18 @ 11:42)   200 mL/Hr IV Intermittent (12-20-18 @ 05:44)   200 mL/Hr IV Intermittent (12-19-18 @ 23:47)   200 mL/Hr IV Intermittent (12-19-18 @ 17:33)   200 mL/Hr IV Intermittent (12-19-18 @ 11:46)   200 mL/Hr IV Intermittent (12-19-18 @ 05:03)   200 mL/Hr IV Intermittent (12-19-18 @ 00:14)   200 mL/Hr IV Intermittent (12-18-18 @ 17:19)   200 mL/Hr IV Intermittent (12-18-18 @ 11:40)   200 mL/Hr IV Intermittent (12-18-18 @ 05:06)   200 mL/Hr IV Intermittent (12-17-18 @ 23:39)   200 mL/Hr IV Intermittent (12-17-18 @ 18:18)   200 mL/Hr IV Intermittent (12-17-18 @ 11:28)   200 mL/Hr IV Intermittent (12-17-18 @ 05:39)   200 mL/Hr IV Intermittent (12-17-18 @ 00:45)   200 mL/Hr IV Intermittent (12-16-18 @ 18:25)   200 mL/Hr IV Intermittent (12-16-18 @ 12:40)   200 mL/Hr IV Intermittent (12-16-18 @ 06:11)   200 mL/Hr IV Intermittent (12-16-18 @ 00:32)   200 mL/Hr IV Intermittent (12-15-18 @ 18:33)   200 mL/Hr IV Intermittent (12-15-18 @ 12:47)   200 mL/Hr IV Intermittent (12-15-18 @ 05:31)   200 mL/Hr IV Intermittent (12-15-18 @ 00:33)    valACYclovir   1000 milliGRAM(s) Oral (12-12-18 @ 11:31)        doxycycline IVPB      doxycycline IVPB 100 milliGRAM(s) IV Intermittent every 12 hours

## 2018-12-24 NOTE — PROGRESS NOTE ADULT - ASSESSMENT
73F    Hypertension    Admitted for hypoxemia, Afib RVR, , elevated d-dimer  Sepsis ruled out on admission  Afebrile  No leukocytosis  Lactic acidosis to 3    Rash on C4/5 area, vesicles, s/p treatment for VZV  CXR edema, likely fluid overload in the setting of Afib RVR  CT chest Mucoid debris in the proximal trachea suggestive of aspiration. Bilateral patchy airspace opacities greatest in the right lower lobe. +bronchiectasis  MRSA PCR neg  SARITHA/ANCA neg    Bronch cx NGTD  bloody secretions from trach, ?DAH, downtrending hgb; PLT wnl. CT AP no bleed      - on steroids  - Consider d/c doxy 12/15 as will complete a 14 day course of antibiotics    Spectra [id number]

## 2018-12-24 NOTE — PROGRESS NOTE ADULT - SUBJECTIVE AND OBJECTIVE BOX
Patient is a 73y old  Female who presents with a chief complaint of SOB and rash (24 Dec 2018 07:10)      Over Night Events:  Patient seen and examined still vented failed weaning trial yetserday       ROS:  See HPI    PHYSICAL EXAM    ICU Vital Signs Last 24 Hrs  T(C): 37 (24 Dec 2018 04:00), Max: 37.3 (23 Dec 2018 16:00)  T(F): 98.6 (24 Dec 2018 04:00), Max: 99.1 (23 Dec 2018 16:00)  HR: 58 (24 Dec 2018 06:00) (54 - 138)  BP: 103/45 (24 Dec 2018 06:00) (81/65 - 121/52)  BP(mean): 66 (24 Dec 2018 06:00) (55 - 78)  ABP: --  ABP(mean): --  RR: 20 (24 Dec 2018 06:00) (18 - 42)  SpO2: 98% (24 Dec 2018 06:00) (95% - 100%)      General: respond to verbal   HEENT:       ET tube        irvin  Lymph Nodes: NO cervical LN   Lungs: Bilateral BS  Cardiovascular: Regular   Abdomen: Soft, Positive BS  Extremities: No clubbing , 1 edma   Skin: warm   Neurological: wekaness b.l   Musculoskeletal: move all ext     I&O's Detail    23 Dec 2018 07:01  -  24 Dec 2018 07:00  --------------------------------------------------------  IN:    dextrose 5%.: 1000 mL    Enteral Tube Flush: 530 mL    Free Water: 1000 mL    norepinephrine Infusion: 24 mL    propofol Infusion: 120 mL    Vital High Protein: 700 mL  Total IN: 3374 mL    OUT:    Indwelling Catheter - Urethral: 1700 mL  Total OUT: 1700 mL    Total NET: 1674 mL          LABS:                          7.4    13.65 )-----------( 232      ( 24 Dec 2018 07:37 )             23.1         23 Dec 2018 04:30    146    |  101    |  59     ----------------------------<  174    3.8     |  37     |  0.8      Ca    8.9        23 Dec 2018 04:30                                                                                                                                                                                               Mode: AC/ CMV (Assist Control/ Continuous Mandatory Ventilation)  RR (machine): 18  TV (machine): 350  FiO2: 40  PEEP: 5  ITime: 1  MAP: 12  PIP: 29                                      ABG - ( 23 Dec 2018 09:43 )  pH, Arterial: 7.38  pH, Blood: x     /  pCO2: 68    /  pO2: 77    / HCO3: 40    / Base Excess: 13.1  /  SaO2: 96                  MEDICATIONS  (STANDING):  chlorhexidine 0.12% Liquid 15 milliLiter(s) Oral Mucosa two times a day  chlorhexidine 4% Liquid 1 Application(s) Topical <User Schedule>  dextrose 5%. 1000 milliLiter(s) (50 mL/Hr) IV Continuous <Continuous>  doxycycline IVPB      doxycycline IVPB 100 milliGRAM(s) IV Intermittent every 12 hours  lactulose Syrup 10 Gram(s) Oral daily  methylPREDNISolone sodium succinate Injectable 60 milliGRAM(s) IV Push every 8 hours  norepinephrine Infusion 0.05 MICROgram(s)/kG/Min (7.5 mL/Hr) IV Continuous <Continuous>  nystatin Powder 1 Application(s) Topical two times a day  pantoprazole  Injectable 40 milliGRAM(s) IV Push two times a day  propofol Infusion 5 MICROgram(s)/kG/Min (2.4 mL/Hr) IV Continuous <Continuous>  simvastatin 5 milliGRAM(s) Oral at bedtime    MEDICATIONS  (PRN):  fentaNYL    Injectable 50 MICROGram(s) IV Push two times a day PRN Moderate Pain (4 - 6)  guaiFENesin    Syrup 200 milliGRAM(s) Oral every 6 hours PRN Cough          Xrays:  TLC:  OG:  ET tube:                                                                                    decrease b/l opacity buddy LL    ECHO:

## 2018-12-24 NOTE — PROGRESS NOTE ADULT - ASSESSMENT
73F from [facility] w/ PMH of HTN presented to [facility] with worsening generalized weakness and SOB for 3 days.     #Acute Hypercapnic Respiratory Failure likely 2/2 Lovenox induced Diffuse alveolar hemorrhage   -Patient Intubated 12/17 as she was saturating in late 80s  -Duplex report 12/14: negative for DVT   -ID on board > 12/18 dced Acyclovir  -Proventil, Spiriva, Duonebs q6h, Zosyn, started on Levofloxacin 12/17 >> stopped zosyn and Levo 12/20 and started on Doxy  -Sepsis ruled out > no leukocytosis, afebrile  -RVP negative 12/13  -Blood culture 12/14 negative  -Urine Legionella and Strep Ag negative 12/15  -Echo 12/15: LVEF 50-55%  -CT chest suggestive for RLL PNA  -MRSA negative 12/17  -ESR 86, CRP 1.72, RF <10 12/18  -RF factor, SARITHA, ANCA, Hep panel, complements 3/4 negative  -Bronchoscopy done 12/20 > BAL done and sent for testing > Increasing Aliquot  -Cental line placement 12/20  -Rheuma on board  -Repeat UA showed No RBCs  -steroids were increased to 125 Q6 12/20 and decreased today 12/23 to Q8  -Less bloody bronchial secretions      #New Atrial Fibrillation, rate controlled > Most likely ischemic response?  -Patient currently in NSR   -CHA2 DS2 VASC: 4   -Therapeutic Lovenox for AC stopped for episode of 200-300 ml bleed on 12/17 at the injection site and the Hb dropped from 11 to 9 > CT A/P ordered to r/o retroperitoneal bleed > negative for bleed. Bowel regimen added    #HLD  -Simvastatin     #HTN  -HCTZ held for hypotension     #Varicella Zoster Right Shoulder: improving  -Contact and airborne precautions dced 12/17 as per ID rec  -Acyclovir 800mg IV q8h, Benadryl PRN for pruritus > stopped 12/18    #ANTONIETTA improving  -Creatinine 1.0 12/18  -Urine Urea 7575, creat 30, Na 94    #Asymptomatic Bacteriuria with RBCs  -UA positive 12/15 but culture negative 12/15  -Repeat UA 12/17 positive  -Repeat UA negative for rbcs 12/20    #Hypokalemia: improving  -Repleted, monitor BMP   -3.8 12/23        DVT ppx: Therapeutic Lovenox  GI ppx: Not indicated   Diet: NPO  Activity: increase as tolerated   FULL CODE

## 2018-12-24 NOTE — PROGRESS NOTE ADULT - ASSESSMENT
IMPRESSION:    Acute on chronic hypercapnic respiratory failure s/p intubation s/p bronchoscopy alveolar hem, ? etiology was on lovenox  IDALMIS / OHS  Pneumonia/Aspiration?/ fluid overload  Elevated right hemidiaphragm  Dec HB s.p transfusion  anemia   hypernatremia   PLAN:     CNS:  if needed propofol, SAT    HEENT: Oral care    PULMONARY:  HOB @ 45 degrees, will do abg and then evaluate for weaning trial   will do weaning trial   solumderol 60 mg Q 8hrs   CARDIOVASCULAR: KEEP OFF AC  d5w 50cc/hr     GI: GI prophylaxis. Continue feeding start free waTER PER nG 250CC q 4 HRS     RENAL:  Follow up lytes.  Correct as neededBMP  need BMP check NA     INFECTIOUS DISEASE: Follow up cultures, DOXY    HEMATOLOGICAL:  DVT prophylaxis. follow h/H    ENDOCRINE:  Follow up rheumatological work up.     POOR PROGNOSIS  Monitor in ICU IMPRESSION:    Acute on chronic hypercapnic respiratory failure s/p intubation s/p bronchoscopy alveolar hem, ? etiology was on lovenox  IDALMIS / OHS  Pneumonia/Aspiration?/ fluid overload  Elevated right hemidiaphragm  Dec HB s.p transfusion  anemia   hypernatremia   PLAN:     CNS:  if needed propofol, SAT    HEENT: Oral care    PULMONARY:  HOB @ 45 degrees, will do abg and then evaluate for weaning trial   will do weaning trial   solumderol 60 mg Q 8hrs   CARDIOVASCULAR: KEEP OFF AC  d5w 50cc/hr     GI: GI prophylaxis. Continue feeding start free waTER PER nG 250CC q 4 HRS     RENAL:  Follow up lytes.  Correct as neededBMP  need BMP check NA     INFECTIOUS DISEASE: Follow up cultures, DOXY    HEMATOLOGICAL:  DVT prophylaxis. follow h/H    ENDOCRINE:  Follow up rheumatological work up.     POOR PROGNOSIS  Monitor in ICU  d/kevin wilson

## 2018-12-24 NOTE — PROGRESS NOTE ADULT - SUBJECTIVE AND OBJECTIVE BOX
Patient is a 73y old Female who presented with a chief complaint of SOB and rash (23 Dec 2018 20:11)  Currently admitted to medicine with the primary diagnosis of Shortness of breath     Today is hospital day 13d. Pt was initially in ICU  and downgraded on 12/14 to 3c but was upgraded to CCU the same day because of worsening SOB  CCU/ICU day: 10  Intubated: on 12/17  Central Line: right side IJ  Kent: yes  NG: OG tube  Drips: NE and propofol as of now  Iv Lines: 3 lines  failed weaning trial yesterday      PAST MEDICAL & SURGICAL HISTORY  Hypertension  No significant past surgical history    SOCIAL HISTORY:  Negative for smoking/alcohol/drug use.     ALLERGIES:  No Known Allergies    MEDICATIONS:  STANDING MEDICATIONS  chlorhexidine 0.12% Liquid 15 milliLiter(s) Oral Mucosa two times a day  chlorhexidine 4% Liquid 1 Application(s) Topical <User Schedule>  dextrose 5%. 1000 milliLiter(s) IV Continuous <Continuous>  doxycycline IVPB      doxycycline IVPB 100 milliGRAM(s) IV Intermittent every 12 hours  lactulose Syrup 10 Gram(s) Oral daily  methylPREDNISolone sodium succinate Injectable 60 milliGRAM(s) IV Push every 8 hours  norepinephrine Infusion 0.05 MICROgram(s)/kG/Min IV Continuous <Continuous>  nystatin Powder 1 Application(s) Topical two times a day  pantoprazole  Injectable 40 milliGRAM(s) IV Push two times a day  propofol Infusion 5 MICROgram(s)/kG/Min IV Continuous <Continuous>  simvastatin 5 milliGRAM(s) Oral at bedtime    PRN MEDICATIONS  fentaNYL    Injectable 50 MICROGram(s) IV Push two times a day PRN  guaiFENesin    Syrup 200 milliGRAM(s) Oral every 6 hours PRN    Home Medications:  hydroCHLOROthiazide 25 mg oral tablet: 1 tab(s) orally once a day (12 Dec 2018 02:08)  metOLazone 5 mg oral tablet: 1  orally 3 times a week (12 Dec 2018 02:08)  metoprolol succinate 25 mg oral tablet, extended release: 1 tab(s) orally once a day (12 Dec 2018 02:08)    VITALS:   T(F): 98.6  HR: 58  BP: 103/45  RR: 20  SpO2: 98%    LABS:                        8.0    12.25 )-----------( 249      ( 23 Dec 2018 04:30 )             25.7     12-23    146  |  101  |  59<H>  ----------------------------<  174<H>  3.8   |  37<H>  |  0.8    Ca    8.9      23 Dec 2018 04:30  Mg     2.4     12-22    TPro  6.4  /  Alb  3.3<L>  /  TBili  0.7  /  DBili  x   /  AST  26  /  ALT  41  /  AlkPhos  52  12-22    PT/INR - ( 22 Dec 2018 07:14 )   PT: 18.20 sec;   INR: 1.59 ratio         PTT - ( 22 Dec 2018 07:14 )  PTT:28.3 sec    ABG - ( 23 Dec 2018 09:43 )  pH, Arterial: 7.38  pH, Blood: x     /  pCO2: 68    /  pO2: 77    / HCO3: 40    / Base Excess: 13.1  /  SaO2: 96            RADIOLOGY:  < from: Xray Chest 1 View- PORTABLE-Routine (12.23.18 @ 05:54) >  Impression:    Low lung volumes with stable left lung consolidation.  Stable support apparatus.      < from: Xray Chest 1 View- PORTABLE-Routine (12.21.18 @ 05:01) >  Impression:    Stable left greater than right parenchymal opacities.  Support devices, in satisfactory position.    < from: Xray Chest 1 View- PORTABLE-Routine (12.19.18 @ 04:49) >  Impression:    Stable left greater than right opacities.      < from: Xray Chest 1 View- PORTABLE-Routine (12.18.18 @ 06:31) >  Impression:    Resolving left lower lobe opacity.      < from: Xray Chest 1 View-PORTABLE IMMEDIATE (12.17.18 @ 11:57) >  Impression:    No significant change from prior. Bilateral airspace opacities, worse at   the left base       from: Xray Chest 1 View- PORTABLE-Routine (12.16.18 @ 06:12) >  Impression:    No radiographic evidence of acute cardiopulmonary disease.      < from: Xray Chest 1 View- PORTABLE-Routine (12.15.18 @ 06:58) >  Impression:    Decreased bilateral basilar opacity. No pleural effusion or air leak      < from: [facility] Duplex Lower Ext Vein Scan, Bilat (12.14.18 @ 16:42) >  Impression:  No evidence of deep venous thrombosis or superficial thrombophlebitis in   bilateral lower extremities.      < from: Xray Chest 1 View- PORTABLE-Routine (12.14.18 @ 13:26) >  Impression:    Worsening basilar opacifications. Follow-up as needed.      < from: CT Chest No Cont (12.12.18 @ 18:08) >  IMPRESSION:  Mucoid debris in the proximal trachea (3:61) suggestive of aspiration.  Bilateral patchy airspace opacities greatest in the right lower lobe.   Correlate for infectious/inflammatory etiologies.  Prominent mediastinal lymph nodes up to 1.0 cm, probably reactive in   nature.  Cholelithiasis with suggestion of calcification within the wall of the   gallbladder (porcelain gallbladder).      < from: Xray Chest 1 View-PORTABLE IMMEDIATE (12.11.18 @ 20:16) >  IMPRESSION:   Limited exam due to patient body habitus. Apparent perihilar opacities.      < from: Transthoracic Echocardiogram (12.15.18 @ 11:18) >  Summary:   1. Left ventricular ejection fraction, by visual estimation, is 50 to   55%.   2. Normal left ventricular size and wall thicknesses, with normal   systolic function.   3. Spectral Doppler shows impaired relaxation pattern of left   ventricular myocardial filling (Grade I diastolic dysfunction).   4. Mild tricuspid regurgitation.   5. Mild aortic regurgitation.   6. Pulmonic valve regurgitation.   7. Estimated pulmonary artery systolic pressure is 52.5 mmHg assuming a   right atrial pressure of 8 mmHg, which is consistent with moderate   pulmonary hypertension.    PHYSICAL EXAM:  GEN: No acute distress, Intubated  LUNGS: decreased breath sounds BL  HEART: S1/S2 present. RRR.   ABD: Soft, non-tender, non-distended. Bowel sounds present  NEURO: Intubated but follows commands

## 2018-12-25 NOTE — CHART NOTE - NSCHARTNOTEFT_GEN_A_CORE
Registered Dietitian Follow-Up     Patient Profile Reviewed                           Yes [x]   No []     Nutrition History Previously Obtained        Yes []  No [x] Pt intubated at initial and remains intubated at this time.        Pertinent Subjective Information: Pt remains intubated, OGT in place. Spoke to RN who reported that pt is tolerating current TF regimen of Vital HP @ goal rate of 35 ml/hr + 1 pckt of Prosource TF, with no issues.      Pertinent Medical Interventions: Pt failed SBT trial. Sedation restarted. Levophed drip continues.     Diet order: Vital HP @35ml/hr via OGT +1 pckt Prosource TF + propofol 7mL/hr (including propofol provides = 1069kcal, 85g protein, 702mL h20)      Anthropometrics:  - Ht. 152.4cm   - Wt. 81.5kg (12/21)--no new wts noted   - %wt change--vs admit wt of 80kg. likely d/t fluid retention will continue to monitor and adjust PRN.  - BMI 35 (using 12/21 wt)  - IBW     Pertinent Lab Data: Reviewed (12/25): H/H 7.3/22.4, BUN 44, Cr. 0.6, Glu 122     Pertinent Meds: heparin, propofol @ 7mL/hr (185 fat calories), abx, solumedrol, fentanyl, lactulose, levophed, protonix, simvastatin      Physical Findings:  - Appearance: intubated/ventilated   - GI function: LBM 12/25  - Tubes: intubated, OGT   - Oral/Mouth cavity: NPO  - Skin: 1+ pitting edema b/l hands; BS 14      Nutrition Requirements  Weight Used: 80kg CBW, 45.5kg IBW (continued from RD note 12/18)     Estimated energy needs: 860-1100kcal/d (based on: 860- 1000kcal/d (60-70% [id number]) vs. 1000-1140kcal/d (22-24kcal/kg IBW) vs. 880-1120kcal/d (11-14kcal/act wt))  Estimated protein needs: 68-91gm/d (1.5-2.0gm/kg IBW) - pt w/ ANTONIETTA per MD    Estimated fluid needs: per CCU team     Nutrient Intake: meeting kcal/pro needs at this time; however will still keep pt at risk as pt remains intubated      Previous Nutrition Diagnosis: Excessive enteral nutrition infusion d/t current TF regimen with propofol (resolved)    Nutrition Intervention: enteral nutrition     Recommend:  1. Continue current TF regimen  2. Consider daily MVI      Goal/Expected Outcome: TF to provide >85% but <105% estimated needs upon f/u in 3 days.      Indicator/Monitoring: RD will monitor energy intake, body composition, glucose profile, NFPF (GI abnormalities).

## 2018-12-25 NOTE — PROGRESS NOTE ADULT - SUBJECTIVE AND OBJECTIVE BOX
DENISE BARAJAS 73y Female  MRN#: [id number]   SUBJECTIVE  Patient is a 73y old Female who presents with a chief complaint of SOB and rash (24 Dec 2018 12:22)  Currently admitted to medicine with the primary diagnosis of Shortness of breath    OBJECTIVE  PAST MEDICAL & SURGICAL HISTORY  Hypertension  No significant past surgical history    ALLERGIES:  No Known Allergies    MEDICATIONS:  STANDING MEDICATIONS  chlorhexidine 0.12% Liquid 15 milliLiter(s) Oral Mucosa two times a day  chlorhexidine 4% Liquid 1 Application(s) Topical <User Schedule>  doxycycline IVPB      doxycycline IVPB 100 milliGRAM(s) IV Intermittent every 12 hours  lactulose Syrup 10 Gram(s) Oral daily  methylPREDNISolone sodium succinate Injectable 60 milliGRAM(s) IV Push every 8 hours  norepinephrine Infusion 0.05 MICROgram(s)/kG/Min IV Continuous <Continuous>  nystatin Powder 1 Application(s) Topical two times a day  pantoprazole  Injectable 40 milliGRAM(s) IV Push two times a day  propofol Infusion 5 MICROgram(s)/kG/Min IV Continuous <Continuous>  simvastatin 5 milliGRAM(s) Oral at bedtime    PRN MEDICATIONS  fentaNYL    Injectable 50 MICROGram(s) IV Push two times a day PRN  guaiFENesin    Syrup 200 milliGRAM(s) Oral every 6 hours PRN      VITAL SIGNS: Last 24 Hours  T(C): 37.3 (25 Dec 2018 00:00), Max: 37.3 (25 Dec 2018 00:00)  T(F): 99.1 (25 Dec 2018 00:00), Max: 99.1 (25 Dec 2018 00:00)  HR: 64 (25 Dec 2018 05:00) (50 - 74)  BP: 107/43 (25 Dec 2018 05:00) (93/41 - 118/51)  BP(mean): 65 (25 Dec 2018 05:00) (58 - 85)  RR: 26 (25 Dec 2018 05:00) (18 - 53)  SpO2: 98% (25 Dec 2018 05:00) (95% - 100%)    LABS:                        7.3    12.15 )-----------( 238      ( 25 Dec 2018 04:30 )             22.4     12-24    135  |  92<L>  |  58<H>  ----------------------------<  164<H>  4.0   |  37<H>  |  0.8    Ca    8.3<L>      24 Dec 2018 07:37    TPro  5.7<L>  /  Alb  2.9<L>  /  TBili  0.9  /  DBili  x   /  AST  24  /  ALT  51<H>  /  AlkPhos  49  12-24        ABG - ( 24 Dec 2018 10:00 )  pH, Arterial: 7.34  pH, Blood: x     /  pCO2: 70    /  pO2: 75    / HCO3: 38    / Base Excess: 11.0  /  SaO2: 95          PHYSICAL EXAM:  GEN: No acute distress, Intubated  LUNGS: decreased breath sounds BL  HEART: S1/S2 present. RRR.   ABD: Soft, non-tender, non-distended. Bowel sounds present  NEURO: Intubated but follows commands    ASSESSMENT & PLAN  73F from [facility] w/ PMH of HTN presented to [facility] with worsening generalized weakness and SOB for 3 days.     #Acute Hypercapnic Respiratory Failure likely 2/2 Lovenox induced Diffuse alveolar hemorrhage   -Patient Intubated 12/17 as she was saturating in late 80s  -Duplex report 12/14: negative for DVT   -ID on board > 12/18 dced Acyclovir  -Proventil, Spiriva, Duonebs q6h, Zosyn, started on Levofloxacin 12/17 >> stopped zosyn and Levo 12/20 and started on Doxy  -Sepsis ruled out > no leukocytosis, afebrile  -RVP negative 12/13  -Blood culture 12/14 negative  -Urine Legionella and Strep Ag negative 12/15  -Echo 12/15: LVEF 50-55%  -CT chest suggestive for RLL PNA  -MRSA negative 12/17  -ESR 86, CRP 1.72, RF <10 12/18  -RF factor, SARITHA, ANCA, Hep panel, complements 3/4 negative  -Bronchoscopy done 12/20 > BAL done and sent for testing > Increasing Aliquot  -Cental line placement 12/20  -Rheuma on board  -Repeat UA showed No RBCs  -steroids were increased to 125 Q6 12/20 and decreased today 12/23 to Q8  -Less bloody bronchial secretions      #New Atrial Fibrillation, rate controlled > Most likely ischemic response?  -Patient currently in NSR   -CHA2 DS2 VASC: 4   -Therapeutic Lovenox for AC stopped for episode of 200-300 ml bleed on 12/17 at the injection site and the Hb dropped from 11 to 9 > CT A/P ordered to r/o retroperitoneal bleed > negative for bleed. Bowel regimen added    #HLD  -Simvastatin     #HTN  -HCTZ held for hypotension     #Varicella Zoster Right Shoulder: improving  -Contact and airborne precautions dced 12/17 as per ID rec  -Acyclovir 800mg IV q8h, Benadryl PRN for pruritus > stopped 12/18    #ANTONIETTA improving  -Creatinine 1.0 12/18  -Urine Urea 7575, creat 30, Na 94    #Asymptomatic Bacteriuria with RBCs  -UA positive 12/15 but culture negative 12/15  -Repeat UA 12/17 positive  -Repeat UA negative for rbcs 12/20    #Hypokalemia: improving  -Repleted, monitor BMP   -3.8 12/23    DVT ppx: Therapeutic Lovenox  GI ppx: Not indicated   Diet: NPO  Activity: bedrest  FULL CODE

## 2018-12-25 NOTE — PROGRESS NOTE ADULT - ASSESSMENT
IMPRESSION:    Acute on chronic hypercapnic respiratory failure s/p intubation s/p bronchoscopy alveolar hem, ? etiology was on lovenox  IDALMIS / OHS  Pneumonia/Aspiration?/ fluid overload  Elevated right hemidiaphragm  Dec HB s.p transfusion  anemia   hypernatremia   PLAN:     CNS: SAT     HEENT: Oral care    PULMONARY:  HOB @ 45 degrees,   will do weaning trial   solumderol 60 mg Q 8hrs   CARDIOVASCULAR: KEEP OFF AC      GI: GI prophylaxis. Continue feeding start free waTER PER nG 250CC q 4 HRS     RENAL:  Follow up lytes.  Correct as neededBMP  need BMP check NA     INFECTIOUS DISEASE: Follow up cultures, DOXY    HEMATOLOGICAL:  DVT prophylaxis. follow h/H start heaprin SQ     ENDOCRINE:  Follow up rheumatological work up.     POOR PROGNOSIS  Monitor in ICU

## 2018-12-25 NOTE — PROGRESS NOTE ADULT - SUBJECTIVE AND OBJECTIVE BOX
Patient is a 73y old  Female who presents with a chief complaint of SOB and rash (25 Dec 2018 05:21)      Over Night Events:  Patient seen and examined. failed weaning trial     ROS:  See HPI    PHYSICAL EXAM    ICU Vital Signs Last 24 Hrs  T(C): 37.3 (25 Dec 2018 00:00), Max: 37.3 (25 Dec 2018 00:00)  T(F): 99.1 (25 Dec 2018 00:00), Max: 99.1 (25 Dec 2018 00:00)  HR: 64 (25 Dec 2018 08:00) (50 - 74)  BP: 95/52 (25 Dec 2018 08:00) (93/41 - 118/51)  BP(mean): 65 (25 Dec 2018 08:00) (58 - 85)  ABP: --  ABP(mean): --  RR: 20 (25 Dec 2018 08:00) (18 - 44)  SpO2: 98% (25 Dec 2018 08:00) (95% - 100%)      General: awake   HEENT:  irvin  et tube             Lymph Nodes: NO cervical LN   Lungs: Bilateral crackles   Cardiovascular: Regular   Abdomen: Soft, Positive BS  Extremities: No clubbing   Skin: warm  Neurological: no focaladeficit   Musculoskeletal: move all ext     I&O's Detail    24 Dec 2018 07:01  -  25 Dec 2018 07:00  --------------------------------------------------------  IN:    Free Water: 1250 mL    norepinephrine Infusion: 28 mL    propofol Infusion: 107.5 mL    Vital High Protein: 840 mL  Total IN: 2225.5 mL    OUT:    Indwelling Catheter - Urethral: 735 mL    Voided: 1500 mL  Total OUT: 2235 mL    Total NET: -9.5 mL      25 Dec 2018 07:01  -  25 Dec 2018 09:24  --------------------------------------------------------  IN:    Vital High Protein: 70 mL  Total IN: 70 mL    OUT:  Total OUT: 0 mL    Total NET: 70 mL          LABS:                          7.3    12.15 )-----------( 238      ( 25 Dec 2018 04:30 )             22.4         25 Dec 2018 04:30    141    |  97     |  44     ----------------------------<  122    4.1     |  37     |  0.6      Ca    8.3        25 Dec 2018 04:30  Mg     2.2       25 Dec 2018 04:30    TPro  5.5    /  Alb  2.9    /  TBili  0.8    /  DBili  x      /  AST  17     /  ALT  44     /  AlkPhos  47     25 Dec 2018 04:30  Amylase x     lipase x                                                                                                                                                                                                 Mode: AC/ CMV (Assist Control/ Continuous Mandatory Ventilation)  RR (machine): 18  TV (machine): 350  FiO2: 40  PEEP: 5  ITime: 1  MAP: 11  PIP: 31                                      ABG - ( 25 Dec 2018 08:16 )  pH, Arterial: 7.43  pH, Blood: x     /  pCO2: 60    /  pO2: 90    / HCO3: 40    / Base Excess: 13.9  /  SaO2: 98                  MEDICATIONS  (STANDING):  chlorhexidine 0.12% Liquid 15 milliLiter(s) Oral Mucosa two times a day  chlorhexidine 4% Liquid 1 Application(s) Topical <User Schedule>  doxycycline IVPB      doxycycline IVPB 100 milliGRAM(s) IV Intermittent every 12 hours  lactulose Syrup 10 Gram(s) Oral daily  methylPREDNISolone sodium succinate Injectable 60 milliGRAM(s) IV Push every 8 hours  norepinephrine Infusion 0.05 MICROgram(s)/kG/Min (7.5 mL/Hr) IV Continuous <Continuous>  nystatin Powder 1 Application(s) Topical two times a day  pantoprazole  Injectable 40 milliGRAM(s) IV Push two times a day  propofol Infusion 5 MICROgram(s)/kG/Min (2.4 mL/Hr) IV Continuous <Continuous>  simvastatin 5 milliGRAM(s) Oral at bedtime    MEDICATIONS  (PRN):  fentaNYL    Injectable 50 MICROGram(s) IV Push two times a day PRN Moderate Pain (4 - 6)  guaiFENesin    Syrup 200 milliGRAM(s) Oral every 6 hours PRN Cough          Xrays:  TLC:  OG:  ET tube:                                                                                    b/l opacity    ECHO:

## 2018-12-26 NOTE — PROGRESS NOTE ADULT - SUBJECTIVE AND OBJECTIVE BOX
Patient is a 73y old  Female who presents with a chief complaint of SOB and rash (25 Dec 2018 09:24)      Over Night Events:  Patient seen and examined.       ROS:  See HPI    PHYSICAL EXAM    ICU Vital Signs Last 24 Hrs  T(C): 36.8 (26 Dec 2018 08:00), Max: 37.4 (25 Dec 2018 12:00)  T(F): 98.2 (26 Dec 2018 08:00), Max: 99.3 (25 Dec 2018 12:00)  HR: 57 (26 Dec 2018 08:00) (46 - 88)  BP: 109/59 (26 Dec 2018 08:00) (91/40 - 126/48)  BP(mean): 87 (26 Dec 2018 08:00) (55 - 87)  ABP: --  ABP(mean): --  RR: 18 (26 Dec 2018 08:00) (18 - 22)  SpO2: 100% (26 Dec 2018 08:00) (96% - 100%)      General:  HEENT:                Lymph Nodes: NO cervical LN   Lungs: Bilateral BS  Cardiovascular: Regular   Abdomen: Soft, Positive BS  Extremities: No clubbing   Skin:   Neurological:   Musculoskeletal: move all ext     I&O's Detail    25 Dec 2018 07:01  -  26 Dec 2018 07:00  --------------------------------------------------------  IN:    Free Water: 1250 mL    IV PiggyBack: 100 mL    norepinephrine Infusion: 29.8 mL    propofol Infusion: 154 mL    Vital High Protein: 595 mL  Total IN: 2128.8 mL    OUT:    Voided: 1500 mL  Total OUT: 1500 mL    Total NET: 628.8 mL          LABS:                          8.1    11.98 )-----------( 310      ( 26 Dec 2018 04:30 )             25.7         26 Dec 2018 04:30    141    |  97     |  39     ----------------------------<  204    4.2     |  37     |  0.6      Ca    8.8        26 Dec 2018 04:30  Mg     2.2       25 Dec 2018 04:30                                                                                                                                                                                               Mode: AC/ CMV (Assist Control/ Continuous Mandatory Ventilation)  RR (machine): 18  TV (machine): 350  FiO2: 40  PEEP: 5  ITime: 1  MAP: 11  PIP: 26                                      ABG - ( 26 Dec 2018 05:09 )  pH, Arterial: 7.41  pH, Blood: x     /  pCO2: 61    /  pO2: 93    / HCO3: 39    / Base Excess: 12.0  /  SaO2: 98                  MEDICATIONS  (STANDING):  chlorhexidine 0.12% Liquid 15 milliLiter(s) Oral Mucosa two times a day  chlorhexidine 4% Liquid 1 Application(s) Topical <User Schedule>  doxycycline IVPB      doxycycline IVPB 100 milliGRAM(s) IV Intermittent every 12 hours  heparin  Injectable 5000 Unit(s) SubCutaneous every 8 hours  lactulose Syrup 10 Gram(s) Oral daily  methylPREDNISolone sodium succinate Injectable 60 milliGRAM(s) IV Push every 8 hours  multivitamin 1 Tablet(s) Oral daily  norepinephrine Infusion 0.05 MICROgram(s)/kG/Min (7.5 mL/Hr) IV Continuous <Continuous>  nystatin Powder 1 Application(s) Topical two times a day  pantoprazole  Injectable 40 milliGRAM(s) IV Push two times a day  propofol Infusion 5 MICROgram(s)/kG/Min (2.4 mL/Hr) IV Continuous <Continuous>  simvastatin 5 milliGRAM(s) Oral at bedtime    MEDICATIONS  (PRN):  fentaNYL    Injectable 50 MICROGram(s) IV Push two times a day PRN Moderate Pain (4 - 6)  guaiFENesin    Syrup 200 milliGRAM(s) Oral every 6 hours PRN Cough          Xrays:  TLC:  OG:  ET tube:                                                                                    stable    ECHO:

## 2018-12-26 NOTE — PROGRESS NOTE ADULT - SUBJECTIVE AND OBJECTIVE BOX
Patient is a 73y old Female who presented with a chief complaint of SOB and rash (26 Dec 2018 08:48)  Currently admitted to medicine with the primary diagnosis of Shortness of breath     Today is hospital day 15d. Pt was initially in ICU  and downgraded on 12/14 to 3c but was upgraded to CCU the same day because of worsening SOB  CCU/ICU day: 12  Intubated: on 12/17  Central Line: right side IJ  Kent: yes  NG: OG tube  Drips: NE and propofol as of now  Iv Lines: 3 lines  failed weaning trial again today    PAST MEDICAL & SURGICAL HISTORY  Hypertension  No significant past surgical history    SOCIAL HISTORY:  Negative for smoking/alcohol/drug use.     ALLERGIES:  No Known Allergies    MEDICATIONS:  STANDING MEDICATIONS  chlorhexidine 0.12% Liquid 15 milliLiter(s) Oral Mucosa two times a day  chlorhexidine 4% Liquid 1 Application(s) Topical <User Schedule>  doxycycline IVPB      doxycycline IVPB 100 milliGRAM(s) IV Intermittent every 12 hours  heparin  Injectable 5000 Unit(s) SubCutaneous every 8 hours  lactulose Syrup 10 Gram(s) Oral daily  methylPREDNISolone sodium succinate Injectable 60 milliGRAM(s) IV Push every 12 hours  multivitamin 1 Tablet(s) Oral daily  nystatin Powder 1 Application(s) Topical two times a day  pantoprazole  Injectable 40 milliGRAM(s) IV Push two times a day  simvastatin 5 milliGRAM(s) Oral at bedtime    PRN MEDICATIONS  guaiFENesin    Syrup 200 milliGRAM(s) Oral every 6 hours PRN    Home Medications:  hydroCHLOROthiazide 25 mg oral tablet: 1 tab(s) orally once a day (12 Dec 2018 02:08)  metOLazone 5 mg oral tablet: 1  orally 3 times a week (12 Dec 2018 02:08)  metoprolol succinate 25 mg oral tablet, extended release: 1 tab(s) orally once a day (12 Dec 2018 02:08)    VITALS:   T(F): 98  HR: 52  BP: 90/60  RR: 18  SpO2: 100%    LABS:                        8.1    11.98 )-----------( 310      ( 26 Dec 2018 04:30 )             25.7     12-26    141  |  97<L>  |  39<H>  ----------------------------<  204<H>  4.2   |  37<H>  |  0.6<L>    Ca    8.8      26 Dec 2018 04:30  Mg     2.2     12-25    TPro  5.5<L>  /  Alb  2.9<L>  /  TBili  0.8  /  DBili  x   /  AST  17  /  ALT  44<H>  /  AlkPhos  47  12-25        ABG - ( 26 Dec 2018 05:09 )  pH, Arterial: 7.41  pH, Blood: x     /  pCO2: 61    /  pO2: 93    / HCO3: 39    / Base Excess: 12.0  /  SaO2: 98        RADIOLOGY:    PHYSICAL EXAM:  GEN: No acute distress, Intubated  LUNGS: decreased breath sounds BL  HEART: S1/S2 present. RRR.   ABD: Soft, non-tender, non-distended. Bowel sounds present  NEURO: Intubated but follows commands Patient is a 73y old Female who presented with a chief complaint of SOB and rash (26 Dec 2018 08:48)  Currently admitted to medicine with the primary diagnosis of Shortness of breath     Today is hospital day 15d. Pt was initially in ICU  and downgraded on 12/14 to 3c but was upgraded to CCU the same day because of worsening SOB  CCU/ICU day: 12  Intubated: on 12/17  Central Line: right side IJ  Kent: yes  NG: OG tube  Drips: NE and propofol as of now  Iv Lines: 3 lines  failed weaning trial again today, discussed with Pt's brother about option of tracheostomy    PAST MEDICAL & SURGICAL HISTORY  Hypertension  No significant past surgical history    SOCIAL HISTORY:  Negative for smoking/alcohol/drug use.     ALLERGIES:  No Known Allergies    MEDICATIONS:  STANDING MEDICATIONS  chlorhexidine 0.12% Liquid 15 milliLiter(s) Oral Mucosa two times a day  chlorhexidine 4% Liquid 1 Application(s) Topical <User Schedule>  doxycycline IVPB      doxycycline IVPB 100 milliGRAM(s) IV Intermittent every 12 hours  heparin  Injectable 5000 Unit(s) SubCutaneous every 8 hours  lactulose Syrup 10 Gram(s) Oral daily  methylPREDNISolone sodium succinate Injectable 60 milliGRAM(s) IV Push every 12 hours  multivitamin 1 Tablet(s) Oral daily  nystatin Powder 1 Application(s) Topical two times a day  pantoprazole  Injectable 40 milliGRAM(s) IV Push two times a day  simvastatin 5 milliGRAM(s) Oral at bedtime    PRN MEDICATIONS  guaiFENesin    Syrup 200 milliGRAM(s) Oral every 6 hours PRN    Home Medications:  hydroCHLOROthiazide 25 mg oral tablet: 1 tab(s) orally once a day (12 Dec 2018 02:08)  metOLazone 5 mg oral tablet: 1  orally 3 times a week (12 Dec 2018 02:08)  metoprolol succinate 25 mg oral tablet, extended release: 1 tab(s) orally once a day (12 Dec 2018 02:08)    VITALS:   T(F): 98  HR: 52  BP: 90/60  RR: 18  SpO2: 100%    LABS:                        8.1    11.98 )-----------( 310      ( 26 Dec 2018 04:30 )             25.7     12-26    141  |  97<L>  |  39<H>  ----------------------------<  204<H>  4.2   |  37<H>  |  0.6<L>    Ca    8.8      26 Dec 2018 04:30  Mg     2.2     12-25    TPro  5.5<L>  /  Alb  2.9<L>  /  TBili  0.8  /  DBili  x   /  AST  17  /  ALT  44<H>  /  AlkPhos  47  12-25        ABG - ( 26 Dec 2018 05:09 )  pH, Arterial: 7.41  pH, Blood: x     /  pCO2: 61    /  pO2: 93    / HCO3: 39    / Base Excess: 12.0  /  SaO2: 98        RADIOLOGY:  < from: Xray Chest 1 View- PORTABLE-Routine (12.26.18 @ 05:57) >  Impression:    Stable bilateral interstitial opacities      PHYSICAL EXAM:  GEN: No acute distress, Intubated  LUNGS: decreased breath sounds BL  HEART: S1/S2 present. RRR.   ABD: Soft, non-tender, non-distended. Bowel sounds present  NEURO: Intubated but follows commands

## 2018-12-26 NOTE — PROGRESS NOTE ADULT - ASSESSMENT
73F from [facility] w/ PMH of HTN presented to [facility] with worsening generalized weakness and SOB for 3 days.     #Acute Hypercapnic Respiratory Failure likely 2/2 Lovenox induced Diffuse alveolar hemorrhage   -Patient Intubated 12/17 as she was saturating in late 80s  -Duplex report 12/14: negative for DVT   -ID on board > 12/18 dced Acyclovir  -Proventil, Spiriva, Duonebs q6h, Zosyn, started on Levofloxacin 12/17 >> stopped zosyn and Levo 12/20 and started on Doxy on 12/21, would stop tomorrow   -Sepsis ruled out > no leukocytosis, afebrile  -RVP negative 12/13  -Blood culture 12/14 negative  -Urine Legionella and Strep Ag negative 12/15  -Echo 12/15: LVEF 50-55%  -CT chest suggestive for RLL PNA  -MRSA negative 12/17  -ESR 86, CRP 1.72, RF <10 12/18  -RF factor, SARITHA, ANCA, Hep panel, complements 3/4 negative  -Bronchoscopy done 12/20 > BAL done and sent for testing > Increasing Aliquot  -Cental line placement 12/20  -Rheuma on board  -Repeat UA showed No RBCs  -steroids were increased to 125 Q6 12/20 and decreased today 12/23 to Q8  -Less bloody bronchial secretions  -Pt failed trial of weaning several times, so discussed with family > Brother Lei about option of tracheostomy 12/26      #New Atrial Fibrillation, rate controlled > Most likely ischemic response?  -Patient currently in NSR   -CHA2 DS2 VASC: 4   -Therapeutic Lovenox for AC stopped for episode of 200-300 ml bleed on 12/17 at the injection site and the Hb dropped from 11 to 9 > CT A/P ordered to r/o retroperitoneal bleed > negative for bleed. Bowel regimen added  - Started Heparin 12/25    #HLD  -Simvastatin     #HTN  -HCTZ held for hypotension     #Varicella Zoster Right Shoulder: improving  -Contact and airborne precautions dced 12/17 as per ID rec  -Acyclovir 800mg IV q8h, Benadryl PRN for pruritus > stopped 12/18    #ANTONIETTA improving  -Creatinine 0.6 12/26  -Urine Urea 7575, creat 30, Na 94    #Asymptomatic Bacteriuria with RBCs  -UA positive 12/15 but culture negative 12/15  -Repeat UA 12/17 positive  -Repeat UA negative for rbcs 12/20    #Hypokalemia: improving  -Repleted, monitor BMP   -4.2 12/26        DVT ppx: Therapeutic Lovenox  GI ppx: Not indicated   Diet: NPO  Activity: increase as tolerated   FULL CODE

## 2018-12-26 NOTE — PROGRESS NOTE ADULT - ASSESSMENT
IMPRESSION:    Acute on chronic hypercapnic respiratory failure s/p intubation s/p bronchoscopy alveolar hem, ? etiology was on lovenox  IDALMIS / OHS  Pneumonia/Aspiration?/ fluid overload  Elevated right hemidiaphragm  Dec HB s.p transfusion  anemia   hypernatremia   PLAN:     CNS: SAT     HEENT: Oral care    PULMONARY:  HOB @ 45 degrees,   will do weaning trial if failed will need tracheostomy   solumderol 60 mg Q 12hrs     CARDIOVASCULAR: keep is < os   lasix 20 mg     GI: GI prophylaxis. Continue feeding start free waTER PER nG 250CC q 4 HRS     RENAL:  Follow up lytes.  Correct as needed BMP      INFECTIOUS DISEASE: Follow up cultures, DOXY total 7 days     HEMATOLOGICAL:  DVT prophylaxis. follow h/H  heaprin SQ     ENDOCRINE:  Follow up rheumatological work up.     POOR PROGNOSIS  Monitor in ICU

## 2018-12-27 NOTE — PROGRESS NOTE ADULT - ASSESSMENT
IMPRESSION:    Acute on chronic hypercapnic respiratory failure s/p intubation s/p bronchoscopy alveolar hem, ? etiology was on lovenox  IDALMIS / OHS  Pneumonia/Aspiration?/ fluid overload  Elevated right hemidiaphragm  Dec HB s.p transfusion  anemia   hypernatremia   PLAN:     CNS: SAT     HEENT: Oral care    PULMONARY:  HOB @ 45 degrees,   will do weaning trial again if failed will arrange for tracheostomy   solumderol 60 mg Q 12hrs     CARDIOVASCULAR: keep is < os   lasix 20 mg     GI: GI prophylaxis. Continue feeding t free waTER PER nG 250CC q 4 HRS     RENAL:  Follow up lytes.  Correct as needed BMP      INFECTIOUS DISEASE: Follow up cultures, DOXY total 7 days     HEMATOLOGICAL:  DVT prophylaxis. follow h/H  heaprin SQ     ENDOCRINE:  Follow up rheumatological work up.     POOR PROGNOSIS  Monitor in ICU

## 2018-12-27 NOTE — PROGRESS NOTE ADULT - SUBJECTIVE AND OBJECTIVE BOX
Patient is a 73y old Female who presented with a chief complaint of SOB and rash (27 Dec 2018 09:28)  Currently admitted to medicine with the primary diagnosis of AHRF/ aspiration pneumonia/      Today is hospital day 16d. Pt was initially in ICU  and downgraded on 12/14 to 3c but was upgraded to CCU the same day because of worsening SOB  CCU/ICU day: 13  Intubated: on 12/17, failed multiple trials of weaning, Dr Ortiz talked to pt's brother Lei and CTS about possible Tracheostomy  Central Line: right side IJ  Kent: yes  NG: OG tube  Drips: NE and propofol as of now  Iv Lines: 3 lines      PAST MEDICAL & SURGICAL HISTORY  Hypertension  No significant past surgical history    SOCIAL HISTORY:  Negative for smoking/alcohol/drug use.     ALLERGIES:  No Known Allergies    MEDICATIONS:  STANDING MEDICATIONS  chlorhexidine 0.12% Liquid 15 milliLiter(s) Oral Mucosa two times a day  chlorhexidine 4% Liquid 1 Application(s) Topical <User Schedule>  doxycycline IVPB      doxycycline IVPB 100 milliGRAM(s) IV Intermittent every 12 hours  furosemide   Injectable 20 milliGRAM(s) IV Push once  heparin  Injectable 5000 Unit(s) SubCutaneous every 8 hours  lactulose Syrup 10 Gram(s) Oral daily  methylPREDNISolone sodium succinate Injectable 60 milliGRAM(s) IV Push every 12 hours  multivitamin 1 Tablet(s) Oral daily  norepinephrine Infusion 0.05 MICROgram(s)/kG/Min IV Continuous <Continuous>  nystatin Powder 1 Application(s) Topical two times a day  pantoprazole  Injectable 40 milliGRAM(s) IV Push two times a day  propofol Infusion 0.1 MICROgram(s)/kG/Min IV Continuous <Continuous>  simvastatin 5 milliGRAM(s) Oral at bedtime    PRN MEDICATIONS  guaiFENesin    Syrup 200 milliGRAM(s) Oral every 6 hours PRN    Home Medications:  hydroCHLOROthiazide 25 mg oral tablet: 1 tab(s) orally once a day (12 Dec 2018 02:08)  metOLazone 5 mg oral tablet: 1  orally 3 times a week (12 Dec 2018 02:08)  metoprolol succinate 25 mg oral tablet, extended release: 1 tab(s) orally once a day (12 Dec 2018 02:08)    VITALS:   T(F): 98  HR: 55  BP: 103/44  RR: 18  SpO2: 99%    LABS:                        8.2    12.69 )-----------( 351      ( 27 Dec 2018 04:30 )             26.1     12-27    141  |  95<L>  |  39<H>  ----------------------------<  125<H>  3.8   |  37<H>  |  0.7    Ca    8.5      27 Dec 2018 04:30    TPro  6.0  /  Alb  2.9<L>  /  TBili  1.0  /  DBili  x   /  AST  17  /  ALT  37  /  AlkPhos  63  12-27        ABG - ( 27 Dec 2018 08:04 )  pH, Arterial: 7.43  pH, Blood: x     /  pCO2: 62    /  pO2: 107   / HCO3: 40    / Base Excess: 14.3  /  SaO2: 99          RADIOLOGY:  < from: Xray Chest 1 View- PORTABLE-Routine (12.27.18 @ 06:28) >  Impression:    Decreased left basilar opacity/pleural effusion.  Stable support devices.      PHYSICAL EXAM:  GEN: No acute distress, Intubated  LUNGS: decreased breath sounds BL  HEART: S1/S2 present. RRR.   ABD: Soft, non-tender, non-distended. Bowel sounds present  NEURO: Intubated but follows commands

## 2018-12-27 NOTE — PROGRESS NOTE ADULT - SUBJECTIVE AND OBJECTIVE BOX
Patient is a 73y old  Female who presents with a chief complaint of SOB and rash (26 Dec 2018 13:33)      Over Night Events:  Patient seen and examined failed weaning trial yesterday   on pressors on and off       ROS:  See HPI    PHYSICAL EXAM    ICU Vital Signs Last 24 Hrs  T(C): 36.7 (27 Dec 2018 08:00), Max: 36.7 (26 Dec 2018 12:00)  T(F): 98 (27 Dec 2018 08:00), Max: 98 (26 Dec 2018 12:00)  HR: 51 (27 Dec 2018 08:00) (50 - 72)  BP: 96/39 (27 Dec 2018 08:00) (90/40 - 110/52)  BP(mean): 58 (27 Dec 2018 08:00) (54 - 81)  ABP: 96/39 (27 Dec 2018 08:00) (96/39 - 96/39)  ABP(mean): 58 (27 Dec 2018 08:00) (58 - 58)  RR: 18 (27 Dec 2018 08:00) (18 - 35)  SpO2: 100% (27 Dec 2018 08:00) (98% - 100%)      General: awake   HEENT:      et tube           Lymph Nodes: NO cervical LN   Lungs: Bilateral BS  Cardiovascular: Regular   Abdomen: Soft, Positive BS  Extremities: No clubbing 1 edema   Neurological: no focal deficit   Musculoskeletal: move all ext     I&O's Detail    26 Dec 2018 07:01  -  27 Dec 2018 07:00  --------------------------------------------------------  IN:    Enteral Tube Flush: 100 mL    Free Water: 1500 mL    IV PiggyBack: 100 mL    norepinephrine Infusion: 49 mL    propofol Infusion: 193 mL    Vital High Protein: 805 mL  Total IN: 2747 mL    OUT:    Voided: 2300 mL  Total OUT: 2300 mL    Total NET: 447 mL          LABS:                          8.2    12.69 )-----------( 351      ( 27 Dec 2018 04:30 )             26.1         27 Dec 2018 04:30    141    |  95     |  39     ----------------------------<  125    3.8     |  37     |  0.7      Ca    8.5        27 Dec 2018 04:30    TPro  6.0    /  Alb  2.9    /  TBili  1.0    /  DBili  x      /  AST  17     /  ALT  37     /  AlkPhos  63     27 Dec 2018 04:30  Amylase x     lipase x                                                                                                                                                                                                 Mode: AC/ CMV (Assist Control/ Continuous Mandatory Ventilation)  RR (machine): 18  TV (machine): 350  FiO2: 40  PEEP: 5  ITime: 1  MAP: 10  PIP: 26                                      ABG - ( 27 Dec 2018 08:04 )  pH, Arterial: 7.43  pH, Blood: x     /  pCO2: 62    /  pO2: 107   / HCO3: 40    / Base Excess: 14.3  /  SaO2: 99                  MEDICATIONS  (STANDING):  chlorhexidine 0.12% Liquid 15 milliLiter(s) Oral Mucosa two times a day  chlorhexidine 4% Liquid 1 Application(s) Topical <User Schedule>  doxycycline IVPB      doxycycline IVPB 100 milliGRAM(s) IV Intermittent every 12 hours  heparin  Injectable 5000 Unit(s) SubCutaneous every 8 hours  lactulose Syrup 10 Gram(s) Oral daily  methylPREDNISolone sodium succinate Injectable 60 milliGRAM(s) IV Push every 12 hours  multivitamin 1 Tablet(s) Oral daily  norepinephrine Infusion 0.05 MICROgram(s)/kG/Min (7.5 mL/Hr) IV Continuous <Continuous>  nystatin Powder 1 Application(s) Topical two times a day  pantoprazole  Injectable 40 milliGRAM(s) IV Push two times a day  propofol Infusion 0.1 MICROgram(s)/kG/Min (0.048 mL/Hr) IV Continuous <Continuous>  simvastatin 5 milliGRAM(s) Oral at bedtime    MEDICATIONS  (PRN):  guaiFENesin    Syrup 200 milliGRAM(s) Oral every 6 hours PRN Cough          Xrays:  TLC:  OG:  ET tube:                                                                                    LL opacity    ECHO:

## 2018-12-27 NOTE — PROGRESS NOTE ADULT - ASSESSMENT
73F from [facility] w/ PMH of HTN presented to [facility] with worsening generalized weakness and SOB for 3 days.     #Acute Hypercapnic Respiratory Failure likely 2/2 Lovenox induced Diffuse alveolar hemorrhage   -Patient Intubated 12/17 as she was saturating in late 80s  -Duplex report 12/14: negative for DVT   -ID on board > 12/18 dced Acyclovir  -Proventil, Spiriva, Duonebs q6h, Zosyn, started on Levofloxacin 12/17 >> stopped zosyn and Levo 12/20 and started on Doxy on 12/21, would stop tomorrow   -Sepsis ruled out > no leukocytosis, afebrile  -RVP negative 12/13  -Blood culture 12/14 negative  -Urine Legionella and Strep Ag negative 12/15  -Echo 12/15: LVEF 50-55%  -CT chest suggestive for RLL PNA  -MRSA negative 12/17  -ESR 86, CRP 1.72, RF <10 12/18  -Bronchoscopy done 12/20 > BAL done and sent for testing > Increasing Aliquot  -Cental line placement 12/20  -Rheuma on board, autoimmune workup negative > SARITHA, ANCA, RF, COMPLEMENTS, HEP PANEL  -Repeat UA showed No RBCs  -steroids were increased to 125 Q6 12/20 and decreased 12/23 to Q8  -Less bloody bronchial secretions  -Pt failed trial of weaning several times, so discussed with family > Brother Lei about option of tracheostomy 12/26 > CTS consulted about tracheostomy 12/27      #New Atrial Fibrillation, rate controlled > Most likely ischemic response?  -Patient currently in NSR   -CHA2 DS2 VASC: 4   -Therapeutic Lovenox for AC stopped for episode of 200-300 ml bleed on 12/17 at the injection site and the Hb dropped from 11 to 9 > CT A/P ordered to r/o retroperitoneal bleed > negative for bleed. Bowel regimen added  -Started Heparin 12/25    #HLD  -Simvastatin     #HTN  -HCTZ held for hypotension     #Varicella Zoster Right Shoulder: improving  -Contact and airborne precautions dced 12/17 as per ID rec  -Acyclovir 800mg IV q8h, Benadryl PRN for pruritus > stopped 12/18    #ANTONIETTA improving  -Creatinine 0.7 12/27  -Urine Urea 7575, creat 30, Na 94    #Asymptomatic Bacteriuria with RBCs  -UA positive 12/15 but culture negative 12/15  -Repeat UA 12/17 positive  -Repeat UA negative for rbcs 12/20    #Hypokalemia: improving  -Repleted, monitor BMP   -3.8 12/27      DVT ppx: Therapeutic Lovenox  GI ppx: Not indicated   Diet: NPO  Activity: increase as tolerated   FULL CODE

## 2018-12-28 NOTE — CONSULT NOTE ADULT - ASSESSMENT
73F admitted to medical service and not currently extractable thoracic consult placed for trach placement    Plan:  Continue current plan of care as per primary team  Pending evaluation by Dr. Toro for OR date

## 2018-12-28 NOTE — CHART NOTE - NSCHARTNOTEFT_GEN_A_CORE
Registered Dietitian Follow-Up     Patient Profile Reviewed                           Yes [v]   No []     Nutrition History Previously Obtained        Yes []  No []       Pertinent Subjective Information: Pt remains intubated, sedated, on 1 pressor. Per RN tolerating feeds.      Pertinent Medical Interventions: Acute Hypercapnic Respiratory Failure likely 2/2 Lovenox induced diffuse alveolar hemorrhage- pt failed multiple SBTs, possible tracheostomy, pending Sx eval. ANTONIETTA - improving.        Diet order: Viatl HP @35ml/hr + No Carb Prosorce q 24hrs      Anthropometrics:  - Ht. 152.4cm/5'0"  - Wt. weight remains relatively stable: today (12/28) - 78.9kg, yesterday (12/27) - 78.4kg, 12/21 81.5kg, 12/18 -79.8kg   - %wt change  - BMI - 33.9  - IBW 45.5kg +/-10%      Pertinent Lab Data: 12/28 WBC- 11.99, H/H- 8.7/26.9, GLu- 114, BUN- 41, Cl- 96, ALb- 3.4, GFR- 85, 12/27 pCO2-62     Pertinent Meds: propofol@15ml/hr (provides ~396kcal/d), lactulose, solumedrol, levophed, MVI, pantoprazole, zocor     Physical Findings:  - Appearance: intubated  - GI function: last BM documented 12/26 (smear)   - Tubes: OGT  - Oral/Mouth cavity:  - Skin: intact     Nutrition Requirements (Weight Used: 80kg, 45.5kg IBW ) - continue:      Estimated energy needs: 860-1100kcal/d (based on: 808- 943kcal/d (60-70% [id number] - recalculated 12/28 ) vs. 1000-1140kcal/d (22-25kcal/kg IBW) vs. 880-1120kcal/d (11-14kcal/act wt)  Estimated protein needs: 68-91gm/d (1.5-2.0gm/kg IBW) - pt w/ ANTONIETTA per MD    Estimated fluid needs: per CCU team      Nutrient Intake: Current TF order provides 840kcal, 73.5g, protein, 706ml h20 from propofol, 60kcal, 15 gm protein from No Carb Prosource and ~400kcal from propofol for total of 1300kcal, 88.5gm protein and 706ml h20 (118-151% estimated kcal needs and % estimated protein needs)          [v] Previous Nutrition Diagnosis: Excessive enteral nutrition infusion (reinstated)        Nutrition Intervention: Enteral nutrition      Goal/Expected Outcome: EN to provide > 85% but < 105% estimated needs in 1-4 days     Indicator/Monitoring: Will monitor energy intake, diet order, endocrine/ electrolyte profile, body composition, nutrition focused physical findings.     Recommended: please discontinue No Carb Prosource, add Prosource TF q 24hrs instead (40kcal, 11gm protein), decrease Vital HP to 30ml/hr via OGT (720kca,m 63gmprotein, 605ml h20, please note that propofol at current rate (15ml/hr) provides additional ~396kcal/d. Additional fluids per LIP.     at risk f/u Registered Dietitian Follow-Up     Patient Profile Reviewed                           Yes [v]   No []     Nutrition History Previously Obtained        Yes []  No [v]  - pt remains intubated     Pertinent Subjective Information: Pt remains intubated, sedated, on 1 pressor. Per RN tolerating feeds.      Pertinent Medical Interventions: Acute Hypercapnic Respiratory Failure likely 2/2 Lovenox induced diffuse alveolar hemorrhage- pt failed multiple SBTs, possible tracheostomy, pending Sx eval. ANTONIETTA - improving.        Diet order: Viatl HP @35ml/hr + No Carb Prosorce q 24hrs      Anthropometrics:  - Ht. 152.4cm/5'0"  - Wt. weight remains relatively stable: today (12/28) - 78.9kg, yesterday (12/27) - 78.4kg, 12/21 81.5kg, 12/18 -79.8kg   - %wt change  - BMI - 33.9  - IBW 45.5kg +/-10%      Pertinent Lab Data: 12/28 WBC- 11.99, H/H- 8.7/26.9, GLu- 114, BUN- 41, Cl- 96, ALb- 3.4, GFR- 85, 12/27 pCO2-62     Pertinent Meds: propofol@15ml/hr (provides ~396kcal/d), lactulose, solumedrol, levophed, MVI, pantoprazole, zocor     Physical Findings:  - Appearance: intubated  - GI function: last BM documented 12/26 (smear)   - Tubes: OGT  - Oral/Mouth cavity:  - Skin: intact     Nutrition Requirements (Weight Used: 80kg, 45.5kg IBW ) - continue:      Estimated energy needs: 860-1100kcal/d (based on: 808- 943kcal/d (60-70% [id number] - recalculated 12/28 ) vs. 1000-1140kcal/d (22-25kcal/kg IBW) vs. 880-1120kcal/d (11-14kcal/act wt)  Estimated protein needs: 68-91gm/d (1.5-2.0gm/kg IBW) - pt w/ ANTONIETTA per MD    Estimated fluid needs: per CCU team      Nutrient Intake: Current TF order provides 840kcal, 73.5g, protein, 706ml h20 from propofol, 60kcal, 15 gm protein from No Carb Prosource and ~400kcal from propofol for total of 1300kcal, 88.5gm protein and 706ml h20 (118-151% estimated kcal needs and % estimated protein needs)          [v] Previous Nutrition Diagnosis: Excessive enteral nutrition infusion (reinstated)        Nutrition Intervention: Enteral nutrition      Goal/Expected Outcome: EN to provide > 85% but < 105% estimated needs in 1-4 days     Indicator/Monitoring: Will monitor energy intake, diet order, endocrine/ electrolyte profile, body composition, nutrition focused physical findings.     Recommended: please discontinue No Carb Prosource, add Prosource TF q 24hrs instead (40kcal, 11gm protein), decrease Vital HP to 30ml/hr via OGT (720kca,m 63gmprotein, 605ml h20, please note that propofol at current rate (15ml/hr) provides additional ~396kcal/d. Additional fluids per LIP.     at risk f/u

## 2018-12-28 NOTE — PROGRESS NOTE ADULT - SUBJECTIVE AND OBJECTIVE BOX
Patient is a 73y old  Female who presents with a chief complaint of SOB and rash, found to have DAH on bronchoscopy (27 Dec 2018 11:23)        Over Night Events:    No events  Failed multiple SBTs over the week  Remains on Levophed        ROS:  See HPI    PHYSICAL EXAM    ICU Vital Signs Last 24 Hrs  T(C): 36.3 (28 Dec 2018 08:00), Max: 36.8 (28 Dec 2018 00:00)  T(F): 97.4 (28 Dec 2018 08:00), Max: 98.2 (28 Dec 2018 00:00)  HR: 66 (28 Dec 2018 08:00) (46 - 80)  BP: 116/43 (28 Dec 2018 08:00) (92/47 - 131/44)  BP(mean): 63 (28 Dec 2018 08:00) (56 - 106)  ABP: --  ABP(mean): --  RR: 32 (28 Dec 2018 08:00) (18 - 50)  SpO2: 99% (28 Dec 2018 08:00) (97% - 100%)      General: intubated  HEENT: BEATRIZ             Lymphatic system: No cervical LN   Lungs: Bilateral BS, clear  Cardiovascular: Irregular  Gastrointestinal: Soft, Positive BS  Extremities: No clubbing.  Moves extremities.  Full Range of motion   Skin: Warm, intact  Neurological: No motor or sensory deficit       12-27-18 @ 07:01  -  12-28-18 @ 07:00  --------------------------------------------------------  IN:    Enteral Tube Flush: 10 mL    Free Water: 1500 mL    norepinephrine Infusion: 83 mL    propofol Infusion: 299 mL    Vital High Protein: 840 mL  Total IN: 2732 mL    OUT:    Voided: 1300 mL  Total OUT: 1300 mL    Total NET: 1432 mL      12-28-18 @ 07:01  -  12-28-18 @ 10:26  --------------------------------------------------------  IN:    norepinephrine Infusion: 14 mL    propofol Infusion: 30 mL    Vital High Protein: 70 mL  Total IN: 114 mL    OUT:  Total OUT: 0 mL    Total NET: 114 mL          LABS:                            8.7    11.99 )-----------( 397      ( 28 Dec 2018 04:30 )             26.9                                               12-28    143  |  96<L>  |  41<H>  ----------------------------<  114<H>  3.6   |  39<H>  |  0.7    Ca    9.0      28 Dec 2018 04:30  Mg     2.1     12-28    TPro  6.3  /  Alb  3.4<L>  /  TBili  0.9  /  DBili  x   /  AST  15  /  ALT  35  /  AlkPhos  62  12-28      PT/INR - ( 28 Dec 2018 04:30 )   PT: 12.60 sec;   INR: 1.10 ratio         PTT - ( 28 Dec 2018 04:30 )  PTT:29.0 sec                                                                                     LIVER FUNCTIONS - ( 28 Dec 2018 04:30 )  Alb: 3.4 g/dL / Pro: 6.3 g/dL / ALK PHOS: 62 U/L / ALT: 35 U/L / AST: 15 U/L / GGT: x                                                                                               Mode: AC/ CMV (Assist Control/ Continuous Mandatory Ventilation)  RR (machine): 18  TV (machine): 350  FiO2: 40  PEEP: 5  ITime: 1  MAP: 11  PIP: 22                                      ABG - ( 28 Dec 2018 08:12 )  pH, Arterial: 7.35  pH, Blood: x     /  pCO2: 73    /  pO2: 106   / HCO3: 41    / Base Excess: 12.8  /  SaO2: 98                  MEDICATIONS  (STANDING):  chlorhexidine 0.12% Liquid 15 milliLiter(s) Oral Mucosa two times a day  chlorhexidine 4% Liquid 1 Application(s) Topical <User Schedule>  heparin  Injectable 5000 Unit(s) SubCutaneous every 8 hours  lactulose Syrup 10 Gram(s) Oral daily  methylPREDNISolone sodium succinate Injectable 60 milliGRAM(s) IV Push every 12 hours  multivitamin 1 Tablet(s) Oral daily  norepinephrine Infusion 0.05 MICROgram(s)/kG/Min (7.5 mL/Hr) IV Continuous <Continuous>  nystatin Powder 1 Application(s) Topical two times a day  pantoprazole  Injectable 40 milliGRAM(s) IV Push two times a day  propofol Infusion 0.1 MICROgram(s)/kG/Min (0.048 mL/Hr) IV Continuous <Continuous>  simvastatin 5 milliGRAM(s) Oral at bedtime    MEDICATIONS  (PRN):  guaiFENesin    Syrup 200 milliGRAM(s) Oral every 6 hours PRN Cough      Xrays: ETT ok, bilaterla infiltrates                                                                                    ECHO

## 2018-12-28 NOTE — PROGRESS NOTE ADULT - ASSESSMENT
IMPRESSION:    Acute on chronic hypercapnic respiratory failure s/p intubation s/p bronchoscopy alveolar hem, ? etiology was on lovenox  IDALMIS / OHS  Pneumonia/Aspiration/ fluid overload  anemia resolved  hypernatremia resolved    PLAN:     CNS: SAT     HEENT: Oral care    PULMONARY:  HOB @ 45 degrees, Tracheostomy with CTS, continue with steroids for now    CARDIOVASCULAR: Keep equal balance    GI: GI prophylaxis. Continue feeding     RENAL:  Follow up lytes.  Correct as needed BMP. Kent catheter    INFECTIOUS DISEASE: Follow up cultures    HEMATOLOGICAL:  DVT prophylaxis.    ENDOCRINE: rheumatological work up: negative    POOR PROGNOSIS    Call CTS to follow up on tracheostomy    Monitor in ICU

## 2018-12-28 NOTE — CONSULT NOTE ADULT - SUBJECTIVE AND OBJECTIVE BOX
DENISE BARAJAS [id number]  73y Female    HPI:  74 yo F from [facility] w/ pmhx of HTN reports with worsening generalized weakness and SOB for 3 days on 12/11. Pt was at urgent care for evaluation of rash on right shoulder and chest, was found to have O2 sat of 88% on RA and send to the ED for further evaluation. Pt reports b/l leg swelling for a few months. Pt's denies CP, palpitations, orthopnea or PND. Rash on right shoulder, chest and upper arm started on Sunday 12/9/18, pt admits to pruritus, denies pain.   No fever, chills, cough, URI symptoms, dizziness, abd pain, n/v/d, dysuria.     - on arrival to ED, Pt was found to have A fib with RVR HR 130s and O2 sat 69% on RA. Pt improved after Bipap and IV lasix 40mg X1  - pro-  and CXR showes pulmonary edema. (12 Dec 2018 01:56)    Was admitted to medical service and intubated however is not currently extubatable, surgical consult placed for tracheostomy      PAST MEDICAL & SURGICAL HISTORY:  Hypertension  No significant past surgical history        MEDICATIONS  (STANDING):  chlorhexidine 0.12% Liquid 15 milliLiter(s) Oral Mucosa two times a day  chlorhexidine 4% Liquid 1 Application(s) Topical <User Schedule>  heparin  Injectable 5000 Unit(s) SubCutaneous every 8 hours  lactulose Syrup 10 Gram(s) Oral daily  methylPREDNISolone sodium succinate Injectable 60 milliGRAM(s) IV Push every 12 hours  multivitamin 1 Tablet(s) Oral daily  norepinephrine Infusion 0.05 MICROgram(s)/kG/Min (7.5 mL/Hr) IV Continuous <Continuous>  nystatin Powder 1 Application(s) Topical two times a day  pantoprazole  Injectable 40 milliGRAM(s) IV Push two times a day  propofol Infusion 0.1 MICROgram(s)/kG/Min (0.048 mL/Hr) IV Continuous <Continuous>  simvastatin 5 milliGRAM(s) Oral at bedtime    MEDICATIONS  (PRN):  guaiFENesin    Syrup 200 milliGRAM(s) Oral every 6 hours PRN Cough      Allergies    No Known Allergies    Intolerances        REVIEW OF SYSTEMS    [ ] A ten-point review of systems was otherwise negative except as noted.  [X] Due to altered mental status/intubation, subjective information were not able to be obtained from the patient. History was obtained, to the extent possible, from review of the chart and collateral sources of information.      Vital Signs Last 24 Hrs  T(C): 36.3 (28 Dec 2018 08:00), Max: 36.8 (28 Dec 2018 00:00)  T(F): 97.4 (28 Dec 2018 08:00), Max: 98.2 (28 Dec 2018 00:00)  HR: 58 (28 Dec 2018 10:00) (46 - 80)  BP: 119/42 (28 Dec 2018 10:00) (92/47 - 131/44)  BP(mean): 63 (28 Dec 2018 08:00) (56 - 106)  RR: 24 (28 Dec 2018 10:00) (18 - 50)  SpO2: 98% (28 Dec 2018 10:00) (97% - 100%)    PHYSICAL EXAM:  GENERAL: intubated, sedated, minimally responsive to voice  CHEST/LUNG: b/l breath sounds  HEART: Regular rate and rhythm  ABDOMEN: Soft, Nontender, Nondistended;       LABS:  Labs:  CAPILLARY BLOOD GLUCOSE                              8.7    11.99 )-----------( 397      ( 28 Dec 2018 04:30 )             26.9       Auto Neutrophil %: 72.6 % (12-28-18 @ 04:30)  Auto Immature Granulocyte %: 1.0 % (12-28-18 @ 04:30)    12-28    143  |  96<L>  |  41<H>  ----------------------------<  114<H>  3.6   |  39<H>  |  0.7      Calcium, Total Serum: 9.0 mg/dL (12-28-18 @ 04:30)      LFTs:             6.3  | 0.9  | 15       ------------------[62      ( 28 Dec 2018 04:30 )  3.4  | x    | 35          Lipase:x      Amylase:x         Blood Gas Arterial, Lactate: 0.7 mmoL/L (12-28-18 @ 08:12)  Blood Gas Arterial, Lactate: 0.7 mmoL/L (12-27-18 @ 08:04)  Blood Gas Arterial, Lactate: 1.1 mmoL/L (12-26-18 @ 05:09)    ABG - ( 28 Dec 2018 08:12 )  pH: 7.35  /  pCO2: 73    /  pO2: 106   / HCO3: 41    / Base Excess: 12.8  /  SaO2: 98              ABG - ( 27 Dec 2018 08:04 )  pH: 7.43  /  pCO2: 62    /  pO2: 107   / HCO3: 40    / Base Excess: 14.3  /  SaO2: 99              ABG - ( 26 Dec 2018 05:09 )  pH: 7.41  /  pCO2: 61    /  pO2: 93    / HCO3: 39    / Base Excess: 12.0  /  SaO2: 98                Coags:     12.60  ----< 1.10    ( 28 Dec 2018 04:30 )     29.0        RADIOLOGY & ADDITIONAL STUDIES:  < from: Xray Chest 1 View- PORTABLE-Routine (12.28.18 @ 04:43) >  Decreased left basilar opacity/pleural effusion.    Stable support devices.      < end of copied text >

## 2018-12-28 NOTE — PROGRESS NOTE ADULT - SUBJECTIVE AND OBJECTIVE BOX
Patient is a 73y old Female who presented with a chief complaint of SOB and rash (28 Dec 2018 10:25)  Currently admitted to medicine with the primary diagnosis of DAH/ s/p intubation     Today is hospital day 17d. Pt was initially in ICU  and downgraded on 12/14 to 3c but was upgraded to CCU the same day because of worsening SOB  CCU/ICU day: 14  Intubated: on 12/17, failed multiple trials of weaning, Dr Ortiz talked to pt's brother Lei and CTS about possible Tracheostomy, tracheo in plan on monday 12/31  Central Line: Yes Right IJ  Kent: Yes  NG: OG tube  Drips: propofol  Iv Lines: 3    PAST MEDICAL & SURGICAL HISTORY  Hypertension  No significant past surgical history    SOCIAL HISTORY:  Negative for smoking/alcohol/drug use.     ALLERGIES:  No Known Allergies    MEDICATIONS:  STANDING MEDICATIONS  chlorhexidine 0.12% Liquid 15 milliLiter(s) Oral Mucosa two times a day  chlorhexidine 4% Liquid 1 Application(s) Topical <User Schedule>  heparin  Injectable 5000 Unit(s) SubCutaneous every 8 hours  lactulose Syrup 10 Gram(s) Oral daily  methylPREDNISolone sodium succinate Injectable 60 milliGRAM(s) IV Push every 12 hours  multivitamin 1 Tablet(s) Oral daily  norepinephrine Infusion 0.05 MICROgram(s)/kG/Min IV Continuous <Continuous>  nystatin Powder 1 Application(s) Topical two times a day  pantoprazole  Injectable 40 milliGRAM(s) IV Push two times a day  propofol Infusion 0.1 MICROgram(s)/kG/Min IV Continuous <Continuous>  simvastatin 5 milliGRAM(s) Oral at bedtime    PRN MEDICATIONS  guaiFENesin    Syrup 200 milliGRAM(s) Oral every 6 hours PRN    Home Medications:  hydroCHLOROthiazide 25 mg oral tablet: 1 tab(s) orally once a day (12 Dec 2018 02:08)  metOLazone 5 mg oral tablet: 1  orally 3 times a week (12 Dec 2018 02:08)  metoprolol succinate 25 mg oral tablet, extended release: 1 tab(s) orally once a day (12 Dec 2018 02:08)    VITALS:   T(F): 97.4  HR: 58  BP: 119/42  RR: 24  SpO2: 98%    LABS:                        8.7    11.99 )-----------( 397      ( 28 Dec 2018 04:30 )             26.9     12-28    143  |  96<L>  |  41<H>  ----------------------------<  114<H>  3.6   |  39<H>  |  0.7    Ca    9.0      28 Dec 2018 04:30  Mg     2.1     12-28    TPro  6.3  /  Alb  3.4<L>  /  TBili  0.9  /  DBili  x   /  AST  15  /  ALT  35  /  AlkPhos  62  12-28    PT/INR - ( 28 Dec 2018 04:30 )   PT: 12.60 sec;   INR: 1.10 ratio         PTT - ( 28 Dec 2018 04:30 )  PTT:29.0 sec    ABG - ( 28 Dec 2018 08:12 )  pH, Arterial: 7.35  pH, Blood: x     /  pCO2: 73    /  pO2: 106   / HCO3: 41    / Base Excess: 12.8  /  SaO2: 98            RADIOLOGY:  < from: Xray Chest 1 View- PORTABLE-Routine (12.28.18 @ 04:43) >  Impression:    Decreased left basilar opacity/pleural effusion.  Stable support devices.      PHYSICAL EXAM:  GEN: No acute distress, Intubated  LUNGS: decreased breath sounds BL  HEART: S1/S2 present. RRR.   ABD: Soft, non-tender, non-distended. Bowel sounds present  NEURO: Intubated but follows commands

## 2018-12-28 NOTE — PROGRESS NOTE ADULT - ASSESSMENT
73F from [facility] w/ PMH of HTN presented to [facility] with worsening generalized weakness and SOB for 3 days.     #Acute Hypercapnic Respiratory Failure likely 2/2 Lovenox induced Diffuse alveolar hemorrhage: improving  -Patient Intubated 12/17 as she was saturating in late 80s  -Duplex report 12/14: negative for DVT   -Proventil, Spiriva, Duonebs q6h, Zosyn, started on Levofloxacin 12/17 >> stopped zosyn and Levo 12/20 and started on Doxy on 12/21, stopped 12/27  -Sepsis ruled out > no leukocytosis, afebrile  -RVP negative 12/13  -Blood culture 12/14 negative  -Urine Legionella and Strep Ag negative 12/15  -Echo 12/15: LVEF 50-55%  -CT chest suggestive for RLL PNA  -MRSA negative 12/17  -ESR 86, CRP 1.72, RF <10 12/18  -Bronchoscopy done 12/20 > BAL done and sent for testing > Increasing Aliquot  -Cental line placement 12/20  -Rheuma on board, autoimmune workup negative > SARITHA, ANCA, RF, COMPLEMENTS, HEP PANEL  -Repeat UA showed No RBCs  -steroids were increased to 125 Q6 12/20 and decreased 12/23 to Q8  -Less bloody bronchial secretions  -Pt failed trial of weaning several times, so discussed with family > Brother Lei about option of tracheostomy 12/26 > CTS consulted about tracheostomy 12/27 > Tracheostomy in plan on       #New Atrial Fibrillation, rate controlled > Most likely ischemic response?  -Patient currently in NSR   -CHA2 DS2 VASC: 4   -Therapeutic Lovenox for AC stopped for episode of 200-300 ml bleed on 12/17 at the injection site and the Hb dropped from 11 to 9 > CT A/P ordered to r/o retroperitoneal bleed > negative for bleed. Bowel regimen added  -Started Heparin 12/25    #HLD  -Simvastatin     #HTN  -HCTZ held for hypotension     #Varicella Zoster Right Shoulder: improving  -Contact and airborne precautions dced 12/17 as per ID rec  -Acyclovir 800mg IV q8h, Benadryl PRN for pruritus > stopped 12/18    #ANTONIETTA improving  -Creatinine 0.7 12/27  -Urine Urea 7575, creat 30, Na 94    #Asymptomatic Bacteriuria with RBCs  -UA positive 12/15 but culture negative 12/15  -Repeat UA 12/17 positive  -Repeat UA negative for rbcs 12/20    #Hypokalemia: improving  -Repleted, monitor BMP   -3.8 12/27      DVT ppx: Therapeutic Lovenox  GI ppx: Not indicated   Diet: NPO  Activity: increase as tolerated   FULL CODE 73F from [facility] w/ PMH of HTN presented to [facility] with worsening generalized weakness and SOB for 3 days.     #Acute Hypercapnic Respiratory Failure likely 2/2 Lovenox induced Diffuse alveolar hemorrhage: improving  -Patient Intubated 12/17 as she was saturating in late 80s, failed weaning trial 6 times, trach in plan on 12/31  -Duplex report 12/14: negative for DVT   -started Levofloxacin 12/17 >> stopped zosyn and Levo 12/20 and started on Doxy on 12/21, stopped 12/27  -Sepsis ruled out > no leukocytosis, afebrile  -RVP negative 12/13  -Blood culture 12/14 negative  -Urine Legionella and Strep Ag negative 12/15  -Echo 12/15: LVEF 50-55%  -CT chest suggestive for RLL PNA  -MRSA negative 12/17  -ESR 86, CRP 1.72, RF <10 12/18  -Bronchoscopy done 12/20 > BAL done and sent for testing > Increasing Aliquot  -Cental line placement 12/20  -Rheuma on board, autoimmune workup negative > SARITHA, ANCA, RF, COMPLEMENTS, HEP PANEL  -Repeat UA showed No RBCs  -steroids were increased to 125 Q6 12/20 and decreased 12/23 to Q8  -Less bloody bronchial secretions  -Pt failed trial of weaning several times, so discussed with family > Brother Lei about option of tracheostomy 12/26 > CTS consulted about tracheostomy 12/27 > Tracheostomy in plan on       #New Atrial Fibrillation, rate controlled > Most likely ischemic response?  -Patient currently in NSR   -CHA2 DS2 VASC: 4   -HASBLED score 3  -Therapeutic Lovenox for AC stopped for episode of 200-300 ml bleed on 12/17 at the injection site and the Hb dropped from 11 to 9 > CT A/P ordered to r/o retroperitoneal bleed > negative for bleed. Bowel regimen added  -Started Heparin 12/25    #HLD  -Simvastatin     #HTN  -HCTZ held for hypotension   -On pressors    #Varicella Zoster Right Shoulder: improving  -Contact and airborne precautions dced 12/17 as per ID rec  -Acyclovir 800mg IV q8h, Benadryl PRN for pruritus > stopped 12/18      #Asymptomatic Bacteriuria with RBCs  -UA positive 12/15 but culture negative 12/15  -Repeat UA 12/17 positive  -Repeat UA negative for rbcs 12/20    #Hypokalemia: improving  -Repleted, monitor BMP   -3.6 12/28        DVT ppx: Subq Heparin  GI ppx: Not indicated   Diet: NPO  Activity: increase as tolerated   FULL CODE

## 2018-12-29 NOTE — PROGRESS NOTE ADULT - ASSESSMENT
73F from [facility] w/ PMH of HTN presented to [facility] with worsening generalized weakness and SOB for 3 days.     #Acute Hypercapnic Respiratory Failure likely 2/2 Lovenox induced vs 2/2 Pneumonia leading to Diffuse alveolar hemorrhage: improving  -Patient Intubated 12/17 as she was saturating in late 80s, failed weaning trial 6 times, trach in plan on 12/31  -Duplex report 12/14: negative for DVT   -started Levofloxacin 12/17 >> stopped zosyn and Levo 12/20 and started on Doxy on 12/21, stopped 12/27  -Sepsis ruled out > no leukocytosis, afebrile  -RVP negative 12/13  -Blood culture 12/14 negative  -Urine Legionella and Strep Ag negative 12/15  -Echo 12/15: LVEF 50-55%  -CT chest suggestive for RLL PNA  -MRSA negative 12/17  -ESR 86, CRP 1.72, RF <10 12/18  -Bronchoscopy done 12/20 > BAL done and sent for testing > Increasing Aliquot  -Cental line placement 12/20  -Rheuma on board, autoimmune workup negative > SARITHA, ANCA, RF, COMPLEMENTS, HEP PANEL  -Repeat UA showed No RBCs  -steroids were increased to 125 Q6 12/20 and decreased 12/23 to Q8  -Less bloody bronchial secretions  -Pt failed trial of weaning several times, so discussed with family > Brother Lei about option of tracheostomy 12/26 > CTS consulted about tracheostomy 12/27 > Tracheostomy in plan on       #New Atrial Fibrillation, rate controlled  -Patient currently in NSR   -CHA2 DS2 VASC: 4   -HASBLED score 3  -Therapeutic Lovenox for AC stopped for episode of 200-300 ml bleed on 12/17 at the injection site and the Hb dropped from 11 to 9 > CT A/P ordered to r/o retroperitoneal bleed > negative for bleed. Bowel regimen added  -Started Heparin 12/25    #HLD  -Simvastatin     #HTN  -HCTZ held for hypotension   -On pressors    #Varicella Zoster Right Shoulder: improving  -Contact and airborne precautions dced 12/17 as per ID rec  -Acyclovir 800mg IV q8h, Benadryl PRN for pruritus > stopped 12/18      #Asymptomatic Bacteriuria with RBCs  -UA positive 12/15 but culture negative 12/15  -Repeat UA 12/17 positive  -Repeat UA negative for rbcs 12/20    #Hypokalemia: improving  -Repleted, monitor BMP   -3.6 12/28        DVT ppx: Subq Heparin  GI ppx: Not indicated   Diet: NPO  Activity: increase as tolerated   FULL CODE

## 2018-12-29 NOTE — PROGRESS NOTE ADULT - SUBJECTIVE AND OBJECTIVE BOX
Over Night Events:  remains intubated and sedated on propofol   on levo 0.06       ROS:  See HPI    PHYSICAL EXAM    ICU Vital Signs Last 24 Hrs  T(C): 37.6 (29 Dec 2018 08:00), Max: 37.6 (29 Dec 2018 08:00)  T(F): 99.7 (29 Dec 2018 08:00), Max: 99.7 (29 Dec 2018 08:00)  HR: 60 (29 Dec 2018 09:00) (49 - 70)  BP: 128/50 (29 Dec 2018 09:00) (105/52 - 128/50)  BP(mean): 78 (29 Dec 2018 09:00) (50 - 82)  RR: 41 (29 Dec 2018 09:00) (18 - 41)  SpO2: 99% (29 Dec 2018 09:00) (97% - 100%)        12-28-18 @ 07:01  -  12-29-18 @ 07:00  --------------------------------------------------------  IN:    norepinephrine Infusion: 199 mL    propofol Infusion: 410 mL    Vital High Protein: 760 mL  Total IN: 1369 mL    OUT:    Indwelling Catheter - Urethral: 1830 mL  Total OUT: 1830 mL    Total NET: -461 mL      12-29-18 @ 07:01  -  12-29-18 @ 09:51  --------------------------------------------------------  IN:    norepinephrine Infusion: 34 mL    propofol Infusion: 49 mL    Vital High Protein: 90 mL  Total IN: 173 mL    OUT:    Indwelling Catheter - Urethral: 220 mL  Total OUT: 220 mL    Total NET: -47 mL          General:  HEENT:  PERRLA         Lymph Nodes: NO cervical LN, supple, no JVD   Lungs: Bilateral BS, no wheeze or rhonci  Cardiovascular: Regular   Abdomen: Soft, Positive BS, non tender   Extremities: No clubbing, no edema   Skin: INTACT, warm, no edema   Neurological: MOVES ALL EXT, alert oriented       LABS:                          9.1    11.55 )-----------( 420      ( 29 Dec 2018 05:11 )             29.2                                               12-29    146  |  100  |  44<H>  ----------------------------<  139<H>  3.9   |  38<H>  |  0.6<L>    Ca    9.1      29 Dec 2018 05:11  Mg     2.1     12-28    TPro  6.5  /  Alb  3.1<L>  /  TBili  0.7  /  DBili  x   /  AST  13  /  ALT  33  /  AlkPhos  67  12-29      PT/INR - ( 28 Dec 2018 04:30 )   PT: 12.60 sec;   INR: 1.10 ratio         PTT - ( 28 Dec 2018 04:30 )  PTT:29.0 sec                                                                                     LIVER FUNCTIONS - ( 29 Dec 2018 05:11 )  Alb: 3.1 g/dL / Pro: 6.5 g/dL / ALK PHOS: 67 U/L / ALT: 33 U/L / AST: 13 U/L / GGT: x                                                                                               Mode: AC/ CMV (Assist Control/ Continuous Mandatory Ventilation)  RR (machine): 18  TV (machine): 350  FiO2: 40  PEEP: 5  ITime: 1  MAP: 11  PIP: 28                                      ABG - ( 29 Dec 2018 07:46 )  pH, Arterial: 7.43  pH, Blood: x     /  pCO2: 60    /  pO2: 123   / HCO3: 40    / Base Excess: 13.3  /  SaO2: 99              Blood Gas Arterial, Lactate: 0.7 mmoL/L (12-29-18 @ 07:46)        MEDICATIONS  (STANDING):  chlorhexidine 0.12% Liquid 15 milliLiter(s) Oral Mucosa two times a day  chlorhexidine 4% Liquid 1 Application(s) Topical <User Schedule>  heparin  Injectable 5000 Unit(s) SubCutaneous every 8 hours  lactulose Syrup 10 Gram(s) Oral daily  methylPREDNISolone sodium succinate Injectable 60 milliGRAM(s) IV Push every 12 hours  multivitamin 1 Tablet(s) Oral daily  norepinephrine Infusion 0.05 MICROgram(s)/kG/Min (7.5 mL/Hr) IV Continuous <Continuous>  nystatin Powder 1 Application(s) Topical two times a day  pantoprazole  Injectable 40 milliGRAM(s) IV Push two times a day  propofol Infusion 0.1 MICROgram(s)/kG/Min (0.048 mL/Hr) IV Continuous <Continuous>  simvastatin 5 milliGRAM(s) Oral at bedtime    MEDICATIONS  (PRN):  guaiFENesin    Syrup 200 milliGRAM(s) Oral every 6 hours PRN Cough      Xrays:                                                                                     ECHO

## 2018-12-29 NOTE — PROGRESS NOTE ADULT - ASSESSMENT
IMPRESSION:    Acute on chronic hypercapnic respiratory failure s/p intubation s/p bronchoscopy alveolar hem, ? etiology was on lovenox  IDALMIS / OHS  Pneumonia/Aspiration/ fluid overload  anemia resolved  hypernatremia resolved    PLAN:     CNS: SAT, precedex and fentanyl     HEENT: Oral care    PULMONARY:  HOB @ 45 degrees, Tracheostomy with CTS, continue with steroids for now    CARDIOVASCULAR: Keep equal balance, wean off pressors     GI: GI prophylaxis. Continue feeding     RENAL:  Follow up lytes.  Correct as needed BMP. Kent catheter    INFECTIOUS DISEASE: Follow up cultures    HEMATOLOGICAL:  DVT prophylaxis.    ENDOCRINE: rheumatological work up: negative    POOR PROGNOSIS    Call CTS to follow up on tracheostomy    Monitor in ICU

## 2018-12-29 NOTE — PROGRESS NOTE ADULT - SUBJECTIVE AND OBJECTIVE BOX
Patient is a 73y old Female who presented with a chief complaint of SOB and rash (28 Dec 2018 13:55)  Currently admitted to medicine with the primary diagnosis of DAH/Lovenox induced vs idiopathic vs 2/2 Pneumonia       Today is hospital day 18d. Pt was initially in ICU  and downgraded on 12/14 to 3c but was upgraded to CCU the same day because of worsening SOB  CCU/ICU day: 15  Intubated: on 12/17, failed multiple trials of weaning, Dr Ortiz talked to pt's brother Lei and CTS about possible Tracheostomy, tracheo in plan on monday 12/31  Central Line: Yes Right IJ  Kent: Yes  NG: OG tube  Drips: None as of now  Iv Lines: 3    PAST MEDICAL & SURGICAL HISTORY  Hypertension  No significant past surgical history    SOCIAL HISTORY:  Negative for smoking/alcohol/drug use.     ALLERGIES:  No Known Allergies    MEDICATIONS:  STANDING MEDICATIONS  chlorhexidine 0.12% Liquid 15 milliLiter(s) Oral Mucosa two times a day  chlorhexidine 4% Liquid 1 Application(s) Topical <User Schedule>  heparin  Injectable 5000 Unit(s) SubCutaneous every 8 hours  lactulose Syrup 10 Gram(s) Oral daily  methylPREDNISolone sodium succinate Injectable 60 milliGRAM(s) IV Push every 12 hours  multivitamin 1 Tablet(s) Oral daily  norepinephrine Infusion 0.05 MICROgram(s)/kG/Min IV Continuous <Continuous>  nystatin Powder 1 Application(s) Topical two times a day  pantoprazole  Injectable 40 milliGRAM(s) IV Push two times a day  propofol Infusion 0.1 MICROgram(s)/kG/Min IV Continuous <Continuous>  simvastatin 5 milliGRAM(s) Oral at bedtime    PRN MEDICATIONS  guaiFENesin    Syrup 200 milliGRAM(s) Oral every 6 hours PRN    Home Medications:  hydroCHLOROthiazide 25 mg oral tablet: 1 tab(s) orally once a day (12 Dec 2018 02:08)  metOLazone 5 mg oral tablet: 1  orally 3 times a week (12 Dec 2018 02:08)  metoprolol succinate 25 mg oral tablet, extended release: 1 tab(s) orally once a day (12 Dec 2018 02:08)    VITALS:   T(F): 98.7  HR: 62  BP: 109/50  RR: 21  SpO2: 99%    LABS:                        9.1    11.55 )-----------( 420      ( 29 Dec 2018 05:11 )             29.2     12-29    146  |  100  |  44<H>  ----------------------------<  139<H>  3.9   |  38<H>  |  0.6<L>    Ca    9.1      29 Dec 2018 05:11  Mg     2.1     12-28    TPro  6.5  /  Alb  3.1<L>  /  TBili  0.7  /  DBili  x   /  AST  13  /  ALT  33  /  AlkPhos  67  12-29    PT/INR - ( 28 Dec 2018 04:30 )   PT: 12.60 sec;   INR: 1.10 ratio         PTT - ( 28 Dec 2018 04:30 )  PTT:29.0 sec    ABG - ( 28 Dec 2018 08:12 )  pH, Arterial: 7.35  pH, Blood: x     /  pCO2: 73    /  pO2: 106   / HCO3: 41    / Base Excess: 12.8  /  SaO2: 98          RADIOLOGY:  < from: Xray Chest 1 View- PORTABLE-Routine (12.28.18 @ 04:43) >  Impression:    Decreased left basilar opacity/pleural effusion.  Stable support devices.      PHYSICAL EXAM:  GEN: No acute distress, Intubated  LUNGS: decreased breath sounds BL  HEART: S1/S2 present. RRR.   ABD: Soft, non-tender, non-distended. Bowel sounds present  NEURO: Intubated but follows commands

## 2018-12-30 NOTE — PROGRESS NOTE ADULT - SUBJECTIVE AND OBJECTIVE BOX
Progress Note: Surgery  Patient: DENISE BARAJAS , 73y (1945)Female   MRN: [id number]  Location: [facility] [address]  Visit: 12-11-18 Inpatient  Date: 12-30-18 @ 01:14    Hospital Day: 20    Procedure/Injury: consult for trach     Events over 24h: MELANIE overnight    Vitals: T(F): 99 (12-30-18 @ 00:00), Max: 99.7 (12-29-18 @ 08:00)  HR: 46 (12-30-18 @ 00:15)  BP: 145/55 (12-30-18 @ 00:00) (72/33 - 145/55)  RR: 32 (12-30-18 @ 00:00)  SpO2: 100% (12-30-18 @ 00:15)    Diet: Diet, NPO with Tube Feed:   Tube Feeding Modality: Orogastric  Vital High Protein  Total Volume for 24 Hours (mL): 960  Continuous  Starting Tube Feed Rate mL per Hour: 20  Increase Tube Feed Rate by (mL): 10     Every 6 hours  Until Goal Tube Feed Rate (mL per Hour): 40  Tube Feed Duration (in Hours): 24  Tube Feed Start Time: 12:00 (12-18-18 @ 11:06)  Diet, NPO with Tube Feed:   Tube Feeding Modality: Orogastric  Vital High Protein  Total Volume for 24 Hours (mL): 720  Continuous  Starting Tube Feed Rate mL per Hour: 30  Until Goal Tube Feed Rate (mL per Hour): 30  Tube Feed Duration (in Hours): 24  Tube Feed Start Time: 10:00  No Carb Prosource TF     Qty per Day:  1 (12-28-18 @ 09:39)  Diet, NPO with Tube Feed:   Tube Feeding Modality: Orogastric  Vital High Protein  Total Volume for 24 Hours (mL): 720  Continuous  Starting Tube Feed Rate mL per Hour: 30  Until Goal Tube Feed Rate (mL per Hour): 30  Tube Feed Duration (in Hours): 24  Tube Feed Start Time: 10:00  No Carb Prosource TF     Qty per Day:  1 (12-29-18 @ 06:50)  Diet, NPO:   NPO for Procedure/Test     NPO Start Date: 30-Dec-2018,   NPO Start Time: 23:30 (12-29-18 @ 20:37)      Bowel function:   Bowel movement []   Flatus []    In:   12-28-18 @ 07:01  -  12-29-18 @ 07:00  --------------------------------------------------------  IN: 1369 mL    12-29-18 @ 07:01  -  12-30-18 @ 01:14  --------------------------------------------------------  IN: 1124.1 mL      Out:   12-28-18 @ 07:01  -  12-29-18 @ 07:00  --------------------------------------------------------  OUT:    Indwelling Catheter - Urethral: 1830 mL  Total OUT: 1830 mL      12-29-18 @ 07:01  -  12-30-18 @ 01:14  --------------------------------------------------------  OUT:    Indwelling Catheter - Urethral: 950 mL  Total OUT: 950 mL        Net:   12-28-18 @ 07:01  -  12-29-18 @ 07:00  --------------------------------------------------------  NET: -461 mL    12-29-18 @ 07:01  -  12-30-18 @ 01:14  --------------------------------------------------------  NET: 174.1 mL        Physical Examination:  General: NAD, lying in bed comfortably   HEENT: NCAT, BEATRIZ, WNL  Heart: S1 S2, No MRG RRR   Lungs: CTABL +BS Equal BL, No WRC  Abdomen: Soft, non-distended, nontender. Obese. No guarding or rebound tenderness.   MSK/Extremities: ROM intact BL UE/LE. Normal gait. No significant deformity or joint abnormality.   Skin: Warm/dry, Normal color, No jaundice.   Incisions/Wounds: Dressings in place, clean, dry and intact, no signs of infection/active bleeding/drainage    Medications: [Standing]  chlorhexidine 0.12% Liquid 15 milliLiter(s) Oral Mucosa two times a day  chlorhexidine 4% Liquid 1 Application(s) Topical <User Schedule>  dexmedetomidine Infusion 0.5 MICROgram(s)/kG/Hr (10 mL/Hr) IV Continuous <Continuous>  fentaNYL   Infusion. 0.5 MICROgram(s)/kG/Hr (4 mL/Hr) IV Continuous <Continuous>  heparin  Injectable 5000 Unit(s) SubCutaneous every 8 hours  lactulose Syrup 10 Gram(s) Oral daily  methylPREDNISolone sodium succinate Injectable 60 milliGRAM(s) IV Push every 12 hours  multivitamin 1 Tablet(s) Oral daily  norepinephrine Infusion 0.05 MICROgram(s)/kG/Min (3.75 mL/Hr) IV Continuous <Continuous>  nystatin Powder 1 Application(s) Topical two times a day  pantoprazole  Injectable 40 milliGRAM(s) IV Push two times a day  simvastatin 5 milliGRAM(s) Oral at bedtime    Medications:[PRN]  guaiFENesin    Syrup 200 milliGRAM(s) Oral every 6 hours PRN    Labs:                        9.1    11.55 )-----------( 420      ( 29 Dec 2018 05:11 )             29.2     12-29    146  |  100  |  44<H>  ----------------------------<  139<H>  3.9   |  38<H>  |  0.6<L>    Ca    9.1      29 Dec 2018 05:11  Phos  3.3     12-29  Mg     2.1     12-28    TPro  6.5  /  Alb  3.1<L>  /  TBili  0.7  /  DBili  x   /  AST  13  /  ALT  33  /  AlkPhos  67  12-29    LIVER FUNCTIONS - ( 29 Dec 2018 05:11 )  Alb: 3.1 g/dL / Pro: 6.5 g/dL / ALK PHOS: 67 U/L / ALT: 33 U/L / AST: 13 U/L / GGT: x           PT/INR - ( 28 Dec 2018 04:30 )   PT: 12.60 sec;   INR: 1.10 ratio         PTT - ( 28 Dec 2018 04:30 )  PTT:29.0 sec  ABG - ( 29 Dec 2018 07:46 )  pH: 7.43  /  pCO2: 60    /  pO2: 123   / HCO3: 40    / Base Excess: 13.3  /  SaO2: 99                CARDIAC MARKERS ( 29 Dec 2018 15:50 )  x     / x     / 11 U/L / x     / x            RR (machine): 18, TV (machine): 350, FiO2: 40, PEEP: 5, PIP: 28      Date/Time: 12-30-18 @ 01:14

## 2018-12-30 NOTE — PROGRESS NOTE ADULT - SUBJECTIVE AND OBJECTIVE BOX
Patient is a 73y old  Female who presents with a chief complaint of SOB and rash (30 Dec 2018 01:13)        Over Night Events:        ROS:  See HPI    PHYSICAL EXAM    ICU Vital Signs Last 24 Hrs  T(C): 37.6 (30 Dec 2018 08:00), Max: 37.6 (30 Dec 2018 08:00)  T(F): 99.7 (30 Dec 2018 08:00), Max: 99.7 (30 Dec 2018 08:00)  HR: 142 (30 Dec 2018 09:48) (44 - 144)  BP: 107/60 (30 Dec 2018 09:48) (64/48 - 145/55)  BP(mean): 89 (30 Dec 2018 09:48) (54 - 93)  ABP: --  ABP(mean): --  RR: 32 (30 Dec 2018 09:48) (17 - 55)  SpO2: 99% (30 Dec 2018 09:48) (93% - 100%)      General:  HEENT: BEATRIZ             Lymphatic system: No cervical LN   Lungs: Bilateral BS  Cardiovascular: Regular   Gastrointestinal: Soft, Positive BS  Extremities: No clubbing.  Moves extremities.  Full Range of motion   Skin: Warm, intact  Neurological: No motor or sensory deficit       12-29-18 @ 07:01  -  12-30-18 @ 07:00  --------------------------------------------------------  IN:    dexmedetomidine Infusion: 287.8 mL    Enteral Tube Flush: 90 mL    fentaNYL Infusion.: 241.5 mL    norepinephrine Infusion: 68 mL    norepinephrine Infusion: 68 mL    propofol Infusion: 80 mL    Vital High Protein: 720 mL  Total IN: 1555.3 mL    OUT:    Indwelling Catheter - Urethral: 1240 mL  Total OUT: 1240 mL    Total NET: 315.3 mL      12-30-18 @ 07:01  -  12-30-18 @ 10:04  --------------------------------------------------------  IN:    dexmedetomidine Infusion: 16.1 mL    fentaNYL Infusion.: 16.1 mL    norepinephrine Infusion: 1 mL    Vital High Protein: 30 mL  Total IN: 63.2 mL    OUT:    Indwelling Catheter - Urethral: 35 mL  Total OUT: 35 mL    Total NET: 28.2 mL          LABS:                            8.4    7.99  )-----------( 310      ( 30 Dec 2018 04:30 )             27.1                                               12-30    145  |  100  |  43<H>  ----------------------------<  138<H>  4.4   |  38<H>  |  0.6<L>    Ca    8.9      30 Dec 2018 04:30  Phos  3.3     12-29    TPro  6.0  /  Alb  3.2<L>  /  TBili  0.9  /  DBili  x   /  AST  12  /  ALT  28  /  AlkPhos  56  12-30      PTT - ( 30 Dec 2018 04:30 )  PTT:23.1 sec                                           CARDIAC MARKERS ( 29 Dec 2018 15:50 )  x     / x     / 11 U/L / x     / x                                                LIVER FUNCTIONS - ( 30 Dec 2018 04:30 )  Alb: 3.2 g/dL / Pro: 6.0 g/dL / ALK PHOS: 56 U/L / ALT: 28 U/L / AST: 12 U/L / GGT: x                                                                                               Mode: AC/ CMV (Assist Control/ Continuous Mandatory Ventilation)  RR (machine): 18  TV (machine): 350  FiO2: 40  PEEP: 5  ITime: 1  MAP: 10  PIP: 22                                      ABG - ( 30 Dec 2018 07:47 )  pH, Arterial: 7.40  pH, Blood: x     /  pCO2: 64    /  pO2: 95    / HCO3: 40    / Base Excess: 13.3  /  SaO2: 98                  MEDICATIONS  (STANDING):  chlorhexidine 0.12% Liquid 15 milliLiter(s) Oral Mucosa two times a day  chlorhexidine 4% Liquid 1 Application(s) Topical <User Schedule>  dexmedetomidine Infusion 0.5 MICROgram(s)/kG/Hr (10 mL/Hr) IV Continuous <Continuous>  fentaNYL   Infusion. 0.5 MICROgram(s)/kG/Hr (4 mL/Hr) IV Continuous <Continuous>  heparin  Injectable 5000 Unit(s) SubCutaneous every 8 hours  lactulose Syrup 10 Gram(s) Oral daily  methylPREDNISolone sodium succinate Injectable 60 milliGRAM(s) IV Push every 12 hours  multivitamin 1 Tablet(s) Oral daily  norepinephrine Infusion 0.05 MICROgram(s)/kG/Min (3.75 mL/Hr) IV Continuous <Continuous>  nystatin Powder 1 Application(s) Topical two times a day  pantoprazole  Injectable 40 milliGRAM(s) IV Push two times a day  simvastatin 5 milliGRAM(s) Oral at bedtime    MEDICATIONS  (PRN):  guaiFENesin    Syrup 200 milliGRAM(s) Oral every 6 hours PRN Cough      Xrays:                                                                                     ECHO Patient is a 73y old  Female who presents with a chief complaint of SOB and rash (30 Dec 2018 01:13)        Over Night Events:    remains on levophed 0.05  remains intubated on sedation agitated this morning needed to give pushes of fentanyl  follows commands on sedation    ROS:  See HPI    PHYSICAL EXAM    ICU Vital Signs Last 24 Hrs  T(C): 37.6 (30 Dec 2018 08:00), Max: 37.6 (30 Dec 2018 08:00)  T(F): 99.7 (30 Dec 2018 08:00), Max: 99.7 (30 Dec 2018 08:00)  HR: 142 (30 Dec 2018 09:48) (44 - 144)  BP: 107/60 (30 Dec 2018 09:48) (64/48 - 145/55)  BP(mean): 89 (30 Dec 2018 09:48) (54 - 93)  ABP: --  ABP(mean): --  RR: 32 (30 Dec 2018 09:48) (17 - 55)  SpO2: 99% (30 Dec 2018 09:48) (93% - 100%)      General:  HEENT: BEATRIZ             Lymphatic system: No cervical LN   Lungs: Bilateral BS  Cardiovascular: Regular   Gastrointestinal: Soft, Positive BS  Extremities: No clubbing.  Moves extremities.  Full Range of motion   Skin: Warm, intact  Neurological: No motor or sensory deficit       12-29-18 @ 07:01  -  12-30-18 @ 07:00  --------------------------------------------------------  IN:    dexmedetomidine Infusion: 287.8 mL    Enteral Tube Flush: 90 mL    fentaNYL Infusion.: 241.5 mL    norepinephrine Infusion: 68 mL    norepinephrine Infusion: 68 mL    propofol Infusion: 80 mL    Vital High Protein: 720 mL  Total IN: 1555.3 mL    OUT:    Indwelling Catheter - Urethral: 1240 mL  Total OUT: 1240 mL    Total NET: 315.3 mL      12-30-18 @ 07:01  -  12-30-18 @ 10:04  --------------------------------------------------------  IN:    dexmedetomidine Infusion: 16.1 mL    fentaNYL Infusion.: 16.1 mL    norepinephrine Infusion: 1 mL    Vital High Protein: 30 mL  Total IN: 63.2 mL    OUT:    Indwelling Catheter - Urethral: 35 mL  Total OUT: 35 mL    Total NET: 28.2 mL          LABS:                            8.4    7.99  )-----------( 310      ( 30 Dec 2018 04:30 )             27.1                                               12-30    145  |  100  |  43<H>  ----------------------------<  138<H>  4.4   |  38<H>  |  0.6<L>    Ca    8.9      30 Dec 2018 04:30  Phos  3.3     12-29    TPro  6.0  /  Alb  3.2<L>  /  TBili  0.9  /  DBili  x   /  AST  12  /  ALT  28  /  AlkPhos  56  12-30      PTT - ( 30 Dec 2018 04:30 )  PTT:23.1 sec                                           CARDIAC MARKERS ( 29 Dec 2018 15:50 )  x     / x     / 11 U/L / x     / x                                                LIVER FUNCTIONS - ( 30 Dec 2018 04:30 )  Alb: 3.2 g/dL / Pro: 6.0 g/dL / ALK PHOS: 56 U/L / ALT: 28 U/L / AST: 12 U/L / GGT: x                                                                                               Mode: AC/ CMV (Assist Control/ Continuous Mandatory Ventilation)  RR (machine): 18  TV (machine): 350  FiO2: 40  PEEP: 5  ITime: 1  MAP: 10  PIP: 22                                      ABG - ( 30 Dec 2018 07:47 )  pH, Arterial: 7.40  pH, Blood: x     /  pCO2: 64    /  pO2: 95    / HCO3: 40    / Base Excess: 13.3  /  SaO2: 98                  MEDICATIONS  (STANDING):  chlorhexidine 0.12% Liquid 15 milliLiter(s) Oral Mucosa two times a day  chlorhexidine 4% Liquid 1 Application(s) Topical <User Schedule>  dexmedetomidine Infusion 0.5 MICROgram(s)/kG/Hr (10 mL/Hr) IV Continuous <Continuous>  fentaNYL   Infusion. 0.5 MICROgram(s)/kG/Hr (4 mL/Hr) IV Continuous <Continuous>  heparin  Injectable 5000 Unit(s) SubCutaneous every 8 hours  lactulose Syrup 10 Gram(s) Oral daily  methylPREDNISolone sodium succinate Injectable 60 milliGRAM(s) IV Push every 12 hours  multivitamin 1 Tablet(s) Oral daily  norepinephrine Infusion 0.05 MICROgram(s)/kG/Min (3.75 mL/Hr) IV Continuous <Continuous>  nystatin Powder 1 Application(s) Topical two times a day  pantoprazole  Injectable 40 milliGRAM(s) IV Push two times a day  simvastatin 5 milliGRAM(s) Oral at bedtime    MEDICATIONS  (PRN):  guaiFENesin    Syrup 200 milliGRAM(s) Oral every 6 hours PRN Cough      Xrays:  b/l opacities Patient is a 73y old  Female who presents with a chief complaint of SOB and rash (30 Dec 2018 01:13)        Over Night Events:    remains on levophed 0.05  remains intubated on sedation agitated this morning needed to give pushes of fentanyl  follows commands on sedation    ROS:  See HPI    PHYSICAL EXAM    ICU Vital Signs Last 24 Hrs  T(C): 37.6 (30 Dec 2018 08:00), Max: 37.6 (30 Dec 2018 08:00)  T(F): 99.7 (30 Dec 2018 08:00), Max: 99.7 (30 Dec 2018 08:00)  HR: 142 (30 Dec 2018 09:48) (44 - 144)  BP: 107/60 (30 Dec 2018 09:48) (64/48 - 145/55)  BP(mean): 89 (30 Dec 2018 09:48) (54 - 93)  ABP: --  ABP(mean): --  RR: 32 (30 Dec 2018 09:48) (17 - 55)  SpO2: 99% (30 Dec 2018 09:48) (93% - 100%)      General: sedated  HEENT: BEATRIZ             Lymphatic system: No cervical LN   Lungs: Bilateral BS intubated  Cardiovascular: Regular   Gastrointestinal: Soft, Positive BS  Extremities: No clubbing.  Moves extremities.  Full Range of motion   Skin: Warm, intact  Neurological: No motor or sensory deficit       12-29-18 @ 07:01  -  12-30-18 @ 07:00  --------------------------------------------------------  IN:    dexmedetomidine Infusion: 287.8 mL    Enteral Tube Flush: 90 mL    fentaNYL Infusion.: 241.5 mL    norepinephrine Infusion: 68 mL    norepinephrine Infusion: 68 mL    propofol Infusion: 80 mL    Vital High Protein: 720 mL  Total IN: 1555.3 mL    OUT:    Indwelling Catheter - Urethral: 1240 mL  Total OUT: 1240 mL    Total NET: 315.3 mL      12-30-18 @ 07:01  -  12-30-18 @ 10:04  --------------------------------------------------------  IN:    dexmedetomidine Infusion: 16.1 mL    fentaNYL Infusion.: 16.1 mL    norepinephrine Infusion: 1 mL    Vital High Protein: 30 mL  Total IN: 63.2 mL    OUT:    Indwelling Catheter - Urethral: 35 mL  Total OUT: 35 mL    Total NET: 28.2 mL          LABS:                            8.4    7.99  )-----------( 310      ( 30 Dec 2018 04:30 )             27.1                                               12-30    145  |  100  |  43<H>  ----------------------------<  138<H>  4.4   |  38<H>  |  0.6<L>    Ca    8.9      30 Dec 2018 04:30  Phos  3.3     12-29    TPro  6.0  /  Alb  3.2<L>  /  TBili  0.9  /  DBili  x   /  AST  12  /  ALT  28  /  AlkPhos  56  12-30      PTT - ( 30 Dec 2018 04:30 )  PTT:23.1 sec                                           CARDIAC MARKERS ( 29 Dec 2018 15:50 )  x     / x     / 11 U/L / x     / x                                                LIVER FUNCTIONS - ( 30 Dec 2018 04:30 )  Alb: 3.2 g/dL / Pro: 6.0 g/dL / ALK PHOS: 56 U/L / ALT: 28 U/L / AST: 12 U/L / GGT: x                                                                                               Mode: AC/ CMV (Assist Control/ Continuous Mandatory Ventilation)  RR (machine): 18  TV (machine): 350  FiO2: 40  PEEP: 5  ITime: 1  MAP: 10  PIP: 22                                      ABG - ( 30 Dec 2018 07:47 )  pH, Arterial: 7.40  pH, Blood: x     /  pCO2: 64    /  pO2: 95    / HCO3: 40    / Base Excess: 13.3  /  SaO2: 98                  MEDICATIONS  (STANDING):  chlorhexidine 0.12% Liquid 15 milliLiter(s) Oral Mucosa two times a day  chlorhexidine 4% Liquid 1 Application(s) Topical <User Schedule>  dexmedetomidine Infusion 0.5 MICROgram(s)/kG/Hr (10 mL/Hr) IV Continuous <Continuous>  fentaNYL   Infusion. 0.5 MICROgram(s)/kG/Hr (4 mL/Hr) IV Continuous <Continuous>  heparin  Injectable 5000 Unit(s) SubCutaneous every 8 hours  lactulose Syrup 10 Gram(s) Oral daily  methylPREDNISolone sodium succinate Injectable 60 milliGRAM(s) IV Push every 12 hours  multivitamin 1 Tablet(s) Oral daily  norepinephrine Infusion 0.05 MICROgram(s)/kG/Min (3.75 mL/Hr) IV Continuous <Continuous>  nystatin Powder 1 Application(s) Topical two times a day  pantoprazole  Injectable 40 milliGRAM(s) IV Push two times a day  simvastatin 5 milliGRAM(s) Oral at bedtime    MEDICATIONS  (PRN):  guaiFENesin    Syrup 200 milliGRAM(s) Oral every 6 hours PRN Cough      Xrays:  b/l opacities

## 2018-12-30 NOTE — PROGRESS NOTE ADULT - SUBJECTIVE AND OBJECTIVE BOX
LENGTH OF HOSPITAL STAY: 19d    CHIEF COMPLAINT:   Patient is a 72 yo F who presents with a chief complaint of SOB and rash.    Agitated on fentanyl and precedex. Required push doses of fentanyl 25 mcg and versed 2 mg.     HISTORY OF PRESENTING ILLNESS:   72 yo F from [facility] w/ pmhx of HTN reports with worsening generalized weakness and SOB for 3 days. Pt was at urgent care for evaluation of rash on right shoulder and chest, was found to have O2 sat of 88% on RA and send to the ED for further evaluation. Pt reports b/l leg swelling for a few months. Pt's denies CP, palpitations, orthopnea or PND. Rash on right shoulder, chest and upper arm started on Sunday 12/9/18, pt admits to pruritus, denies pain.   No fever, chills, cough, URI symptoms, dizziness, abd pain, n/v/d, dysuria.     - on arrival to ED, Pt was found to have A fib with RVR HR 130s and O2 sat 69% on RA. Pt improved after Bipap and IV lasix 40mg X1  - pro-  and CXR showes pulmonary edema.     PAST MEDICAL & SURGICAL HISTORY  PAST MEDICAL & SURGICAL HISTORY:  Hypertension  No significant past surgical history    SOCIAL HISTORY:    ALLERGIES:  No Known Allergies    MEDICATIONS:  STANDING MEDICATIONS  chlorhexidine 0.12% Liquid 15 milliLiter(s) Oral Mucosa two times a day  chlorhexidine 4% Liquid 1 Application(s) Topical <User Schedule>  dexmedetomidine Infusion 0.5 MICROgram(s)/kG/Hr IV Continuous <Continuous>  fentaNYL   Infusion. 0.5 MICROgram(s)/kG/Hr IV Continuous <Continuous>  heparin  Injectable 5000 Unit(s) SubCutaneous every 8 hours  lactulose Syrup 10 Gram(s) Oral daily  methylPREDNISolone sodium succinate Injectable 60 milliGRAM(s) IV Push every 12 hours  multivitamin 1 Tablet(s) Oral daily  norepinephrine Infusion 0.05 MICROgram(s)/kG/Min IV Continuous <Continuous>  nystatin Powder 1 Application(s) Topical two times a day  pantoprazole  Injectable 40 milliGRAM(s) IV Push two times a day  simvastatin 5 milliGRAM(s) Oral at bedtime    PRN MEDICATIONS  guaiFENesin    Syrup 200 milliGRAM(s) Oral every 6 hours PRN    VITALS:   T(F): 99.5  HR: 52  BP: 109/44  RR: 37  SpO2: 100%    LABS:                        8.4    7.99  )-----------( 310      ( 30 Dec 2018 04:30 )             27.1     12-30    145  |  100  |  43<H>  ----------------------------<  138<H>  4.4   |  38<H>  |  0.6<L>    Ca    8.9      30 Dec 2018 04:30  Phos  3.3     12-29    TPro  6.0  /  Alb  3.2<L>  /  TBili  0.9  /  DBili  x   /  AST  12  /  ALT  28  /  AlkPhos  56  12-30    PTT - ( 30 Dec 2018 04:30 )  PTT:23.1 sec    ABG - ( 30 Dec 2018 07:47 )  pH, Arterial: 7.40  pH, Blood: x     /  pCO2: 64    /  pO2: 95    / HCO3: 40    / Base Excess: 13.3  /  SaO2: 98                Creatine Kinase, Serum: 11 U/L (12-29-18 @ 15:50)      CARDIAC MARKERS ( 29 Dec 2018 15:50 )  x     / x     / 11 U/L / x     / x          RADIOLOGY:    PHYSICAL EXAM:  GEN: intubated, follows commands  HEENT: moist mucous membranes   LUNGS: clear to auscultation bilaterally   HEART: S1/S2 present, RRR  ABD: soft, nontender, nondistended, bowel sounds present  EXT: no edema  NEURO: awake, intubated

## 2018-12-30 NOTE — PROGRESS NOTE ADULT - ASSESSMENT
73F from [facility] w/ PMH of HTN presented to [facility] with worsening generalized weakness and SOB for 3 days.     #Acute Hypercapnic Respiratory Failure likely 2/2 Lovenox induced vs 2/2 Pneumonia leading to Diffuse alveolar hemorrhage: improving  -Patient Intubated 12/17 as she was saturating in late 80s, failed weaning trial 6 times, trach in plan on 12/31  -Duplex report 12/14: negative for DVT   -Started Levofloxacin 12/17 >> stopped zosyn and Levo 12/20 and started on Doxy on 12/21, stopped 12/27  -Sepsis ruled out > no leukocytosis, afebrile  -RVP negative 12/13  -Blood culture 12/14 negative  -Urine Legionella and Strep Ag negative 12/15  -Echo 12/15: LVEF 50-55%  -CT chest suggestive for RLL PNA  -MRSA negative 12/17  -ESR 86, CRP 1.72, RF <10 12/18  -Bronchoscopy done 12/20 > BAL done and sent for testing > Increasing Aliquot  -Cental line placement 12/20  -Rheum on board, autoimmune workup negative > SARITHA, ANCA, RF, COMPLEMENTS, HEP PANEL  -Repeat UA showed No RBCs  -Steroids were increased to 125 Q6 12/20 and decreased 12/23 to Q8, now 60 Q12  -Less bloody bronchial secretions  -Pt failed trial of weaning several times, so discussed with family > Brother Lei about option of tracheostomy 12/26 > CTS consulted about tracheostomy 12/27  -Planned for tracheostomy tomorrow  -Pending preop risk stratification per cardio, consult placed  -ETT advanced 1 cm    #New Atrial Fibrillation, rate controlled  -Patient currently in NSR   -[id number] VASC: 4   -HASBLED score 3  -Therapeutic Lovenox for AC stopped for episode of 200-300 ml bleed on 12/17 at the injection site and the Hb dropped from 11 to 9 > CT A/P ordered to r/o retroperitoneal bleed > negative for bleed. Bowel regimen added  -Started Heparin 12/25    #HLD  -Simvastatin     #HTN  -HCTZ held for hypotension   -On pressors    #Varicella Zoster Right Shoulder: improving  -Contact and airborne precautions dced 12/17 as per ID rec  -Acyclovir 800mg IV q8h, Benadryl PRN for pruritus > stopped 12/18      #Asymptomatic Bacteriuria with RBCs  -UA positive 12/15 but culture negative 12/15  -Repeat UA 12/17 positive  -Repeat UA negative for rbcs 12/20    #Hypokalemia: improving  -Repleted, monitor BMP   -3.6 12/28        DVT ppx: Subq Heparin  GI ppx: Not indicated   Diet: NPO  Activity: Increase as tolerated   FULL CODE

## 2018-12-30 NOTE — PROGRESS NOTE ADULT - ASSESSMENT
IMPRESSION:    Acute on chronic hypercapnic respiratory failure s/p intubation s/p bronchoscopy alveolar hem, ? etiology was on lovenox  IDALMIS / OHS  Pneumonia/Aspiration/ fluid overload  anemia resolved  hypernatremia resolved    PLAN:     CNS: no SAT due to agitation, precedex and fentanyl    HEENT: Oral care    PULMONARY:  HOB @ 45 degrees, Tracheostomy with CTS, continue with steroids for now, advance ETT 1 cm, failed multiple SBTs    CARDIOVASCULAR: Keep equal balance, wean off pressors     GI: GI prophylaxis. Continue feeding     RENAL:  Follow up lytes.  Correct as needed BMP. Kent catheter    INFECTIOUS DISEASE: Follow up cultures    HEMATOLOGICAL:  DVT prophylaxis.    ENDOCRINE: rheumatological work up: negative    POOR PROGNOSIS    Call CTS to follow up on tracheostomy  transfer to vent unit after trachesotomy    Monitor in ICU

## 2018-12-30 NOTE — PROGRESS NOTE ADULT - ASSESSMENT
Assessment:  73y Female patient consulted for trach placement    Plan:  - pre-op for possible trach early next week  - attending to see

## 2018-12-31 NOTE — PROGRESS NOTE ADULT - SUBJECTIVE AND OBJECTIVE BOX
GENERAL SURGERY PROGRESS NOTE     DENISE BARAJAS  73y  Female  Hospital day :20d  POD:  Procedure:   OVERNIGHT EVENTS:  Patient remains on pressors.  Febrile to 100.6.  Intubated.    T(F): 99.8 (12-31-18 @ 00:00), Max: 100.6 (12-30-18 @ 16:00)  HR: 52 (12-31-18 @ 00:00) (46 - 144)  BP: 112/47 (12-31-18 @ 00:00) (64/48 - 148/59)  RR: 35 (12-31-18 @ 00:00) (17 - 55)  SpO2: 100% (12-31-18 @ 00:00) (93% - 100%)    DIET/FLUIDS: multivitamin 1 Tablet(s) Oral daily  sodium chloride 0.9%. 1000 milliLiter(s) IV Continuous <Continuous>    URINE:   12-29-18 @ 07:01  -  12-30-18 @ 07:00  --------------------------------------------------------  OUT: 1240 mL     Indwelling Urethral Catheter:     Connect To:  Straight Drainage/Gravity    Indication:  Urine Output Monitoring in Critically Ill (12-30-18 @ 06:35)    GI proph:  pantoprazole  Injectable 40 milliGRAM(s) IV Push two times a day    AC/ proph: heparin  Injectable 5000 Unit(s) SubCutaneous every 8 hours    ABx:     PHYSICAL EXAM:  GENERAL: NAD, intubated.   CHEST/LUNG: Clear to auscultation bilaterally  HEART: bradycardic.   ABDOMEN: Soft, Nontender, Nondistended;       LABS  Labs:  CAPILLARY BLOOD GLUCOSE                              8.4    7.99  )-----------( 310      ( 30 Dec 2018 04:30 )             27.1       Auto Neutrophil %: 74.2 % (12-30-18 @ 04:30)  Auto Immature Granulocyte %: 1.4 % (12-30-18 @ 04:30)    12-30    145  |  100  |  43<H>  ----------------------------<  138<H>  4.4   |  38<H>  |  0.6<L>      Calcium, Total Serum: 8.9 mg/dL (12-30-18 @ 04:30)      LFTs:             6.0  | 0.9  | 12       ------------------[56      ( 30 Dec 2018 04:30 )  3.2  | x    | 28          Lipase:x      Amylase:x         Blood Gas Arterial, Lactate: 0.4 mmoL/L (12-30-18 @ 07:47)  Blood Gas Arterial, Lactate: 0.7 mmoL/L (12-29-18 @ 07:46)  Blood Gas Arterial, Lactate: 0.7 mmoL/L (12-28-18 @ 08:12)    ABG - ( 30 Dec 2018 07:47 )  pH: 7.40  /  pCO2: 64    /  pO2: 95    / HCO3: 40    / Base Excess: 13.3  /  SaO2: 98              ABG - ( 29 Dec 2018 07:46 )  pH: 7.43  /  pCO2: 60    /  pO2: 123   / HCO3: 40    / Base Excess: 13.3  /  SaO2: 99              ABG - ( 28 Dec 2018 08:12 )  pH: 7.35  /  pCO2: 73    /  pO2: 106   / HCO3: 41    / Base Excess: 12.8  /  SaO2: 98            Coags:     x      ----< x       ( 30 Dec 2018 04:30 )     23.1        CARDIAC MARKERS ( 29 Dec 2018 15:50 )  x     / x     / 11 U/L / x     / x              RADIOLOGY & ADDITIONAL TESTS:  < from: Xray Chest 1 View- PORTABLE-Routine (12.30.18 @ 12:33) >  Impression:      Pulmonary vascular congestion. No parenchymal opacity pleural effusion or   air leak    < end of copied text >

## 2018-12-31 NOTE — PROGRESS NOTE ADULT - SUBJECTIVE AND OBJECTIVE BOX
Patient is a 73y old  Female who presents with a chief complaint of SOB and rash (31 Dec 2018 00:56)        Over Night Events: Tachycardic, irregular, on pressors. Not going for tracheostomy since pt is unstable.    ROS:  See HPI    PHYSICAL EXAM    ICU Vital Signs Last 24 Hrs  T(C): 37.3 (31 Dec 2018 08:00), Max: 38.1 (30 Dec 2018 16:00)  T(F): 99.1 (31 Dec 2018 08:00), Max: 100.6 (30 Dec 2018 16:00)  HR: 114 (31 Dec 2018 08:00) (50 - 148)  BP: 126/66 (31 Dec 2018 08:00) (64/48 - 148/59)  BP(mean): 93 (31 Dec 2018 08:00) (52 - 113)  RR: 21 (31 Dec 2018 08:00) (17 - 39)  SpO2: 99% (31 Dec 2018 08:00) (98% - 100%)      General: sedated  HEENT: BEATRIZ             Lymphatic system: No cervical LN   Lungs: Bilateral BS intubated  Cardiovascular: Irregular  Gastrointestinal: Soft, Positive BS  Extremities: No clubbing.  Moves extremities.  Full Range of motion   Skin: Warm, intact  Neurological: Not following commands        12-30-18 @ 07:01  -  12-31-18 @ 07:00  --------------------------------------------------------  IN:    dexmedetomidine Infusion: 509.9 mL    Enteral Tube Flush: 60 mL    fentaNYL Infusion.: 516.4 mL    norepinephrine Infusion: 56 mL    sodium chloride 0.9%.: 525 mL    Vital High Protein: 510 mL  Total IN: 2177.3 mL    OUT:    Indwelling Catheter - Urethral: 1200 mL  Total OUT: 1200 mL    Total NET: 977.3 mL      12-31-18 @ 07:01  -  12-31-18 @ 09:15  --------------------------------------------------------  IN:  Total IN: 0 mL    OUT:    Indwelling Catheter - Urethral: 30 mL  Total OUT: 30 mL    Total NET: -30 mL          LABS:                            8.5    11.41 )-----------( 250      ( 31 Dec 2018 04:30 )             27.8                                               12-31    147<H>  |  104  |  48<H>  ----------------------------<  123<H>  4.2   |  35<H>  |  0.6<L>    Ca    8.4<L>      31 Dec 2018 04:30  Phos  2.5     12-30  Mg     2.1     12-30    TPro  6.0  /  Alb  3.2<L>  /  TBili  0.9  /  DBili  x   /  AST  12  /  ALT  28  /  AlkPhos  56  12-30      PT/INR - ( 31 Dec 2018 04:30 )   PT: 13.70 sec;   INR: 1.19 ratio         PTT - ( 30 Dec 2018 23:30 )  PTT:30.5 sec                                           CARDIAC MARKERS ( 29 Dec 2018 15:50 )  x     / x     / 11 U/L / x     / x                                                LIVER FUNCTIONS - ( 30 Dec 2018 04:30 )  Alb: 3.2 g/dL / Pro: 6.0 g/dL / ALK PHOS: 56 U/L / ALT: 28 U/L / AST: 12 U/L / GGT: x                                                                                               Mode: AC/ CMV (Assist Control/ Continuous Mandatory Ventilation)  RR (machine): 18  TV (machine): 350  FiO2: 40  PEEP: 5  ITime: 1  MAP: 10  PIP: 25                                      ABG - ( 31 Dec 2018 07:33 )  pH, Arterial: 7.40  pH, Blood: x     /  pCO2: 57    /  pO2: 101   / HCO3: 35    / Base Excess: 9.2   /  SaO2: 98          MEDICATIONS  (STANDING):  chlorhexidine 0.12% Liquid 15 milliLiter(s) Oral Mucosa two times a day  chlorhexidine 4% Liquid 1 Application(s) Topical <User Schedule>  dexmedetomidine Infusion 0.5 MICROgram(s)/kG/Hr (10 mL/Hr) IV Continuous <Continuous>  fentaNYL   Infusion. 0.5 MICROgram(s)/kG/Hr (4 mL/Hr) IV Continuous <Continuous>  heparin  Injectable 5000 Unit(s) SubCutaneous every 8 hours  lactulose Syrup 10 Gram(s) Oral daily  methylPREDNISolone sodium succinate Injectable 60 milliGRAM(s) IV Push every 12 hours  multivitamin 1 Tablet(s) Oral daily  norepinephrine Infusion 0.05 MICROgram(s)/kG/Min (3.75 mL/Hr) IV Continuous <Continuous>  nystatin Powder 1 Application(s) Topical two times a day  pantoprazole  Injectable 40 milliGRAM(s) IV Push two times a day  simvastatin 5 milliGRAM(s) Oral at bedtime  sodium chloride 0.9%. 1000 milliLiter(s) (75 mL/Hr) IV Continuous <Continuous>    MEDICATIONS  (PRN):  acetaminophen   Tablet .. 650 milliGRAM(s) Oral every 6 hours PRN Temp greater or equal to 38C (100.4F)  guaiFENesin    Syrup 200 milliGRAM(s) Oral every 6 hours PRN Cough

## 2018-12-31 NOTE — PROGRESS NOTE ADULT - SUBJECTIVE AND OBJECTIVE BOX
SUBJECTIVE:    Patient is a 73y old Female who presents with a chief complaint of SOB and rash (31 Dec 2018 09:15)    Currently admitted to medicine with the primary diagnosis of diffuse alveolar hemorrhage      Today is hospital day 20d. Overnight, patient required Levophed for pressor support. Anesthesia evaluated patient for scheduled tracheostomy today and cancelled surgery due to fluctuating bradycardia/atrial fibrillation with RVR and hypotension. CTS suggested possible bedside trach. Patient had one episode of asystole for 11 seconds today after amiodarone was initiated for atrial fibrillation rhythm control. Amiodarone has been discontinued. Patient remains intubated and sedated.     PAST MEDICAL & SURGICAL HISTORY  Hypertension  No significant past surgical history    SOCIAL HISTORY:  Patient denies alcohol, tobacco, and recreational drug use.     From ( ) home ( ) nursing home (X ) other: [facility]     ALLERGIES:  No Known Allergies    MEDICATIONS:  STANDING MEDICATIONS  chlorhexidine 0.12% Liquid 15 milliLiter(s) Oral Mucosa two times a day  chlorhexidine 4% Liquid 1 Application(s) Topical <User Schedule>  dexmedetomidine Infusion 0.5 MICROgram(s)/kG/Hr IV Continuous <Continuous>  fentaNYL   Infusion. 0.5 MICROgram(s)/kG/Hr IV Continuous <Continuous>  heparin  Injectable 5000 Unit(s) SubCutaneous every 8 hours  lactulose Syrup 10 Gram(s) Oral daily  methylPREDNISolone sodium succinate Injectable 60 milliGRAM(s) IV Push every 12 hours  multivitamin 1 Tablet(s) Oral daily  norepinephrine Infusion 0.05 MICROgram(s)/kG/Min IV Continuous <Continuous>  nystatin Powder 1 Application(s) Topical two times a day  pantoprazole  Injectable 40 milliGRAM(s) IV Push two times a day  simvastatin 5 milliGRAM(s) Oral at bedtime  sodium chloride 0.9%. 1000 milliLiter(s) IV Continuous <Continuous>    PRN MEDICATIONS  acetaminophen   Tablet .. 650 milliGRAM(s) Oral every 6 hours PRN  guaiFENesin    Syrup 200 milliGRAM(s) Oral every 6 hours PRN    VITALS:   T(F): 99.1  HR: 54  BP: 104/46  RR: 18  SpO2: 100%    LABS:                        8.5    11.41 )-----------( 250      ( 31 Dec 2018 04:30 )             27.8     12-31    147<H>  |  104  |  48<H>  ----------------------------<  123<H>  4.2   |  35<H>  |  0.6<L>    Ca    8.4<L>      31 Dec 2018 04:30  Phos  2.5     12-30  Mg     2.1     12-30    TPro  6.0  /  Alb  3.2<L>  /  TBili  0.9  /  DBili  x   /  AST  12  /  ALT  28  /  AlkPhos  56  12-30    PT/INR - ( 31 Dec 2018 04:30 )   PT: 13.70 sec;   INR: 1.19 ratio         PTT - ( 30 Dec 2018 23:30 )  PTT:30.5 sec    ABG - ( 31 Dec 2018 07:33 )  pH, Arterial: 7.40  pH, Blood: x     /  pCO2: 57    /  pO2: 101   / HCO3: 35    / Base Excess: 9.2   /  SaO2: 98        CARDIAC MARKERS ( 29 Dec 2018 15:50 )  x     / x     / 11 U/L / x     / x        RADIOLOGY:  CXR    EXAM:  XR CHEST PORTABLE ROUTINE 1V          PROCEDURE DATE:  12/31/2018      INTERPRETATION:  Clinical History / Reason for exam: 1 placement.    Comparison : Chest radiograph December 30, 2018.    Technique/Positioning: Low lung volumes.    Findings:    Support devices: Stable endotracheal tube,, right central venous   catheter, enteric tube.    Cardiac/mediastinum/hilum: Obscured.    Lung parenchyma/Pleura: Low lung volumes. Stable bilateral opacities.    Skeleton/soft tissues: Unchanged.    Impression:      Low lung volumes. Stable bilateral opacities.    Stable support devices    KENNEDY RINALDI M.D., ATTENDING RADIOLOGIST  This document has been electronically signed. Dec 31 2018  7:25AM      PHYSICAL EXAM:  GEN: No acute distress, intubated and sedated   HEENT: bilateral miotic pupils (on fentanyl drip)   LUNGS: Clear to auscultation bilaterally   HEART: Fluctuating bradycardic to tachycardic, no murmurs appreciated   ABD: Soft, no grimacing on palpation, non-distended. Bowel sounds present.   EXT: Noncyanotic, nonedematous, 2+ peripheral pulses, skin intact   NEURO: intubated and sedated     Lines/Tubes   Kent catheter  Intubated and sedated  OG tube

## 2018-12-31 NOTE — PROGRESS NOTE ADULT - ASSESSMENT
A/P:  This is a 73 year old female with prolonged intubation awaiting tracheostomy placement.  -Pre-op  -NPO  -Labs, coags appear OK, not on any anticoagulation.   -Type and screen active.  -Will plan for trach today.

## 2018-12-31 NOTE — PROGRESS NOTE ADULT - ASSESSMENT
73F from [facility] w/ PMH of HTN presented to [facility] with worsening generalized weakness and SOB for 3 days.     #Acute Hypercapnic Respiratory Failure 2/2 Lovenox vs 2/2 Pneumonia leading to Diffuse alveolar hemorrhage, improving  -Intubated on 12/17, failed weaning trial 6 times. Planned for tracheostomy today 12/31 but anesthesia cancelled procedure (bradycardic, atrial fib w/ RVR, hypotension on pressors). CTS considering bedside trach, will keep patient NPO  -Fentanyl and Precedex for sedation   -Bronchoscopy 12/20 with BAL   -Antibiotic course completed (levaquin, zosyn, doxycycline)   -Duplex 12/14: negative for DVT   -Echo 12/15: LVEF 50-55%  -RVP, blood culture, urine legionella, strep antigen, MRSA negative   -CT chest suggestive for RLL PNA  -Rheum on board, autoimmune workup negative > SARITHA, ANCA, RF, COMPLEMENTS, HEP PANEL  -Continue Solumedrol 60 Q12    #Hypotension  -Levophed     #New AF with episodes of RVR, alternating with bradycardia  -Amiodarone 200 initiated this AM -> patient went into asystole for 11 seconds. Amiodarone discontinued.   -No anticoagulation for now   -[id number] VASC: 4   -HASBLED score 3    #HLD  -Simvastatin     #PMH of HTN  -HCTZ held for hypotension     #Varicella Zoster Right Shoulder: improving  -Contact and airborne precautions dced 12/17 as per ID rec. Completed course of acyclovir.     DVT ppx: Subq Heparin  GI ppx: Protonix 40mg IV BID   Diet: NPO  Activity: Increase as tolerated   Dispo: patient needs tracheostomy placement, bedside vs. OR   FULL CODE

## 2018-12-31 NOTE — PROGRESS NOTE ADULT - ASSESSMENT
IMPRESSION:    Acute on chronic hypercapnic respiratory failure s/p intubation s/p bronchoscopy alveolar hem, ? etiology was on lovenox  IDALMIS / OHS  Pneumonia/Aspiration/ fluid overload  anemia resolved  hypernatremia resolved    PLAN:     CNS: no SAT due to agitation, precedex and fentanyl    HEENT: Oral care    PULMONARY:  HOB @ 45 degrees, Possible tracheostomy with CTS, continue with steroids for now. failed multiple SBTs    CARDIOVASCULAR: Keep equal balance, wean off pressors     GI: GI prophylaxis. Continue feeding     RENAL:  Follow up lytes.  Correct as needed BMP. Kent catheter    INFECTIOUS DISEASE: Follow up cultures    HEMATOLOGICAL:  DVT prophylaxis.    ENDOCRINE: rheumatological work up: negative    POOR PROGNOSIS    transfer to vent unit after trachesotomy    Monitor in ICU

## 2018-12-31 NOTE — PROGRESS NOTE ADULT - SUBJECTIVE AND OBJECTIVE BOX
Subjective:pt intubated and sedated in no distress.        MEDICATIONS  (STANDING):  chlorhexidine 0.12% Liquid 15 milliLiter(s) Oral Mucosa two times a day  chlorhexidine 4% Liquid 1 Application(s) Topical <User Schedule>  dexmedetomidine Infusion 0.5 MICROgram(s)/kG/Hr (10 mL/Hr) IV Continuous <Continuous>  fentaNYL   Infusion. 0.5 MICROgram(s)/kG/Hr (4 mL/Hr) IV Continuous <Continuous>  heparin  Injectable 5000 Unit(s) SubCutaneous every 8 hours  lactulose Syrup 10 Gram(s) Oral daily  methylPREDNISolone sodium succinate Injectable 60 milliGRAM(s) IV Push every 12 hours  multivitamin 1 Tablet(s) Oral daily  norepinephrine Infusion 0.05 MICROgram(s)/kG/Min (3.75 mL/Hr) IV Continuous <Continuous>  nystatin Powder 1 Application(s) Topical two times a day  pantoprazole  Injectable 40 milliGRAM(s) IV Push two times a day  simvastatin 5 milliGRAM(s) Oral at bedtime  sodium chloride 0.9%. 1000 milliLiter(s) (75 mL/Hr) IV Continuous <Continuous>    MEDICATIONS  (PRN):  acetaminophen   Tablet .. 650 milliGRAM(s) Oral every 6 hours PRN Temp greater or equal to 38C (100.4F)  guaiFENesin    Syrup 200 milliGRAM(s) Oral every 6 hours PRN Cough            Vital Signs Last 24 Hrs  T(C): 37.4 (31 Dec 2018 12:00), Max: 37.7 (31 Dec 2018 00:00)  T(F): 99.4 (31 Dec 2018 12:00), Max: 99.8 (31 Dec 2018 00:00)  HR: 60 (31 Dec 2018 15:00) (50 - 148)  BP: 94/48 (31 Dec 2018 15:00) (82/38 - 132/65)  BP(mean): 69 (31 Dec 2018 15:00) (52 - 113)  RR: 17 (31 Dec 2018 14:00) (17 - 39)  SpO2: 110% (31 Dec 2018 14:26) (98% - 110%)             REVIEW OF SYSTEMS: pt intubated and sedated  CONSTITUTIONAL: no fever, no chills, no diaphoresis               PHYSICAL EXAM:  · CONSTITUTIONAL: Pt  intubated , sedated in no distress  . NECK: Supple, (+) JVD, no bruit on either carotid side   · RESPIRATORY: decreased bilateral air entry, no wheeze, no crackle, no wet rales  · CARDIOVASCULAR: Normal S1, A2, P2, no murmur, no click, regular rate,  no rub,  · EXTREMITIES: No cyanosis, no clubbing, no edema  · VASCULAR: Pulses are regular, equal, bilateral in upper and lower extremities  	  TELEMETRY: pafib rapid at times, sr, sb, pauses    ECG:< from: 12 Lead ECG (12.31.18 @ 06:20) >  Diagnosis Line Atrial fibrillation with rapid ventricular response  Right bundle branch block  T wave abnormality, consider inferolateral ischemia  Abnormal ECG    < end of copied text >  CXRAY < from: Xray Chest 1 View- PORTABLE-Routine (12.31.18 @ 05:45) >  Impression:      Low lung volumes. Stable bilateral opacities.    Stable support devices    < end of copied text >      TTE:< from: Transthoracic Echocardiogram (12.15.18 @ 11:18) >  Summary:   1. Left ventricular ejection fraction, by visual estimation, is 50 to   55%.   2. Normal left ventricular size and wall thicknesses, with normal   systolic function.   3. Spectral Doppler shows impaired relaxation pattern of left   ventricular myocardial filling (Grade I diastolic dysfunction).   4. Mild tricuspidregurgitation.   5. Mild aortic regurgitation.   6. Pulmonic valve regurgitation.   7. Estimated pulmonary artery systolic pressure is 52.5 mmHg assuming a   right atrial pressure of 8 mmHg, which is consistent with moderate   pulmonary hypertension.      < end of copied text >      LABS:                        8.5    11.41 )-----------( 250      ( 31 Dec 2018 04:30 )             27.8     12-31    147<H>  |  104  |  48<H>  ----------------------------<  123<H>  4.2   |  35<H>  |  0.6<L>    Ca    8.4<L>      31 Dec 2018 04:30  Phos  2.5     12-30  Mg     2.1     12-30    TPro  6.0  /  Alb  3.2<L>  /  TBili  0.9  /  DBili  x   /  AST  12  /  ALT  28  /  AlkPhos  56  12-30        PT/INR - ( 31 Dec 2018 04:30 )   PT: 13.70 sec;   INR: 1.19 ratio         PTT - ( 30 Dec 2018 23:30 )  PTT:30.5 sec    I&O's Summary    30 Dec 2018 07:01  -  31 Dec 2018 07:00  --------------------------------------------------------  IN: 2177.3 mL / OUT: 1200 mL / NET: 977.3 mL    31 Dec 2018 07:01  -  31 Dec 2018 17:42  --------------------------------------------------------  IN: 1047.9 mL / OUT: 370 mL / NET: 677.9 mL      BNP  RADIOLOGY & ADDITIONAL STUDIES:    IMPRESSION AND PLAN:

## 2018-12-31 NOTE — PROVIDER CONTACT NOTE (OTHER) - BACKGROUND
Patient has been sinus bradycardic all night, even when agitated. Patient has been sinus bradycardic all night, even when agitated.  Patient is intubated and sedated on Fentanyl and Precedex all night, but does break through the sedation at times.

## 2019-01-01 VITALS
SYSTOLIC BLOOD PRESSURE: 91 MMHG | HEART RATE: 60 BPM | OXYGEN SATURATION: 99 % | DIASTOLIC BLOOD PRESSURE: 45 MMHG | RESPIRATION RATE: 19 BRPM

## 2019-01-01 LAB
-  AMIKACIN: SIGNIFICANT CHANGE UP
-  AMOXICILLIN/CLAVULANIC ACID: SIGNIFICANT CHANGE UP
-  AMPICILLIN/SULBACTAM: SIGNIFICANT CHANGE UP
-  AMPICILLIN: SIGNIFICANT CHANGE UP
-  AZTREONAM: SIGNIFICANT CHANGE UP
-  CEFAZOLIN: SIGNIFICANT CHANGE UP
-  CEFEPIME: SIGNIFICANT CHANGE UP
-  CEFOXITIN: SIGNIFICANT CHANGE UP
-  CEFTRIAXONE: SIGNIFICANT CHANGE UP
-  CIPROFLOXACIN: SIGNIFICANT CHANGE UP
-  ERTAPENEM: SIGNIFICANT CHANGE UP
-  GENTAMICIN: SIGNIFICANT CHANGE UP
-  LEVOFLOXACIN: SIGNIFICANT CHANGE UP
-  MEROPENEM: SIGNIFICANT CHANGE UP
-  NITROFURANTOIN: SIGNIFICANT CHANGE UP
-  PIPERACILLIN/TAZOBACTAM: SIGNIFICANT CHANGE UP
-  TOBRAMYCIN: SIGNIFICANT CHANGE UP
-  TRIMETHOPRIM/SULFAMETHOXAZOLE: SIGNIFICANT CHANGE UP
ALBUMIN SERPL ELPH-MCNC: 2.5 G/DL — LOW (ref 3.5–5.2)
ALBUMIN SERPL ELPH-MCNC: 2.6 G/DL — LOW (ref 3.5–5.2)
ALBUMIN SERPL ELPH-MCNC: 2.9 G/DL — LOW (ref 3.5–5.2)
ALP SERPL-CCNC: 66 U/L — SIGNIFICANT CHANGE UP (ref 30–115)
ALP SERPL-CCNC: 70 U/L — SIGNIFICANT CHANGE UP (ref 30–115)
ALP SERPL-CCNC: 74 U/L — SIGNIFICANT CHANGE UP (ref 30–115)
ALT FLD-CCNC: 17 U/L — SIGNIFICANT CHANGE UP (ref 0–41)
ALT FLD-CCNC: 19 U/L — SIGNIFICANT CHANGE UP (ref 0–41)
ALT FLD-CCNC: 27 U/L — SIGNIFICANT CHANGE UP (ref 0–41)
ANION GAP SERPL CALC-SCNC: 10 MMOL/L — SIGNIFICANT CHANGE UP (ref 7–14)
ANION GAP SERPL CALC-SCNC: 11 MMOL/L — SIGNIFICANT CHANGE UP (ref 7–14)
ANION GAP SERPL CALC-SCNC: 11 MMOL/L — SIGNIFICANT CHANGE UP (ref 7–14)
ANION GAP SERPL CALC-SCNC: 12 MMOL/L — SIGNIFICANT CHANGE UP (ref 7–14)
ANION GAP SERPL CALC-SCNC: 15 MMOL/L — HIGH (ref 7–14)
ANION GAP SERPL CALC-SCNC: 15 MMOL/L — HIGH (ref 7–14)
ANION GAP SERPL CALC-SCNC: 16 MMOL/L — HIGH (ref 7–14)
ANION GAP SERPL CALC-SCNC: 16 MMOL/L — HIGH (ref 7–14)
ANION GAP SERPL CALC-SCNC: 7 MMOL/L — SIGNIFICANT CHANGE UP (ref 7–14)
ANION GAP SERPL CALC-SCNC: 8 MMOL/L — SIGNIFICANT CHANGE UP (ref 7–14)
ANION GAP SERPL CALC-SCNC: 9 MMOL/L — SIGNIFICANT CHANGE UP (ref 7–14)
APPEARANCE UR: ABNORMAL
APTT BLD: 25.7 SEC — LOW (ref 27–39.2)
APTT BLD: 26.9 SEC — LOW (ref 27–39.2)
APTT BLD: 27.7 SEC — SIGNIFICANT CHANGE UP (ref 27–39.2)
AST SERPL-CCNC: 11 U/L — SIGNIFICANT CHANGE UP (ref 0–41)
AST SERPL-CCNC: 16 U/L — SIGNIFICANT CHANGE UP (ref 0–41)
AST SERPL-CCNC: 19 U/L — SIGNIFICANT CHANGE UP (ref 0–41)
BACTERIA # UR AUTO: ABNORMAL /HPF
BASE EXCESS BLDA CALC-SCNC: 3.8 MMOL/L — HIGH (ref -2–2)
BASE EXCESS BLDA CALC-SCNC: 5.4 MMOL/L — HIGH (ref -2–2)
BASE EXCESS BLDA CALC-SCNC: 6.7 MMOL/L — HIGH (ref -2–2)
BASOPHILS # BLD AUTO: 0.01 K/UL — SIGNIFICANT CHANGE UP (ref 0–0.2)
BASOPHILS # BLD AUTO: 0.02 K/UL — SIGNIFICANT CHANGE UP (ref 0–0.2)
BASOPHILS # BLD AUTO: 0.03 K/UL — SIGNIFICANT CHANGE UP (ref 0–0.2)
BASOPHILS # BLD AUTO: 0.03 K/UL — SIGNIFICANT CHANGE UP (ref 0–0.2)
BASOPHILS NFR BLD AUTO: 0.1 % — SIGNIFICANT CHANGE UP (ref 0–1)
BASOPHILS NFR BLD AUTO: 0.2 % — SIGNIFICANT CHANGE UP (ref 0–1)
BASOPHILS NFR BLD AUTO: 0.3 % — SIGNIFICANT CHANGE UP (ref 0–1)
BASOPHILS NFR BLD AUTO: 0.3 % — SIGNIFICANT CHANGE UP (ref 0–1)
BILIRUB DIRECT SERPL-MCNC: 0.5 MG/DL — HIGH (ref 0–0.2)
BILIRUB INDIRECT FLD-MCNC: 0.5 MG/DL — SIGNIFICANT CHANGE UP (ref 0.2–1.2)
BILIRUB SERPL-MCNC: 0.5 MG/DL — SIGNIFICANT CHANGE UP (ref 0.2–1.2)
BILIRUB SERPL-MCNC: 0.6 MG/DL — SIGNIFICANT CHANGE UP (ref 0.2–1.2)
BILIRUB SERPL-MCNC: 1 MG/DL — SIGNIFICANT CHANGE UP (ref 0.2–1.2)
BILIRUB UR-MCNC: ABNORMAL
BLD GP AB SCN SERPL QL: SIGNIFICANT CHANGE UP
BUN SERPL-MCNC: 22 MG/DL — HIGH (ref 10–20)
BUN SERPL-MCNC: 24 MG/DL — HIGH (ref 10–20)
BUN SERPL-MCNC: 25 MG/DL — HIGH (ref 10–20)
BUN SERPL-MCNC: 28 MG/DL — HIGH (ref 10–20)
BUN SERPL-MCNC: 31 MG/DL — HIGH (ref 10–20)
BUN SERPL-MCNC: 32 MG/DL — HIGH (ref 10–20)
BUN SERPL-MCNC: 36 MG/DL — HIGH (ref 10–20)
BUN SERPL-MCNC: 46 MG/DL — HIGH (ref 10–20)
BUN SERPL-MCNC: 47 MG/DL — HIGH (ref 10–20)
CALCIUM SERPL-MCNC: 7.2 MG/DL — LOW (ref 8.5–10.1)
CALCIUM SERPL-MCNC: 7.6 MG/DL — LOW (ref 8.5–10.1)
CALCIUM SERPL-MCNC: 7.8 MG/DL — LOW (ref 8.5–10.1)
CALCIUM SERPL-MCNC: 8 MG/DL — LOW (ref 8.5–10.1)
CALCIUM SERPL-MCNC: 8.2 MG/DL — LOW (ref 8.5–10.1)
CALCIUM SERPL-MCNC: 8.3 MG/DL — LOW (ref 8.5–10.1)
CHLORIDE SERPL-SCNC: 100 MMOL/L — SIGNIFICANT CHANGE UP (ref 98–110)
CHLORIDE SERPL-SCNC: 101 MMOL/L — SIGNIFICANT CHANGE UP (ref 98–110)
CHLORIDE SERPL-SCNC: 104 MMOL/L — SIGNIFICANT CHANGE UP (ref 98–110)
CHLORIDE SERPL-SCNC: 92 MMOL/L — LOW (ref 98–110)
CHLORIDE SERPL-SCNC: 94 MMOL/L — LOW (ref 98–110)
CHLORIDE SERPL-SCNC: 97 MMOL/L — LOW (ref 98–110)
CHLORIDE SERPL-SCNC: 98 MMOL/L — SIGNIFICANT CHANGE UP (ref 98–110)
CHLORIDE SERPL-SCNC: 99 MMOL/L — SIGNIFICANT CHANGE UP (ref 98–110)
CHLORIDE SERPL-SCNC: 99 MMOL/L — SIGNIFICANT CHANGE UP (ref 98–110)
CK MB CFR SERPL CALC: 1 NG/ML — SIGNIFICANT CHANGE UP (ref 0.6–6.3)
CK SERPL-CCNC: 30 U/L — SIGNIFICANT CHANGE UP (ref 0–225)
CO2 SERPL-SCNC: 28 MMOL/L — SIGNIFICANT CHANGE UP (ref 17–32)
CO2 SERPL-SCNC: 29 MMOL/L — SIGNIFICANT CHANGE UP (ref 17–32)
CO2 SERPL-SCNC: 29 MMOL/L — SIGNIFICANT CHANGE UP (ref 17–32)
CO2 SERPL-SCNC: 30 MMOL/L — SIGNIFICANT CHANGE UP (ref 17–32)
CO2 SERPL-SCNC: 30 MMOL/L — SIGNIFICANT CHANGE UP (ref 17–32)
CO2 SERPL-SCNC: 32 MMOL/L — SIGNIFICANT CHANGE UP (ref 17–32)
CO2 SERPL-SCNC: 33 MMOL/L — HIGH (ref 17–32)
COLOR SPEC: SIGNIFICANT CHANGE UP
CREAT SERPL-MCNC: 0.5 MG/DL — LOW (ref 0.7–1.5)
CREAT SERPL-MCNC: 0.6 MG/DL — LOW (ref 0.7–1.5)
CREAT SERPL-MCNC: 0.7 MG/DL — SIGNIFICANT CHANGE UP (ref 0.7–1.5)
CREAT SERPL-MCNC: 0.8 MG/DL — SIGNIFICANT CHANGE UP (ref 0.7–1.5)
CREAT SERPL-MCNC: 0.9 MG/DL — SIGNIFICANT CHANGE UP (ref 0.7–1.5)
CULTURE RESULTS: SIGNIFICANT CHANGE UP
CULTURE RESULTS: SIGNIFICANT CHANGE UP
DIFF PNL FLD: NEGATIVE — SIGNIFICANT CHANGE UP
EOSINOPHIL # BLD AUTO: 0 K/UL — SIGNIFICANT CHANGE UP (ref 0–0.7)
EOSINOPHIL # BLD AUTO: 0 K/UL — SIGNIFICANT CHANGE UP (ref 0–0.7)
EOSINOPHIL # BLD AUTO: 0.02 K/UL — SIGNIFICANT CHANGE UP (ref 0–0.7)
EOSINOPHIL # BLD AUTO: 0.11 K/UL — SIGNIFICANT CHANGE UP (ref 0–0.7)
EOSINOPHIL # BLD AUTO: 0.13 K/UL — SIGNIFICANT CHANGE UP (ref 0–0.7)
EOSINOPHIL # BLD AUTO: 0.25 K/UL — SIGNIFICANT CHANGE UP (ref 0–0.7)
EOSINOPHIL # BLD AUTO: 0.39 K/UL — SIGNIFICANT CHANGE UP (ref 0–0.7)
EOSINOPHIL # BLD AUTO: 0.39 K/UL — SIGNIFICANT CHANGE UP (ref 0–0.7)
EOSINOPHIL # BLD AUTO: 0.48 K/UL — SIGNIFICANT CHANGE UP (ref 0–0.7)
EOSINOPHIL NFR BLD AUTO: 0 % — SIGNIFICANT CHANGE UP (ref 0–8)
EOSINOPHIL NFR BLD AUTO: 0 % — SIGNIFICANT CHANGE UP (ref 0–8)
EOSINOPHIL NFR BLD AUTO: 0.3 % — SIGNIFICANT CHANGE UP (ref 0–8)
EOSINOPHIL NFR BLD AUTO: 0.9 % — SIGNIFICANT CHANGE UP (ref 0–8)
EOSINOPHIL NFR BLD AUTO: 1.4 % — SIGNIFICANT CHANGE UP (ref 0–8)
EOSINOPHIL NFR BLD AUTO: 2.3 % — SIGNIFICANT CHANGE UP (ref 0–8)
EOSINOPHIL NFR BLD AUTO: 4.1 % — SIGNIFICANT CHANGE UP (ref 0–8)
EOSINOPHIL NFR BLD AUTO: 4.3 % — SIGNIFICANT CHANGE UP (ref 0–8)
EOSINOPHIL NFR BLD AUTO: 5.3 % — SIGNIFICANT CHANGE UP (ref 0–8)
EPI CELLS # UR: ABNORMAL /HPF
FERRITIN SERPL-MCNC: 395 NG/ML — HIGH (ref 15–150)
GAS PNL BLDA: SIGNIFICANT CHANGE UP
GAS PNL BLDA: SIGNIFICANT CHANGE UP
GLUCOSE BLDC GLUCOMTR-MCNC: 123 MG/DL — HIGH (ref 70–99)
GLUCOSE BLDC GLUCOMTR-MCNC: 137 MG/DL — HIGH (ref 70–99)
GLUCOSE BLDC GLUCOMTR-MCNC: 167 MG/DL — HIGH (ref 70–99)
GLUCOSE BLDC GLUCOMTR-MCNC: 186 MG/DL — HIGH (ref 70–99)
GLUCOSE BLDC GLUCOMTR-MCNC: 212 MG/DL — HIGH (ref 70–99)
GLUCOSE SERPL-MCNC: 104 MG/DL — HIGH (ref 70–99)
GLUCOSE SERPL-MCNC: 109 MG/DL — HIGH (ref 70–99)
GLUCOSE SERPL-MCNC: 125 MG/DL — HIGH (ref 70–99)
GLUCOSE SERPL-MCNC: 137 MG/DL — HIGH (ref 70–99)
GLUCOSE SERPL-MCNC: 140 MG/DL — HIGH (ref 70–99)
GLUCOSE SERPL-MCNC: 142 MG/DL — HIGH (ref 70–99)
GLUCOSE SERPL-MCNC: 147 MG/DL — HIGH (ref 70–99)
GLUCOSE SERPL-MCNC: 168 MG/DL — HIGH (ref 70–99)
GLUCOSE SERPL-MCNC: 170 MG/DL — HIGH (ref 70–99)
GLUCOSE SERPL-MCNC: 178 MG/DL — HIGH (ref 70–99)
GLUCOSE SERPL-MCNC: 193 MG/DL — HIGH (ref 70–99)
GLUCOSE UR QL: NEGATIVE — SIGNIFICANT CHANGE UP
HCO3 BLDA-SCNC: 31 MMOL/L — HIGH (ref 23–27)
HCO3 BLDA-SCNC: 31 MMOL/L — HIGH (ref 23–27)
HCO3 BLDA-SCNC: 32 MMOL/L — HIGH (ref 23–27)
HCT VFR BLD CALC: 21.9 % — LOW (ref 37–47)
HCT VFR BLD CALC: 22.9 % — LOW (ref 37–47)
HCT VFR BLD CALC: 24.5 % — LOW (ref 37–47)
HCT VFR BLD CALC: 24.5 % — LOW (ref 37–47)
HCT VFR BLD CALC: 26.7 % — LOW (ref 37–47)
HCT VFR BLD CALC: 27.2 % — LOW (ref 37–47)
HCT VFR BLD CALC: 27.5 % — LOW (ref 37–47)
HCT VFR BLD CALC: 28.2 % — LOW (ref 37–47)
HCT VFR BLD CALC: 30.5 % — LOW (ref 37–47)
HCT VFR BLD CALC: 30.5 % — LOW (ref 37–47)
HCT VFR BLD CALC: 33 % — LOW (ref 37–47)
HCT VFR BLD CALC: 33.2 % — LOW (ref 37–47)
HGB BLD-MCNC: 10 G/DL — LOW (ref 12–16)
HGB BLD-MCNC: 10.7 G/DL — LOW (ref 12–16)
HGB BLD-MCNC: 10.8 G/DL — LOW (ref 12–16)
HGB BLD-MCNC: 6.9 G/DL — CRITICAL LOW (ref 12–16)
HGB BLD-MCNC: 7 G/DL — CRITICAL LOW (ref 12–16)
HGB BLD-MCNC: 7.4 G/DL — CRITICAL LOW (ref 12–16)
HGB BLD-MCNC: 7.8 G/DL — LOW (ref 12–16)
HGB BLD-MCNC: 8.7 G/DL — LOW (ref 12–16)
HGB BLD-MCNC: 8.8 G/DL — LOW (ref 12–16)
HGB BLD-MCNC: 8.9 G/DL — LOW (ref 12–16)
HGB BLD-MCNC: 9.1 G/DL — LOW (ref 12–16)
HGB BLD-MCNC: 9.9 G/DL — LOW (ref 12–16)
HOROWITZ INDEX BLDA+IHG-RTO: 50 — SIGNIFICANT CHANGE UP
HOROWITZ INDEX BLDA+IHG-RTO: 50 — SIGNIFICANT CHANGE UP
HOROWITZ INDEX BLDA+IHG-RTO: 60 — SIGNIFICANT CHANGE UP
IMM GRANULOCYTES NFR BLD AUTO: 0.7 % — HIGH (ref 0.1–0.3)
IMM GRANULOCYTES NFR BLD AUTO: 0.7 % — HIGH (ref 0.1–0.3)
IMM GRANULOCYTES NFR BLD AUTO: 0.8 % — HIGH (ref 0.1–0.3)
IMM GRANULOCYTES NFR BLD AUTO: 1.1 % — HIGH (ref 0.1–0.3)
IMM GRANULOCYTES NFR BLD AUTO: 1.2 % — HIGH (ref 0.1–0.3)
IMM GRANULOCYTES NFR BLD AUTO: 1.7 % — HIGH (ref 0.1–0.3)
IMM GRANULOCYTES NFR BLD AUTO: 2.1 % — HIGH (ref 0.1–0.3)
IMM GRANULOCYTES NFR BLD AUTO: 2.2 % — HIGH (ref 0.1–0.3)
IMM GRANULOCYTES NFR BLD AUTO: 3 % — HIGH (ref 0.1–0.3)
INR BLD: 1.1 RATIO — SIGNIFICANT CHANGE UP (ref 0.65–1.3)
INR BLD: 1.13 RATIO — SIGNIFICANT CHANGE UP (ref 0.65–1.3)
IRON SATN MFR SERPL: 15 % — SIGNIFICANT CHANGE UP (ref 15–50)
IRON SATN MFR SERPL: 23 UG/DL — LOW (ref 35–150)
KETONES UR-MCNC: NEGATIVE — SIGNIFICANT CHANGE UP
LEUKOCYTE ESTERASE UR-ACNC: ABNORMAL
LYMPHOCYTES # BLD AUTO: 0.4 K/UL — LOW (ref 1.2–3.4)
LYMPHOCYTES # BLD AUTO: 0.76 K/UL — LOW (ref 1.2–3.4)
LYMPHOCYTES # BLD AUTO: 0.78 K/UL — LOW (ref 1.2–3.4)
LYMPHOCYTES # BLD AUTO: 0.81 K/UL — LOW (ref 1.2–3.4)
LYMPHOCYTES # BLD AUTO: 0.92 K/UL — LOW (ref 1.2–3.4)
LYMPHOCYTES # BLD AUTO: 0.92 K/UL — LOW (ref 1.2–3.4)
LYMPHOCYTES # BLD AUTO: 1.04 K/UL — LOW (ref 1.2–3.4)
LYMPHOCYTES # BLD AUTO: 1.12 K/UL — LOW (ref 1.2–3.4)
LYMPHOCYTES # BLD AUTO: 1.44 K/UL — SIGNIFICANT CHANGE UP (ref 1.2–3.4)
LYMPHOCYTES # BLD AUTO: 10.1 % — LOW (ref 20.5–51.1)
LYMPHOCYTES # BLD AUTO: 10.1 % — LOW (ref 20.5–51.1)
LYMPHOCYTES # BLD AUTO: 10.4 % — LOW (ref 20.5–51.1)
LYMPHOCYTES # BLD AUTO: 16 % — LOW (ref 20.5–51.1)
LYMPHOCYTES # BLD AUTO: 5.5 % — LOW (ref 20.5–51.1)
LYMPHOCYTES # BLD AUTO: 6.5 % — LOW (ref 20.5–51.1)
LYMPHOCYTES # BLD AUTO: 9 % — LOW (ref 20.5–51.1)
LYMPHOCYTES # BLD AUTO: 9.5 % — LOW (ref 20.5–51.1)
LYMPHOCYTES # BLD AUTO: 9.7 % — LOW (ref 20.5–51.1)
MAGNESIUM SERPL-MCNC: 1.7 MG/DL — LOW (ref 1.8–2.4)
MAGNESIUM SERPL-MCNC: 1.8 MG/DL — SIGNIFICANT CHANGE UP (ref 1.8–2.4)
MAGNESIUM SERPL-MCNC: 1.9 MG/DL — SIGNIFICANT CHANGE UP (ref 1.8–2.4)
MAGNESIUM SERPL-MCNC: 2 MG/DL — SIGNIFICANT CHANGE UP (ref 1.8–2.4)
MAGNESIUM SERPL-MCNC: 2.2 MG/DL — SIGNIFICANT CHANGE UP (ref 1.8–2.4)
MAGNESIUM SERPL-MCNC: 2.2 MG/DL — SIGNIFICANT CHANGE UP (ref 1.8–2.4)
MAGNESIUM SERPL-MCNC: 2.3 MG/DL — SIGNIFICANT CHANGE UP (ref 1.8–2.4)
MCHC RBC-ENTMCNC: 29 PG — SIGNIFICANT CHANGE UP (ref 27–31)
MCHC RBC-ENTMCNC: 29.4 PG — SIGNIFICANT CHANGE UP (ref 27–31)
MCHC RBC-ENTMCNC: 29.5 PG — SIGNIFICANT CHANGE UP (ref 27–31)
MCHC RBC-ENTMCNC: 29.5 PG — SIGNIFICANT CHANGE UP (ref 27–31)
MCHC RBC-ENTMCNC: 29.6 PG — SIGNIFICANT CHANGE UP (ref 27–31)
MCHC RBC-ENTMCNC: 29.6 PG — SIGNIFICANT CHANGE UP (ref 27–31)
MCHC RBC-ENTMCNC: 29.7 PG — SIGNIFICANT CHANGE UP (ref 27–31)
MCHC RBC-ENTMCNC: 29.8 PG — SIGNIFICANT CHANGE UP (ref 27–31)
MCHC RBC-ENTMCNC: 30 PG — SIGNIFICANT CHANGE UP (ref 27–31)
MCHC RBC-ENTMCNC: 30.2 G/DL — LOW (ref 32–37)
MCHC RBC-ENTMCNC: 30.3 PG — SIGNIFICANT CHANGE UP (ref 27–31)
MCHC RBC-ENTMCNC: 30.4 PG — SIGNIFICANT CHANGE UP (ref 27–31)
MCHC RBC-ENTMCNC: 30.6 G/DL — LOW (ref 32–37)
MCHC RBC-ENTMCNC: 30.6 PG — SIGNIFICANT CHANGE UP (ref 27–31)
MCHC RBC-ENTMCNC: 31.5 G/DL — LOW (ref 32–37)
MCHC RBC-ENTMCNC: 31.8 G/DL — LOW (ref 32–37)
MCHC RBC-ENTMCNC: 32 G/DL — SIGNIFICANT CHANGE UP (ref 32–37)
MCHC RBC-ENTMCNC: 32.2 G/DL — SIGNIFICANT CHANGE UP (ref 32–37)
MCHC RBC-ENTMCNC: 32.3 G/DL — SIGNIFICANT CHANGE UP (ref 32–37)
MCHC RBC-ENTMCNC: 32.5 G/DL — SIGNIFICANT CHANGE UP (ref 32–37)
MCHC RBC-ENTMCNC: 32.6 G/DL — SIGNIFICANT CHANGE UP (ref 32–37)
MCHC RBC-ENTMCNC: 32.7 G/DL — SIGNIFICANT CHANGE UP (ref 32–37)
MCHC RBC-ENTMCNC: 32.7 G/DL — SIGNIFICANT CHANGE UP (ref 32–37)
MCHC RBC-ENTMCNC: 32.8 G/DL — SIGNIFICANT CHANGE UP (ref 32–37)
MCV RBC AUTO: 90.7 FL — SIGNIFICANT CHANGE UP (ref 81–99)
MCV RBC AUTO: 91.3 FL — SIGNIFICANT CHANGE UP (ref 81–99)
MCV RBC AUTO: 91.5 FL — SIGNIFICANT CHANGE UP (ref 81–99)
MCV RBC AUTO: 92.1 FL — SIGNIFICANT CHANGE UP (ref 81–99)
MCV RBC AUTO: 92.5 FL — SIGNIFICANT CHANGE UP (ref 81–99)
MCV RBC AUTO: 92.6 FL — SIGNIFICANT CHANGE UP (ref 81–99)
MCV RBC AUTO: 92.7 FL — SIGNIFICANT CHANGE UP (ref 81–99)
MCV RBC AUTO: 92.7 FL — SIGNIFICANT CHANGE UP (ref 81–99)
MCV RBC AUTO: 93.6 FL — SIGNIFICANT CHANGE UP (ref 81–99)
MCV RBC AUTO: 95 FL — SIGNIFICANT CHANGE UP (ref 81–99)
MCV RBC AUTO: 96.1 FL — SIGNIFICANT CHANGE UP (ref 81–99)
MCV RBC AUTO: 97.2 FL — SIGNIFICANT CHANGE UP (ref 81–99)
METHOD TYPE: SIGNIFICANT CHANGE UP
MONOCYTES # BLD AUTO: 0.2 K/UL — SIGNIFICANT CHANGE UP (ref 0.1–0.6)
MONOCYTES # BLD AUTO: 0.34 K/UL — SIGNIFICANT CHANGE UP (ref 0.1–0.6)
MONOCYTES # BLD AUTO: 0.4 K/UL — SIGNIFICANT CHANGE UP (ref 0.1–0.6)
MONOCYTES # BLD AUTO: 0.43 K/UL — SIGNIFICANT CHANGE UP (ref 0.1–0.6)
MONOCYTES # BLD AUTO: 0.44 K/UL — SIGNIFICANT CHANGE UP (ref 0.1–0.6)
MONOCYTES # BLD AUTO: 0.45 K/UL — SIGNIFICANT CHANGE UP (ref 0.1–0.6)
MONOCYTES # BLD AUTO: 0.46 K/UL — SIGNIFICANT CHANGE UP (ref 0.1–0.6)
MONOCYTES # BLD AUTO: 0.53 K/UL — SIGNIFICANT CHANGE UP (ref 0.1–0.6)
MONOCYTES # BLD AUTO: 0.74 K/UL — HIGH (ref 0.1–0.6)
MONOCYTES NFR BLD AUTO: 2.8 % — SIGNIFICANT CHANGE UP (ref 1.7–9.3)
MONOCYTES NFR BLD AUTO: 3.7 % — SIGNIFICANT CHANGE UP (ref 1.7–9.3)
MONOCYTES NFR BLD AUTO: 3.7 % — SIGNIFICANT CHANGE UP (ref 1.7–9.3)
MONOCYTES NFR BLD AUTO: 4.2 % — SIGNIFICANT CHANGE UP (ref 1.7–9.3)
MONOCYTES NFR BLD AUTO: 4.5 % — SIGNIFICANT CHANGE UP (ref 1.7–9.3)
MONOCYTES NFR BLD AUTO: 4.6 % — SIGNIFICANT CHANGE UP (ref 1.7–9.3)
MONOCYTES NFR BLD AUTO: 5 % — SIGNIFICANT CHANGE UP (ref 1.7–9.3)
MONOCYTES NFR BLD AUTO: 6.6 % — SIGNIFICANT CHANGE UP (ref 1.7–9.3)
MONOCYTES NFR BLD AUTO: 7.4 % — SIGNIFICANT CHANGE UP (ref 1.7–9.3)
NEUTROPHILS # BLD AUTO: 10.49 K/UL — HIGH (ref 1.4–6.5)
NEUTROPHILS # BLD AUTO: 6.52 K/UL — HIGH (ref 1.4–6.5)
NEUTROPHILS # BLD AUTO: 6.68 K/UL — HIGH (ref 1.4–6.5)
NEUTROPHILS # BLD AUTO: 6.88 K/UL — HIGH (ref 1.4–6.5)
NEUTROPHILS # BLD AUTO: 7.09 K/UL — HIGH (ref 1.4–6.5)
NEUTROPHILS # BLD AUTO: 7.26 K/UL — HIGH (ref 1.4–6.5)
NEUTROPHILS # BLD AUTO: 7.46 K/UL — HIGH (ref 1.4–6.5)
NEUTROPHILS # BLD AUTO: 8.17 K/UL — HIGH (ref 1.4–6.5)
NEUTROPHILS # BLD AUTO: 8.99 K/UL — HIGH (ref 1.4–6.5)
NEUTROPHILS NFR BLD AUTO: 76.7 % — HIGH (ref 42.2–75.2)
NEUTROPHILS NFR BLD AUTO: 77.6 % — HIGH (ref 42.2–75.2)
NEUTROPHILS NFR BLD AUTO: 79.1 % — HIGH (ref 42.2–75.2)
NEUTROPHILS NFR BLD AUTO: 81 % — HIGH (ref 42.2–75.2)
NEUTROPHILS NFR BLD AUTO: 81.1 % — HIGH (ref 42.2–75.2)
NEUTROPHILS NFR BLD AUTO: 81.4 % — HIGH (ref 42.2–75.2)
NEUTROPHILS NFR BLD AUTO: 83.1 % — HIGH (ref 42.2–75.2)
NEUTROPHILS NFR BLD AUTO: 87.6 % — HIGH (ref 42.2–75.2)
NEUTROPHILS NFR BLD AUTO: 89.6 % — HIGH (ref 42.2–75.2)
NITRITE UR-MCNC: POSITIVE
NRBC # BLD: 0 /100 WBCS — SIGNIFICANT CHANGE UP (ref 0–0)
ORGANISM # SPEC MICROSCOPIC CNT: SIGNIFICANT CHANGE UP
ORGANISM # SPEC MICROSCOPIC CNT: SIGNIFICANT CHANGE UP
PCO2 BLDA: 41 MMHG — SIGNIFICANT CHANGE UP (ref 38–42)
PCO2 BLDA: 50 MMHG — HIGH (ref 38–42)
PCO2 BLDA: 70 MMHG — CRITICAL HIGH (ref 38–42)
PH BLDA: 7.27 — LOW (ref 7.38–7.42)
PH BLDA: 7.4 — SIGNIFICANT CHANGE UP (ref 7.38–7.42)
PH BLDA: 7.49 — HIGH (ref 7.38–7.42)
PH UR: 8.5 — SIGNIFICANT CHANGE UP (ref 5–8)
PHOSPHATE SERPL-MCNC: 1.4 MG/DL — LOW (ref 2.1–4.9)
PHOSPHATE SERPL-MCNC: 3.6 MG/DL — SIGNIFICANT CHANGE UP (ref 2.1–4.9)
PLATELET # BLD AUTO: 155 K/UL — SIGNIFICANT CHANGE UP (ref 130–400)
PLATELET # BLD AUTO: 156 K/UL — SIGNIFICANT CHANGE UP (ref 130–400)
PLATELET # BLD AUTO: 167 K/UL — SIGNIFICANT CHANGE UP (ref 130–400)
PLATELET # BLD AUTO: 176 K/UL — SIGNIFICANT CHANGE UP (ref 130–400)
PLATELET # BLD AUTO: 182 K/UL — SIGNIFICANT CHANGE UP (ref 130–400)
PLATELET # BLD AUTO: 185 K/UL — SIGNIFICANT CHANGE UP (ref 130–400)
PLATELET # BLD AUTO: 202 K/UL — SIGNIFICANT CHANGE UP (ref 130–400)
PLATELET # BLD AUTO: 213 K/UL — SIGNIFICANT CHANGE UP (ref 130–400)
PLATELET # BLD AUTO: 215 K/UL — SIGNIFICANT CHANGE UP (ref 130–400)
PLATELET # BLD AUTO: 221 K/UL — SIGNIFICANT CHANGE UP (ref 130–400)
PLATELET # BLD AUTO: 222 K/UL — SIGNIFICANT CHANGE UP (ref 130–400)
PLATELET # BLD AUTO: 224 K/UL — SIGNIFICANT CHANGE UP (ref 130–400)
PO2 BLDA: 65 MMHG — LOW (ref 78–95)
PO2 BLDA: 71 MMHG — LOW (ref 78–95)
PO2 BLDA: 79 MMHG — SIGNIFICANT CHANGE UP (ref 78–95)
POTASSIUM SERPL-MCNC: 2.8 MMOL/L — LOW (ref 3.5–5)
POTASSIUM SERPL-MCNC: 3.1 MMOL/L — LOW (ref 3.5–5)
POTASSIUM SERPL-MCNC: 3.1 MMOL/L — LOW (ref 3.5–5)
POTASSIUM SERPL-MCNC: 3.2 MMOL/L — LOW (ref 3.5–5)
POTASSIUM SERPL-MCNC: 3.3 MMOL/L — LOW (ref 3.5–5)
POTASSIUM SERPL-MCNC: 3.6 MMOL/L — SIGNIFICANT CHANGE UP (ref 3.5–5)
POTASSIUM SERPL-MCNC: 3.7 MMOL/L — SIGNIFICANT CHANGE UP (ref 3.5–5)
POTASSIUM SERPL-MCNC: 3.7 MMOL/L — SIGNIFICANT CHANGE UP (ref 3.5–5)
POTASSIUM SERPL-MCNC: 4 MMOL/L — SIGNIFICANT CHANGE UP (ref 3.5–5)
POTASSIUM SERPL-MCNC: 4.1 MMOL/L — SIGNIFICANT CHANGE UP (ref 3.5–5)
POTASSIUM SERPL-MCNC: 4.3 MMOL/L — SIGNIFICANT CHANGE UP (ref 3.5–5)
POTASSIUM SERPL-SCNC: 2.8 MMOL/L — LOW (ref 3.5–5)
POTASSIUM SERPL-SCNC: 3.1 MMOL/L — LOW (ref 3.5–5)
POTASSIUM SERPL-SCNC: 3.1 MMOL/L — LOW (ref 3.5–5)
POTASSIUM SERPL-SCNC: 3.2 MMOL/L — LOW (ref 3.5–5)
POTASSIUM SERPL-SCNC: 3.3 MMOL/L — LOW (ref 3.5–5)
POTASSIUM SERPL-SCNC: 3.6 MMOL/L — SIGNIFICANT CHANGE UP (ref 3.5–5)
POTASSIUM SERPL-SCNC: 3.7 MMOL/L — SIGNIFICANT CHANGE UP (ref 3.5–5)
POTASSIUM SERPL-SCNC: 3.7 MMOL/L — SIGNIFICANT CHANGE UP (ref 3.5–5)
POTASSIUM SERPL-SCNC: 4 MMOL/L — SIGNIFICANT CHANGE UP (ref 3.5–5)
POTASSIUM SERPL-SCNC: 4.1 MMOL/L — SIGNIFICANT CHANGE UP (ref 3.5–5)
POTASSIUM SERPL-SCNC: 4.3 MMOL/L — SIGNIFICANT CHANGE UP (ref 3.5–5)
PROT SERPL-MCNC: 5.2 G/DL — LOW (ref 6–8)
PROT SERPL-MCNC: 5.8 G/DL — LOW (ref 6–8)
PROT SERPL-MCNC: 6 G/DL — SIGNIFICANT CHANGE UP (ref 6–8)
PROT UR-MCNC: 100
PROTHROM AB SERPL-ACNC: 12.6 SEC — SIGNIFICANT CHANGE UP (ref 9.95–12.87)
PROTHROM AB SERPL-ACNC: 13 SEC — HIGH (ref 9.95–12.87)
RBC # BLD: 2.34 M/UL — LOW (ref 4.2–5.4)
RBC # BLD: 2.41 M/UL — LOW (ref 4.2–5.4)
RBC # BLD: 2.52 M/UL — LOW (ref 4.2–5.4)
RBC # BLD: 2.55 M/UL — LOW (ref 4.2–5.4)
RBC # BLD: 2.9 M/UL — LOW (ref 4.2–5.4)
RBC # BLD: 2.97 M/UL — LOW (ref 4.2–5.4)
RBC # BLD: 3 M/UL — LOW (ref 4.2–5.4)
RBC # BLD: 3.05 M/UL — LOW (ref 4.2–5.4)
RBC # BLD: 3.29 M/UL — LOW (ref 4.2–5.4)
RBC # BLD: 3.34 M/UL — LOW (ref 4.2–5.4)
RBC # BLD: 3.56 M/UL — LOW (ref 4.2–5.4)
RBC # BLD: 3.63 M/UL — LOW (ref 4.2–5.4)
RBC # FLD: 18 % — HIGH (ref 11.5–14.5)
RBC # FLD: 18.1 % — HIGH (ref 11.5–14.5)
RBC # FLD: 18.2 % — HIGH (ref 11.5–14.5)
RBC # FLD: 18.3 % — HIGH (ref 11.5–14.5)
RBC # FLD: 18.5 % — HIGH (ref 11.5–14.5)
RBC # FLD: 18.6 % — HIGH (ref 11.5–14.5)
RBC # FLD: 18.7 % — HIGH (ref 11.5–14.5)
RBC # FLD: 19.2 % — HIGH (ref 11.5–14.5)
SAO2 % BLDA: 95 % — SIGNIFICANT CHANGE UP (ref 94–98)
SAO2 % BLDA: 96 % — SIGNIFICANT CHANGE UP (ref 94–98)
SAO2 % BLDA: 97 % — SIGNIFICANT CHANGE UP (ref 94–98)
SODIUM SERPL-SCNC: 133 MMOL/L — LOW (ref 135–146)
SODIUM SERPL-SCNC: 138 MMOL/L — SIGNIFICANT CHANGE UP (ref 135–146)
SODIUM SERPL-SCNC: 139 MMOL/L — SIGNIFICANT CHANGE UP (ref 135–146)
SODIUM SERPL-SCNC: 140 MMOL/L — SIGNIFICANT CHANGE UP (ref 135–146)
SODIUM SERPL-SCNC: 141 MMOL/L — SIGNIFICANT CHANGE UP (ref 135–146)
SODIUM SERPL-SCNC: 142 MMOL/L — SIGNIFICANT CHANGE UP (ref 135–146)
SODIUM SERPL-SCNC: 143 MMOL/L — SIGNIFICANT CHANGE UP (ref 135–146)
SODIUM SERPL-SCNC: 144 MMOL/L — SIGNIFICANT CHANGE UP (ref 135–146)
SODIUM SERPL-SCNC: 145 MMOL/L — SIGNIFICANT CHANGE UP (ref 135–146)
SP GR SPEC: >=1.03 — SIGNIFICANT CHANGE UP (ref 1.01–1.03)
SPECIMEN SOURCE: SIGNIFICANT CHANGE UP
SPECIMEN SOURCE: SIGNIFICANT CHANGE UP
TIBC SERPL-MCNC: 154 UG/DL — LOW (ref 220–430)
TRANSFERRIN SERPL-MCNC: 136 MG/DL — LOW (ref 200–360)
TRI-PHOS CRY UR QL COMP ASSIST: ABNORMAL /HPF
TROPONIN T SERPL-MCNC: 0.01 NG/ML — SIGNIFICANT CHANGE UP
TYPE + AB SCN PNL BLD: SIGNIFICANT CHANGE UP
UIBC SERPL-MCNC: 131 UG/DL — SIGNIFICANT CHANGE UP (ref 110–370)
UROBILINOGEN FLD QL: 1 (ref 0.2–0.2)
WBC # BLD: 10.03 K/UL — SIGNIFICANT CHANGE UP (ref 4.8–10.8)
WBC # BLD: 11.07 K/UL — HIGH (ref 4.8–10.8)
WBC # BLD: 11.62 K/UL — HIGH (ref 4.8–10.8)
WBC # BLD: 11.97 K/UL — HIGH (ref 4.8–10.8)
WBC # BLD: 6.56 K/UL — SIGNIFICANT CHANGE UP (ref 4.8–10.8)
WBC # BLD: 7.27 K/UL — SIGNIFICANT CHANGE UP (ref 4.8–10.8)
WBC # BLD: 8.04 K/UL — SIGNIFICANT CHANGE UP (ref 4.8–10.8)
WBC # BLD: 8.55 K/UL — SIGNIFICANT CHANGE UP (ref 4.8–10.8)
WBC # BLD: 8.97 K/UL — SIGNIFICANT CHANGE UP (ref 4.8–10.8)
WBC # BLD: 8.98 K/UL — SIGNIFICANT CHANGE UP (ref 4.8–10.8)
WBC # BLD: 9.13 K/UL — SIGNIFICANT CHANGE UP (ref 4.8–10.8)
WBC # BLD: 9.44 K/UL — SIGNIFICANT CHANGE UP (ref 4.8–10.8)
WBC # FLD AUTO: 10.03 K/UL — SIGNIFICANT CHANGE UP (ref 4.8–10.8)
WBC # FLD AUTO: 11.07 K/UL — HIGH (ref 4.8–10.8)
WBC # FLD AUTO: 11.62 K/UL — HIGH (ref 4.8–10.8)
WBC # FLD AUTO: 11.97 K/UL — HIGH (ref 4.8–10.8)
WBC # FLD AUTO: 6.56 K/UL — SIGNIFICANT CHANGE UP (ref 4.8–10.8)
WBC # FLD AUTO: 7.27 K/UL — SIGNIFICANT CHANGE UP (ref 4.8–10.8)
WBC # FLD AUTO: 8.04 K/UL — SIGNIFICANT CHANGE UP (ref 4.8–10.8)
WBC # FLD AUTO: 8.55 K/UL — SIGNIFICANT CHANGE UP (ref 4.8–10.8)
WBC # FLD AUTO: 8.97 K/UL — SIGNIFICANT CHANGE UP (ref 4.8–10.8)
WBC # FLD AUTO: 8.98 K/UL — SIGNIFICANT CHANGE UP (ref 4.8–10.8)
WBC # FLD AUTO: 9.13 K/UL — SIGNIFICANT CHANGE UP (ref 4.8–10.8)
WBC # FLD AUTO: 9.44 K/UL — SIGNIFICANT CHANGE UP (ref 4.8–10.8)
WBC UR QL: >50 /HPF

## 2019-01-01 RX ORDER — MAGNESIUM SULFATE 500 MG/ML
2 VIAL (ML) INJECTION ONCE
Qty: 0 | Refills: 0 | Status: COMPLETED | OUTPATIENT
Start: 2019-01-01 | End: 2019-01-01

## 2019-01-01 RX ORDER — NYSTATIN CREAM 100000 [USP'U]/G
1 CREAM TOPICAL
Qty: 0 | Refills: 0 | Status: DISCONTINUED | OUTPATIENT
Start: 2019-01-01 | End: 2019-01-01

## 2019-01-01 RX ORDER — MEROPENEM 1 G/30ML
1000 INJECTION INTRAVENOUS ONCE
Qty: 0 | Refills: 0 | Status: COMPLETED | OUTPATIENT
Start: 2019-01-01 | End: 2019-01-01

## 2019-01-01 RX ORDER — AMIODARONE HYDROCHLORIDE 400 MG/1
0.5 TABLET ORAL
Qty: 900 | Refills: 0 | Status: DISCONTINUED | OUTPATIENT
Start: 2019-01-01 | End: 2019-01-01

## 2019-01-01 RX ORDER — MAGNESIUM SULFATE 500 MG/ML
2 VIAL (ML) INJECTION ONCE
Qty: 0 | Refills: 0 | Status: DISCONTINUED | OUTPATIENT
Start: 2019-01-01 | End: 2019-01-01

## 2019-01-01 RX ORDER — SODIUM CHLORIDE 9 MG/ML
1000 INJECTION, SOLUTION INTRAVENOUS
Qty: 0 | Refills: 0 | Status: DISCONTINUED | OUTPATIENT
Start: 2019-01-01 | End: 2019-01-01

## 2019-01-01 RX ORDER — SENNA PLUS 8.6 MG/1
15 TABLET ORAL DAILY
Qty: 0 | Refills: 0 | Status: DISCONTINUED | OUTPATIENT
Start: 2019-01-01 | End: 2019-01-01

## 2019-01-01 RX ORDER — PHENYLEPHRINE HYDROCHLORIDE 10 MG/ML
0.1 INJECTION INTRAVENOUS
Qty: 160 | Refills: 0 | Status: DISCONTINUED | OUTPATIENT
Start: 2019-01-01 | End: 2019-01-01

## 2019-01-01 RX ORDER — METOPROLOL TARTRATE 50 MG
5 TABLET ORAL ONCE
Qty: 0 | Refills: 0 | Status: COMPLETED | OUTPATIENT
Start: 2019-01-01 | End: 2019-01-01

## 2019-01-01 RX ORDER — MEROPENEM 1 G/30ML
500 INJECTION INTRAVENOUS EVERY 6 HOURS
Qty: 0 | Refills: 0 | Status: DISCONTINUED | OUTPATIENT
Start: 2019-01-01 | End: 2019-01-01

## 2019-01-01 RX ORDER — AMIODARONE HYDROCHLORIDE 400 MG/1
200 TABLET ORAL ONCE
Qty: 0 | Refills: 0 | Status: COMPLETED | OUTPATIENT
Start: 2019-01-01 | End: 2019-01-01

## 2019-01-01 RX ORDER — MEROPENEM 1 G/30ML
1000 INJECTION INTRAVENOUS EVERY 8 HOURS
Qty: 0 | Refills: 0 | Status: DISCONTINUED | OUTPATIENT
Start: 2019-01-01 | End: 2019-01-01

## 2019-01-01 RX ORDER — PROPOFOL 10 MG/ML
5 INJECTION, EMULSION INTRAVENOUS
Qty: 1000 | Refills: 0 | Status: DISCONTINUED | OUTPATIENT
Start: 2019-01-01 | End: 2019-01-01

## 2019-01-01 RX ORDER — DOCUSATE SODIUM 100 MG
100 CAPSULE ORAL
Qty: 0 | Refills: 0 | Status: DISCONTINUED | OUTPATIENT
Start: 2019-01-01 | End: 2019-01-01

## 2019-01-01 RX ORDER — PANTOPRAZOLE SODIUM 20 MG/1
40 TABLET, DELAYED RELEASE ORAL
Qty: 0 | Refills: 0 | Status: DISCONTINUED | OUTPATIENT
Start: 2019-01-01 | End: 2019-01-01

## 2019-01-01 RX ORDER — FUROSEMIDE 40 MG
40 TABLET ORAL DAILY
Qty: 0 | Refills: 0 | Status: DISCONTINUED | OUTPATIENT
Start: 2019-01-01 | End: 2019-01-01

## 2019-01-01 RX ORDER — PHENYLEPHRINE HYDROCHLORIDE 10 MG/ML
0.5 INJECTION INTRAVENOUS
Qty: 160 | Refills: 0 | Status: DISCONTINUED | OUTPATIENT
Start: 2019-01-01 | End: 2019-01-01

## 2019-01-01 RX ORDER — POTASSIUM CHLORIDE 20 MEQ
20 PACKET (EA) ORAL
Qty: 0 | Refills: 0 | Status: COMPLETED | OUTPATIENT
Start: 2019-01-01 | End: 2019-01-01

## 2019-01-01 RX ORDER — MORPHINE SULFATE 50 MG/1
4 CAPSULE, EXTENDED RELEASE ORAL
Qty: 250 | Refills: 0 | Status: DISCONTINUED | OUTPATIENT
Start: 2019-01-01 | End: 2019-01-01

## 2019-01-01 RX ORDER — SODIUM CHLORIDE 9 MG/ML
500 INJECTION INTRAMUSCULAR; INTRAVENOUS; SUBCUTANEOUS ONCE
Qty: 0 | Refills: 0 | Status: COMPLETED | OUTPATIENT
Start: 2019-01-01 | End: 2019-01-01

## 2019-01-01 RX ORDER — CHLORHEXIDINE GLUCONATE 213 G/1000ML
15 SOLUTION TOPICAL
Qty: 0 | Refills: 0 | Status: DISCONTINUED | OUTPATIENT
Start: 2019-01-01 | End: 2019-01-01

## 2019-01-01 RX ORDER — IPRATROPIUM BROMIDE 0.2 MG/ML
500 SOLUTION, NON-ORAL INHALATION EVERY 6 HOURS
Qty: 0 | Refills: 0 | Status: DISCONTINUED | OUTPATIENT
Start: 2019-01-01 | End: 2019-01-01

## 2019-01-01 RX ORDER — POTASSIUM CHLORIDE 20 MEQ
40 PACKET (EA) ORAL ONCE
Qty: 0 | Refills: 0 | Status: COMPLETED | OUTPATIENT
Start: 2019-01-01 | End: 2019-01-01

## 2019-01-01 RX ORDER — LACTULOSE 10 G/15ML
20 SOLUTION ORAL DAILY
Qty: 0 | Refills: 0 | Status: DISCONTINUED | OUTPATIENT
Start: 2019-01-01 | End: 2019-01-01

## 2019-01-01 RX ORDER — SIMVASTATIN 20 MG/1
5 TABLET, FILM COATED ORAL AT BEDTIME
Qty: 0 | Refills: 0 | Status: DISCONTINUED | OUTPATIENT
Start: 2019-01-01 | End: 2019-01-01

## 2019-01-01 RX ORDER — POTASSIUM CHLORIDE 20 MEQ
40 PACKET (EA) ORAL EVERY 4 HOURS
Qty: 0 | Refills: 0 | Status: COMPLETED | OUTPATIENT
Start: 2019-01-01 | End: 2019-01-01

## 2019-01-01 RX ORDER — ROBINUL 0.2 MG/ML
0.4 INJECTION INTRAMUSCULAR; INTRAVENOUS ONCE
Qty: 0 | Refills: 0 | Status: COMPLETED | OUTPATIENT
Start: 2019-01-01 | End: 2019-01-01

## 2019-01-01 RX ORDER — FENTANYL CITRATE 50 UG/ML
25 INJECTION INTRAVENOUS ONCE
Qty: 0 | Refills: 0 | Status: DISCONTINUED | OUTPATIENT
Start: 2019-01-01 | End: 2019-01-01

## 2019-01-01 RX ORDER — AMIODARONE HYDROCHLORIDE 400 MG/1
400 TABLET ORAL ONCE
Qty: 0 | Refills: 0 | Status: DISCONTINUED | OUTPATIENT
Start: 2019-01-01 | End: 2019-01-01

## 2019-01-01 RX ORDER — LEVETIRACETAM 250 MG/1
750 TABLET, FILM COATED ORAL EVERY 12 HOURS
Qty: 0 | Refills: 0 | Status: DISCONTINUED | OUTPATIENT
Start: 2019-01-01 | End: 2019-01-01

## 2019-01-01 RX ORDER — ACETAMINOPHEN 500 MG
650 TABLET ORAL EVERY 6 HOURS
Qty: 0 | Refills: 0 | Status: DISCONTINUED | OUTPATIENT
Start: 2019-01-01 | End: 2019-01-01

## 2019-01-01 RX ORDER — SODIUM CHLORIDE 9 MG/ML
500 INJECTION, SOLUTION INTRAVENOUS ONCE
Qty: 0 | Refills: 0 | Status: COMPLETED | OUTPATIENT
Start: 2019-01-01 | End: 2019-01-01

## 2019-01-01 RX ORDER — AMIODARONE HYDROCHLORIDE 400 MG/1
200 TABLET ORAL EVERY 12 HOURS
Qty: 0 | Refills: 0 | Status: DISCONTINUED | OUTPATIENT
Start: 2019-01-01 | End: 2019-01-01

## 2019-01-01 RX ORDER — FUROSEMIDE 40 MG
40 TABLET ORAL ONCE
Qty: 0 | Refills: 0 | Status: COMPLETED | OUTPATIENT
Start: 2019-01-01 | End: 2019-01-01

## 2019-01-01 RX ORDER — AMIODARONE HYDROCHLORIDE 400 MG/1
150 TABLET ORAL ONCE
Qty: 0 | Refills: 0 | Status: COMPLETED | OUTPATIENT
Start: 2019-01-01 | End: 2019-01-01

## 2019-01-01 RX ORDER — AMIODARONE HYDROCHLORIDE 400 MG/1
200 TABLET ORAL
Qty: 0 | Refills: 0 | Status: DISCONTINUED | OUTPATIENT
Start: 2019-01-01 | End: 2019-01-01

## 2019-01-01 RX ORDER — CHLORHEXIDINE GLUCONATE 213 G/1000ML
1 SOLUTION TOPICAL
Qty: 0 | Refills: 0 | Status: DISCONTINUED | OUTPATIENT
Start: 2019-01-01 | End: 2019-01-01

## 2019-01-01 RX ORDER — AMIODARONE HYDROCHLORIDE 400 MG/1
1 TABLET ORAL
Qty: 900 | Refills: 0 | Status: DISCONTINUED | OUTPATIENT
Start: 2019-01-01 | End: 2019-01-01

## 2019-01-01 RX ORDER — FENTANYL CITRATE 50 UG/ML
0.5 INJECTION INTRAVENOUS
Qty: 2500 | Refills: 0 | Status: DISCONTINUED | OUTPATIENT
Start: 2019-01-01 | End: 2019-01-01

## 2019-01-01 RX ORDER — MEROPENEM 1 G/30ML
INJECTION INTRAVENOUS
Qty: 0 | Refills: 0 | Status: DISCONTINUED | OUTPATIENT
Start: 2019-01-01 | End: 2019-01-01

## 2019-01-01 RX ORDER — POTASSIUM CHLORIDE 20 MEQ
20 PACKET (EA) ORAL ONCE
Qty: 0 | Refills: 0 | Status: COMPLETED | OUTPATIENT
Start: 2019-01-01 | End: 2019-01-01

## 2019-01-01 RX ORDER — MORPHINE SULFATE 50 MG/1
4 CAPSULE, EXTENDED RELEASE ORAL
Qty: 100 | Refills: 0 | Status: DISCONTINUED | OUTPATIENT
Start: 2019-01-01 | End: 2019-01-01

## 2019-01-01 RX ORDER — AMIODARONE HYDROCHLORIDE 400 MG/1
400 TABLET ORAL DAILY
Qty: 0 | Refills: 0 | Status: DISCONTINUED | OUTPATIENT
Start: 2019-01-01 | End: 2019-01-01

## 2019-01-01 RX ORDER — METOPROLOL TARTRATE 50 MG
2.5 TABLET ORAL ONCE
Qty: 0 | Refills: 0 | Status: COMPLETED | OUTPATIENT
Start: 2019-01-01 | End: 2019-01-01

## 2019-01-01 RX ORDER — HEPARIN SODIUM 5000 [USP'U]/ML
5000 INJECTION INTRAVENOUS; SUBCUTANEOUS EVERY 8 HOURS
Qty: 0 | Refills: 0 | Status: DISCONTINUED | OUTPATIENT
Start: 2019-01-01 | End: 2019-01-01

## 2019-01-01 RX ORDER — MORPHINE SULFATE 50 MG/1
4 CAPSULE, EXTENDED RELEASE ORAL
Qty: 0 | Refills: 0 | Status: DISCONTINUED | OUTPATIENT
Start: 2019-01-01 | End: 2019-01-01

## 2019-01-01 RX ADMIN — PANTOPRAZOLE SODIUM 40 MILLIGRAM(S): 20 TABLET, DELAYED RELEASE ORAL at 17:01

## 2019-01-01 RX ADMIN — PANTOPRAZOLE SODIUM 40 MILLIGRAM(S): 20 TABLET, DELAYED RELEASE ORAL at 18:09

## 2019-01-01 RX ADMIN — Medication 60 MILLIGRAM(S): at 05:22

## 2019-01-01 RX ADMIN — LEVETIRACETAM 400 MILLIGRAM(S): 250 TABLET, FILM COATED ORAL at 17:21

## 2019-01-01 RX ADMIN — MORPHINE SULFATE 4 MILLIGRAM(S): 50 CAPSULE, EXTENDED RELEASE ORAL at 16:40

## 2019-01-01 RX ADMIN — MEROPENEM 100 MILLIGRAM(S): 1 INJECTION INTRAVENOUS at 23:25

## 2019-01-01 RX ADMIN — CHLORHEXIDINE GLUCONATE 15 MILLILITER(S): 213 SOLUTION TOPICAL at 05:04

## 2019-01-01 RX ADMIN — LACTULOSE 20 GRAM(S): 10 SOLUTION ORAL at 11:53

## 2019-01-01 RX ADMIN — Medication 60 MILLIGRAM(S): at 06:35

## 2019-01-01 RX ADMIN — HEPARIN SODIUM 5000 UNIT(S): 5000 INJECTION INTRAVENOUS; SUBCUTANEOUS at 05:45

## 2019-01-01 RX ADMIN — MEROPENEM 100 MILLIGRAM(S): 1 INJECTION INTRAVENOUS at 12:33

## 2019-01-01 RX ADMIN — Medication 40 MILLIGRAM(S): at 05:16

## 2019-01-01 RX ADMIN — SIMVASTATIN 5 MILLIGRAM(S): 20 TABLET, FILM COATED ORAL at 22:51

## 2019-01-01 RX ADMIN — CHLORHEXIDINE GLUCONATE 15 MILLILITER(S): 213 SOLUTION TOPICAL at 17:50

## 2019-01-01 RX ADMIN — PANTOPRAZOLE SODIUM 40 MILLIGRAM(S): 20 TABLET, DELAYED RELEASE ORAL at 17:18

## 2019-01-01 RX ADMIN — AMIODARONE HYDROCHLORIDE 200 MILLIGRAM(S): 400 TABLET ORAL at 17:52

## 2019-01-01 RX ADMIN — MORPHINE SULFATE 4 MILLIGRAM(S): 50 CAPSULE, EXTENDED RELEASE ORAL at 15:48

## 2019-01-01 RX ADMIN — FENTANYL CITRATE 25 MICROGRAM(S): 50 INJECTION INTRAVENOUS at 22:15

## 2019-01-01 RX ADMIN — HEPARIN SODIUM 5000 UNIT(S): 5000 INJECTION INTRAVENOUS; SUBCUTANEOUS at 13:50

## 2019-01-01 RX ADMIN — LACTULOSE 20 GRAM(S): 10 SOLUTION ORAL at 12:18

## 2019-01-01 RX ADMIN — AMIODARONE HYDROCHLORIDE 400 MILLIGRAM(S): 400 TABLET ORAL at 06:08

## 2019-01-01 RX ADMIN — Medication 1 TABLET(S): at 13:23

## 2019-01-01 RX ADMIN — LEVETIRACETAM 400 MILLIGRAM(S): 250 TABLET, FILM COATED ORAL at 05:13

## 2019-01-01 RX ADMIN — CHLORHEXIDINE GLUCONATE 1 APPLICATION(S): 213 SOLUTION TOPICAL at 05:25

## 2019-01-01 RX ADMIN — AMIODARONE HYDROCHLORIDE 200 MILLIGRAM(S): 400 TABLET ORAL at 17:24

## 2019-01-01 RX ADMIN — PANTOPRAZOLE SODIUM 40 MILLIGRAM(S): 20 TABLET, DELAYED RELEASE ORAL at 06:35

## 2019-01-01 RX ADMIN — Medication 40 MILLIEQUIVALENT(S): at 11:42

## 2019-01-01 RX ADMIN — CHLORHEXIDINE GLUCONATE 1 APPLICATION(S): 213 SOLUTION TOPICAL at 06:10

## 2019-01-01 RX ADMIN — PANTOPRAZOLE SODIUM 40 MILLIGRAM(S): 20 TABLET, DELAYED RELEASE ORAL at 17:09

## 2019-01-01 RX ADMIN — Medication 50 MILLIEQUIVALENT(S): at 08:25

## 2019-01-01 RX ADMIN — NYSTATIN CREAM 1 APPLICATION(S): 100000 CREAM TOPICAL at 17:13

## 2019-01-01 RX ADMIN — Medication 100 MILLIGRAM(S): at 18:09

## 2019-01-01 RX ADMIN — Medication 60 MILLIGRAM(S): at 17:04

## 2019-01-01 RX ADMIN — Medication 1 TABLET(S): at 12:20

## 2019-01-01 RX ADMIN — NYSTATIN CREAM 1 APPLICATION(S): 100000 CREAM TOPICAL at 17:06

## 2019-01-01 RX ADMIN — PANTOPRAZOLE SODIUM 40 MILLIGRAM(S): 20 TABLET, DELAYED RELEASE ORAL at 17:12

## 2019-01-01 RX ADMIN — HEPARIN SODIUM 5000 UNIT(S): 5000 INJECTION INTRAVENOUS; SUBCUTANEOUS at 06:36

## 2019-01-01 RX ADMIN — MEROPENEM 100 MILLIGRAM(S): 1 INJECTION INTRAVENOUS at 05:22

## 2019-01-01 RX ADMIN — CHLORHEXIDINE GLUCONATE 15 MILLILITER(S): 213 SOLUTION TOPICAL at 06:09

## 2019-01-01 RX ADMIN — LACTULOSE 10 GRAM(S): 10 SOLUTION ORAL at 11:00

## 2019-01-01 RX ADMIN — MEROPENEM 100 MILLIGRAM(S): 1 INJECTION INTRAVENOUS at 05:12

## 2019-01-01 RX ADMIN — LACTULOSE 20 GRAM(S): 10 SOLUTION ORAL at 15:17

## 2019-01-01 RX ADMIN — HEPARIN SODIUM 5000 UNIT(S): 5000 INJECTION INTRAVENOUS; SUBCUTANEOUS at 13:59

## 2019-01-01 RX ADMIN — SIMVASTATIN 5 MILLIGRAM(S): 20 TABLET, FILM COATED ORAL at 21:20

## 2019-01-01 RX ADMIN — FENTANYL CITRATE 25 MICROGRAM(S): 50 INJECTION INTRAVENOUS at 21:35

## 2019-01-01 RX ADMIN — FENTANYL CITRATE 4 MICROGRAM(S)/KG/HR: 50 INJECTION INTRAVENOUS at 04:46

## 2019-01-01 RX ADMIN — CHLORHEXIDINE GLUCONATE 1 APPLICATION(S): 213 SOLUTION TOPICAL at 06:24

## 2019-01-01 RX ADMIN — CHLORHEXIDINE GLUCONATE 15 MILLILITER(S): 213 SOLUTION TOPICAL at 09:08

## 2019-01-01 RX ADMIN — SENNA PLUS 15 MILLILITER(S): 8.6 TABLET ORAL at 12:46

## 2019-01-01 RX ADMIN — PROPOFOL 2.42 MICROGRAM(S)/KG/MIN: 10 INJECTION, EMULSION INTRAVENOUS at 03:49

## 2019-01-01 RX ADMIN — MEROPENEM 100 MILLIGRAM(S): 1 INJECTION INTRAVENOUS at 05:06

## 2019-01-01 RX ADMIN — MEROPENEM 100 MILLIGRAM(S): 1 INJECTION INTRAVENOUS at 17:13

## 2019-01-01 RX ADMIN — AMIODARONE HYDROCHLORIDE 200 MILLIGRAM(S): 400 TABLET ORAL at 05:15

## 2019-01-01 RX ADMIN — Medication 60 MILLIGRAM(S): at 11:35

## 2019-01-01 RX ADMIN — SENNA PLUS 15 MILLILITER(S): 8.6 TABLET ORAL at 12:16

## 2019-01-01 RX ADMIN — LACTULOSE 20 GRAM(S): 10 SOLUTION ORAL at 12:15

## 2019-01-01 RX ADMIN — Medication 50 MILLIEQUIVALENT(S): at 10:13

## 2019-01-01 RX ADMIN — Medication 1 TABLET(S): at 12:19

## 2019-01-01 RX ADMIN — SENNA PLUS 15 MILLILITER(S): 8.6 TABLET ORAL at 12:14

## 2019-01-01 RX ADMIN — NYSTATIN CREAM 1 APPLICATION(S): 100000 CREAM TOPICAL at 05:24

## 2019-01-01 RX ADMIN — HEPARIN SODIUM 5000 UNIT(S): 5000 INJECTION INTRAVENOUS; SUBCUTANEOUS at 22:51

## 2019-01-01 RX ADMIN — AMIODARONE HYDROCHLORIDE 200 MILLIGRAM(S): 400 TABLET ORAL at 17:41

## 2019-01-01 RX ADMIN — Medication 100 MILLIGRAM(S): at 06:23

## 2019-01-01 RX ADMIN — Medication 100 MILLIGRAM(S): at 17:52

## 2019-01-01 RX ADMIN — NYSTATIN CREAM 1 APPLICATION(S): 100000 CREAM TOPICAL at 17:01

## 2019-01-01 RX ADMIN — MORPHINE SULFATE 4 MILLIGRAM(S): 50 CAPSULE, EXTENDED RELEASE ORAL at 15:34

## 2019-01-01 RX ADMIN — Medication 60 MILLIGRAM(S): at 05:21

## 2019-01-01 RX ADMIN — SIMVASTATIN 5 MILLIGRAM(S): 20 TABLET, FILM COATED ORAL at 22:09

## 2019-01-01 RX ADMIN — NYSTATIN CREAM 1 APPLICATION(S): 100000 CREAM TOPICAL at 05:46

## 2019-01-01 RX ADMIN — HEPARIN SODIUM 5000 UNIT(S): 5000 INJECTION INTRAVENOUS; SUBCUTANEOUS at 21:57

## 2019-01-01 RX ADMIN — SODIUM CHLORIDE 75 MILLILITER(S): 9 INJECTION, SOLUTION INTRAVENOUS at 13:58

## 2019-01-01 RX ADMIN — CHLORHEXIDINE GLUCONATE 15 MILLILITER(S): 213 SOLUTION TOPICAL at 17:05

## 2019-01-01 RX ADMIN — Medication 100 MILLIGRAM(S): at 05:15

## 2019-01-01 RX ADMIN — AMIODARONE HYDROCHLORIDE 200 MILLIGRAM(S): 400 TABLET ORAL at 19:56

## 2019-01-01 RX ADMIN — DEXMEDETOMIDINE HYDROCHLORIDE IN 0.9% SODIUM CHLORIDE 10 MICROGRAM(S)/KG/HR: 4 INJECTION INTRAVENOUS at 04:46

## 2019-01-01 RX ADMIN — SENNA PLUS 15 MILLILITER(S): 8.6 TABLET ORAL at 17:23

## 2019-01-01 RX ADMIN — Medication 1 MILLIGRAM(S): at 15:52

## 2019-01-01 RX ADMIN — PHENYLEPHRINE HYDROCHLORIDE 1.51 MICROGRAM(S)/KG/MIN: 10 INJECTION INTRAVENOUS at 07:00

## 2019-01-01 RX ADMIN — Medication 50 MILLIEQUIVALENT(S): at 07:55

## 2019-01-01 RX ADMIN — Medication 50 MILLIEQUIVALENT(S): at 09:00

## 2019-01-01 RX ADMIN — AMIODARONE HYDROCHLORIDE 200 MILLIGRAM(S): 400 TABLET ORAL at 05:12

## 2019-01-01 RX ADMIN — HEPARIN SODIUM 5000 UNIT(S): 5000 INJECTION INTRAVENOUS; SUBCUTANEOUS at 06:24

## 2019-01-01 RX ADMIN — Medication 100 MILLIGRAM(S): at 05:12

## 2019-01-01 RX ADMIN — PHENYLEPHRINE HYDROCHLORIDE 7.57 MICROGRAM(S)/KG/MIN: 10 INJECTION INTRAVENOUS at 18:48

## 2019-01-01 RX ADMIN — Medication 60 MILLIGRAM(S): at 05:05

## 2019-01-01 RX ADMIN — HEPARIN SODIUM 5000 UNIT(S): 5000 INJECTION INTRAVENOUS; SUBCUTANEOUS at 05:23

## 2019-01-01 RX ADMIN — AMIODARONE HYDROCHLORIDE 400 MILLIGRAM(S): 400 TABLET ORAL at 17:12

## 2019-01-01 RX ADMIN — Medication 100 MILLIGRAM(S): at 17:01

## 2019-01-01 RX ADMIN — NYSTATIN CREAM 1 APPLICATION(S): 100000 CREAM TOPICAL at 17:10

## 2019-01-01 RX ADMIN — Medication 2 MILLIGRAM(S): at 16:26

## 2019-01-01 RX ADMIN — PANTOPRAZOLE SODIUM 40 MILLIGRAM(S): 20 TABLET, DELAYED RELEASE ORAL at 05:05

## 2019-01-01 RX ADMIN — MEROPENEM 100 MILLIGRAM(S): 1 INJECTION INTRAVENOUS at 21:18

## 2019-01-01 RX ADMIN — CHLORHEXIDINE GLUCONATE 15 MILLILITER(S): 213 SOLUTION TOPICAL at 17:04

## 2019-01-01 RX ADMIN — Medication 60 MILLIGRAM(S): at 06:24

## 2019-01-01 RX ADMIN — NYSTATIN CREAM 1 APPLICATION(S): 100000 CREAM TOPICAL at 05:12

## 2019-01-01 RX ADMIN — HEPARIN SODIUM 5000 UNIT(S): 5000 INJECTION INTRAVENOUS; SUBCUTANEOUS at 22:09

## 2019-01-01 RX ADMIN — PROPOFOL 2.42 MICROGRAM(S)/KG/MIN: 10 INJECTION, EMULSION INTRAVENOUS at 06:54

## 2019-01-01 RX ADMIN — CHLORHEXIDINE GLUCONATE 1 APPLICATION(S): 213 SOLUTION TOPICAL at 05:15

## 2019-01-01 RX ADMIN — HEPARIN SODIUM 5000 UNIT(S): 5000 INJECTION INTRAVENOUS; SUBCUTANEOUS at 13:24

## 2019-01-01 RX ADMIN — ROBINUL 0.4 MILLIGRAM(S): 0.2 INJECTION INTRAMUSCULAR; INTRAVENOUS at 15:37

## 2019-01-01 RX ADMIN — CHLORHEXIDINE GLUCONATE 15 MILLILITER(S): 213 SOLUTION TOPICAL at 05:25

## 2019-01-01 RX ADMIN — CHLORHEXIDINE GLUCONATE 15 MILLILITER(S): 213 SOLUTION TOPICAL at 05:24

## 2019-01-01 RX ADMIN — Medication 100 MILLIGRAM(S): at 17:05

## 2019-01-01 RX ADMIN — Medication 50 MILLIEQUIVALENT(S): at 12:14

## 2019-01-01 RX ADMIN — MEROPENEM 100 MILLIGRAM(S): 1 INJECTION INTRAVENOUS at 11:36

## 2019-01-01 RX ADMIN — HEPARIN SODIUM 5000 UNIT(S): 5000 INJECTION INTRAVENOUS; SUBCUTANEOUS at 13:42

## 2019-01-01 RX ADMIN — Medication 50 MILLIEQUIVALENT(S): at 11:15

## 2019-01-01 RX ADMIN — Medication 40 MILLIGRAM(S): at 05:05

## 2019-01-01 RX ADMIN — NYSTATIN CREAM 1 APPLICATION(S): 100000 CREAM TOPICAL at 17:52

## 2019-01-01 RX ADMIN — PANTOPRAZOLE SODIUM 40 MILLIGRAM(S): 20 TABLET, DELAYED RELEASE ORAL at 06:24

## 2019-01-01 RX ADMIN — PANTOPRAZOLE SODIUM 40 MILLIGRAM(S): 20 TABLET, DELAYED RELEASE ORAL at 17:51

## 2019-01-01 RX ADMIN — NYSTATIN CREAM 1 APPLICATION(S): 100000 CREAM TOPICAL at 17:25

## 2019-01-01 RX ADMIN — Medication 1 TABLET(S): at 14:35

## 2019-01-01 RX ADMIN — CHLORHEXIDINE GLUCONATE 1 APPLICATION(S): 213 SOLUTION TOPICAL at 05:04

## 2019-01-01 RX ADMIN — NYSTATIN CREAM 1 APPLICATION(S): 100000 CREAM TOPICAL at 17:39

## 2019-01-01 RX ADMIN — PANTOPRAZOLE SODIUM 40 MILLIGRAM(S): 20 TABLET, DELAYED RELEASE ORAL at 05:25

## 2019-01-01 RX ADMIN — Medication 2 MILLIGRAM(S): at 15:15

## 2019-01-01 RX ADMIN — Medication 50 MILLIEQUIVALENT(S): at 11:00

## 2019-01-01 RX ADMIN — MEROPENEM 100 MILLIGRAM(S): 1 INJECTION INTRAVENOUS at 05:05

## 2019-01-01 RX ADMIN — Medication 50 GRAM(S): at 17:32

## 2019-01-01 RX ADMIN — PANTOPRAZOLE SODIUM 40 MILLIGRAM(S): 20 TABLET, DELAYED RELEASE ORAL at 05:24

## 2019-01-01 RX ADMIN — HEPARIN SODIUM 5000 UNIT(S): 5000 INJECTION INTRAVENOUS; SUBCUTANEOUS at 21:18

## 2019-01-01 RX ADMIN — NYSTATIN CREAM 1 APPLICATION(S): 100000 CREAM TOPICAL at 06:25

## 2019-01-01 RX ADMIN — MORPHINE SULFATE 4 MG/HR: 50 CAPSULE, EXTENDED RELEASE ORAL at 14:54

## 2019-01-01 RX ADMIN — Medication 40 MILLIEQUIVALENT(S): at 14:59

## 2019-01-01 RX ADMIN — Medication 60 MILLIGRAM(S): at 05:23

## 2019-01-01 RX ADMIN — SODIUM CHLORIDE 1000 MILLILITER(S): 9 INJECTION INTRAMUSCULAR; INTRAVENOUS; SUBCUTANEOUS at 10:00

## 2019-01-01 RX ADMIN — Medication 60 MILLIGRAM(S): at 06:09

## 2019-01-01 RX ADMIN — AMIODARONE HYDROCHLORIDE 200 MILLIGRAM(S): 400 TABLET ORAL at 17:20

## 2019-01-01 RX ADMIN — HEPARIN SODIUM 5000 UNIT(S): 5000 INJECTION INTRAVENOUS; SUBCUTANEOUS at 05:06

## 2019-01-01 RX ADMIN — CHLORHEXIDINE GLUCONATE 1 APPLICATION(S): 213 SOLUTION TOPICAL at 06:36

## 2019-01-01 RX ADMIN — HEPARIN SODIUM 5000 UNIT(S): 5000 INJECTION INTRAVENOUS; SUBCUTANEOUS at 13:25

## 2019-01-01 RX ADMIN — PROPOFOL 2.42 MICROGRAM(S)/KG/MIN: 10 INJECTION, EMULSION INTRAVENOUS at 22:10

## 2019-01-01 RX ADMIN — CHLORHEXIDINE GLUCONATE 1 APPLICATION(S): 213 SOLUTION TOPICAL at 05:46

## 2019-01-01 RX ADMIN — Medication 1 TABLET(S): at 11:33

## 2019-01-01 RX ADMIN — PANTOPRAZOLE SODIUM 40 MILLIGRAM(S): 20 TABLET, DELAYED RELEASE ORAL at 05:13

## 2019-01-01 RX ADMIN — SENNA PLUS 15 MILLILITER(S): 8.6 TABLET ORAL at 13:41

## 2019-01-01 RX ADMIN — AMIODARONE HYDROCHLORIDE 200 MILLIGRAM(S): 400 TABLET ORAL at 05:05

## 2019-01-01 RX ADMIN — Medication 60 MILLIGRAM(S): at 05:13

## 2019-01-01 RX ADMIN — CHLORHEXIDINE GLUCONATE 15 MILLILITER(S): 213 SOLUTION TOPICAL at 05:49

## 2019-01-01 RX ADMIN — Medication 40 MILLIGRAM(S): at 05:12

## 2019-01-01 RX ADMIN — CHLORHEXIDINE GLUCONATE 15 MILLILITER(S): 213 SOLUTION TOPICAL at 17:01

## 2019-01-01 RX ADMIN — Medication 100 MILLIGRAM(S): at 17:25

## 2019-01-01 RX ADMIN — CHLORHEXIDINE GLUCONATE 1 APPLICATION(S): 213 SOLUTION TOPICAL at 05:21

## 2019-01-01 RX ADMIN — Medication 5 MILLIGRAM(S): at 04:36

## 2019-01-01 RX ADMIN — NYSTATIN CREAM 1 APPLICATION(S): 100000 CREAM TOPICAL at 17:09

## 2019-01-01 RX ADMIN — Medication 1 TABLET(S): at 11:53

## 2019-01-01 RX ADMIN — SODIUM CHLORIDE 100 MILLILITER(S): 9 INJECTION, SOLUTION INTRAVENOUS at 10:00

## 2019-01-01 RX ADMIN — HEPARIN SODIUM 5000 UNIT(S): 5000 INJECTION INTRAVENOUS; SUBCUTANEOUS at 22:27

## 2019-01-01 RX ADMIN — NYSTATIN CREAM 1 APPLICATION(S): 100000 CREAM TOPICAL at 05:23

## 2019-01-01 RX ADMIN — NYSTATIN CREAM 1 APPLICATION(S): 100000 CREAM TOPICAL at 05:09

## 2019-01-01 RX ADMIN — CHLORHEXIDINE GLUCONATE 15 MILLILITER(S): 213 SOLUTION TOPICAL at 17:24

## 2019-01-01 RX ADMIN — CHLORHEXIDINE GLUCONATE 15 MILLILITER(S): 213 SOLUTION TOPICAL at 17:09

## 2019-01-01 RX ADMIN — HEPARIN SODIUM 5000 UNIT(S): 5000 INJECTION INTRAVENOUS; SUBCUTANEOUS at 21:15

## 2019-01-01 RX ADMIN — Medication 100 MILLIGRAM(S): at 05:06

## 2019-01-01 RX ADMIN — CHLORHEXIDINE GLUCONATE 15 MILLILITER(S): 213 SOLUTION TOPICAL at 17:41

## 2019-01-01 RX ADMIN — SENNA PLUS 15 MILLILITER(S): 8.6 TABLET ORAL at 12:20

## 2019-01-01 RX ADMIN — SIMVASTATIN 5 MILLIGRAM(S): 20 TABLET, FILM COATED ORAL at 22:26

## 2019-01-01 RX ADMIN — Medication 100 MILLIGRAM(S): at 17:41

## 2019-01-01 RX ADMIN — CHLORHEXIDINE GLUCONATE 15 MILLILITER(S): 213 SOLUTION TOPICAL at 06:23

## 2019-01-01 RX ADMIN — PANTOPRAZOLE SODIUM 40 MILLIGRAM(S): 20 TABLET, DELAYED RELEASE ORAL at 17:23

## 2019-01-01 RX ADMIN — AMIODARONE HYDROCHLORIDE 33.33 MG/MIN: 400 TABLET ORAL at 16:58

## 2019-01-01 RX ADMIN — FENTANYL CITRATE 4.04 MICROGRAM(S)/KG/HR: 50 INJECTION INTRAVENOUS at 06:22

## 2019-01-01 RX ADMIN — HEPARIN SODIUM 5000 UNIT(S): 5000 INJECTION INTRAVENOUS; SUBCUTANEOUS at 14:36

## 2019-01-01 RX ADMIN — HEPARIN SODIUM 5000 UNIT(S): 5000 INJECTION INTRAVENOUS; SUBCUTANEOUS at 05:05

## 2019-01-01 RX ADMIN — NYSTATIN CREAM 1 APPLICATION(S): 100000 CREAM TOPICAL at 05:13

## 2019-01-01 RX ADMIN — MEROPENEM 100 MILLIGRAM(S): 1 INJECTION INTRAVENOUS at 11:18

## 2019-01-01 RX ADMIN — NYSTATIN CREAM 1 APPLICATION(S): 100000 CREAM TOPICAL at 06:35

## 2019-01-01 RX ADMIN — Medication 1 TABLET(S): at 11:18

## 2019-01-01 RX ADMIN — Medication 1 TABLET(S): at 12:15

## 2019-01-01 RX ADMIN — PANTOPRAZOLE SODIUM 40 MILLIGRAM(S): 20 TABLET, DELAYED RELEASE ORAL at 05:23

## 2019-01-01 RX ADMIN — MEROPENEM 100 MILLIGRAM(S): 1 INJECTION INTRAVENOUS at 00:13

## 2019-01-01 RX ADMIN — PANTOPRAZOLE SODIUM 40 MILLIGRAM(S): 20 TABLET, DELAYED RELEASE ORAL at 05:46

## 2019-01-01 RX ADMIN — SODIUM CHLORIDE 1000 MILLILITER(S): 9 INJECTION, SOLUTION INTRAVENOUS at 13:16

## 2019-01-01 RX ADMIN — PANTOPRAZOLE SODIUM 40 MILLIGRAM(S): 20 TABLET, DELAYED RELEASE ORAL at 06:09

## 2019-01-01 RX ADMIN — MEROPENEM 100 MILLIGRAM(S): 1 INJECTION INTRAVENOUS at 12:49

## 2019-01-01 RX ADMIN — Medication 40 MILLIEQUIVALENT(S): at 19:30

## 2019-01-01 RX ADMIN — Medication 100 MILLIGRAM(S): at 17:09

## 2019-01-01 RX ADMIN — HEPARIN SODIUM 5000 UNIT(S): 5000 INJECTION INTRAVENOUS; SUBCUTANEOUS at 05:24

## 2019-01-01 RX ADMIN — AMIODARONE HYDROCHLORIDE 400 MILLIGRAM(S): 400 TABLET ORAL at 05:22

## 2019-01-01 RX ADMIN — Medication 50 MILLIEQUIVALENT(S): at 10:47

## 2019-01-01 RX ADMIN — PROPOFOL 2.42 MICROGRAM(S)/KG/MIN: 10 INJECTION, EMULSION INTRAVENOUS at 06:21

## 2019-01-01 RX ADMIN — PANTOPRAZOLE SODIUM 40 MILLIGRAM(S): 20 TABLET, DELAYED RELEASE ORAL at 17:37

## 2019-01-01 RX ADMIN — DEXMEDETOMIDINE HYDROCHLORIDE IN 0.9% SODIUM CHLORIDE 10 MICROGRAM(S)/KG/HR: 4 INJECTION INTRAVENOUS at 01:32

## 2019-01-01 RX ADMIN — CHLORHEXIDINE GLUCONATE 15 MILLILITER(S): 213 SOLUTION TOPICAL at 05:45

## 2019-01-01 RX ADMIN — Medication 60 MILLIGRAM(S): at 05:46

## 2019-01-01 RX ADMIN — MEROPENEM 100 MILLIGRAM(S): 1 INJECTION INTRAVENOUS at 17:57

## 2019-01-01 RX ADMIN — CHLORHEXIDINE GLUCONATE 1 APPLICATION(S): 213 SOLUTION TOPICAL at 05:22

## 2019-01-01 RX ADMIN — HEPARIN SODIUM 5000 UNIT(S): 5000 INJECTION INTRAVENOUS; SUBCUTANEOUS at 06:08

## 2019-01-01 RX ADMIN — Medication 100 MILLIGRAM(S): at 05:24

## 2019-01-01 RX ADMIN — HEPARIN SODIUM 5000 UNIT(S): 5000 INJECTION INTRAVENOUS; SUBCUTANEOUS at 05:22

## 2019-01-01 RX ADMIN — FENTANYL CITRATE 4.04 MICROGRAM(S)/KG/HR: 50 INJECTION INTRAVENOUS at 03:48

## 2019-01-01 RX ADMIN — MEROPENEM 100 MILLIGRAM(S): 1 INJECTION INTRAVENOUS at 17:34

## 2019-01-01 RX ADMIN — HEPARIN SODIUM 5000 UNIT(S): 5000 INJECTION INTRAVENOUS; SUBCUTANEOUS at 05:14

## 2019-01-01 RX ADMIN — MEROPENEM 100 MILLIGRAM(S): 1 INJECTION INTRAVENOUS at 01:09

## 2019-01-01 RX ADMIN — LACTULOSE 10 GRAM(S): 10 SOLUTION ORAL at 11:34

## 2019-01-01 RX ADMIN — HEPARIN SODIUM 5000 UNIT(S): 5000 INJECTION INTRAVENOUS; SUBCUTANEOUS at 15:19

## 2019-01-01 RX ADMIN — HEPARIN SODIUM 5000 UNIT(S): 5000 INJECTION INTRAVENOUS; SUBCUTANEOUS at 14:59

## 2019-01-01 RX ADMIN — Medication 1 TABLET(S): at 13:22

## 2019-01-01 RX ADMIN — PANTOPRAZOLE SODIUM 40 MILLIGRAM(S): 20 TABLET, DELAYED RELEASE ORAL at 17:05

## 2019-01-01 RX ADMIN — AMIODARONE HYDROCHLORIDE 618 MILLIGRAM(S): 400 TABLET ORAL at 16:47

## 2019-01-01 RX ADMIN — Medication 1 MILLIGRAM(S): at 16:28

## 2019-01-01 RX ADMIN — CHLORHEXIDINE GLUCONATE 15 MILLILITER(S): 213 SOLUTION TOPICAL at 18:08

## 2019-01-01 RX ADMIN — HEPARIN SODIUM 5000 UNIT(S): 5000 INJECTION INTRAVENOUS; SUBCUTANEOUS at 22:06

## 2019-01-01 RX ADMIN — Medication 2.5 MILLIGRAM(S): at 07:10

## 2019-01-01 RX ADMIN — Medication 100 MILLIGRAM(S): at 06:09

## 2019-01-01 RX ADMIN — SIMVASTATIN 5 MILLIGRAM(S): 20 TABLET, FILM COATED ORAL at 21:15

## 2019-01-01 RX ADMIN — SIMVASTATIN 5 MILLIGRAM(S): 20 TABLET, FILM COATED ORAL at 21:07

## 2019-01-01 RX ADMIN — SIMVASTATIN 5 MILLIGRAM(S): 20 TABLET, FILM COATED ORAL at 21:58

## 2019-01-01 RX ADMIN — Medication 50 MILLIEQUIVALENT(S): at 12:17

## 2019-01-01 RX ADMIN — Medication 40 MILLIGRAM(S): at 10:52

## 2019-01-01 RX ADMIN — MORPHINE SULFATE 4 MG/HR: 50 CAPSULE, EXTENDED RELEASE ORAL at 15:15

## 2019-01-01 RX ADMIN — Medication 50 MILLIEQUIVALENT(S): at 09:51

## 2019-01-01 RX ADMIN — CHLORHEXIDINE GLUCONATE 15 MILLILITER(S): 213 SOLUTION TOPICAL at 06:35

## 2019-01-01 RX ADMIN — NYSTATIN CREAM 1 APPLICATION(S): 100000 CREAM TOPICAL at 18:08

## 2019-01-01 RX ADMIN — Medication 650 MILLIGRAM(S): at 04:20

## 2019-01-01 RX ADMIN — MEROPENEM 100 MILLIGRAM(S): 1 INJECTION INTRAVENOUS at 12:23

## 2019-01-01 RX ADMIN — AMIODARONE HYDROCHLORIDE 200 MILLIGRAM(S): 400 TABLET ORAL at 05:06

## 2019-01-01 RX ADMIN — NYSTATIN CREAM 1 APPLICATION(S): 100000 CREAM TOPICAL at 06:10

## 2019-01-01 RX ADMIN — Medication 40 MILLIGRAM(S): at 10:14

## 2019-01-01 RX ADMIN — Medication 1 MILLIGRAM(S): at 15:21

## 2019-01-01 RX ADMIN — AMIODARONE HYDROCHLORIDE 33.33 MG/MIN: 400 TABLET ORAL at 23:06

## 2019-01-01 RX ADMIN — CHLORHEXIDINE GLUCONATE 15 MILLILITER(S): 213 SOLUTION TOPICAL at 17:20

## 2019-01-01 RX ADMIN — Medication 650 MILLIGRAM(S): at 05:51

## 2019-01-01 RX ADMIN — CHLORHEXIDINE GLUCONATE 15 MILLILITER(S): 213 SOLUTION TOPICAL at 05:12

## 2019-01-01 RX ADMIN — LACTULOSE 20 GRAM(S): 10 SOLUTION ORAL at 13:28

## 2019-01-01 RX ADMIN — MORPHINE SULFATE 4 MILLIGRAM(S): 50 CAPSULE, EXTENDED RELEASE ORAL at 16:25

## 2019-01-01 RX ADMIN — HEPARIN SODIUM 5000 UNIT(S): 5000 INJECTION INTRAVENOUS; SUBCUTANEOUS at 21:07

## 2019-01-01 NOTE — PROGRESS NOTE ADULT - ASSESSMENT
IMPRESSION:    Acute on chronic hypercapnic respiratory failure s/p intubation s/p bronchoscopy alveolar hem, ? etiology was on lovenox  IDALMIS / OHS  Pneumonia/Aspiration/ fluid overload  anemia resolved  hypernatremia resolved  A fib with RVR      PLAN:     CNS: no SAT due to agitation, precedex and fentanyl    HEENT: Oral care    PULMONARY:  HOB @ 45 degrees, Possible tracheostomy with CTS, continue with steroids for now. failed multiple SBTs    CARDIOVASCULAR: Keep equal balance, wean off pressors. Needs cardiology follow up for controlling HR    GI: GI prophylaxis. Continue feeding     RENAL:  Follow up lytes.  Correct as needed BMP. Kent catheter    INFECTIOUS DISEASE: Follow up cultures    HEMATOLOGICAL:  DVT prophylaxis.    ENDOCRINE: rheumatological work up: negative    POOR PROGNOSIS  transfer to vent unit after trachesotomy  Monitor in ICU for now

## 2019-01-01 NOTE — PROGRESS NOTE ADULT - SUBJECTIVE AND OBJECTIVE BOX
DENISE BARAJAS  73y Female   [id number]    Procedure/dx: needs trach  Events of the Last 24h: patient was supposed to go for trach but  pt this am went back into afib and spontaneously converted to nsr, sb.  pt was to be started on amio load but was held off secondary to brief pauses.  pt was given a small dose of amio 200 po and shortly thereafter had an 11 sec pause.    Patient is a 73y old  Female who presents with a chief complaint of SOB and rash (31 Dec 2018 17:42)    PAST MEDICAL & SURGICAL HISTORY:  Hypertension  No significant past surgical history    Vital Signs Last 24 Hrs  T(C): 37.4 (01 Jan 2019 00:00), Max: 37.4 (31 Dec 2018 12:00)  T(F): 99.3 (01 Jan 2019 00:00), Max: 99.4 (31 Dec 2018 12:00)  HR: 46 (01 Jan 2019 01:00) (46 - 148)  BP: 109/55 (01 Jan 2019 01:00) (82/38 - 126/66)  BP(mean): 78 (01 Jan 2019 01:00) (52 - 93)  RR: 17 (01 Jan 2019 01:00) (17 - 35)  SpO2: 100% (01 Jan 2019 01:00) (90% - 110%)    Mode: AC/ CMV (Assist Control/ Continuous Mandatory Ventilation), RR (machine): 18, TV (machine): 350, FiO2: 40, PEEP: 5, ITime: 1, MAP: 12, PIP: 36    Diet, NPO after Midnight:    NPO Start Date: 30-Dec-2018,   NPO Start Time: 23:59 (12-30-18 @ 21:51)    I&O's Detail    30 Dec 2018 07:01  -  31 Dec 2018 07:00  --------------------------------------------------------  IN:    dexmedetomidine Infusion: 509.9 mL    Enteral Tube Flush: 60 mL    fentaNYL Infusion.: 516.4 mL    norepinephrine Infusion: 56 mL    sodium chloride 0.9%.: 525 mL    Vital High Protein: 510 mL  Total IN: 2177.3 mL    OUT:    Indwelling Catheter - Urethral: 1200 mL  Total OUT: 1200 mL    Total NET: 977.3 mL    31 Dec 2018 07:01  -  01 Jan 2019 01:51  --------------------------------------------------------  IN:    dexmedetomidine Infusion: 318.5 mL    fentaNYL Infusion.: 766.6 mL    norepinephrine Infusion: 21 mL    sodium chloride 0.9%.: 1350 mL  Total IN: 2456.1 mL    OUT:    Indwelling Catheter - Urethral: 720 mL  Total OUT: 720 mL    Total NET: 1736.1 mL    MEDICATIONS  (STANDING):  chlorhexidine 0.12% Liquid 15 milliLiter(s) Oral Mucosa two times a day  chlorhexidine 4% Liquid 1 Application(s) Topical <User Schedule>  dexmedetomidine Infusion 0.5 MICROgram(s)/kG/Hr (10 mL/Hr) IV Continuous <Continuous>  fentaNYL   Infusion. 0.5 MICROgram(s)/kG/Hr (4 mL/Hr) IV Continuous <Continuous>  heparin  Injectable 5000 Unit(s) SubCutaneous every 8 hours  lactulose Syrup 10 Gram(s) Oral daily  methylPREDNISolone sodium succinate Injectable 60 milliGRAM(s) IV Push every 12 hours  multivitamin 1 Tablet(s) Oral daily  norepinephrine Infusion 0.05 MICROgram(s)/kG/Min (3.75 mL/Hr) IV Continuous <Continuous>  nystatin Powder 1 Application(s) Topical two times a day  pantoprazole  Injectable 40 milliGRAM(s) IV Push two times a day  simvastatin 5 milliGRAM(s) Oral at bedtime  sodium chloride 0.9%. 1000 milliLiter(s) (75 mL/Hr) IV Continuous <Continuous>    MEDICATIONS  (PRN):  acetaminophen   Tablet .. 650 milliGRAM(s) Oral every 6 hours PRN Temp greater or equal to 38C (100.4F)  guaiFENesin    Syrup 200 milliGRAM(s) Oral every 6 hours PRN Cough    PHYSICAL EXAM:  GENERAL: pt intubated and sedated in no distress.    CHEST/LUNG: Clear to auscultation bilaterally;   HEART: S1 and S2 noted  ABDOMEN: Soft, Nondistended;   PERIPHERAL VASCULAR: Extremities are normal in color, size and temperature.     LABS:                      8.5    11.41 )-----------( 250      ( 31 Dec 2018 04:30 )             27.8        12-31    147<H>  |  104  |  48<H>  ----------------------------<  123<H>  4.2   |  35<H>  |  0.6<L>    Ca    8.4<L>      31 Dec 2018 04:30  Phos  2.5     12-30  Mg     2.1     12-30    TPro  6.0  /  Alb  3.2<L>  /  TBili  0.9  /  DBili  x   /  AST  12  /  ALT  28  /  AlkPhos  56  12-30    LIVER FUNCTIONS - ( 30 Dec 2018 04:30 )  Alb: 3.2 g/dL / Pro: 6.0 g/dL / ALK PHOS: 56 U/L / ALT: 28 U/L / AST: 12 U/L / GGT: x           PT/INR - ( 31 Dec 2018 04:30 )   PT: 13.70 sec;   INR: 1.19 ratio      PTT - ( 30 Dec 2018 23:30 )  PTT:30.5 sec    IMAGING:  < from: Xray Chest 1 View- PORTABLE-Routine (12.31.18 @ 05:45) >  Impression:      Low lung volumes. Stable bilateral opacities.    Stable support devices    < end of copied text >

## 2019-01-01 NOTE — CHART NOTE - NSCHARTNOTEFT_GEN_A_CORE
Registered Dietitian Follow-Up     Patient Profile Reviewed                           Yes [v]   No []     Nutrition History Previously Obtained        Yes []  No [x]  - pt remains intubated     Pertinent Subjective Information:   -Pt remains intubated, sedated, on pressor support. Pt currently NPO in preparation for trach placement as she has failed multiple SBTs. Pt was to undergo trach yesterday but postponed d/t bradycardic, atrial fib w/ RVR, hypotension on pressors. As per RN pt to remain NPO until trach placed either today or tomorrow. See RD recommendations at end of document.      Pertinent Medical Interventions:   Acute Hypercapnic Respiratory Failure likely 2/2 Lovenox induced diffuse alveolar hemorrhage- pt failed multiple SBTs, pt to undergo tracheostomy today or tomorrow (bedside vs OR placement)   New AF with episodes of RVR, alternating with bradycardia--no anticoagulation at this time, amiodarone d/c      Diet order: NPO (previously receiving Vital HP @35ml/hr + No Carb Prosorce q 24hrs)      Anthropometrics:  - Ht. 152.4cm/5'0"  - Wt.: 80.9kg (1/1)  - %wt change--wt has varied 79.8kg (12/18), 81.5kg (12/21), 78.4kg (12/27), 78.9kg (12/28). despite variation, range relatively stable.   - BMI - 34.8 (using 1/1 wt)   - IBW 45.5kg +/-10%      Pertinent Lab Data: (1/1/19) RBC 2.52, H/H 7.4/24.5, BUN 47, Cr 0.6, serum glucose 147, Ca 8.0     Pertinent Meds: heparin, abx, NS @75mL/hr, fentanyl, precedex, levophed, solumedrol, multivitamin, lactulose, protonix, simvastatin      Physical Findings:  - Appearance: intubated  - GI function: last BM documented 12/29  - Tubes: OGT  - Oral/Mouth cavity: intubated, OGT   - Skin: intact; no edema noted      Nutrition Requirements (Weight Used: 80kg, 45.5kg IBW) --continue as pt remains intubated      Estimated energy needs: 860-1100kcal/d (based on: 808- 943kcal/d (60-70% [id number] - recalculated 12/28 ) vs. 1000-1140kcal/d (22-25kcal/kg IBW) vs. 880-1120kcal/d (11-14kcal/act wt))  Estimated protein needs: 68-91gm/d (1.5-2.0gm/kg IBW) - pt w/ ANTONIETTA per MD    Estimated fluid needs: per CCU team    Nutrient Intake: not meeting needs d/t current NPO diet order      [x] Previous Nutrition Diagnosis: Excessive enteral nutrition infusion--adjusted to inadequate enteral nutrition infusion     Nutrition Intervention: Enteral nutrition   Recommend:  1. Once trach placed, please adjust feeds to BOLUS feeds of Osmolite 1.0 @ 240mL Q6H + Prosource TF TID.   -TF at goal rate will provide 1095kcal, 73g protein, 806mL free H20 and 73% RDIs.   -maintain aspiration precautions, flushes per CCU team  2. Continue daily multivitamin.      Goal/Expected Outcome: As diet advanced EN to provide >85% but <105% estimated needs. f/u in 3 days.      Indicator/Monitoring: Will monitor energy intake, diet order, endocrine/ electrolyte profile, body composition, nutrition focused physical findings (TF tolerance).     at risk f/u.

## 2019-01-01 NOTE — PROGRESS NOTE ADULT - SUBJECTIVE AND OBJECTIVE BOX
Patient is a 73y old  Female who presents with a chief complaint of SOB and rash (01 Jan 2019 03:50)      OVER NIGHT EVENTS: Still having runs of Afib with uncontrolled rate and also has pauses up to 11secs      PHYSICAL EXAM    ICU Vital Signs Last 24 Hrs  T(C): 37.2 (01 Jan 2019 04:00), Max: 37.4 (31 Dec 2018 12:00)  T(F): 99 (01 Jan 2019 04:00), Max: 99.4 (31 Dec 2018 12:00)  HR: 52 (01 Jan 2019 08:00) (46 - 116)  BP: 154/60 (01 Jan 2019 08:00) (85/43 - 154/60)  BP(mean): 93 (01 Jan 2019 08:00) (57 - 93)  RR: 17 (01 Jan 2019 08:00) (17 - 32)  SpO2: 100% (01 Jan 2019 08:00) (90% - 110%)    I&O's Detail    31 Dec 2018 07:01  -  01 Jan 2019 07:00  --------------------------------------------------------  IN:    dexmedetomidine Infusion: 415.1 mL    fentaNYL Infusion.: 1057 mL    norepinephrine Infusion: 27 mL    sodium chloride 0.9%.: 1800 mL  Total IN: 3299.1 mL    OUT:    Indwelling Catheter - Urethral: 995 mL  Total OUT: 995 mL    Total NET: 2304.1 mL      General: sedated  HEENT: BEATRIZ             Lymphatic system: No cervical LN   Lungs: Bilateral BS intubated  Cardiovascular: Irregular  Gastrointestinal: Soft, Positive BS  Extremities: No clubbing.  Moves extremities.  Full Range of motion   Skin: Warm, intact  Neurological: Not following commands          LABS:                          7.4    8.04  )-----------( 222      ( 01 Jan 2019 04:30 )             24.5                                               01-01    144  |  104  |  47<H>  ----------------------------<  147<H>  4.3   |  33<H>  |  0.6<L>    Ca    8.0<L>      01 Jan 2019 04:30  Phos  2.5     12-30  Mg     2.0     01-01        PT/INR - ( 31 Dec 2018 04:30 )   PT: 13.70 sec;   INR: 1.19 ratio         PTT - ( 30 Dec 2018 23:30 )  PTT:30.5 sec                                           CARDIAC MARKERS ( 01 Jan 2019 02:11 )  x     / 0.01 ng/mL / 30 U/L / x     / 1.0 ng/mL                      Mode: AC/ CMV (Assist Control/ Continuous Mandatory Ventilation)  RR (machine): 18  TV (machine): 350  FiO2: 40  PEEP: 5  ITime: 1  MAP: 12  PIP: 36                                      ABG - ( 01 Jan 2019 07:41 )  pH, Arterial: 7.37  pH, Blood: x     /  pCO2: 54    /  pO2: 90    / HCO3: 32    / Base Excess: 5.5   /  SaO2: 98          MEDICATIONS  (STANDING):  chlorhexidine 0.12% Liquid 15 milliLiter(s) Oral Mucosa two times a day  chlorhexidine 4% Liquid 1 Application(s) Topical <User Schedule>  dexmedetomidine Infusion 0.5 MICROgram(s)/kG/Hr (10 mL/Hr) IV Continuous <Continuous>  fentaNYL    Injectable 25 MICROGram(s) IV Push once  fentaNYL   Infusion. 0.5 MICROgram(s)/kG/Hr (4 mL/Hr) IV Continuous <Continuous>  heparin  Injectable 5000 Unit(s) SubCutaneous every 8 hours  lactulose Syrup 10 Gram(s) Oral daily  methylPREDNISolone sodium succinate Injectable 60 milliGRAM(s) IV Push every 12 hours  multivitamin 1 Tablet(s) Oral daily  norepinephrine Infusion 0.05 MICROgram(s)/kG/Min (3.75 mL/Hr) IV Continuous <Continuous>  nystatin Powder 1 Application(s) Topical two times a day  pantoprazole  Injectable 40 milliGRAM(s) IV Push two times a day  simvastatin 5 milliGRAM(s) Oral at bedtime  sodium chloride 0.9%. 1000 milliLiter(s) (75 mL/Hr) IV Continuous <Continuous>    MEDICATIONS  (PRN):  acetaminophen   Tablet .. 650 milliGRAM(s) Oral every 6 hours PRN Temp greater or equal to 38C (100.4F)  guaiFENesin    Syrup 200 milliGRAM(s) Oral every 6 hours PRN Cough

## 2019-01-01 NOTE — PROGRESS NOTE ADULT - ASSESSMENT
This is a 73 year old female with prolonged intubation awaiting tracheostomy placement.    Plan:   will continue to follow and plan for trach when patient stabilizes

## 2019-01-01 NOTE — PROGRESS NOTE ADULT - ASSESSMENT
DENISE BARAJAS 73y Female  MRN#: [id number]   CODE STATUS: FULL    SUBJECTIVE  Patient is a 73y old Female who presents with a chief complaint of SOB and rash (01 Jan 2019 01:51)      Currently admitted to medicine with the primary diagnosis of  diffuse alveolar hemorrhage.       Today is hospital day 21d, and this morning she is laying in bed comfortably and reports no overnight events.   Overnight the patient had an episode of Atrial Fibrillation with RVR that resolved spontaneously.  No agents were administered per instructions from cardiology.     Present Today:           Kent Catheter ()No/ (x)Yes? Indwelling Urethral Catheter    Indication:             Central Line ()No/ (x)Yes?   Indication:          IV Fluids (x)No/ ()Yes? Type:  Rate:  Indication:    OBJECTIVE  PAST MEDICAL & SURGICAL HISTORY  Hypertension  No significant past surgical history    ALLERGIES:  No Known Allergies    MEDICATIONS:  STANDING MEDICATIONS  chlorhexidine 0.12% Liquid 15 milliLiter(s) Oral Mucosa two times a day  chlorhexidine 4% Liquid 1 Application(s) Topical <User Schedule>  dexmedetomidine Infusion 0.5 MICROgram(s)/kG/Hr (10 mL/Hr) IV Continuous <Continuous>  fentaNYL   Infusion. 0.5 MICROgram(s)/kG/Hr (4 mL/Hr) IV Continuous <Continuous>  heparin  Injectable 5000 Unit(s) SubCutaneous every 8 hours  lactulose Syrup 10 Gram(s) Oral daily  methylPREDNISolone sodium succinate Injectable 60 milliGRAM(s) IV Push every 12 hours  multivitamin 1 Tablet(s) Oral daily  norepinephrine Infusion 0.05 MICROgram(s)/kG/Min (3.75 mL/Hr) IV Continuous <Continuous>  nystatin Powder 1 Application(s) Topical two times a day  pantoprazole  Injectable 40 milliGRAM(s) IV Push two times a day  simvastatin 5 milliGRAM(s) Oral at bedtime  sodium chloride 0.9%. 1000 milliLiter(s) (75 mL/Hr) IV Continuous <Continuous>    PRN MEDICATIONS  acetaminophen   Tablet .. 650 milliGRAM(s) Oral every 6 hours PRN  guaiFENesin    Syrup 200 milliGRAM(s) Oral every 6 hours PRN      VITAL SIGNS: Last 24 Hours  T(C): 37.4 (01 Jan 2019 00:00), Max: 37.4 (31 Dec 2018 12:00)  T(F): 99.3 (01 Jan 2019 00:00), Max: 99.4 (31 Dec 2018 12:00)  HR: 52 (01 Jan 2019 03:00) (46 - 148)  BP: 102/47 (01 Jan 2019 03:00) (85/43 - 126/66)  BP(mean): 61 (01 Jan 2019 03:00) (57 - 93)  RR: 18 (01 Jan 2019 03:00) (17 - 35)  SpO2: 100% (01 Jan 2019 03:00) (90% - 110%)    LABS:                        8.5    11.41 )-----------( 250      ( 31 Dec 2018 04:30 )             27.8     12-31    147<H>  |  104  |  48<H>  ----------------------------<  123<H>  4.2   |  35<H>  |  0.6<L>    Ca    8.4<L>      31 Dec 2018 04:30  Phos  2.5     12-30  Mg     2.1     12-30    TPro  6.0  /  Alb  3.2<L>  /  TBili  0.9  /  DBili  x   /  AST  12  /  ALT  28  /  AlkPhos  56  12-30    PT/INR - ( 31 Dec 2018 04:30 )   PT: 13.70 sec;   INR: 1.19 ratio         PTT - ( 30 Dec 2018 23:30 )  PTT:30.5 sec    ABG - ( 31 Dec 2018 07:33 )  pH, Arterial: 7.40  pH, Blood: x     /  pCO2: 57    /  pO2: 101   / HCO3: 35    / Base Excess: 9.2   /  SaO2: 98                Creatine Kinase, Serum: 30 U/L (01-01-19 @ 02:11)      CARDIAC MARKERS ( 01 Jan 2019 02:11 )  x     / x     / 30 U/L / x     / x        RADIOLOGY:    PROCEDURE DATE:  12/31/2018      INTERPRETATION:  Clinical History / Reason for exam: 1 placement.    Comparison : Chest radiograph December 30, 2018.    Technique/Positioning: Low lung volumes.    Findings:    Support devices: Stable endotracheal tube,, right central venous   catheter, enteric tube.    Cardiac/mediastinum/hilum: Obscured.    Lung parenchyma/Pleura: Low lung volumes. Stable bilateral opacities.    Skeleton/soft tissues: Unchanged.    Impression:      Low lung volumes. Stable bilateral opacities.    Stable support devices    KENNEDY RINALDI M.D., ATTENDING RADIOLOGIST  This document has been electronically signed. Dec 31 2018  7:25AM    PHYSICAL EXAM:    GENERAL: NAD, well-developed,  intubated and sedated   HEENT:  Atraumatic, Normocephalic, bilateral miotic pupils (on fentanyl drip)  PULMONARY: Clear to auscultation bilaterally; No wheeze  CARDIOVASCULAR: Regular rate and rhythm; No murmurs, rubs, or gallops  GASTROINTESTINAL: Soft, Nontender, Nondistended; Bowel sounds present  MUSCULOSKELETAL:  2+ Peripheral Pulses, No clubbing, cyanosis, or edema  NEUROLOGY: intubated and sedated   SKIN: No rashes or lesions    Lines/Tubes   Kent catheter  Intubated and sedated  OG tube     ASSESSMENT & PLAN    73F from [facility] w/ PMH of HTN presented to [facility] with worsening generalized weakness and SOB for 3 days.     #Acute Hypercapnic Respiratory Failure 2/2 Lovenox vs 2/2 Pneumonia leading to Diffuse alveolar hemorrhage, improving  -Intubated on 12/17, failed weaning trial 6 times. Planned for tracheostomy today 12/31 but anesthesia cancelled procedure (bradycardic, atrial fib w/ RVR, hypotension on pressors). CTS considering bedside trach, will keep patient NPO  -Fentanyl and Precedex for sedation   -Bronchoscopy 12/20 with BAL   -Antibiotic course completed (levaquin, zosyn, doxycycline)   -Duplex 12/14: negative for DVT   -Echo 12/15: LVEF 50-55%  -RVP, blood culture, urine legionella, strep antigen, MRSA negative   -CT chest suggestive for RLL PNA  -Rheum on board, autoimmune workup negative > SARITHA, ANCA, RF, COMPLEMENTS, HEP PANEL  -Continue Solumedrol 60 Q12    #Hypotension  -Levophed     #New AF with episodes of RVR, alternating with bradycardia  -Amiodarone 200 initiated this AM -> patient went into asystole for 11 seconds. Amiodarone discontinued.   -No anticoagulation for now   -[id number] VASC: 4   -HASBLED score 3    #HLD  -Simvastatin     #PMH of HTN  -HCTZ held for hypotension     #Varicella Zoster Right Shoulder: improving  -Contact and airborne precautions dced 12/17 as per ID rec. Completed course of acyclovir.     DVT ppx: Subq Heparin    GI ppx: Protonix 40mg IV BID     Diet: NPO    Activity: Increase as tolerated     Dispo: patient needs tracheostomy placement, bedside vs. OR     FULL CODE DENISE BARAJAS 73y Female  MRN#: [id number]   CODE STATUS: FULL    SUBJECTIVE  Patient is a 73y old Female who presents with a chief complaint of SOB and rash (01 Jan 2019 01:51)      Currently admitted to medicine with the primary diagnosis of  diffuse alveolar hemorrhage.       Today is hospital day 21d, and this morning she is laying in bed comfortably and reports no overnight events.   Overnight the patient had an episode of Atrial Fibrillation with RVR that resolved spontaneously.  No agents were administered per instructions from cardiology.     Present Today:           Kent Catheter ()No/ (x)Yes? Indwelling Urethral Catheter    Indication:             Central Line ()No/ (x)Yes?   Indication:          IV Fluids (x)No/ ()Yes? Type:  Rate:  Indication:    OBJECTIVE  PAST MEDICAL & SURGICAL HISTORY  Hypertension  No significant past surgical history    ALLERGIES:  No Known Allergies    MEDICATIONS:  STANDING MEDICATIONS  chlorhexidine 0.12% Liquid 15 milliLiter(s) Oral Mucosa two times a day  chlorhexidine 4% Liquid 1 Application(s) Topical <User Schedule>  dexmedetomidine Infusion 0.5 MICROgram(s)/kG/Hr (10 mL/Hr) IV Continuous <Continuous>  fentaNYL   Infusion. 0.5 MICROgram(s)/kG/Hr (4 mL/Hr) IV Continuous <Continuous>  heparin  Injectable 5000 Unit(s) SubCutaneous every 8 hours  lactulose Syrup 10 Gram(s) Oral daily  methylPREDNISolone sodium succinate Injectable 60 milliGRAM(s) IV Push every 12 hours  multivitamin 1 Tablet(s) Oral daily  norepinephrine Infusion 0.05 MICROgram(s)/kG/Min (3.75 mL/Hr) IV Continuous <Continuous>  nystatin Powder 1 Application(s) Topical two times a day  pantoprazole  Injectable 40 milliGRAM(s) IV Push two times a day  simvastatin 5 milliGRAM(s) Oral at bedtime  sodium chloride 0.9%. 1000 milliLiter(s) (75 mL/Hr) IV Continuous <Continuous>    PRN MEDICATIONS  acetaminophen   Tablet .. 650 milliGRAM(s) Oral every 6 hours PRN  guaiFENesin    Syrup 200 milliGRAM(s) Oral every 6 hours PRN      VITAL SIGNS: Last 24 Hours  T(C): 37.4 (01 Jan 2019 00:00), Max: 37.4 (31 Dec 2018 12:00)  T(F): 99.3 (01 Jan 2019 00:00), Max: 99.4 (31 Dec 2018 12:00)  HR: 52 (01 Jan 2019 03:00) (46 - 148)  BP: 102/47 (01 Jan 2019 03:00) (85/43 - 126/66)  BP(mean): 61 (01 Jan 2019 03:00) (57 - 93)  RR: 18 (01 Jan 2019 03:00) (17 - 35)  SpO2: 100% (01 Jan 2019 03:00) (90% - 110%)    LABS:                        8.5    11.41 )-----------( 250      ( 31 Dec 2018 04:30 )             27.8     12-31    147<H>  |  104  |  48<H>  ----------------------------<  123<H>  4.2   |  35<H>  |  0.6<L>    Ca    8.4<L>      31 Dec 2018 04:30  Phos  2.5     12-30  Mg     2.1     12-30    TPro  6.0  /  Alb  3.2<L>  /  TBili  0.9  /  DBili  x   /  AST  12  /  ALT  28  /  AlkPhos  56  12-30    PT/INR - ( 31 Dec 2018 04:30 )   PT: 13.70 sec;   INR: 1.19 ratio         PTT - ( 30 Dec 2018 23:30 )  PTT:30.5 sec    ABG - ( 31 Dec 2018 07:33 )  pH, Arterial: 7.40  pH, Blood: x     /  pCO2: 57    /  pO2: 101   / HCO3: 35    / Base Excess: 9.2   /  SaO2: 98          Creatine Kinase, Serum: 30 U/L (01-01-19 @ 02:11)  CARDIAC MARKERS ( 01 Jan 2019 02:11 )  x     / x     / 30 U/L / x     / x        RADIOLOGY:    PROCEDURE DATE:  12/31/2018      INTERPRETATION:  Clinical History / Reason for exam: 1 placement.    Comparison : Chest radiograph December 30, 2018.    Technique/Positioning: Low lung volumes.    Findings:    Support devices: Stable endotracheal tube,, right central venous   catheter, enteric tube.    Cardiac/mediastinum/hilum: Obscured.    Lung parenchyma/Pleura: Low lung volumes. Stable bilateral opacities.    Skeleton/soft tissues: Unchanged.    Impression:      Low lung volumes. Stable bilateral opacities.    Stable support devices    KENNEDY RINALDI M.D., ATTENDING RADIOLOGIST  This document has been electronically signed. Dec 31 2018  7:25AM    PHYSICAL EXAM:    GENERAL: NAD, well-developed,  intubated and sedated   HEENT:  Atraumatic, Normocephalic, bilateral miotic pupils (on fentanyl drip)  PULMONARY: Clear to auscultation bilaterally; No wheeze  CARDIOVASCULAR: Regular rate and rhythm; No murmurs, rubs, or gallops  GASTROINTESTINAL: Soft, Nontender, Nondistended; Bowel sounds present  MUSCULOSKELETAL:  2+ Peripheral Pulses, No clubbing, cyanosis, or edema  NEUROLOGY: intubated and sedated   SKIN: No rashes or lesions    Lines/Tubes   Kent catheter  Intubated and sedated  OG tube     ASSESSMENT & PLAN    73F from [facility] w/ PMH of HTN presented to [facility] with worsening generalized weakness and SOB for 3 days.     #Acute Hypercapnic Respiratory Failure 2/2 Lovenox vs 2/2 Pneumonia leading to Diffuse alveolar hemorrhage, improving  -Intubated on 12/17, failed weaning trial 6 times. Planned for tracheostomy today 12/31 but anesthesia cancelled procedure (bradycardic, atrial fib w/ RVR, hypotension on pressors). CTS considering bedside trach, will keep patient NPO  -Fentanyl and Precedex for sedation   -Bronchoscopy 12/20 with BAL   -Antibiotic course completed (levaquin, zosyn, doxycycline)   -Duplex 12/14: negative for DVT   -Echo 12/15: LVEF 50-55%  -RVP, blood culture, urine legionella, strep antigen, MRSA negative   -CT chest suggestive for RLL PNA  -Rheum on board, autoimmune workup negative > SARITHA, ANCA, RF, COMPLEMENTS, HEP PANEL  -Continue Solumedrol 60 Q12    #Hypotension  -Levophed     #New AF with episodes of RVR, alternating with bradycardia  -Amiodarone 200 initiated this AM -> patient went into asystole for 11 seconds. Amiodarone discontinued.   -No anticoagulation for now   -[id number] VASC: 4   -HASBLED score 3    #HLD  -Simvastatin     #PMH of HTN  -HCTZ held for hypotension     #Varicella Zoster Right Shoulder: improving  -Contact and airborne precautions dced 12/17 as per ID rec. Completed course of acyclovir.     DVT ppx: Subq Heparin    GI ppx: Protonix 40mg IV BID     Diet: NPO    Activity: Increase as tolerated     Dispo: patient needs tracheostomy placement, bedside vs. OR     FULL CODE

## 2019-01-02 NOTE — PROVIDER CONTACT NOTE (CHANGE IN STATUS NOTIFICATION) - ASSESSMENT
PT agitated appeared to be distress on ventilator: [id number]. RN notified md cordon and respiratory therapist. PT pox 83% ambu w/ 100% done 3 mucus clots coughed up by pt pox improved.  PT HR above 150, has become as high 171

## 2019-01-02 NOTE — PROGRESS NOTE ADULT - PROBLEM SELECTOR PLAN 1
SOB appears secondary to possible aspiration pneumonia and not chf.  pt improved  tx as per pulmonary  consider d/c lasix for now  supplement K+ to keep 4 to 5
pt with pneumonia ? aspiration  d/c lasix for now  tx pneumonia as per medical team  nutrition as per medical team  echo f/u ef  ekg
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  pt is on no av sandro blocking agents and did not tolerate amio  ep eval for tachybrady,  pt will need a pacemaker but concern of infection  pt is not an anticoagulation candidate with alveolar hemorrhage
pt has tachybrady syndrome.  pt had pafib initially during admission and was placed on anticoagulation that needed to be stop secondary to alveolar hemorrhage.  pt had respiratory failure intubated and needs a trach.  pt this am went back into afib and spontaneously converted to nsr, sb.  pt was to be started on amio load but was held off secondary to brief pauses.  pt was given a small dose of amio 200 po and shortly thereafter had an 11 sec pause.  the pause occurred probably before amio had a chance to start working but because of the pause the pt will no longer get anymore dose.  if pt has more pauses and there is no correctable cause then may need a tvp.  hesitant to do it with pt's comorbid dzs and high likelihood of infection.  observe off any avnodal blocking agents  f/u tsh  avoid hypoxia as doing.    no anticoagulation with alveolar hemorrhage.

## 2019-01-02 NOTE — PROGRESS NOTE ADULT - SUBJECTIVE AND OBJECTIVE BOX
Patient is a 73y old  Female who presents with a chief complaint of SOB and rash (02 Jan 2019 01:49)        Over Night Events:        ROS:  See HPI    PHYSICAL EXAM    ICU Vital Signs Last 24 Hrs  T(C): 37.1 (02 Jan 2019 06:40), Max: 37.2 (01 Jan 2019 08:00)  T(F): 98.7 (02 Jan 2019 06:40), Max: 99 (01 Jan 2019 08:00)  HR: 124 (02 Jan 2019 06:40) (42 - 164)  BP: 97/52 (02 Jan 2019 06:40) (78/37 - 185/63)  BP(mean): 70 (02 Jan 2019 06:00) (47 - 108)  ABP: --  ABP(mean): --  RR: 18 (02 Jan 2019 06:40) (17 - 44)  SpO2: 97% (02 Jan 2019 06:40) (79% - 100%)      General:  HEENT: BEATRIZ             Lymphatic system: No cervical LN   Lungs: Bilateral BS  Cardiovascular: Regular   Gastrointestinal: Soft, Positive BS  Extremities: No clubbing.  Moves extremities.  Full Range of motion   Skin: Warm, intact  Neurological: No motor or sensory deficit       01-01-19 @ 07:01  -  01-02-19 @ 07:00  --------------------------------------------------------  IN:    dexmedetomidine Infusion: 197.5 mL    fentaNYL Infusion.: 994.8 mL    fentaNYL Infusion.: 118.5 mL    norepinephrine Infusion: 52 mL    phenylephrine   Infusion: 3 mL    propofol Infusion: 35 mL    propofol Infusion: 83 mL    sodium chloride 0.9%: 1125 mL  Total IN: 2608.8 mL    OUT:    Indwelling Catheter - Urethral: 1830 mL  Total OUT: 1830 mL    Total NET: 778.8 mL          LABS:                            7.8    10.03 )-----------( 224      ( 02 Jan 2019 04:30 )             24.5                                               01-02    138  |  100  |  46<H>  ----------------------------<  137<H>  3.7   |  30  |  0.8    Ca    8.3<L>      02 Jan 2019 04:30  Phos  3.6     01-02  Mg     1.8     01-02        PTT - ( 02 Jan 2019 04:30 )  PTT:26.9 sec                                           CARDIAC MARKERS ( 01 Jan 2019 02:11 )  x     / 0.01 ng/mL / 30 U/L / x     / 1.0 ng/mL                                                                                                                                  Mode: AC/ CMV (Assist Control/ Continuous Mandatory Ventilation)  RR (machine): 18  TV (machine): 350  FiO2: 40  PEEP: 8  ITime: 1  MAP: 10  PIP: 27                                      ABG - ( 02 Jan 2019 03:38 )  pH, Arterial: 7.31  pH, Blood: x     /  pCO2: 61    /  pO2: 93    / HCO3: 31    / Base Excess: 2.8   /  SaO2: 98                  MEDICATIONS  (STANDING):  chlorhexidine 0.12% Liquid 15 milliLiter(s) Oral Mucosa two times a day  chlorhexidine 4% Liquid 1 Application(s) Topical <User Schedule>  fentaNYL   Infusion. 0.5 MICROgram(s)/kG/Hr (4.035 mL/Hr) IV Continuous <Continuous>  heparin  Injectable 5000 Unit(s) SubCutaneous every 8 hours  lactulose Syrup 10 Gram(s) Oral daily  methylPREDNISolone sodium succinate Injectable 60 milliGRAM(s) IV Push every 12 hours  multivitamin 1 Tablet(s) Oral daily  norepinephrine Infusion 0.05 MICROgram(s)/kG/Min (3.75 mL/Hr) IV Continuous <Continuous>  nystatin Powder 1 Application(s) Topical two times a day  pantoprazole  Injectable 40 milliGRAM(s) IV Push two times a day  phenylephrine    Infusion 0.1 MICROgram(s)/kG/Min (1.513 mL/Hr) IV Continuous <Continuous>  propofol Infusion 5 MICROgram(s)/kG/Min (2.421 mL/Hr) IV Continuous <Continuous>  simvastatin 5 milliGRAM(s) Oral at bedtime    MEDICATIONS  (PRN):  acetaminophen   Tablet .. 650 milliGRAM(s) Oral every 6 hours PRN Temp greater or equal to 38C (100.4F)  guaiFENesin    Syrup 200 milliGRAM(s) Oral every 6 hours PRN Cough      Xrays:                                                                                     ECHO Patient is a 73y old  Female who presents with a chief complaint of SOB and rash (02 Jan 2019 01:49)        Over Night Events:    agitated overnight  desaturated requiring ambu bag, mucous plug suctioned and she improved  remain intubated  on pressors      ROS:  See HPI    PHYSICAL EXAM    ICU Vital Signs Last 24 Hrs  T(C): 37.1 (02 Jan 2019 06:40), Max: 37.2 (01 Jan 2019 08:00)  T(F): 98.7 (02 Jan 2019 06:40), Max: 99 (01 Jan 2019 08:00)  HR: 124 (02 Jan 2019 06:40) (42 - 164)  BP: 97/52 (02 Jan 2019 06:40) (78/37 - 185/63)  BP(mean): 70 (02 Jan 2019 06:00) (47 - 108)  ABP: --  ABP(mean): --  RR: 18 (02 Jan 2019 06:40) (17 - 44)  SpO2: 97% (02 Jan 2019 06:40) (79% - 100%)      General: agitated on sedation  HEENT: BEATRIZ             Lymphatic system: No cervical LN   Lungs: Bilateral BS  Cardiovascular: Regular   Gastrointestinal: Soft, Positive BS  Extremities: No clubbing.  Moves extremities.  Full Range of motion   Skin: Warm, intact  Neurological: No motor or sensory deficit not following commands      01-01-19 @ 07:01  -  01-02-19 @ 07:00  --------------------------------------------------------  IN:    dexmedetomidine Infusion: 197.5 mL    fentaNYL Infusion.: 994.8 mL    fentaNYL Infusion.: 118.5 mL    norepinephrine Infusion: 52 mL    phenylephrine   Infusion: 3 mL    propofol Infusion: 35 mL    propofol Infusion: 83 mL    sodium chloride 0.9%: 1125 mL  Total IN: 2608.8 mL    OUT:    Indwelling Catheter - Urethral: 1830 mL  Total OUT: 1830 mL    Total NET: 778.8 mL          LABS:                            7.8    10.03 )-----------( 224      ( 02 Jan 2019 04:30 )             24.5                                               01-02    138  |  100  |  46<H>  ----------------------------<  137<H>  3.7   |  30  |  0.8    Ca    8.3<L>      02 Jan 2019 04:30  Phos  3.6     01-02  Mg     1.8     01-02        PTT - ( 02 Jan 2019 04:30 )  PTT:26.9 sec                                           CARDIAC MARKERS ( 01 Jan 2019 02:11 )  x     / 0.01 ng/mL / 30 U/L / x     / 1.0 ng/mL                                                                                                                                  Mode: AC/ CMV (Assist Control/ Continuous Mandatory Ventilation)  RR (machine): 18  TV (machine): 350  FiO2: 40  PEEP: 8  ITime: 1  MAP: 10  PIP: 27                                      ABG - ( 02 Jan 2019 03:38 )  pH, Arterial: 7.31  pH, Blood: x     /  pCO2: 61    /  pO2: 93    / HCO3: 31    / Base Excess: 2.8   /  SaO2: 98                  MEDICATIONS  (STANDING):  chlorhexidine 0.12% Liquid 15 milliLiter(s) Oral Mucosa two times a day  chlorhexidine 4% Liquid 1 Application(s) Topical <User Schedule>  fentaNYL   Infusion. 0.5 MICROgram(s)/kG/Hr (4.035 mL/Hr) IV Continuous <Continuous>  heparin  Injectable 5000 Unit(s) SubCutaneous every 8 hours  lactulose Syrup 10 Gram(s) Oral daily  methylPREDNISolone sodium succinate Injectable 60 milliGRAM(s) IV Push every 12 hours  multivitamin 1 Tablet(s) Oral daily  norepinephrine Infusion 0.05 MICROgram(s)/kG/Min (3.75 mL/Hr) IV Continuous <Continuous>  nystatin Powder 1 Application(s) Topical two times a day  pantoprazole  Injectable 40 milliGRAM(s) IV Push two times a day  phenylephrine    Infusion 0.1 MICROgram(s)/kG/Min (1.513 mL/Hr) IV Continuous <Continuous>  propofol Infusion 5 MICROgram(s)/kG/Min (2.421 mL/Hr) IV Continuous <Continuous>  simvastatin 5 milliGRAM(s) Oral at bedtime    MEDICATIONS  (PRN):  acetaminophen   Tablet .. 650 milliGRAM(s) Oral every 6 hours PRN Temp greater or equal to 38C (100.4F)  guaiFENesin    Syrup 200 milliGRAM(s) Oral every 6 hours PRN Cough      Xrays:  bibasilar opacities

## 2019-01-02 NOTE — PROGRESS NOTE ADULT - SUBJECTIVE AND OBJECTIVE BOX
Subjective: pt intubated and sedated in no distress      MEDICATIONS  (STANDING):  chlorhexidine 0.12% Liquid 15 milliLiter(s) Oral Mucosa two times a day  chlorhexidine 4% Liquid 1 Application(s) Topical <User Schedule>  fentaNYL   Infusion. 0.5 MICROgram(s)/kG/Hr (4.035 mL/Hr) IV Continuous <Continuous>  heparin  Injectable 5000 Unit(s) SubCutaneous every 8 hours  lactulose Syrup 10 Gram(s) Oral daily  methylPREDNISolone sodium succinate Injectable 60 milliGRAM(s) IV Push every 12 hours  multivitamin 1 Tablet(s) Oral daily  norepinephrine Infusion 0.05 MICROgram(s)/kG/Min (3.75 mL/Hr) IV Continuous <Continuous>  nystatin Powder 1 Application(s) Topical two times a day  pantoprazole  Injectable 40 milliGRAM(s) IV Push two times a day  phenylephrine    Infusion 0.1 MICROgram(s)/kG/Min (1.513 mL/Hr) IV Continuous <Continuous>  propofol Infusion 5 MICROgram(s)/kG/Min (2.421 mL/Hr) IV Continuous <Continuous>  simvastatin 5 milliGRAM(s) Oral at bedtime    MEDICATIONS  (PRN):  acetaminophen   Tablet .. 650 milliGRAM(s) Oral every 6 hours PRN Temp greater or equal to 38C (100.4F)  guaiFENesin    Syrup 200 milliGRAM(s) Oral every 6 hours PRN Cough            Vital Signs Last 24 Hrs  T(C): 37.1 (02 Jan 2019 08:00), Max: 37.2 (01 Jan 2019 12:00)  T(F): 98.7 (02 Jan 2019 08:00), Max: 98.9 (01 Jan 2019 12:00)  HR: 71 (02 Jan 2019 08:00) (42 - 164)  BP: 98/64 (02 Jan 2019 08:00) (78/37 - 185/63)  BP(mean): 74 (02 Jan 2019 08:00) (47 - 108)  RR: 18 (02 Jan 2019 08:00) (17 - 44)  SpO2: 95% (02 Jan 2019 08:00) (79% - 100%)             REVIEW OF SYSTEMS:  CONSTITUTIONAL: no fever, no chills, no diaphoresis               PHYSICAL EXAM:  · CONSTITUTIONAL: Looks stable, in no distress  . NECK: Supple, no JVD, no bruit on either carotid side   · RESPIRATORY: Normal air entry to lung base, no wheeze, no crackle, no wet rales  · CARDIOVASCULAR: Normal S1, A2, P2, no murmur, no click, regular rate,  no rub,  · EXTREMITIES: No cyanosis, no clubbing,(+) 1 edema  · VASCULAR: Pulses are regular, equal, bilateral in upper and lower extremities  	  TELEMETRY: nsr, st, afib with rapid v response    ECG:< from: 12 Lead ECG (01.01.19 @ 07:57) >  Diagnosis Line Sinus bradycardia  Right bundle branch block  Abnormal ECG    < end of copied text >      TTE:  < from: Transthoracic Echocardiogram (12.15.18 @ 11:18) >  Summary:   1. Left ventricular ejection fraction, by visual estimation, is 50 to   55%.   2. Normal left ventricular size and wall thicknesses, with normal   systolic function.   3. Spectral Doppler shows impaired relaxation pattern of left   ventricular myocardial filling (Grade I diastolic dysfunction).   4. Mild tricuspidregurgitation.   5. Mild aortic regurgitation.   6. Pulmonic valve regurgitation.   7. Estimated pulmonary artery systolic pressure is 52.5 mmHg assuming a   right atrial pressure of 8 mmHg, which is consistent with moderate   pulmonary hypertension.    < end of copied text >    LABS:                        7.8    10.03 )-----------( 224      ( 02 Jan 2019 04:30 )             24.5     01-02    138  |  100  |  46<H>  ----------------------------<  137<H>  3.7   |  30  |  0.8    Ca    8.3<L>      02 Jan 2019 04:30  Phos  3.6     01-02  Mg     1.8     01-02      CARDIAC MARKERS ( 01 Jan 2019 02:11 )  x     / 0.01 ng/mL / 30 U/L / x     / 1.0 ng/mL      PTT - ( 02 Jan 2019 04:30 )  PTT:26.9 sec    I&O's Summary    01 Jan 2019 07:01  -  02 Jan 2019 07:00  --------------------------------------------------------  IN: 2608.8 mL / OUT: 1830 mL / NET: 778.8 mL      BNP  RADIOLOGY & ADDITIONAL STUDIES:    IMPRESSION AND PLAN:

## 2019-01-02 NOTE — PROGRESS NOTE ADULT - ASSESSMENT
Assessment:  73y Female patient admitted S/P diffuse alveolar hemorrhage consulted for possible tracheostomy     Plan:  - trach today   - NPO  - Continue care per CCU  - DVT & GI PPX

## 2019-01-02 NOTE — CONSULT NOTE ADULT - SUBJECTIVE AND OBJECTIVE BOX
Pt is a 74 yo F from [facility] w/ pmhx of HTN presented with worsening generalized weakness and SOB for 3 days. Pt was at urgent care for evaluation of rash on right shoulder and chest, was found to have O2 sat of 88% on RA and send to the ED for further evaluation. Pt reports b/l leg swelling for a few months. Pt's denies CP, palpitations, orthopnea or PND. Rash on right shoulder, chest and upper arm started on 18, pt admited to pruritus..   No fever, chills, cough, URI symptoms, dizziness, abd pain, n/v/d, dysuria.     - on arrival to ED, Pt was found to have A fib with RVR HR 130s and O2 sat 69% on RA. Pt improved after Bipap and IV lasix 40mg X1  - pro-  and < from: Transthoracic Echocardiogram (12.15.18 @ 11:18) >     CXR showes pulmonary edema.    Pt was intubated on  due to worsening hypoxic respiratory failure not responding to BIPAP.  Pt has failed multiple SBT.  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      On telemetry pt was noted to have an 11 second pause with Afib with RVR.  EP was consulted    Patient is a 73y old  Female who presents with a chief complaint of SOB and rash (2019 08:44)    PAST MEDICAL & SURGICAL HISTORY:  Hypertension  No significant past surgical history        PREVIOUS DIAGNOSTIC TESTING:      ECHO   FINDINGS:  1. Left ventricular ejection fraction, by visual estimation, is 50 to   55%.   2. Normal left ventricular size and wall thicknesses, with normal   systolic function.   3. Spectral Doppler shows impaired relaxation pattern of left   ventricular myocardial filling (Grade I diastolic dysfunction).   4. Mild tricuspidregurgitation.   5. Mild aortic regurgitation.   6. Pulmonic valve regurgitation.   7. Estimated pulmonary artery systolic pressure is 52.5 mmHg assuming a   right atrial pressure of 8 mmHg, which is consistent with moderate   pulmonary hypertension.        STRESS  FINDINGS:    CATHETERIZATION  FINDINGS:    ELECTROPHYSIOLOGY STUDY  FINDINGS:    CAROTID ULTRASOUND:  FINDINGS    VENOUS DUPLEX SCAN:  FINDINGS:    CHEST CT PULMONARY ANGIO with IV Contrast:  FINDINGS:    MEDICATIONS  (STANDING):  chlorhexidine 0.12% Liquid 15 milliLiter(s) Oral Mucosa two times a day  chlorhexidine 4% Liquid 1 Application(s) Topical <User Schedule>  fentaNYL   Infusion. 0.5 MICROgram(s)/kG/Hr (4.035 mL/Hr) IV Continuous <Continuous>  heparin  Injectable 5000 Unit(s) SubCutaneous every 8 hours  lactated ringers Bolus 500 milliLiter(s) IV Bolus once  lactulose Syrup 10 Gram(s) Oral daily  methylPREDNISolone sodium succinate Injectable 60 milliGRAM(s) IV Push daily  multivitamin 1 Tablet(s) Oral daily  nystatin Powder 1 Application(s) Topical two times a day  pantoprazole  Injectable 40 milliGRAM(s) IV Push two times a day  phenylephrine    Infusion 0.1 MICROgram(s)/kG/Min (1.513 mL/Hr) IV Continuous <Continuous>  propofol Infusion 5 MICROgram(s)/kG/Min (2.421 mL/Hr) IV Continuous <Continuous>  simvastatin 5 milliGRAM(s) Oral at bedtime    MEDICATIONS  (PRN):  acetaminophen   Tablet .. 650 milliGRAM(s) Oral every 6 hours PRN Temp greater or equal to 38C (100.4F)  guaiFENesin    Syrup 200 milliGRAM(s) Oral every 6 hours PRN Cough      FAMILY HISTORY:  Family history of stroke (Father)      SOCIAL HISTORY: unable to obtain as pt is intubated    CIGARETTES:    ALCOHOL:    Past Surgical History:    Allergies:    No Known Allergies      REVIEW OF SYSTEMS:    unable to obtain due to pt being intubated and sedated    Vital Signs Last 24 Hrs  T(C): 37.2 (2019 12:00), Max: 37.2 (2019 10:00)  T(F): 99 (2019 12:00), Max: 99 (2019 10:00)  HR: 71 (2019 12:00) (42 - 164)  BP: 107/47 (2019 12:00) (77/40 - 185/63)  BP(mean): 69 (2019 12:00) (47 - 108)  RR: 18 (2019 12:00) (17 - 44)  SpO2: 99% (2019 12:00) (79% - 100%)    PHYSICAL EXAM:        GENERAL: Pt is intubated in NAD  HEAD:  Atraumatic, Normocephalic  EYES: EOMI, PERRLA, conjunctiva and sclera clear  ENMT: +ETT  NECK: Supple and normal thyroid.  No JVD or carotid bruit.  Carotid pulse is 2+ bilaterally.  HEART: Regular rate and rhythm; No murmurs, rubs, or gallops.  PULMONARY: Clear to auscultation and perfusion.  No rales, wheezing, or rhonchi bilaterally.  ABDOMEN: Soft, Nontender, Nondistended; Bowel sounds present  EXTREMITIES:  2+ Peripheral Pulses, No clubbing, cyanosis, or edema  LYMPH: No lymphadenopathy noted  NEUROLOGICAL: sedated on propofol and fentanyl      INTERPRETATION OF TELEMETRY:    ECG:  (19 @ 07:27) >  Ventricular Rate 132 BPM    Atrial Rate 107 BPM    QRS Duration 130 ms    Q-T Interval 324 ms    QTC Calculation(Bezet) 480 ms    R Axis 2 degrees    T Axis -31 degrees    Diagnosis Line Atrial fibrillation with rapid ventricular response with premature ventricular  or aberrantly conducted complexes  Right bundle branch block  Possible Inferior infarct , age undetermined  T wave abnormality, consider lateral ischemia  Abnormal ECG    I&O's Detail    2019 07:01  -  2019 07:00  --------------------------------------------------------  IN:    dexmedetomidine Infusion: 197.5 mL    fentaNYL Infusion.: 158 mL    fentaNYL Infusion.: 994.8 mL    norepinephrine Infusion: 52 mL    phenylephrine   Infusion: 3 mL    propofol Infusion: 35 mL    propofol Infusion: 83 mL    sodium chloride 0.9%: 1125 mL  Total IN: 2648.3 mL    OUT:    Indwelling Catheter - Urethral: 1830 mL  Total OUT: 1830 mL    Total NET: 818.3 mL      2019 07:  -  2019 13:14  --------------------------------------------------------  IN:  Total IN: 0 mL    OUT:  Total OUT: 0 mL    Total NET: 0 mL          LABS:                        7.8    10.03 )-----------( 224      ( 2019 04:30 )             24.5         138  |  100  |  46<H>  ----------------------------<  137<H>  3.7   |  30  |  0.8    Ca    8.3<L>      2019 04:30  Phos  3.6       Mg     1.8           CARDIAC MARKERS ( 2019 02:11 )  x     / 0.01 ng/mL / 30 U/L / x     / 1.0 ng/mL      PTT - ( 2019 04:30 )  PTT:26.9 sec    BNP  I&O's Detail    2019 07:01  -  2019 07:00  --------------------------------------------------------  IN:    dexmedetomidine Infusion: 197.5 mL    fentaNYL Infusion.: 158 mL    fentaNYL Infusion.: 994.8 mL    norepinephrine Infusion: 52 mL    phenylephrine   Infusion: 3 mL    propofol Infusion: 35 mL    propofol Infusion: 83 mL    sodium chloride 0.9%: 1125 mL  Total IN: 2648.3 mL    OUT:    Indwelling Catheter - Urethral: 1830 mL  Total OUT: 1830 mL    Total NET: 818.3 mL      2019 07:01  -  2019 13:14  --------------------------------------------------------  IN:  Total IN: 0 mL    OUT:  Total OUT: 0 mL    Total NET: 0 mL< from: Xray Chest 1 View- PORTABLE-Routine (19 @ 05:39) >  Impression:      Stable bilateral opacities, left greater than right.              Daily     Daily Weight in k.1 (2019 06:00)    RADIOLOGY & ADDITIONAL STUDIES: Pt is a 74 yo F from [facility] w/ pmhx of HTN presented with worsening generalized weakness and SOB for 3 days. Pt was at urgent care for evaluation of rash on right shoulder and chest, was found to have O2 sat of 88% on RA and send to the ED for further evaluation. Pt reports b/l leg swelling for a few months. Pt's denies CP, palpitations, orthopnea or PND. Rash on right shoulder, chest and upper arm started on 18, pt admited to pruritus..   No fever, chills, cough, URI symptoms, dizziness, abd pain, n/v/d, dysuria.     - on arrival to ED, Pt was found to have A fib with RVR HR 130s and O2 sat 69% on RA. Pt improved after Bipap and IV lasix 40mg X1  - pro-  and < from: Transthoracic Echocardiogram (12.15.18 @ 11:18) >     CXR showes pulmonary edema.    Pt was intubated on  due to worsening hypoxic respiratory failure not responding to BIPAP.  Pt has failed multiple SBT.  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      Pt had been given po Amiodarone 2 days ago - On telemetry pt was noted to have an 11 second pause with Afib with RVR.  As a result of the pause, Amiodarone was stopped. EP was consulted      PAST MEDICAL & SURGICAL HISTORY:  Hypertension  No significant past surgical history        PREVIOUS DIAGNOSTIC TESTING:      ECHO   FINDINGS:  1. Left ventricular ejection fraction, by visual estimation, is 50 to   55%.   2. Normal left ventricular size and wall thicknesses, with normal   systolic function.   3. Spectral Doppler shows impaired relaxation pattern of left   ventricular myocardial filling (Grade I diastolic dysfunction).   4. Mild tricuspidregurgitation.   5. Mild aortic regurgitation.   6. Pulmonic valve regurgitation.   7. Estimated pulmonary artery systolic pressure is 52.5 mmHg assuming a   right atrial pressure of 8 mmHg, which is consistent with moderate   pulmonary hypertension.        STRESS  FINDINGS:    CATHETERIZATION  FINDINGS:    ELECTROPHYSIOLOGY STUDY  FINDINGS:    CAROTID ULTRASOUND:  FINDINGS    VENOUS DUPLEX SCAN:  FINDINGS:    CHEST CT PULMONARY ANGIO with IV Contrast:  FINDINGS:    MEDICATIONS  (STANDING):  chlorhexidine 0.12% Liquid 15 milliLiter(s) Oral Mucosa two times a day  chlorhexidine 4% Liquid 1 Application(s) Topical <User Schedule>  fentaNYL   Infusion. 0.5 MICROgram(s)/kG/Hr (4.035 mL/Hr) IV Continuous <Continuous>  heparin  Injectable 5000 Unit(s) SubCutaneous every 8 hours  lactated ringers Bolus 500 milliLiter(s) IV Bolus once  lactulose Syrup 10 Gram(s) Oral daily  methylPREDNISolone sodium succinate Injectable 60 milliGRAM(s) IV Push daily  multivitamin 1 Tablet(s) Oral daily  nystatin Powder 1 Application(s) Topical two times a day  pantoprazole  Injectable 40 milliGRAM(s) IV Push two times a day  phenylephrine    Infusion 0.1 MICROgram(s)/kG/Min (1.513 mL/Hr) IV Continuous <Continuous>  propofol Infusion 5 MICROgram(s)/kG/Min (2.421 mL/Hr) IV Continuous <Continuous>  simvastatin 5 milliGRAM(s) Oral at bedtime    MEDICATIONS  (PRN):  acetaminophen   Tablet .. 650 milliGRAM(s) Oral every 6 hours PRN Temp greater or equal to 38C (100.4F)  guaiFENesin    Syrup 200 milliGRAM(s) Oral every 6 hours PRN Cough      FAMILY HISTORY:  Family history of stroke (Father)      SOCIAL HISTORY: unable to obtain as pt is intubated    CIGARETTES:    ALCOHOL:    Past Surgical History:    Allergies:    No Known Allergies      REVIEW OF SYSTEMS:    unable to obtain due to pt being intubated and sedated    Vital Signs Last 24 Hrs  T(C): 37.2 (2019 12:00), Max: 37.2 (2019 10:00)  T(F): 99 (2019 12:00), Max: 99 (2019 10:00)  HR: 71 (2019 12:00) (42 - 164)  BP: 107/47 (2019 12:00) (77/40 - 185/63)  BP(mean): 69 (2019 12:00) (47 - 108)  RR: 18 (2019 12:00) (17 - 44)  SpO2: 99% (2019 12:00) (79% - 100%)    PHYSICAL EXAM:        GENERAL: Pt is intubated in NAD  HEAD:  Atraumatic, Normocephalic  EYES: EOMI, PERRLA, conjunctiva and sclera clear  ENMT: +ETT  NECK: Supple and normal thyroid.  No JVD or carotid bruit.  Carotid pulse is 2+ bilaterally.  HEART: Regular rate and rhythm; No murmurs, rubs, or gallops.  PULMONARY: Clear to auscultation and perfusion.  No rales, wheezing, or rhonchi bilaterally.  ABDOMEN: Soft, Nontender, Nondistended; Bowel sounds present  EXTREMITIES:  2+ Peripheral Pulses, No clubbing, cyanosis, or edema  LYMPH: No lymphadenopathy noted  NEUROLOGICAL: sedated on propofol and fentanyl      INTERPRETATION OF TELEMETRY:    ECG:  (19 @ 07:27) >  Ventricular Rate 132 BPM    Atrial Rate 107 BPM    QRS Duration 130 ms    Q-T Interval 324 ms    QTC Calculation(Bezet) 480 ms    R Axis 2 degrees    T Axis -31 degrees    Diagnosis Line Atrial fibrillation with rapid ventricular response with premature ventricular  or aberrantly conducted complexes  Right bundle branch block  Possible Inferior infarct , age undetermined  T wave abnormality, consider lateral ischemia  Abnormal ECG    I&O's Detail    2019 07:  -  2019 07:00  --------------------------------------------------------  IN:    dexmedetomidine Infusion: 197.5 mL    fentaNYL Infusion.: 158 mL    fentaNYL Infusion.: 994.8 mL    norepinephrine Infusion: 52 mL    phenylephrine   Infusion: 3 mL    propofol Infusion: 35 mL    propofol Infusion: 83 mL    sodium chloride 0.9%: 1125 mL  Total IN: 2648.3 mL    OUT:    Indwelling Catheter - Urethral: 1830 mL  Total OUT: 1830 mL    Total NET: 818.3 mL      2019 07:  -  2019 13:14  --------------------------------------------------------  IN:  Total IN: 0 mL    OUT:  Total OUT: 0 mL    Total NET: 0 mL          LABS:                        7.8    10.03 )-----------( 224      ( 2019 04:30 )             24.5         138  |  100  |  46<H>  ----------------------------<  137<H>  3.7   |  30  |  0.8    Ca    8.3<L>      2019 04:30  Phos  3.6     -  Mg     1.8           CARDIAC MARKERS ( 2019 02:11 )  x     / 0.01 ng/mL / 30 U/L / x     / 1.0 ng/mL      PTT - ( 2019 04:30 )  PTT:26.9 sec    BNP  I&O's Detail    2019 07:01  -  2019 07:00  --------------------------------------------------------  IN:    dexmedetomidine Infusion: 197.5 mL    fentaNYL Infusion.: 158 mL    fentaNYL Infusion.: 994.8 mL    norepinephrine Infusion: 52 mL    phenylephrine   Infusion: 3 mL    propofol Infusion: 35 mL    propofol Infusion: 83 mL    sodium chloride 0.9%: 1125 mL  Total IN: 2648.3 mL    OUT:    Indwelling Catheter - Urethral: 1830 mL  Total OUT: 1830 mL    Total NET: 818.3 mL      2019 07:01  -  2019 13:14  --------------------------------------------------------  IN:  Total IN: 0 mL    OUT:  Total OUT: 0 mL    Total NET: 0 mL< from: Xray Chest 1 View- PORTABLE-Routine (19 @ 05:39) >  Impression:      Stable bilateral opacities, left greater than right.              Daily     Daily Weight in k.1 (2019 06:00)    RADIOLOGY & ADDITIONAL STUDIES: Pt is a 72 yo F from [facility] w/ pmhx of HTN presented with worsening generalized weakness and SOB for 3 days. Pt was at urgent care for evaluation of rash on right shoulder and chest, was found to have O2 sat of 88% on RA and send to the ED for further evaluation. Pt reports b/l leg swelling for a few months. Pt's denies CP, palpitations, orthopnea or PND. Rash on right shoulder, chest and upper arm started on 18, pt admited to pruritus..   No fever, chills, cough, URI symptoms, dizziness, abd pain, n/v/d, dysuria.     - on arrival to ED, Pt was found to have A fib with RVR HR 130s and O2 sat 69% on RA. Pt improved after Bipap and IV lasix 40mg X1  - pro-  and < from: Transthoracic Echocardiogram (12.15.18 @ 11:18) >     CXR showes pulmonary edema.    Pt was intubated on  due to worsening hypoxic respiratory failure not responding to BIPAP.  Pt has failed multiple SBT.  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      Pt had been given po Amiodarone 2 days ago - On telemetry pt was noted to have an 11 second pause with Afib with RVR on .  Her HR was as high as 150 but she broke spontaneously without any medications. .  As a result of the pause, Amiodarone was stopped. EP was consulted      PAST MEDICAL & SURGICAL HISTORY:  Hypertension  No significant past surgical history        PREVIOUS DIAGNOSTIC TESTING:      ECHO   FINDINGS:  1. Left ventricular ejection fraction, by visual estimation, is 50 to   55%.   2. Normal left ventricular size and wall thicknesses, with normal   systolic function.   3. Spectral Doppler shows impaired relaxation pattern of left   ventricular myocardial filling (Grade I diastolic dysfunction).   4. Mild tricuspidregurgitation.   5. Mild aortic regurgitation.   6. Pulmonic valve regurgitation.   7. Estimated pulmonary artery systolic pressure is 52.5 mmHg assuming a   right atrial pressure of 8 mmHg, which is consistent with moderate   pulmonary hypertension.        STRESS  FINDINGS:    CATHETERIZATION  FINDINGS:    ELECTROPHYSIOLOGY STUDY  FINDINGS:    CAROTID ULTRASOUND:  FINDINGS    VENOUS DUPLEX SCAN:  FINDINGS:    CHEST CT PULMONARY ANGIO with IV Contrast:  FINDINGS:    MEDICATIONS  (STANDING):  chlorhexidine 0.12% Liquid 15 milliLiter(s) Oral Mucosa two times a day  chlorhexidine 4% Liquid 1 Application(s) Topical <User Schedule>  fentaNYL   Infusion. 0.5 MICROgram(s)/kG/Hr (4.035 mL/Hr) IV Continuous <Continuous>  heparin  Injectable 5000 Unit(s) SubCutaneous every 8 hours  lactated ringers Bolus 500 milliLiter(s) IV Bolus once  lactulose Syrup 10 Gram(s) Oral daily  methylPREDNISolone sodium succinate Injectable 60 milliGRAM(s) IV Push daily  multivitamin 1 Tablet(s) Oral daily  nystatin Powder 1 Application(s) Topical two times a day  pantoprazole  Injectable 40 milliGRAM(s) IV Push two times a day  phenylephrine    Infusion 0.1 MICROgram(s)/kG/Min (1.513 mL/Hr) IV Continuous <Continuous>  propofol Infusion 5 MICROgram(s)/kG/Min (2.421 mL/Hr) IV Continuous <Continuous>  simvastatin 5 milliGRAM(s) Oral at bedtime    MEDICATIONS  (PRN):  acetaminophen   Tablet .. 650 milliGRAM(s) Oral every 6 hours PRN Temp greater or equal to 38C (100.4F)  guaiFENesin    Syrup 200 milliGRAM(s) Oral every 6 hours PRN Cough      FAMILY HISTORY:  Family history of stroke (Father)      SOCIAL HISTORY: unable to obtain as pt is intubated    CIGARETTES:    ALCOHOL:    Past Surgical History:    Allergies:    No Known Allergies      REVIEW OF SYSTEMS:    unable to obtain due to pt being intubated and sedated    Vital Signs Last 24 Hrs  T(C): 37.2 (2019 12:00), Max: 37.2 (2019 10:00)  T(F): 99 (2019 12:00), Max: 99 (2019 10:00)  HR: 71 (2019 12:00) (42 - 164)  BP: 107/47 (2019 12:00) (77/40 - 185/63)  BP(mean): 69 (2019 12:00) (47 - 108)  RR: 18 (2019 12:00) (17 - 44)  SpO2: 99% (2019 12:00) (79% - 100%)    PHYSICAL EXAM:        GENERAL: Pt is intubated in NAD  HEAD:  Atraumatic, Normocephalic  EYES: EOMI, PERRLA, conjunctiva and sclera clear  ENMT: +ETT  NECK: Supple and normal thyroid.  No JVD or carotid bruit.  Carotid pulse is 2+ bilaterally.  HEART: Regular rate and rhythm; No murmurs, rubs, or gallops.  PULMONARY: Clear to auscultation and perfusion.  No rales, wheezing, or rhonchi bilaterally.  ABDOMEN: Soft, Nontender, Nondistended; Bowel sounds present  EXTREMITIES:  2+ Peripheral Pulses, No clubbing, cyanosis, or edema  LYMPH: No lymphadenopathy noted  NEUROLOGICAL: sedated on propofol and fentanyl      INTERPRETATION OF TELEMETRY:    ECG:  (19 @ 07:27) >  Ventricular Rate 132 BPM    Atrial Rate 107 BPM    QRS Duration 130 ms    Q-T Interval 324 ms    QTC Calculation(Bezet) 480 ms    R Axis 2 degrees    T Axis -31 degrees    Diagnosis Line Atrial fibrillation with rapid ventricular response with premature ventricular  or aberrantly conducted complexes  Right bundle branch block  Possible Inferior infarct , age undetermined  T wave abnormality, consider lateral ischemia  Abnormal ECG    I&O's Detail    2019 07:01  -  2019 07:00  --------------------------------------------------------  IN:    dexmedetomidine Infusion: 197.5 mL    fentaNYL Infusion.: 158 mL    fentaNYL Infusion.: 994.8 mL    norepinephrine Infusion: 52 mL    phenylephrine   Infusion: 3 mL    propofol Infusion: 35 mL    propofol Infusion: 83 mL    sodium chloride 0.9%: 1125 mL  Total IN: 2648.3 mL    OUT:    Indwelling Catheter - Urethral: 1830 mL  Total OUT: 1830 mL    Total NET: 818.3 mL      2019 07:01  -  2019 13:14  --------------------------------------------------------  IN:  Total IN: 0 mL    OUT:  Total OUT: 0 mL    Total NET: 0 mL          LABS:                        7.8    10.03 )-----------( 224      ( 2019 04:30 )             24.5     -    138  |  100  |  46<H>  ----------------------------<  137<H>  3.7   |  30  |  0.8    Ca    8.3<L>      2019 04:30  Phos  3.6     02  Mg     1.8           CARDIAC MARKERS ( 2019 02:11 )  x     / 0.01 ng/mL / 30 U/L / x     / 1.0 ng/mL      PTT - ( 2019 04:30 )  PTT:26.9 sec    BNP  I&O's Detail    2019 07:01  -  2019 07:00  --------------------------------------------------------  IN:    dexmedetomidine Infusion: 197.5 mL    fentaNYL Infusion.: 158 mL    fentaNYL Infusion.: 994.8 mL    norepinephrine Infusion: 52 mL    phenylephrine   Infusion: 3 mL    propofol Infusion: 35 mL    propofol Infusion: 83 mL    sodium chloride 0.9%: 1125 mL  Total IN: 2648.3 mL    OUT:    Indwelling Catheter - Urethral: 1830 mL  Total OUT: 1830 mL    Total NET: 818.3 mL      2019 07:  -  2019 13:14  --------------------------------------------------------  IN:  Total IN: 0 mL    OUT:  Total OUT: 0 mL    Total NET: 0 mL< from: Xray Chest 1 View- PORTABLE-Routine (19 @ 05:39) >  Impression:      Stable bilateral opacities, left greater than right.              Daily     Daily Weight in k.1 (2019 06:00)    RADIOLOGY & ADDITIONAL STUDIES:

## 2019-01-02 NOTE — PROGRESS NOTE ADULT - ASSESSMENT
IMPRESSION:    Acute on chronic hypercapnic respiratory failure s/p intubation s/p bronchoscopy alveolar hem, ? etiology was on lovenox  IDALMIS / OHS  Pneumonia/Aspiration/ fluid overload  anemia resolved  hypernatremia resolved  Tachy pamela syndrome with pauses      PLAN:     CNS: no SAT due to agitation, precedex and fentanyl    HEENT: Oral care    PULMONARY:  HOB @ 45 degrees, Possible tracheostomy with CTS, continue with steroids for now. failed multiple SBTs    CARDIOVASCULAR: Keep equal balance, wean off pressors. Needs cardiology follow up for controlling HR    GI: GI prophylaxis. Continue feeding     RENAL:  Follow up lytes.  Correct as needed BMP. Kent catheter    INFECTIOUS DISEASE: Follow up cultures    HEMATOLOGICAL:  DVT prophylaxis.    ENDOCRINE: rheumatological work up: negative    POOR PROGNOSIS  transfer to vent unit after trachesotomy  Monitor in ICU for now IMPRESSION:    Acute on chronic hypercapnic respiratory failure s/p intubation s/p bronchoscopy alveolar hem, ? etiology was on lovenox  Failed SBT several times  IDALMIS / OHS  Pneumonia/Aspiration/ fluid overload  Tachy pamela syndrome with pauses      PLAN:     CNS: no SAT due to agitation, keep precedex and fentanyl    HEENT: Oral care    PULMONARY:  HOB @ 45 degrees, needs tracheostomy but postponed until PPM placed and off pressors, taper solumedrol to 60 daily    CARDIOVASCULAR: Keep equal balance, wean off pressors. Needs cardiology follow up for controlling HR    GI: GI prophylaxis. Continue feeding     RENAL:  Follow up lytes.  Correct as needed BMP. Kent catheter    INFECTIOUS DISEASE: Follow up cultures    HEMATOLOGICAL:  DVT prophylaxis.    ENDOCRINE: rheumatological work up: negative    POOR PROGNOSIS  transfer to vent unit after trachesotomy  Monitor in ICU for now IMPRESSION:    Acute on chronic hypercapnic respiratory failure s/p intubation s/p bronchoscopy alveolar hem, ? etiology was on lovenox  Failed SBT several times  IDALMIS / OHS  Pneumonia/Aspiration/ fluid overload  Tachy pamela syndrome with pauses      PLAN:     CNS: no SAT due to agitation, keep precedex and fentanyl    HEENT: Oral care    PULMONARY:  HOB @ 45 degrees, needs tracheostomy but postponed until PPM placed and off pressors, taper solumedrol to 60 daily, no vent changes    CARDIOVASCULAR: Keep equal balance, wean off neosynephrine. Needs cardiology follow up for controlling HR, keep off AV sandro blocking agents, cardio f/u for PPM placement EP eval    GI: GI prophylaxis. Continue feeding     RENAL:  Follow up lytes.  Correct as needed BMP. Kent catheter    INFECTIOUS DISEASE: Follow up cultures    HEMATOLOGICAL:  DVT prophylaxis.    ENDOCRINE: rheumatological work up: negative    POOR PROGNOSIS  transfer to vent unit after trachesotomy  Monitor in ICU for now IMPRESSION:    Acute on chronic hypercapnic respiratory failure s/p intubation s/p bronchoscopy alveolar hem, ? etiology was on lovenox  Failed SBT several times  IDALMIS / OHS  Pneumonia/Aspiration/ fluid overload  Tachy pamela syndrome with pauses      PLAN:     CNS: no SAT due to agitation, keep propofol and fentanyl, dont use precedex can cause bradycardia at high doses    HEENT: Oral care    PULMONARY:  HOB @ 45 degrees, needs tracheostomy but postponed possible PPM vs TVP placed and off pressors, taper solumedrol to 60 daily, no vent changes    CARDIOVASCULAR: Keep equal balance, wean off neosynephrine. Needs cardiology follow up for controlling HR, keep off AV sandro blocking agents, cardio f/u for PPM placement EP eval    GI: GI prophylaxis. Continue feeding     RENAL:  Follow up lytes.  Correct as needed BMP. Kent catheter    INFECTIOUS DISEASE: Follow up cultures    HEMATOLOGICAL:  DVT prophylaxis.    ENDOCRINE: rheumatological work up: negative    POOR PROGNOSIS  transfer to vent unit after trachesotomy  Monitor in ICU for now

## 2019-01-02 NOTE — PROGRESS NOTE ADULT - SUBJECTIVE AND OBJECTIVE BOX
Progress Note: Surgery  Patient: DENISE BARAJAS , 73y (1945)Female   MRN: [id number]  Location: [facility] [address]  Visit: 12-11-18 Inpatient  Date: 01-02-19 @ 01:49    Hospital Day:     Post-op Day:     Procedure/Injury:     Events over 24h:     Vitals: T(F): 98.6 (01-02-19 @ 00:00), Max: 99 (01-01-19 @ 04:00)  HR: 100 (01-02-19 @ 01:00)  BP: 185/63 (01-02-19 @ 01:00) (78/37 - 185/63)  RR: 18 (01-02-19 @ 01:00)  SpO2: 99% (01-02-19 @ 01:00)    Diet: Diet, NPO after Midnight:      NPO Start Date: 30-Dec-2018,   NPO Start Time: 23:59 (12-30-18 @ 21:51)    Out:   12-31-18 @ 07:01 - 01-01-19 @ 07:00  --------------------------------------------------------  OUT:    Indwelling Catheter - Urethral: 1025 mL  Total OUT: 1025 mL      01-01-19 @ 07:01  - 01-02-19 @ 01:49  --------------------------------------------------------  OUT:    Indwelling Catheter - Urethral: 1630 mL  Total OUT: 1630 mL    EXAM  GENERAL: NAD, intubated and sedated   HEENT:  NCAT   PULMONARY: CTAB  CARDIOVASCULAR: Regular rate and rhythm; No murmurs, rubs, or gallops  GASTROINTESTINAL: Soft, Nontender, Nondistended; Bowel sounds present  MUSCULOSKELETAL:  2+ Peripheral Pulses, No clubbing, cyanosis, or edema      Medications: [Standing]  chlorhexidine 0.12% Liquid 15 milliLiter(s) Oral Mucosa two times a day  chlorhexidine 4% Liquid 1 Application(s) Topical <User Schedule>  fentaNYL   Infusion. 0.5 MICROgram(s)/kG/Hr (4 mL/Hr) IV Continuous <Continuous>  heparin  Injectable 5000 Unit(s) SubCutaneous every 8 hours  lactulose Syrup 10 Gram(s) Oral daily  methylPREDNISolone sodium succinate Injectable 60 milliGRAM(s) IV Push every 12 hours  multivitamin 1 Tablet(s) Oral daily  norepinephrine Infusion 0.05 MICROgram(s)/kG/Min (3.75 mL/Hr) IV Continuous <Continuous>  nystatin Powder 1 Application(s) Topical two times a day  pantoprazole  Injectable 40 milliGRAM(s) IV Push two times a day  propofol Infusion 5 MICROgram(s)/kG/Min (2.421 mL/Hr) IV Continuous <Continuous>  simvastatin 5 milliGRAM(s) Oral at bedtime  sodium chloride 0.9%. 1000 milliLiter(s) (75 mL/Hr) IV Continuous <Continuous>    Medications:[PRN]  acetaminophen   Tablet .. 650 milliGRAM(s) Oral every 6 hours PRN  guaiFENesin    Syrup 200 milliGRAM(s) Oral every 6 hours PRN    Labs:                        7.4    8.04  )-----------( 222      ( 01 Jan 2019 04:30 )             24.5     01-01    144  |  104  |  47<H>  ----------------------------<  147<H>  4.3   |  33<H>  |  0.6<L>    Ca    8.0<L>      01 Jan 2019 04:30  Mg     2.0     01-01    PT/INR - ( 31 Dec 2018 04:30 )   PT: 13.70 sec;   INR: 1.19 ratio        ABG - ( 01 Jan 2019 07:41 )  pH: 7.37  /  pCO2: 54    /  pO2: 90    / HCO3: 32    / Base Excess: 5.5   /  SaO2: 98        CARDIAC MARKERS ( 01 Jan 2019 02:11 )  x     / 0.01 ng/mL / 30 U/L / x     / 1.0 ng/mL    RR (machine): 18, TV (machine): 350, FiO2: 40, PEEP: 8, PIP: 27    Date/Time: 01-02-19 @ 01:49

## 2019-01-02 NOTE — PROVIDER CONTACT NOTE (CHANGE IN STATUS NOTIFICATION) - RECOMMENDATIONS
Rn spoke w/ second year doctor and she recommended switching levophed to phenylephrine due rapid heart rate.

## 2019-01-02 NOTE — PROGRESS NOTE ADULT - SUBJECTIVE AND OBJECTIVE BOX
SUBJECTIVE:    Patient is a 73y old Female who presents with a chief complaint of SOB and rash (02 Jan 2019 13:06)    Currently admitted to medicine with the primary diagnosis of acute hypercapnic respiratory failure complicated by diffuse alveolar hemorrhage      Today is hospital day 22d. Overnight, Levophed was discontinued and patient became hypotension and tachycardic (a fib with RVR). Levophed was initially restarted then switched to phenylephrine. Precedex was switched to propofol for sedation. During sedation vacation this AM, patient was following commands (closing eyes, squeezing hand). She is currently intubated and sedated, no grimacing on physical exam. Tracheostomy and pacemaker delayed until patient is more hemodynamically stable.     PAST MEDICAL & SURGICAL HISTORY  Hypertension  No significant past surgical history    SOCIAL HISTORY:  Patient denies alcohol, tobacco, and recreational drug use.     ALLERGIES:  No Known Allergies    MEDICATIONS:  STANDING MEDICATIONS  chlorhexidine 0.12% Liquid 15 milliLiter(s) Oral Mucosa two times a day  chlorhexidine 4% Liquid 1 Application(s) Topical <User Schedule>  fentaNYL   Infusion. 0.5 MICROgram(s)/kG/Hr IV Continuous <Continuous>  heparin  Injectable 5000 Unit(s) SubCutaneous every 8 hours  lactulose Syrup 10 Gram(s) Oral daily  methylPREDNISolone sodium succinate Injectable 60 milliGRAM(s) IV Push daily  multivitamin 1 Tablet(s) Oral daily  nystatin Powder 1 Application(s) Topical two times a day  pantoprazole  Injectable 40 milliGRAM(s) IV Push two times a day  phenylephrine    Infusion 0.1 MICROgram(s)/kG/Min IV Continuous <Continuous>  propofol Infusion 5 MICROgram(s)/kG/Min IV Continuous <Continuous>  simvastatin 5 milliGRAM(s) Oral at bedtime    PRN MEDICATIONS  acetaminophen   Tablet .. 650 milliGRAM(s) Oral every 6 hours PRN  guaiFENesin    Syrup 200 milliGRAM(s) Oral every 6 hours PRN    VITALS:   T(F): 99  HR: 71  BP: 107/47  RR: 18  SpO2: 99%    LABS:                        7.8    10.03 )-----------( 224      ( 02 Jan 2019 04:30 )             24.5     01-02    138  |  100  |  46<H>  ----------------------------<  137<H>  3.7   |  30  |  0.8    Ca    8.3<L>      02 Jan 2019 04:30  Phos  3.6     01-02  Mg     1.8     01-02      PTT - ( 02 Jan 2019 04:30 )  PTT:26.9 sec    ABG - ( 02 Jan 2019 08:08 )  pH, Arterial: 7.35  pH, Blood: x     /  pCO2: 57    /  pO2: 72    / HCO3: 32    / Base Excess: 5.2   /  SaO2: 96        CARDIAC MARKERS ( 01 Jan 2019 02:11 )  x     / 0.01 ng/mL / 30 U/L / x     / 1.0 ng/mL    RADIOLOGY:  CXR  Impression:    Stable bilateral opacities, left greater than right.    PHYSICAL EXAM:  GEN: No acute distress, intubated and sedated   LUNGS: Bilateral breath sounds, on ventilator   HEART: Tachycardic, irregular rhythm, no murmurs appreciated   ABD: Soft, non-tender, non-distended. Bowel sounds present.   EXT: Noncyanotic, nonedematous, 2+ peripheral pulses, skin intact   NEURO: intubated and sedated     Lines/Tubes   Peripheral IV access  R IJ   Kent catheter  Endotracheal tube   OJ tube

## 2019-01-02 NOTE — PROGRESS NOTE ADULT - PROBLEM SELECTOR PLAN 2
as per pulmonary  for tx of hypercapnia resp failure  poor prognosis
cont lovenox for now  change metoprolol to 12.5 po q12  ekg  echo
pt stable
pt with respiratory failure, hypercapnia as per pulmonary

## 2019-01-02 NOTE — CONSULT NOTE ADULT - ASSESSMENT
Atrial fibrillation/TachyBrady syndrome  - pt is currently in NSR  - pt does not have a documented history of afib.  She is not on any AV sandro blocking agents.  She did not tolerate amiodarone.   - CHADSVasc score is 4 but pt is not a candidate for anticoagulation due to alveolar hemorrhage  - replete electrolytes as needed  - Pt may benefit from PPM once she is stable and if no signs of infection    Hypoxic Respiratory failure  - Pt has been unable to wean from the vent.  Primary team is planning for tracheostomy Atrial fibrillation/TachyBrady syndrome  - pt is currently in NSR  - TSH was normal on admission  - pt does not have a documented history of afib.  She is not on any AV sandro blocking agents.  She did not tolerate amiodarone.   - CHADSVasc score is 4 but pt is not a candidate for anticoagulation due to alveolar hemorrhage  - replete electrolytes as needed  - Pt may benefit from PPM once she is stable.  She is currently on pressors.     Hypoxic Respiratory failure  - Pt has been unable to wean from the vent.  Primary team is planning for tracheostomy Atrial fibrillation/TachyBrady syndrome  - pt is currently in NSR  - TSH was normal on admission  - pt does not have a documented history of afib.  She is not on any AV sandro blocking agents.  She did not tolerate amiodarone.   - CHADSVasc score is 4 but pt is not a candidate for anticoagulation due to alveolar hemorrhage  - replete electrolytes as needed  - Pt may benefit from PPM once she is stable however she is currently on pressors. Will continue to follow    Hypoxic Respiratory failure  - Pt has been unable to wean from the vent.  Primary team is planning for tracheostomy Atrial fibrillation/TachyBrady syndrome  - pt is currently in NSR  - TSH was normal on admission  - pt does not have a documented history of afib.  She is not on any AV sandro blocking agents.  She had an 11 second pause after receiving po amiodarone.  Unsure if the pause was due to amiodarone but as a precaution, amiodarone was stopped.  - CHADSVasc score is 4 but pt is not a candidate for anticoagulation due to alveolar hemorrhage  - replete electrolytes as needed  - At this time, pt is a poor candidate for a PPM as she is requiring pressors.  She is currently in NSR.  We will continue to follow.    Hypoxic Respiratory failure  - Pt has been unable to wean from the vent.  Primary team is planning for tracheostomy Paroxysmal Atrial fibrillation/TachyBrady syndrome  - pt is currently in NSR  - TSH was normal on admission  - pt does not have a documented history of afib.  She is not on any AV sandro blocking agents.  She had an 11 second pause after receiving po amiodarone.  Unsure if the pause was due to amiodarone but as a precaution, amiodarone was stopped.  - CHADSVasc score is 4 but pt is not a candidate for anticoagulation due to alveolar hemorrhage  - replete electrolytes as needed  - At this time, pt is a poor candidate for a PPM as she is requiring pressors.  She is currently in NSR.  We will continue to follow.    Hypoxic Respiratory failure  - Pt has been unable to wean from the vent.  Primary team is planning for tracheostomy Paroxysmal Atrial fibrillation/TachyBrady syndrome  - pt is currently in NSR  - TSH was normal on admission  - pt does not have a documented history of afib.  She is not on any AV sandro blocking agents.  She had an 11 second pause on 12/31.  We have reviewed the event on telemetry - pause was likely a vasovagal response.  - CHADSVasc score is 4 but pt is not a candidate for anticoagulation due to alveolar hemorrhage  - replete electrolytes as needed  - Recommend:        Start Amiodarone 400 mg po for rate control for Afib.      Hypoxic Respiratory failure  - Pt has been unable to wean from the vent.  Primary team is planning for tracheostomy

## 2019-01-02 NOTE — PROGRESS NOTE ADULT - ASSESSMENT
73F from [facility] w/ PMH of HTN presented to [facility] with worsening generalized weakness and SOB for 3 days. Currently admitted to CCU with diffuse alveolar hemorrhage, intubated and sedated. Consider for pacemaker and tracheostomy on hold 2/2 hemodynamic instability.     #Acute Hypercapnic Respiratory Failure 2/2 Lovenox vs Pneumonia, complicated by Diffuse Alveolar Hemorrhage, improving  -Intubated on 12/17, failed weaning trial 6 times. Tracheostomy with CTS delayed due to hemodynamic instability   -Fentanyl and Propofol for sedation   -Bronchoscopy 12/20 with BAL   -Antibiotic course completed (levaquin, zosyn, doxycycline)   -Duplex 12/14: negative for DVT   -Echo 12/15: LVEF 50-55%  -RVP, blood culture, urine legionella, strep antigen, MRSA negative   -CT chest suggestive for RLL PNA, improving   -Rheum on board, autoimmune workup negative including SARITHA, ANCA, RF, COMPLEMENTS, HEP PANEL  -Solumedrol decreased to 60mg daily   -Daily CXR, ABG     #Hypotension   -No obvious source of infection. Afebrile, no leukocytosis.   -CXR improving. Last blood cx, urine cx, and bronchial cx negative for growth. Patient still hypotensive requiring pressors. New blood cx and urine cx ordered.   -500cc IVF bolus given   -Levophed switched to pheylephrine gtt     #Normocytic Anemia likely 2/2 Diffuse Alveolar Hemorrhage   -Hemoglobin 7.8  -Iron studies ordered   -Keep active T&S, transfuse if < 7     #New Atrial Fibrillation with episodes of RVR, alternating with bradycardia  -Amiodarone discontinued due to 11 second asystole episode  -No anticoagulation for now 2/2 diffuse alveolar hemorrhage   -Cardiology: EP consult for PPM   -EP: poor candidate for PPM at this time   -[id number] VASC: 4   -HASBLED score 3    #Decreased Bowel Movements  -Active bowel sounds, abdomen soft, no hard stool in rectum  -Colace, senna, lactulose     #HLD  -Simvastatin 5mg PO qhs     #HTN   -Currently hypotensive on pressors, home medications held     #Varicella Zoster Right Shoulder, improving  -Contact and airborne precautions dced 12/17 as per ID rec. Completed course of acyclovir.     DVT ppx: Subq Heparin  GI ppx: Protonix 40mg IV BID   Diet: NPO  Activity: Increase as tolerated   Dispo: patient needs tracheostomy placement, possible PPM implantation, once hemodynamically stable   FULL CODE

## 2019-01-03 NOTE — PROGRESS NOTE ADULT - ASSESSMENT
73F from [facility] w/ PMH of HTN presented to [facility] with worsening generalized weakness and SOB for 3 days. Currently admitted to CCU with diffuse alveolar hemorrhage, intubated and sedated. Patient was weaned off pressors (phenylephrine) this AM). Scheduled for tracheostomy placement with CTS surgery, will be transferred to vent unit after.     #Acute on Chronic Hypercapnic Respiratory Failure s/p intubation, Diffuse Alveolar Hemorrhage likely 2/2 Lovenox   -Intubated on 12/17, failed weaning trial 6 times. Bronchoscopy on 12/20. Completed antibiotic course (Levaquin, Zosyn, doxycycline). Duplex 12/14 negative for DVT.   -RVP, Urine Legionella, Strep antigen, MRSA, blood cx negative   -Autoimmune workup negative (SARITHA, ANCA, RF, complement, hep panel)   -CXR 1/3/19: patchy bibasilar airspace opacities    -Plan for trach with CTS today 1/3/19 -> transfer to vent unit after   -Fentanyl and Propofol for sedation. Avoid Precedex 2/2 bradycardia   -Solumedrol 60mg daily     #Normocytic Anemia likely 2/2 Diffuse Alveolar Hemorrhage   -Hemoglobin 7.8 -> 7.0. Repeat CBC ordered for 4pm   -Iron studies ordered   -Keep active T&S, transfuse if < 7     #Tachybrady Syndrome (AF w/ RVR alternating with pauses)   -Per EP, 11 second asystole event likely 2/2 vasovagal episode. No PPM at this time.   -Amiodarone 400mg daily   -No anticoagulation 2/2 diffuse alveolar hemorrhage   -Atropine PRN for bradycardia   -[id number]: 4   -HASBLED: 3    #Hypokalemia  -Repleted, monitor BMP     #HLD  -Simvastatin 5mg PO qhs     #HTN   -Currently hypotensive, home medications held     #Varicella Zoster Right Shoulder, resolved   -Completed course of acyclovir     DVT ppx: Heparin 5000 units q8h   GI ppx: Protonix 40mg IV BID   Diet: NPO  Activity: Increase as tolerated   Dispo: trach placement -> transfer to vent unit   FULL CODE

## 2019-01-03 NOTE — PROGRESS NOTE ADULT - SUBJECTIVE AND OBJECTIVE BOX
SUBJECTIVE:    Patient is a 73y old Female who presents with a chief complaint of SOB and rash (03 Jan 2019 07:21)    Currently admitted to medicine with the primary diagnosis of diffuse alveolar hemorrhage      Today is hospital day 23d. Overnight, patient desatted to the 60s and required bag mask ventilation temporarily. Respiratory was called, patient suctioned to remove mucus plugs, and Fi02 was increased to 60%. This AM, we are weaning the patient off phenylephrine. Amiodarone was started yesterday for atrial fibrillation with RVR (HR still in 130s). Patient hypokalemic and anemic to 7.0 this AM. She remains intubated and sedated on propofol and fentanyl, but is moving all extremities and following commands. We will continue to try to get in touch with CTS regarding plan for trach placement. No plan for pacemaker at this time.     PAST MEDICAL & SURGICAL HISTORY  Hypertension  No significant past surgical history    SOCIAL HISTORY:  Patient denies alcohol, tobacco, and recreational drug use.   From [facility] (assisted living), functional at baseline     ALLERGIES:  No Known Allergies    MEDICATIONS:  STANDING MEDICATIONS  amiodarone    Tablet 400 milliGRAM(s) Oral daily  chlorhexidine 0.12% Liquid 15 milliLiter(s) Oral Mucosa two times a day  chlorhexidine 4% Liquid 1 Application(s) Topical <User Schedule>  docusate sodium Liquid 100 milliGRAM(s) Oral two times a day  fentaNYL   Infusion. 0.5 MICROgram(s)/kG/Hr IV Continuous <Continuous>  heparin  Injectable 5000 Unit(s) SubCutaneous every 8 hours  lactulose Syrup 20 Gram(s) Oral daily  methylPREDNISolone sodium succinate Injectable 60 milliGRAM(s) IV Push daily  multivitamin 1 Tablet(s) Oral daily  nystatin Powder 1 Application(s) Topical two times a day  pantoprazole  Injectable 40 milliGRAM(s) IV Push two times a day  phenylephrine    Infusion 0.1 MICROgram(s)/kG/Min IV Continuous <Continuous>  potassium chloride  20 mEq/100 mL IVPB 20 milliEquivalent(s) IV Intermittent every 2 hours  propofol Infusion 5 MICROgram(s)/kG/Min IV Continuous <Continuous>  senna Syrup 15 milliLiter(s) Oral daily  simvastatin 5 milliGRAM(s) Oral at bedtime    PRN MEDICATIONS  acetaminophen   Tablet .. 650 milliGRAM(s) Oral every 6 hours PRN  guaiFENesin    Syrup 200 milliGRAM(s) Oral every 6 hours PRN    VITALS:   T(F): 99.1  HR: 75  BP: 90/44  RR: 26  SpO2: 98%    LABS:                        7.0    8.98  )-----------( 213      ( 03 Jan 2019 04:30 )             22.9     01-03    141  |  100  |  36<H>  ----------------------------<  109<H>  3.2<L>   |  32  |  0.7    Ca    8.3<L>      03 Jan 2019 04:30  Phos  3.6     01-02  Mg     2.2     01-03    PT/INR - ( 03 Jan 2019 04:30 )   PT: 13.00 sec;   INR: 1.13 ratio      PTT - ( 03 Jan 2019 04:30 )  PTT:25.7 sec    ABG - ( 03 Jan 2019 08:24 )  pH, Arterial: 7.27  pH, Blood: x     /  pCO2: 70    /  pO2: 79    / HCO3: 32    / Base Excess: 3.8   /  SaO2: 96        RADIOLOGY:  CXR  Impression:    Low lung volumes with patchy bibasilar airspace opacities.  Stable positioning of the support devices as above.    PHYSICAL EXAM:  GEN: No acute distress, intubated and sedated, closes eyes and squeezes hands on command   LUNGS: Clear to auscultation bilaterally, no labored breathing   HEART: S1/S2 present. RRR.   ABD: Soft, non-tender, non-distended. Bowel sounds present.   EXT: Noncyanotic, nonedematous, 2+ peripheral pulses, skin intact   NEURO: AAOX3, CN II-XII grossly intact     Lines/Tubes   Right IJ  Kent catheter  Endotracheal tube  OJ tube SUBJECTIVE:    Patient is a 73y old Female who presents with a chief complaint of SOB and rash (03 Jan 2019 07:21)    Currently admitted to medicine with the primary diagnosis of diffuse alveolar hemorrhage      Today is hospital day 23d. Overnight, patient desatted to the 60s and required bag mask ventilation temporarily. Respiratory was called, patient suctioned to remove mucus plugs, and Fi02 was increased to 60%. This AM, we are weaning the patient off phenylephrine. Amiodarone was started yesterday for atrial fibrillation with RVR (HR still in 130s). Patient hypokalemic and anemic to 7.0 this AM. She remains intubated and sedated on propofol and fentanyl, but is moving all extremities and following commands. We will continue to try to get in touch with CTS regarding plan for trach placement. No plan for pacemaker at this time.     PAST MEDICAL & SURGICAL HISTORY  Hypertension  No significant past surgical history    SOCIAL HISTORY:  Patient denies alcohol, tobacco, and recreational drug use.   From [facility] (assisted living), functional at baseline     ALLERGIES:  No Known Allergies    MEDICATIONS:  STANDING MEDICATIONS  amiodarone    Tablet 400 milliGRAM(s) Oral daily  chlorhexidine 0.12% Liquid 15 milliLiter(s) Oral Mucosa two times a day  chlorhexidine 4% Liquid 1 Application(s) Topical <User Schedule>  docusate sodium Liquid 100 milliGRAM(s) Oral two times a day  fentaNYL   Infusion. 0.5 MICROgram(s)/kG/Hr IV Continuous <Continuous>  heparin  Injectable 5000 Unit(s) SubCutaneous every 8 hours  lactulose Syrup 20 Gram(s) Oral daily  methylPREDNISolone sodium succinate Injectable 60 milliGRAM(s) IV Push daily  multivitamin 1 Tablet(s) Oral daily  nystatin Powder 1 Application(s) Topical two times a day  pantoprazole  Injectable 40 milliGRAM(s) IV Push two times a day  phenylephrine    Infusion 0.1 MICROgram(s)/kG/Min IV Continuous <Continuous>  potassium chloride  20 mEq/100 mL IVPB 20 milliEquivalent(s) IV Intermittent every 2 hours  propofol Infusion 5 MICROgram(s)/kG/Min IV Continuous <Continuous>  senna Syrup 15 milliLiter(s) Oral daily  simvastatin 5 milliGRAM(s) Oral at bedtime    PRN MEDICATIONS  acetaminophen   Tablet .. 650 milliGRAM(s) Oral every 6 hours PRN  guaiFENesin    Syrup 200 milliGRAM(s) Oral every 6 hours PRN    VITALS:   T(F): 99.1  HR: 75  BP: 90/44  RR: 26  SpO2: 98%    LABS:                        7.0    8.98  )-----------( 213      ( 03 Jan 2019 04:30 )             22.9     01-03    141  |  100  |  36<H>  ----------------------------<  109<H>  3.2<L>   |  32  |  0.7    Ca    8.3<L>      03 Jan 2019 04:30  Phos  3.6     01-02  Mg     2.2     01-03    PT/INR - ( 03 Jan 2019 04:30 )   PT: 13.00 sec;   INR: 1.13 ratio      PTT - ( 03 Jan 2019 04:30 )  PTT:25.7 sec    ABG - ( 03 Jan 2019 08:24 )  pH, Arterial: 7.27  pH, Blood: x     /  pCO2: 70    /  pO2: 79    / HCO3: 32    / Base Excess: 3.8   /  SaO2: 96        RADIOLOGY:  CXR  Impression:    Low lung volumes with patchy bibasilar airspace opacities.  Stable positioning of the support devices as above.    PHYSICAL EXAM:  GEN: No acute distress, intubated and sedated, closes eyes and squeezes hands on command   LUNGS: Bilateral breath sounds, on ventilator   HEART: Tachycardic, irregular rhythm, no murmurs appreciated   ABD: Soft, non-tender, non-distended. Bowel sounds present.   EXT: Noncyanotic, nonedematous, 2+ peripheral pulses, skin intact    NEURO: Moving all extremities     Lines/Tubes   Right IJ  Kent catheter  Endotracheal tube  OJ tube

## 2019-01-03 NOTE — PROGRESS NOTE ADULT - SUBJECTIVE AND OBJECTIVE BOX
Patient is a 73y old  Female who presents with a chief complaint of SOB and rash (03 Jan 2019 01:04)        Over Night Events:        ROS:  See HPI    PHYSICAL EXAM    ICU Vital Signs Last 24 Hrs  T(C): 37.3 (03 Jan 2019 04:00), Max: 37.3 (03 Jan 2019 04:00)  T(F): 99.1 (03 Jan 2019 04:00), Max: 99.1 (03 Jan 2019 04:00)  HR: 82 (03 Jan 2019 06:00) (50 - 144)  BP: 119/57 (03 Jan 2019 06:00) (77/40 - 119/57)  BP(mean): 85 (03 Jan 2019 06:00) (50 - 93)  ABP: --  ABP(mean): --  RR: 18 (03 Jan 2019 06:00) (17 - 22)  SpO2: 94% (03 Jan 2019 06:00) (94% - 100%)      General:  HEENT: BEATRIZ             Lymphatic system: No cervical LN   Lungs: Bilateral BS  Cardiovascular: Regular   Gastrointestinal: Soft, Positive BS  Extremities: No clubbing.  Moves extremities.  Full Range of motion   Skin: Warm, intact  Neurological: No motor or sensory deficit       01-02-19 @ 07:01  -  01-03-19 @ 07:00  --------------------------------------------------------  IN:    fentaNYL Infusion.: 678.8 mL    Lactated Ringers IV Bolus: 500 mL    phenylephrine   Infusion: 65 mL    propofol Infusion: 286 mL    Vital High Protein: 240 mL  Total IN: 1769.8 mL    OUT:    Indwelling Catheter - Urethral: 1410 mL  Total OUT: 1410 mL    Total NET: 359.8 mL          LABS:                            7.0    8.98  )-----------( 213      ( 03 Jan 2019 04:30 )             22.9                                               01-03    141  |  100  |  36<H>  ----------------------------<  109<H>  3.2<L>   |  32  |  0.7    Ca    8.3<L>      03 Jan 2019 04:30  Phos  3.6     01-02  Mg     2.2     01-03        PT/INR - ( 03 Jan 2019 04:30 )   PT: 13.00 sec;   INR: 1.13 ratio         PTT - ( 03 Jan 2019 04:30 )  PTT:25.7 sec                                                                                                                                                                           Mode: AC/ CMV (Assist Control/ Continuous Mandatory Ventilation)  RR (machine): 18  TV (machine): 350  FiO2: 40  PEEP: 8  ITime: 1  MAP: 14  PIP: 29                                      ABG - ( 02 Jan 2019 08:08 )  pH, Arterial: 7.35  pH, Blood: x     /  pCO2: 57    /  pO2: 72    / HCO3: 32    / Base Excess: 5.2   /  SaO2: 96                  MEDICATIONS  (STANDING):  amiodarone    Tablet 400 milliGRAM(s) Oral daily  chlorhexidine 0.12% Liquid 15 milliLiter(s) Oral Mucosa two times a day  chlorhexidine 4% Liquid 1 Application(s) Topical <User Schedule>  docusate sodium Liquid 100 milliGRAM(s) Oral two times a day  fentaNYL   Infusion. 0.5 MICROgram(s)/kG/Hr (4.035 mL/Hr) IV Continuous <Continuous>  heparin  Injectable 5000 Unit(s) SubCutaneous every 8 hours  lactulose Syrup 20 Gram(s) Oral daily  methylPREDNISolone sodium succinate Injectable 60 milliGRAM(s) IV Push daily  multivitamin 1 Tablet(s) Oral daily  nystatin Powder 1 Application(s) Topical two times a day  pantoprazole  Injectable 40 milliGRAM(s) IV Push two times a day  phenylephrine    Infusion 0.1 MICROgram(s)/kG/Min (1.513 mL/Hr) IV Continuous <Continuous>  potassium chloride  20 mEq/100 mL IVPB 20 milliEquivalent(s) IV Intermittent every 2 hours  propofol Infusion 5 MICROgram(s)/kG/Min (2.421 mL/Hr) IV Continuous <Continuous>  senna Syrup 15 milliLiter(s) Oral daily  simvastatin 5 milliGRAM(s) Oral at bedtime    MEDICATIONS  (PRN):  acetaminophen   Tablet .. 650 milliGRAM(s) Oral every 6 hours PRN Temp greater or equal to 38C (100.4F)  guaiFENesin    Syrup 200 milliGRAM(s) Oral every 6 hours PRN Cough      Xrays:                                                                                     ECHO Patient is a 73y old  Female who presents with a chief complaint of SOB and rash (03 Jan 2019 01:04)        Over Night Events:    no overnight events  remains intubated  no pauses  started on po amio per EP  off pressors 8am this morning    ROS:  See HPI    PHYSICAL EXAM    ICU Vital Signs Last 24 Hrs  T(C): 37.3 (03 Jan 2019 04:00), Max: 37.3 (03 Jan 2019 04:00)  T(F): 99.1 (03 Jan 2019 04:00), Max: 99.1 (03 Jan 2019 04:00)  HR: 82 (03 Jan 2019 06:00) (50 - 144)  BP: 119/57 (03 Jan 2019 06:00) (77/40 - 119/57)  BP(mean): 85 (03 Jan 2019 06:00) (50 - 93)  ABP: --  ABP(mean): --  RR: 18 (03 Jan 2019 06:00) (17 - 22)  SpO2: 94% (03 Jan 2019 06:00) (94% - 100%)      General: no distress  HEENT: BEATRIZ             Lymphatic system: No cervical LN   Lungs: Bilateral BS redced at bases  Cardiovascular: Regular   Gastrointestinal: Soft, Positive BS  Extremities: No clubbing.  Moves extremities.  Full Range of motion pedal edema  Skin: Warm, intact  Neurological: No motor or sensory deficit       01-02-19 @ 07:01  -  01-03-19 @ 07:00  --------------------------------------------------------  IN:    fentaNYL Infusion.: 678.8 mL    Lactated Ringers IV Bolus: 500 mL    phenylephrine   Infusion: 65 mL    propofol Infusion: 286 mL    Vital High Protein: 240 mL  Total IN: 1769.8 mL    OUT:    Indwelling Catheter - Urethral: 1410 mL  Total OUT: 1410 mL    Total NET: 359.8 mL          LABS:                            7.0    8.98  )-----------( 213      ( 03 Jan 2019 04:30 )             22.9                                               01-03    141  |  100  |  36<H>  ----------------------------<  109<H>  3.2<L>   |  32  |  0.7    Ca    8.3<L>      03 Jan 2019 04:30  Phos  3.6     01-02  Mg     2.2     01-03        PT/INR - ( 03 Jan 2019 04:30 )   PT: 13.00 sec;   INR: 1.13 ratio         PTT - ( 03 Jan 2019 04:30 )  PTT:25.7 sec                                                                                                                                                                           Mode: AC/ CMV (Assist Control/ Continuous Mandatory Ventilation)  RR (machine): 18  TV (machine): 350  FiO2: 40  PEEP: 8  ITime: 1  MAP: 14  PIP: 29                                      ABG - ( 02 Jan 2019 08:08 )  pH, Arterial: 7.35  pH, Blood: x     /  pCO2: 57    /  pO2: 72    / HCO3: 32    / Base Excess: 5.2   /  SaO2: 96                  MEDICATIONS  (STANDING):  amiodarone    Tablet 400 milliGRAM(s) Oral daily  chlorhexidine 0.12% Liquid 15 milliLiter(s) Oral Mucosa two times a day  chlorhexidine 4% Liquid 1 Application(s) Topical <User Schedule>  docusate sodium Liquid 100 milliGRAM(s) Oral two times a day  fentaNYL   Infusion. 0.5 MICROgram(s)/kG/Hr (4.035 mL/Hr) IV Continuous <Continuous>  heparin  Injectable 5000 Unit(s) SubCutaneous every 8 hours  lactulose Syrup 20 Gram(s) Oral daily  methylPREDNISolone sodium succinate Injectable 60 milliGRAM(s) IV Push daily  multivitamin 1 Tablet(s) Oral daily  nystatin Powder 1 Application(s) Topical two times a day  pantoprazole  Injectable 40 milliGRAM(s) IV Push two times a day  phenylephrine    Infusion 0.1 MICROgram(s)/kG/Min (1.513 mL/Hr) IV Continuous <Continuous>  potassium chloride  20 mEq/100 mL IVPB 20 milliEquivalent(s) IV Intermittent every 2 hours  propofol Infusion 5 MICROgram(s)/kG/Min (2.421 mL/Hr) IV Continuous <Continuous>  senna Syrup 15 milliLiter(s) Oral daily  simvastatin 5 milliGRAM(s) Oral at bedtime    MEDICATIONS  (PRN):  acetaminophen   Tablet .. 650 milliGRAM(s) Oral every 6 hours PRN Temp greater or equal to 38C (100.4F)  guaiFENesin    Syrup 200 milliGRAM(s) Oral every 6 hours PRN Cough      Xrays:                                                                                     ECHO

## 2019-01-03 NOTE — PROGRESS NOTE ADULT - ASSESSMENT
IMPRESSION:    Acute on chronic hypercapnic respiratory failure s/p intubation s/p bronchoscopy alveolar hem, ? etiology was on lovenox  Failed SBT several times  IDALMIS / OHS  Pneumonia/Aspiration/ fluid overload  Tachy pamela syndrome with pauses      PLAN:     CNS: no SAT due to agitation, keep propofol and fentanyl, dont use precedex can cause bradycardia at high doses    HEENT: Oral care    PULMONARY:  HOB @ 45 degrees, needs tracheostomy but postponed possible PPM vs TVP placed and off pressors, taper solumedrol to 60 daily, no vent changes    CARDIOVASCULAR: Keep equal balance, wean off neosynephrine. Needs cardiology follow up for controlling HR, keep off AV sandro blocking agents, cardio f/u for PPM placement EP eval    GI: GI prophylaxis. Continue feeding     RENAL:  Follow up lytes.  Correct as needed BMP. Ketn catheter    INFECTIOUS DISEASE: Follow up cultures    HEMATOLOGICAL:  DVT prophylaxis.    ENDOCRINE: rheumatological work up: negative    POOR PROGNOSIS  transfer to vent unit after trachesotomy  Monitor in ICU for now IMPRESSION:    Acute on chronic hypercapnic respiratory failure s/p intubation s/p bronchoscopy alveolar hem, ? etiology was on lovenox  Failed SBT several times  IDALMIS / OHS  Pneumonia/Aspiration/ fluid overload  Tachy pamela syndrome with pauses      PLAN:     CNS: no SAT due to agitation, keep propofol and fentanyl, dont use precedex can cause bradycardia at high doses    HEENT: Oral care    PULMONARY:  HOB @ 45 degrees,, no SBT, tracheostomy today with CTS, c/w solumedrol      CARDIOVASCULAR: on amiodarone per EP, no need for PPM, Keep equal balance    GI: GI prophylaxis. Continue og feeding     RENAL:  Follow up lytes.  Correct as needed BMP. Kent catheter    INFECTIOUS DISEASE: Follow up cultures    HEMATOLOGICAL:  DVT prophylaxis.    ENDOCRINE: rheumatological work up: negative    POOR PROGNOSIS  transfer to vent unit after tracheostomy  Monitor in ICU for now IMPRESSION:    Acute on chronic hypercapnic respiratory failure s/p intubation s/p bronchoscopy alveolar hem, ? etiology was on lovenox  Failed SBT several times  IDALMIS / OHS  Pneumonia/Aspiration/ fluid overload  Tachy pamela syndrome with pauses      PLAN:     CNS: no SAT due to agitation, keep propofol and fentanyl, dont use precedex can cause bradycardia at high doses    HEENT: Oral care    PULMONARY:  HOB @ 45 degrees, vent changes as follows: increase TV to 450, no SBT today, tracheostomy today with CTS, c/w solumedrol , ipratropium nebs    CARDIOVASCULAR: on amiodarone per EP, no need for PPM, Keep equal balance    GI: GI prophylaxis. Continue og feeding     RENAL:  Follow up lytes.  Correct as needed BMP. Kent catheter    INFECTIOUS DISEASE: Follow up cultures    HEMATOLOGICAL:  DVT prophylaxis.    ENDOCRINE: rheumatological work up: negative    POOR PROGNOSIS  transfer to vent unit after tracheostomy  Monitor in ICU for now IMPRESSION:    Acute on chronic hypercapnic respiratory failure s/p intubation s/p bronchoscopy alveolar hem, ? etiology was on lovenox  Failed SBT several times  IDALMIS / OHS  Pneumonia/Aspiration/ fluid overload  Tachy pamela syndrome with pauses      PLAN:     CNS: no SAT due to agitation, keep propofol and fentanyl, dont use precedex can cause bradycardia at high doses    HEENT: Oral care    PULMONARY:  HOB @ 45 degrees, vent changes as follows: increase TV to 450, no SBT today, tracheostomy today with CTS, c/w solumedrol     CARDIOVASCULAR: on amiodarone per EP, no need for PPM, Keep equal balance    GI: GI prophylaxis. Continue og feeding     RENAL:  Follow up lytes.  Correct as needed BMP. Kent catheter    INFECTIOUS DISEASE: Follow up cultures    HEMATOLOGICAL:  DVT prophylaxis.    ENDOCRINE: rheumatological work up: negative    POOR PROGNOSIS  transfer to vent unit after tracheostomy  Monitor in ICU for now

## 2019-01-04 NOTE — PRE-ANESTHESIA EVALUATION ADULT - NSRADCARDRESULTSFT_GEN_ALL_CORE
Summary:   1. Left ventricular ejection fraction, by visual estimation, is 50 to   55%.   2. Normal left ventricular size and wall thicknesses, with normal   systolic function.   3. Spectral Doppler shows impaired relaxation pattern of left   ventricular myocardial filling (Grade I diastolic dysfunction).   4. Mild tricuspid regurgitation.   5. Mild aortic regurgitation.   6. Pulmonic valve regurgitation.   7. Estimated pulmonary artery systolic pressure is 52.5 mmHg assuming a   right atrial pressure of 8 mmHg, which is consistent with moderate   pulmonary hypertension.     EP:   The pause  of 11 seconds most likely due to vagal response as the PP interval slowly increases right before the pause.    - Recommend amiodarone to maintenance normal sinus rhythm.  - If patient becomes bradycardic the during tracheostomy atropine could be administered..      I was physically present for the key portions of the evaluation and management (E/M) service provided.  I agree with the above history, physical, and plan which I have reviewed and edited where appropriate.     40 minutes spent on total encounter; more than 50% of the visit was spent counseling and/or coordinating care by the attending physician.

## 2019-01-04 NOTE — PROGRESS NOTE ADULT - ASSESSMENT
73F from [facility] w/ PMH of HTN presented to [facility] with worsening generalized weakness and SOB for 3 days. Currently admitted to CCU with diffuse alveolar hemorrhage 2/2 Lovenox, intubated and sedated. Scheduled for tracheostomy placement with CTS surgery, will be transferred to vent unit after. Kent catheter and central line may be removed after successful trach. Long-tube feeding plan to be determined.     #Acute on Chronic Hypercapnic Respiratory Failure s/p intubation, Diffuse Alveolar Hemorrhage likely 2/2 Lovenox   -Intubated on 12/17, failed weaning trial 6 times. Bronchoscopy on 12/20. Completed antibiotic course (Levaquin, Zosyn, doxycycline). Duplex 12/14 negative for DVT.   -RVP, Urine Legionella, Strep antigen, MRSA, blood cx negative   -Autoimmune workup negative (SARITHA, ANCA, RF, complement, hep panel)   -CXR 1/3/19: patchy bibasilar airspace opacities    -Plan for trach with CTS today 1/4/19 -> transfer to vent unit after.  -Plan for feeds: NG tube bolus feeds after trach, discuss PEG placement with family if no improvement of mental status   -Fentanyl and Propofol for sedation. Avoid Precedex 2/2 bradycardia   -Solumedrol 60mg daily     #Normocytic Anemia, likely anemia of chronic disease   -Hemoglobin 6.9 -> 8.7 s/p 1 unit PRBCs on 1/3/19   -Iron studies; total iron low, ferritin high, TIBC low   -Keep active T&S, transfuse if < 7     #Tachybrady Syndrome (AF w/ RVR alternating with pauses)   -Per EP, 11 second asystole event likely 2/2 vasovagal episode. No PPM at this time.   -Amiodarone 400mg daily   -No anticoagulation 2/2 diffuse alveolar hemorrhage   -Atropine PRN for bradycardia   -[id number]: 4   -HASBLED: 3    #HLD  -Simvastatin 5mg PO qhs     #HTN   -Currently hypotensive, home medications held     #Varicella Zoster Right Shoulder, resolved   -Completed course of acyclovir     #Constipation  -Senna, lactulose, colace. Last BM on 1/3/19     DVT ppx: Heparin 5000 units q8h   GI ppx: Protonix 40mg IV BID   Diet: NPO  Activity: Increase as tolerated   Dispo: trach placement -> transfer to vent unit   FULL CODE

## 2019-01-04 NOTE — PROGRESS NOTE ADULT - SUBJECTIVE AND OBJECTIVE BOX
SUBJECTIVE:    Patient is a 73y old Female who presents with a chief complaint of SOB and rash (04 Jan 2019 08:06)    Currently admitted to medicine with the primary diagnosis of acute hypercapnic respiratory failure s/p intubation 2/2 lovenox-induced diffuse alveolar hemorrhage      Today is hospital day 24d. No acute overnight events. Patient to tentatively go for trach placement with CTS surgery and can be transferred to the vent unit after. This AM, she is tracking with her eyes to sound, following commands (hand squeeze, eye closing), and moving all extremities. No grimacing/guarding to indicate pain.     PAST MEDICAL & SURGICAL HISTORY  Hypertension  No significant past surgical history    SOCIAL HISTORY:  Patient denies alcohol, tobacco, and recreational drug use.   From formerly [facility] (assisted living), functional at baseline     ALLERGIES:  No Known Allergies    MEDICATIONS:  STANDING MEDICATIONS  amiodarone    Tablet 400 milliGRAM(s) Oral daily  chlorhexidine 0.12% Liquid 15 milliLiter(s) Oral Mucosa two times a day  chlorhexidine 4% Liquid 1 Application(s) Topical <User Schedule>  docusate sodium Liquid 100 milliGRAM(s) Oral two times a day  fentaNYL   Infusion. 0.5 MICROgram(s)/kG/Hr IV Continuous <Continuous>  heparin  Injectable 5000 Unit(s) SubCutaneous every 8 hours  lactulose Syrup 20 Gram(s) Oral daily  methylPREDNISolone sodium succinate Injectable 60 milliGRAM(s) IV Push daily  multivitamin 1 Tablet(s) Oral daily  nystatin Powder 1 Application(s) Topical two times a day  pantoprazole  Injectable 40 milliGRAM(s) IV Push two times a day  propofol Infusion 5 MICROgram(s)/kG/Min IV Continuous <Continuous>  senna Syrup 15 milliLiter(s) Oral daily  simvastatin 5 milliGRAM(s) Oral at bedtime    PRN MEDICATIONS  acetaminophen   Tablet .. 650 milliGRAM(s) Oral every 6 hours PRN  guaiFENesin    Syrup 200 milliGRAM(s) Oral every 6 hours PRN    VITALS:   T(F): 99.9  HR: 60  BP: 79/42  RR: 17  SpO2: 100%    LABS:                        8.7    11.07 )-----------( 176      ( 04 Jan 2019 04:30 )             26.7     01-04    133<L>  |  94<L>  |  28<H>  ----------------------------<  125<H>  4.0   |  28  |  0.7    Ca    8.3<L>      04 Jan 2019 04:30  Mg     2.2     01-04      PT/INR - ( 03 Jan 2019 04:30 )   PT: 13.00 sec;   INR: 1.13 ratio         PTT - ( 03 Jan 2019 04:30 )  PTT:25.7 sec    ABG - ( 04 Jan 2019 08:21 )  pH, Arterial: 7.40  pH, Blood: x     /  pCO2: 50    /  pO2: 65    / HCO3: 31    / Base Excess: 5.4   /  SaO2: 95        Culture - Blood (collected 02 Jan 2019 17:14)  Source: .Blood None  Preliminary Report (04 Jan 2019 01:01):    No growth to date.    RADIOLOGY:  CXR  Impression  Stable bilateral lung opacities    PHYSICAL EXAM:  GEN: No acute distress, intubated and sedated, closes eyes and squeezes hands on command, tracking with eyes   LUNGS: Bilateral breath sounds, on ventilator   HEART: Regular rate, irregular rhythm, no murmurs appreciated   ABD: Soft, non-tender, minimally distended. Bowel sounds present.   EXT: Noncyanotic, nonedematous, 2+ peripheral pulses, skin intact    NEURO: Moving all extremities     Lines/Tubes   Kent catheter  R IJ  Endotracheal tube  OJ tube   Peripheral IV access

## 2019-01-04 NOTE — PROGRESS NOTE ADULT - SUBJECTIVE AND OBJECTIVE BOX
Progress Note: Surgery  Patient: DENISE BARAJAS , 73y (1945)Female   MRN: [id number]  Location: [facility] A  Visit: 12-11-18 Inpatient  Date: 01-04-19 @ 05:03    Hospital Day: 25    Procedure/Injury: trach consult     Events over 24h: patient still hypotensive throughout day. vent settings 50%/8    Vitals: T(F): 98 (01-04-19 @ 04:00), Max: 98.4 (01-03-19 @ 12:00)  HR: 64 (01-04-19 @ 04:00)  BP: 90/49 (01-04-19 @ 04:00) (77/35 - 119/57)  RR: 18 (01-04-19 @ 04:00)  SpO2: 98% (01-04-19 @ 04:00)    Diet: Diet, NPO (01-02-19 @ 06:37)    Out:   01-02-19 @ 07:01  -  01-03-19 @ 07:00  --------------------------------------------------------  OUT:    Indwelling Catheter - Urethral: 1410 mL  Total OUT: 1410 mL      01-03-19 @ 07:01  -  01-04-19 @ 05:03  --------------------------------------------------------  OUT:    Indwelling Catheter - Urethral: 970 mL  Total OUT: 970 mL      Physical Examination:  General: NAD, intubated  HEENT: NCAT, BEATRIZ, WNL  Heart: S1 S2, No MRG RRR   Lungs: CTABL +BS Equal BL, No WRC  Abdomen: Soft, non-distended, nontender. Obese. No guarding or rebound tenderness.     Medications: [Standing]  amiodarone    Tablet 400 milliGRAM(s) Oral daily  chlorhexidine 0.12% Liquid 15 milliLiter(s) Oral Mucosa two times a day  chlorhexidine 4% Liquid 1 Application(s) Topical <User Schedule>  docusate sodium Liquid 100 milliGRAM(s) Oral two times a day  fentaNYL   Infusion. 0.5 MICROgram(s)/kG/Hr (4.035 mL/Hr) IV Continuous <Continuous>  heparin  Injectable 5000 Unit(s) SubCutaneous every 8 hours  lactulose Syrup 20 Gram(s) Oral daily  methylPREDNISolone sodium succinate Injectable 60 milliGRAM(s) IV Push daily  multivitamin 1 Tablet(s) Oral daily  nystatin Powder 1 Application(s) Topical two times a day  pantoprazole  Injectable 40 milliGRAM(s) IV Push two times a day  phenylephrine    Infusion 0.1 MICROgram(s)/kG/Min (1.513 mL/Hr) IV Continuous <Continuous>  propofol Infusion 5 MICROgram(s)/kG/Min (2.421 mL/Hr) IV Continuous <Continuous>  senna Syrup 15 milliLiter(s) Oral daily  simvastatin 5 milliGRAM(s) Oral at bedtime    Medications:[PRN]  acetaminophen   Tablet .. 650 milliGRAM(s) Oral every 6 hours PRN  guaiFENesin    Syrup 200 milliGRAM(s) Oral every 6 hours PRN    Labs:                        9.1    8.55  )-----------( 167      ( 03 Jan 2019 23:30 )             28.2     01-03    139  |  99  |  31<H>  ----------------------------<  142<H>  4.1   |  30  |  0.6<L>    Ca    8.3<L>      03 Jan 2019 17:00  Mg     2.0     01-03        PT/INR - ( 03 Jan 2019 04:30 )   PT: 13.00 sec;   INR: 1.13 ratio    PTT - ( 03 Jan 2019 04:30 )  PTT:25.7 sec  ABG - ( 03 Jan 2019 14:28 )  pH: 7.40  /  pCO2: 51    /  pO2: 109   / HCO3: 32    / Base Excess: 6.3   /  SaO2: 99        RR (machine): 18, TV (machine): 450, FiO2: 50, PEEP: 8, PIP: 39    Imaging:  None/24h    Date/Time: 01-04-19 @ 05:03

## 2019-01-04 NOTE — CHART NOTE - NSCHARTNOTEFT_GEN_A_CORE
Registered Dietitian Follow-Up     Patient Profile Reviewed                           Yes [x]   No []     Nutrition History Previously Obtained        Yes []  No [x]  - pt remains intubated     Pertinent Subjective Information:   -Pt remains intubated, sedated, off pressor support. Pt remains NPO day 2 awaiting trach placement as she has failed multiple SBTs. Spoke to LIP who reports feeding plan s/p trach placement remains pending based on mental status. If mental status improves, PO trial and if mental status remains, likely enteral feeds. See RD recommendations at end of document.     Pertinent Medical Interventions:   Acute Hypercapnic Respiratory Failure likely 2/2 Lovenox induced diffuse alveolar hemorrhage- pt failed multiple SBTs, possible trach today 1/4      Diet order: NPO      Anthropometrics:  - Ht. 152.4cm  - Wt.: 82.3kg (1/3)  - %wt change--wt has varied 79.8kg (12/18), 81.5kg (12/21), 78.4kg (12/27), 78.9kg (12/28), 80.9kg (1/1). despite variation, range relatively stable.   - BMI - 35.4 (using 1/3 wt)   - IBW 45.5kg      Pertinent Lab Data: (1/4/19) WBC 11.07, RBC 2.90, H/H 8.7/26.7, , Cl 94, BUN 28, serum glucose 125, Ca 8.3, MAP: 67     Pertinent Meds: heparin, abx, amiodarone, colace, fentanyl, propofol @ 13mL/hr (343kcal), lactulose, solumedrol, senna, multivitamin, protonix, simvastatin      Physical Findings:  - Appearance: intubated  - GI function: last BM documented 1/3  - Tubes: OGT  - Oral/Mouth cavity: intubated, OGT   - Skin: intact; no edema noted      Nutrition Requirements   Weight Used: 82.3kg, 45.5kg IBW   If pt remains intubated:   Estimated energy needs: 947-1128 kcal/d (based on: 936-1092 (60-70% [id number] (1560, Ve: 8.5, Tmax: 37.9--recalculated 1/4) vs. 1000-1140kcal/d (22-25kcal/kg IBW) vs. 905-1152kcal/d (11-14kcal/act wt))  Estimated protein needs: 68-91gm/d (1.5-2.0gm/kg IBW) pt w/ ANTONIETTA    Estimated fluid needs: per CCU team    If trach placed   Estimated energy needs = 1069-1433 kcal/day (25-30 kcal/kg IBW).  Estimated protein needs = 55-64 g/day (1.2-1.4 g/kg IBW considering BMI >30 and ANTONIETTA).  Estimated fluid needs = 1mL/kcal or per CCU team     Nutrient Intake: not meeting needs d/t current NPO diet order      [x] Previous Nutrition Diagnosis: Inadequate enteral nutrition infusion--ongoing.    Nutrition Intervention: Enteral nutrition   Once TRACH placed recommend:  1. If pt deemed appropriate for PO intake, consult SLP to assess PO tolerance.   2. If not appropriate for PO feeding, provide bolus feeds via enteral nutrition access.  Recommend BOLUS feeds of Osmolite 1.5 @ 240mL Q8H + Prosource TF BID.   -TF at goal rate will provide 1170kcal, 67g protein, 547mL free H20 and 72%RDIs.   -maintain aspiration precautions, flushes per CCU team  3. Continue daily multivitamin.     If pt remains INTUBATED recommend:  1. Continuous feeds of Vital HP @ 30mL/hr x24hrs + ProSource TF Q24H. TF at goal rate including calories from propofol will provide 1108 kcal, 74g protein, 590mL free H2O.   2. Continue daily multivitamin.      Goal/Expected Outcome: As diet advanced EN to provide >85% but <105% estimated needs. f/u in 3 days.      Indicator/Monitoring: Will monitor energy intake, diet order, endocrine/ electrolyte profile, body composition, nutrition focused physical findings (TF tolerance).

## 2019-01-04 NOTE — PROGRESS NOTE ADULT - ASSESSMENT
Assessment:  73y Female patient admitted S/P diffuse alveolar hemorrhage consulted for possible tracheostomy     Plan:  - possible trach today   - NPO  - Continue care per CCU  - DVT & GI PPX

## 2019-01-04 NOTE — BRIEF OPERATIVE NOTE - OPERATION/FINDINGS
patient arrived from CCU, noted to be acutely hypoxic, this was addressed with 100% FIO2.  Patient also noted, once on OR table, to have unstable BP and tachycardia and bigemini, alternating with NSR.

## 2019-01-04 NOTE — PRE-ANESTHESIA EVALUATION ADULT - NSANTHPMHFT_GEN_ALL_CORE
73F from [facility] w/ PMH of HTN presented to [facility] with worsening generalized weakness and SOB for 3 days. Currently admitted to CCU with diffuse alveolar hemorrhage 2/2 Lovenox, intubated and sedated. Scheduled for tracheostomy placement with CTS surgery, will be transferred to vent unit after. Kent catheter and central line may be removed after successful trach. Long-tube feeding plan to be determined.     #Acute on Chronic Hypercapnic Respiratory Failure s/p intubation, Diffuse Alveolar Hemorrhage likely 2/2 Lovenox   -Intubated on 12/17, failed weaning trial 6 times. Bronchoscopy on 12/20. Completed antibiotic course (Levaquin, Zosyn, doxycycline). Duplex 12/14 negative for DVT.   -RVP, Urine Legionella, Strep antigen, MRSA, blood cx negative   -Autoimmune workup negative (SARITHA, ANCA, RF, complement, hep panel)   -CXR 1/3/19: patchy bibasilar airspace opacities    -Plan for trach with CTS today 1/4/19 -> transfer to vent unit after.  -Plan for feeds: NG tube bolus feeds after trach, discuss PEG placement with family if no improvement of mental status   -Fentanyl and Propofol for sedation. Avoid Precedex 2/2 bradycardia   -Solumedrol 60mg daily     #Normocytic Anemia, likely anemia of chronic disease   -Hemoglobin 6.9 -> 8.7 s/p 1 unit PRBCs on 1/3/19   -Iron studies; total iron low, ferritin high, TIBC low   -Keep active T&S, transfuse if < 7     #Tachybrady Syndrome (AF w/ RVR alternating with pauses)   -Per EP, 11 second asystole event likely 2/2 vasovagal episode. No PPM at this time.   -Amiodarone 400mg daily   -No anticoagulation 2/2 diffuse alveolar hemorrhage   -Atropine PRN for bradycardia   -[id number]: 4   -HASBLED: 3    #HLD  -Simvastatin 5mg PO qhs     #HTN   -Currently hypotensive, home medications held     #Varicella Zoster Right Shoulder, resolved   -Completed course of acyclovir     #Constipation  -Senna, lactulose, colace. Last BM on 1/3/19     DVT ppx: Heparin 5000 units q8h   GI ppx: Protonix 40mg IV BID   Diet: NPO  Activity: Increase as tolerated   Dispo: trach placement -> transfer to vent unit   FULL CODE

## 2019-01-04 NOTE — PROGRESS NOTE ADULT - SUBJECTIVE AND OBJECTIVE BOX
Patient is a 73y old  Female who presents with a chief complaint of SOB and rash (04 Jan 2019 05:02)        Over Night Events:        ROS:  See HPI    PHYSICAL EXAM    ICU Vital Signs Last 24 Hrs  T(C): 37.9 (04 Jan 2019 06:00), Max: 37.9 (04 Jan 2019 06:00)  T(F): 100.2 (04 Jan 2019 06:00), Max: 100.2 (04 Jan 2019 06:00)  HR: 92 (04 Jan 2019 06:00) (60 - 92)  BP: 105/58 (04 Jan 2019 06:00) (77/35 - 119/49)  BP(mean): 76 (04 Jan 2019 06:00) (47 - 80)  ABP: --  ABP(mean): --  RR: 18 (04 Jan 2019 06:00) (17 - 26)  SpO2: 96% (04 Jan 2019 06:00) (95% - 100%)      General:  HEENT: BEATRIZ             Lymphatic system: No cervical LN   Lungs: Bilateral BS  Cardiovascular: Regular   Gastrointestinal: Soft, Positive BS  Extremities: No clubbing.  Moves extremities.  Full Range of motion   Skin: Warm, intact  Neurological: No motor or sensory deficit       01-03-19 @ 07:01  -  01-04-19 @ 07:00  --------------------------------------------------------  IN:    fentaNYL Infusion.: 686.4 mL    Packed Red Blood Cells: 325 mL    propofol Infusion: 279 mL  Total IN: 1290.4 mL    OUT:    Indwelling Catheter - Urethral: 1080 mL  Total OUT: 1080 mL    Total NET: 210.4 mL          LABS:                            8.7    11.07 )-----------( 176      ( 04 Jan 2019 04:30 )             26.7                                               01-04    133<L>  |  94<L>  |  28<H>  ----------------------------<  125<H>  4.0   |  28  |  0.7    Ca    8.3<L>      04 Jan 2019 04:30  Mg     2.2     01-04        PT/INR - ( 03 Jan 2019 04:30 )   PT: 13.00 sec;   INR: 1.13 ratio         PTT - ( 03 Jan 2019 04:30 )  PTT:25.7 sec                                                                                                                              Culture - Blood (collected 02 Jan 2019 17:14)  Source: .Blood None  Preliminary Report (04 Jan 2019 01:01):    No growth to date.                                                   Mode: AC/ CMV (Assist Control/ Continuous Mandatory Ventilation)  RR (machine): 18  TV (machine): 450  FiO2: 50  PEEP: 8  ITime: 1  MAP: 18  PIP: 39                                      ABG - ( 03 Jan 2019 14:28 )  pH, Arterial: 7.40  pH, Blood: x     /  pCO2: 51    /  pO2: 109   / HCO3: 32    / Base Excess: 6.3   /  SaO2: 99                  MEDICATIONS  (STANDING):  amiodarone    Tablet 400 milliGRAM(s) Oral daily  chlorhexidine 0.12% Liquid 15 milliLiter(s) Oral Mucosa two times a day  chlorhexidine 4% Liquid 1 Application(s) Topical <User Schedule>  docusate sodium Liquid 100 milliGRAM(s) Oral two times a day  fentaNYL   Infusion. 0.5 MICROgram(s)/kG/Hr (4.035 mL/Hr) IV Continuous <Continuous>  heparin  Injectable 5000 Unit(s) SubCutaneous every 8 hours  lactulose Syrup 20 Gram(s) Oral daily  methylPREDNISolone sodium succinate Injectable 60 milliGRAM(s) IV Push daily  multivitamin 1 Tablet(s) Oral daily  nystatin Powder 1 Application(s) Topical two times a day  pantoprazole  Injectable 40 milliGRAM(s) IV Push two times a day  phenylephrine    Infusion 0.1 MICROgram(s)/kG/Min (1.513 mL/Hr) IV Continuous <Continuous>  propofol Infusion 5 MICROgram(s)/kG/Min (2.421 mL/Hr) IV Continuous <Continuous>  senna Syrup 15 milliLiter(s) Oral daily  simvastatin 5 milliGRAM(s) Oral at bedtime    MEDICATIONS  (PRN):  acetaminophen   Tablet .. 650 milliGRAM(s) Oral every 6 hours PRN Temp greater or equal to 38C (100.4F)  guaiFENesin    Syrup 200 milliGRAM(s) Oral every 6 hours PRN Cough      Xrays:                                                                                     ECHO Patient is a 73y old  Female who presents with a chief complaint of SOB and rash (04 Jan 2019 05:02)        Over Night Events:    no overnight events  remains intubated  not on pressors    ROS:  See HPI    PHYSICAL EXAM    ICU Vital Signs Last 24 Hrs  T(C): 37.9 (04 Jan 2019 06:00), Max: 37.9 (04 Jan 2019 06:00)  T(F): 100.2 (04 Jan 2019 06:00), Max: 100.2 (04 Jan 2019 06:00)  HR: 92 (04 Jan 2019 06:00) (60 - 92)  BP: 105/58 (04 Jan 2019 06:00) (77/35 - 119/49)  BP(mean): 76 (04 Jan 2019 06:00) (47 - 80)  ABP: --  ABP(mean): --  RR: 18 (04 Jan 2019 06:00) (17 - 26)  SpO2: 96% (04 Jan 2019 06:00) (95% - 100%)      General: sedated  HEENT: BEATRIZ             Lymphatic system: No cervical LN   Lungs: Bilateral BS reduced b/l  Cardiovascular: Regular   Gastrointestinal: Soft, Positive BS  Extremities: No clubbing.  Moves extremities.  Full Range of motion no edema  Skin: Warm, intact  Neurological: No motor or sensory deficit       01-03-19 @ 07:01  -  01-04-19 @ 07:00  --------------------------------------------------------  IN:    fentaNYL Infusion.: 686.4 mL    Packed Red Blood Cells: 325 mL    propofol Infusion: 279 mL  Total IN: 1290.4 mL    OUT:    Indwelling Catheter - Urethral: 1080 mL  Total OUT: 1080 mL    Total NET: 210.4 mL          LABS:                            8.7    11.07 )-----------( 176      ( 04 Jan 2019 04:30 )             26.7                                               01-04    133<L>  |  94<L>  |  28<H>  ----------------------------<  125<H>  4.0   |  28  |  0.7    Ca    8.3<L>      04 Jan 2019 04:30  Mg     2.2     01-04        PT/INR - ( 03 Jan 2019 04:30 )   PT: 13.00 sec;   INR: 1.13 ratio         PTT - ( 03 Jan 2019 04:30 )  PTT:25.7 sec                                                                                                                              Culture - Blood (collected 02 Jan 2019 17:14)  Source: .Blood None  Preliminary Report (04 Jan 2019 01:01):    No growth to date.                                                   Mode: AC/ CMV (Assist Control/ Continuous Mandatory Ventilation)  RR (machine): 18  TV (machine): 450  FiO2: 50  PEEP: 8  ITime: 1  MAP: 18  PIP: 39                                      ABG - ( 03 Jan 2019 14:28 )  pH, Arterial: 7.40  pH, Blood: x     /  pCO2: 51    /  pO2: 109   / HCO3: 32    / Base Excess: 6.3   /  SaO2: 99                  MEDICATIONS  (STANDING):  amiodarone    Tablet 400 milliGRAM(s) Oral daily  chlorhexidine 0.12% Liquid 15 milliLiter(s) Oral Mucosa two times a day  chlorhexidine 4% Liquid 1 Application(s) Topical <User Schedule>  docusate sodium Liquid 100 milliGRAM(s) Oral two times a day  fentaNYL   Infusion. 0.5 MICROgram(s)/kG/Hr (4.035 mL/Hr) IV Continuous <Continuous>  heparin  Injectable 5000 Unit(s) SubCutaneous every 8 hours  lactulose Syrup 20 Gram(s) Oral daily  methylPREDNISolone sodium succinate Injectable 60 milliGRAM(s) IV Push daily  multivitamin 1 Tablet(s) Oral daily  nystatin Powder 1 Application(s) Topical two times a day  pantoprazole  Injectable 40 milliGRAM(s) IV Push two times a day  phenylephrine    Infusion 0.1 MICROgram(s)/kG/Min (1.513 mL/Hr) IV Continuous <Continuous>  propofol Infusion 5 MICROgram(s)/kG/Min (2.421 mL/Hr) IV Continuous <Continuous>  senna Syrup 15 milliLiter(s) Oral daily  simvastatin 5 milliGRAM(s) Oral at bedtime    MEDICATIONS  (PRN):  acetaminophen   Tablet .. 650 milliGRAM(s) Oral every 6 hours PRN Temp greater or equal to 38C (100.4F)  guaiFENesin    Syrup 200 milliGRAM(s) Oral every 6 hours PRN Cough      Xrays:  unchanged bibasila opacities                                                                       < from: Transthoracic Echocardiogram (12.15.18 @ 11:18) >  Summary:   1. Left ventricular ejection fraction, by visual estimation, is 50 to   55%.   2. Normal left ventricular size and wall thicknesses, with normal   systolic function.   3. Spectral Doppler shows impaired relaxation pattern of left   ventricular myocardial filling (Grade I diastolic dysfunction).   4. Mild tricuspidregurgitation.   5. Mild aortic regurgitation.   6. Pulmonic valve regurgitation.   7. Estimated pulmonary artery systolic pressure is 52.5 mmHg assuming a   right atrial pressure of 8 mmHg, which is consistent with moderate   pulmonary hypertension.    < end of copied text >

## 2019-01-04 NOTE — PROGRESS NOTE ADULT - ASSESSMENT
IMPRESSION:    Acute on chronic hypercapnic respiratory failure s/p intubation s/p bronchoscopy alveolar hem, ? etiology was on lovenox  Failed SBT several times  IDALMIS / OHS  Pneumonia/Aspiration/ fluid overload  Tachy pamela syndrome with pauses      PLAN:     CNS: no SAT due to agitation, keep propofol and fentanyl, dont use precedex can cause bradycardia at high doses    HEENT: Oral care    PULMONARY:  HOB @ 45 degrees, vent changes as follows: increase TV to 450, no SBT today, tracheostomy today with CTS, c/w solumedrol     CARDIOVASCULAR: on amiodarone per EP, no need for PPM, Keep equal balance    GI: GI prophylaxis. Continue og feeding     RENAL:  Follow up lytes.  Correct as needed BMP. Kent catheter    INFECTIOUS DISEASE: Follow up cultures    HEMATOLOGICAL:  DVT prophylaxis.    ENDOCRINE: rheumatological work up: negative    POOR PROGNOSIS  transfer to vent unit after tracheostomy  Monitor in ICU for now IMPRESSION:    Acute on chronic hypercapnic respiratory failure s/p intubation s/p bronchoscopy alveolar hem, ? etiology was on lovenox  Failed SBT several times  IDALMIS / OHS  Pneumonia/Aspiration/ fluid overload  Tachy pamela syndrome with pauses      PLAN:     CNS: no SAT due to agitation, keep propofol and fentanyl, dont use precedex can cause bradycardia at high doses    HEENT: Oral care    PULMONARY:  HOB @ 45 degrees, no vent changes, no SBT today, tracheostomy today with CTS, c/w solumedrol for DAH    CARDIOVASCULAR: on amiodarone per EP, no need for PPM, Keep equal balance    GI: GI prophylaxis. Continue og feeding     RENAL:  Follow up lytes.  Correct as needed BMP. Kent catheter    INFECTIOUS DISEASE: Follow up cultures    HEMATOLOGICAL:  DVT prophylaxis.    ENDOCRINE: rheumatological work up: negative    POOR PROGNOSIS  Monitor in ICU for now

## 2019-01-04 NOTE — CHART NOTE - NSCHARTNOTEFT_GEN_A_CORE
Patient was brought to OR, and upon arrival to OR, HR went from SR 80s  to AF tachyarrhythmia 130-160s. BP maintained  /50-60s at this time. HR would fluctuate between 80s- 130s periodically, and decision was made with Dr. Toro to cancel tracheostomy. Bronchoscopy was performed and patient was transported back to CCU with full monitors. Full report was given to CCU team. Patient with /67, HR 95, O2 saturation 95% at this time.

## 2019-01-05 NOTE — PROGRESS NOTE ADULT - SUBJECTIVE AND OBJECTIVE BOX
SUBJ  PT SEDATED ON VENT.  WE NOTED IS IN NSR ON AMIODARONE   SHE HAS BEEN GOING IN AND OUT OF AFIB.  IN NEED OF TRACH.       MEDICATIONS  (STANDING):  amiodarone Infusion 1 mG/Min (33.333 mL/Hr) IV Continuous <Continuous>  amiodarone Infusion 0.5 mG/Min (16.667 mL/Hr) IV Continuous <Continuous>  chlorhexidine 0.12% Liquid 15 milliLiter(s) Oral Mucosa two times a day  chlorhexidine 4% Liquid 1 Application(s) Topical <User Schedule>  docusate sodium Liquid 100 milliGRAM(s) Oral two times a day  fentaNYL   Infusion. 0.5 MICROgram(s)/kG/Hr (4.035 mL/Hr) IV Continuous <Continuous>  heparin  Injectable 5000 Unit(s) SubCutaneous every 8 hours  lactulose Syrup 20 Gram(s) Oral daily  methylPREDNISolone sodium succinate Injectable 60 milliGRAM(s) IV Push daily  multivitamin 1 Tablet(s) Oral daily  nystatin Powder 1 Application(s) Topical two times a day  pantoprazole  Injectable 40 milliGRAM(s) IV Push two times a day  phenylephrine    Infusion 0.5 MICROgram(s)/kG/Min (7.566 mL/Hr) IV Continuous <Continuous>  propofol Infusion 5 MICROgram(s)/kG/Min (2.421 mL/Hr) IV Continuous <Continuous>  senna Syrup 15 milliLiter(s) Oral daily  simvastatin 5 milliGRAM(s) Oral at bedtime    MEDICATIONS  (PRN):  acetaminophen   Tablet .. 650 milliGRAM(s) Oral every 6 hours PRN Temp greater or equal to 38C (100.4F)  guaiFENesin    Syrup 200 milliGRAM(s) Oral every 6 hours PRN Cough            Vital Signs Last 24 Hrs  T(C): 37.9 (05 Jan 2019 12:00), Max: 37.9 (05 Jan 2019 12:00)  T(F): 100.2 (05 Jan 2019 12:00), Max: 100.2 (05 Jan 2019 12:00)  HR: 86 (05 Jan 2019 16:00) (46 - 152)  BP: 158/79 (05 Jan 2019 16:00) (70/39 - 161/73)  BP(mean): 121 (05 Jan 2019 16:00) (48 - 121)  RR: 26 (05 Jan 2019 16:00) (17 - 33)  SpO2: 97% (05 Jan 2019 16:00) (91% - 100%)     REVIEW OF SYSTEMS:  CONSTITUTIONAL: No fever, weight loss, or fatigue  CARDIOLOGY: PAtient denies chest pain, shortness of breath or syncopal episodes.   RESPIRATORY: denies shortness of breath, wheezeing.   NEUROLOGICAL: NO weakness, no focal deficits to report.  ENDOCRINOLOGICAL: no recent change in diabetic medications.   GI: no BRBPR, no N,V,diarrhea.    PSYCHIATRY: normal mood and affect  HEENT: no nasal discharge, no ecchymosis  SKIN: no ecchymosis, no breakdown  MUSCULOSKELETAL: Full range of motion x4.        PHYSICAL EXAM:  · CONSTITUTIONAL:	Well-developed, well nourished    BMI-  ·RESPIRATORY:   airway patent; breath sounds equal; good air movement; respirations non-labored; clear to auscultation bilaterally; no chest wall tenderness; no intercostal retractions; no rales,rhonchi or wheeze  · CARDIOVASCULAR	regular rate and rhythm  no rub  no murmur  normal PMI  · EXTREMITIES: No cyanosis, clubbing or edema  · VASCULAR: 	Equal and normal pulses (carotid, femoral, dorsalis pedis)  	  TELEMETRY:    ECG:    TTE:    LABS:                        9.9    11.97 )-----------( 185      ( 05 Jan 2019 04:53 )             30.5     01-05    145  |  101  |  22<H>  ----------------------------<  193<H>  3.3<L>   |  28  |  0.7    Ca    8.2<L>      05 Jan 2019 04:53  Mg     1.9     01-05              I&O's Summary    04 Jan 2019 07:01  -  05 Jan 2019 07:00  --------------------------------------------------------  IN: 1736.5 mL / OUT: 2685 mL / NET: -948.5 mL    05 Jan 2019 07:01  -  05 Jan 2019 16:56  --------------------------------------------------------  IN: 939.3 mL / OUT: 735 mL / NET: 204.3 mL      BNP  RADIOLOGY & ADDITIONAL STUDIES:    IMPRESSION AND PLAN:    CONTINUE AMIO FOR NOW.   CAN GIVE IV LOPRERSSOR 2.5 TO 5MG PENDING BP PRIOR TO OR DURING TRACH FOR ANY RETURN TO AFIB WITH RVR.

## 2019-01-05 NOTE — PROGRESS NOTE ADULT - SUBJECTIVE AND OBJECTIVE BOX
DENISE KALR 73y Female  MRN#: [id number]   CODE STATUS:________      SUBJECTIVE  HPI    Overnight events     Subjective complaints     Present Today:   - Kent  - Drains   - Central Line :                                             ----------------------------------------------------------  OBJECTIVE  PAST MEDICAL & SURGICAL HISTORY  Hypertension  No significant past surgical history                                            -----------------------------------------------------------  ALLERGIES:  No Known Allergies                                            ------------------------------------------------------------  MEDICATIONS:  STANDING MEDICATIONS  amiodarone Infusion 1 mG/Min IV Continuous <Continuous>  amiodarone Infusion 0.5 mG/Min IV Continuous <Continuous>  chlorhexidine 0.12% Liquid 15 milliLiter(s) Oral Mucosa two times a day  chlorhexidine 4% Liquid 1 Application(s) Topical <User Schedule>  docusate sodium Liquid 100 milliGRAM(s) Oral two times a day  fentaNYL   Infusion. 0.5 MICROgram(s)/kG/Hr IV Continuous <Continuous>  heparin  Injectable 5000 Unit(s) SubCutaneous every 8 hours  lactulose Syrup 20 Gram(s) Oral daily  methylPREDNISolone sodium succinate Injectable 60 milliGRAM(s) IV Push daily  multivitamin 1 Tablet(s) Oral daily  nystatin Powder 1 Application(s) Topical two times a day  pantoprazole  Injectable 40 milliGRAM(s) IV Push two times a day  phenylephrine    Infusion 0.5 MICROgram(s)/kG/Min IV Continuous <Continuous>  propofol Infusion 5 MICROgram(s)/kG/Min IV Continuous <Continuous>  senna Syrup 15 milliLiter(s) Oral daily  simvastatin 5 milliGRAM(s) Oral at bedtime    PRN MEDICATIONS  acetaminophen   Tablet .. 650 milliGRAM(s) Oral every 6 hours PRN  guaiFENesin    Syrup 200 milliGRAM(s) Oral every 6 hours PRN                                            ------------------------------------------------------------  VITAL SIGNS: Last 24 Hours  T(C): 37.9 (05 Jan 2019 12:00), Max: 37.9 (05 Jan 2019 12:00)  T(F): 100.2 (05 Jan 2019 12:00), Max: 100.2 (05 Jan 2019 12:00)  HR: 86 (05 Jan 2019 16:00) (46 - 152)  BP: 158/79 (05 Jan 2019 16:00) (70/39 - 161/73)  BP(mean): 121 (05 Jan 2019 16:00) (48 - 121)  RR: 26 (05 Jan 2019 16:00) (17 - 33)  SpO2: 97% (05 Jan 2019 16:00) (91% - 100%)      01-04-19 @ 07:01  -  01-05-19 @ 07:00  --------------------------------------------------------  IN: 1736.5 mL / OUT: 2685 mL / NET: -948.5 mL    01-05-19 @ 07:01 - 01-05-19 @ 16:46  --------------------------------------------------------  IN: 939.3 mL / OUT: 735 mL / NET: 204.3 mL                                             --------------------------------------------------------------  LABS:                        9.9    11.97 )-----------( 185      ( 05 Jan 2019 04:53 )             30.5     01-05    145  |  101  |  22<H>  ----------------------------<  193<H>  3.3<L>   |  28  |  0.7    Ca    8.2<L>      05 Jan 2019 04:53  Mg     1.9     01-05          ABG - ( 04 Jan 2019 08:21 )  pH, Arterial: 7.40  pH, Blood: x     /  pCO2: 50    /  pO2: 65    / HCO3: 31    / Base Excess: 5.4   /  SaO2: 95                      Culture - Acid Fast - Bronchial w/Smear (collected 04 Jan 2019 14:45)  Source: .Broncial None    Culture - Bronchial (collected 04 Jan 2019 14:45)  Source: .Broncial None  Gram Stain (04 Jan 2019 23:57):    Few polymorphonuclear leukocytes per low power field    No Squamous epithelial cells per low power field    Rare Gram Positive Cocci in Pairs and Chains per oil power field    Culture - Blood (collected 02 Jan 2019 17:14)  Source: .Blood None  Preliminary Report (04 Jan 2019 01:01):    No growth to date.                                                    -------------------------------------------------------------  RADIOLOGY:                                            --------------------------------------------------------------    PHYSICAL EXAM:    GENERAL: NAD, well-developed, AAOx3  HEENT:  Atraumatic, Normocephalic. EOMI, PERRLA, conjunctiva and sclera clear, No JVD  PULMONARY: Clear to auscultation bilaterally; No wheeze  CARDIOVASCULAR: Regular rate and rhythm; No murmurs, rubs, or gallops  GASTROINTESTINAL: Soft, Nontender, Nondistended; Bowel sounds present  MUSCULOSKELETAL:  2+ Peripheral Pulses, No clubbing, cyanosis, or edema  NEUROLOGY: non-focal  SKIN: No rashes or lesions                                           --------------------------------------------------------------    ASSESSMENT & PLAN    Past medical history and hospital course                                                                                                           ----------------------------------------------------  # DVT prophylaxis     # GI prophylaxis     # Diet     # Activity     # Code status     # Disposition                                                                              -------------------------------------------------------- DENISE BARAJAS 73y Female  MRN#: [id number]   CODE STATUS: FULL CODE      SUBJECTIVE  Overnight events - None    Subjective complaints - Pt is intubated, on sedation.     OBJECTIVE  PAST MEDICAL & SURGICAL HISTORY  Hypertension  No significant past surgical history                                            -----------------------------------------------------------  ALLERGIES:  No Known Allergies                                            ------------------------------------------------------------  MEDICATIONS:  STANDING MEDICATIONS  amiodarone Infusion 1 mG/Min IV Continuous <Continuous>  amiodarone Infusion 0.5 mG/Min IV Continuous <Continuous>  chlorhexidine 0.12% Liquid 15 milliLiter(s) Oral Mucosa two times a day  chlorhexidine 4% Liquid 1 Application(s) Topical <User Schedule>  docusate sodium Liquid 100 milliGRAM(s) Oral two times a day  fentaNYL   Infusion. 0.5 MICROgram(s)/kG/Hr IV Continuous <Continuous>  heparin  Injectable 5000 Unit(s) SubCutaneous every 8 hours  lactulose Syrup 20 Gram(s) Oral daily  methylPREDNISolone sodium succinate Injectable 60 milliGRAM(s) IV Push daily  multivitamin 1 Tablet(s) Oral daily  nystatin Powder 1 Application(s) Topical two times a day  pantoprazole  Injectable 40 milliGRAM(s) IV Push two times a day  phenylephrine    Infusion 0.5 MICROgram(s)/kG/Min IV Continuous <Continuous>  propofol Infusion 5 MICROgram(s)/kG/Min IV Continuous <Continuous>  senna Syrup 15 milliLiter(s) Oral daily  simvastatin 5 milliGRAM(s) Oral at bedtime    PRN MEDICATIONS  acetaminophen   Tablet .. 650 milliGRAM(s) Oral every 6 hours PRN  guaiFENesin    Syrup 200 milliGRAM(s) Oral every 6 hours PRN                                            ------------------------------------------------------------  VITAL SIGNS: Last 24 Hours  T(C): 37.9 (05 Jan 2019 12:00), Max: 37.9 (05 Jan 2019 12:00)  T(F): 100.2 (05 Jan 2019 12:00), Max: 100.2 (05 Jan 2019 12:00)  HR: 86 (05 Jan 2019 16:00) (46 - 152)  BP: 158/79 (05 Jan 2019 16:00) (70/39 - 161/73)  BP(mean): 121 (05 Jan 2019 16:00) (48 - 121)  RR: 26 (05 Jan 2019 16:00) (17 - 33)  SpO2: 97% (05 Jan 2019 16:00) (91% - 100%)      01-04-19 @ 07:01  -  01-05-19 @ 07:00  --------------------------------------------------------  IN: 1736.5 mL / OUT: 2685 mL / NET: -948.5 mL    01-05-19 @ 07:01  -  01-05-19 @ 16:46  --------------------------------------------------------  IN: 939.3 mL / OUT: 735 mL / NET: 204.3 mL                                             --------------------------------------------------------------  LABS:                        9.9    11.97 )-----------( 185      ( 05 Jan 2019 04:53 )             30.5     01-05    145  |  101  |  22<H>  ----------------------------<  193<H>  3.3<L>   |  28  |  0.7    Ca    8.2<L>      05 Jan 2019 04:53  Mg     1.9     01-05          ABG - ( 04 Jan 2019 08:21 )  pH, Arterial: 7.40  pH, Blood: x     /  pCO2: 50    /  pO2: 65    / HCO3: 31    / Base Excess: 5.4   /  SaO2: 95          Culture - Acid Fast - Bronchial w/Smear (collected 04 Jan 2019 14:45)  Source: .Broncial None    Culture - Bronchial (collected 04 Jan 2019 14:45)  Source: .Broncial None  Gram Stain (04 Jan 2019 23:57):    Few polymorphonuclear leukocytes per low power field    No Squamous epithelial cells per low power field    Rare Gram Positive Cocci in Pairs and Chains per oil power field    Culture - Blood (collected 02 Jan 2019 17:14)  Source: .Blood None  Preliminary Report (04 Jan 2019 01:01):    No growth to date.                                            -------------------------------------------------------------  RADIOLOGY:  < from: Xray Chest 1 View- PORTABLE-Urgent (01.05.19 @ 09:40) >  Impression:      Stable bilateral airspace opacities. Low lung volumes. No pneumothorax.    Support lines and tubes as described.                                            --------------------------------------------------------------    PHYSICAL EXAM:  GEN: No acute distress, intubated and sedated, closes eyes and squeezes hands on command, tracking with eyes   LUNGS: Bilateral breath sounds, on ventilator   HEART: Regular rate, irregular rhythm, no murmurs appreciated   ABD: Soft, non-tender, minimally distended. Bowel sounds present.   EXT: Non cyanotic, nonedematous, 2+ peripheral pulses, skin intact    NEURO: Moving all extremities                                           --------------------------------------------------------------    ASSESSMENT & PLAN    73F from [facility] w/ PMH of HTN presented to [facility] with worsening generalized weakness and SOB for 3 days. Currently admitted to CCU with diffuse alveolar hemorrhage 2/2 Lovenox, intubated and sedated. Scheduled for tracheostomy placement with CTS surgery, will be transferred to vent unit after. Kent catheter and central line may be removed after successful trach. Long-tube feeding plan to be determined.     #Acute on Chronic Hypercapnic Respiratory Failure s/p intubation, Diffuse Alveolar Hemorrhage likely 2/2 Lovenox   -Intubated on 12/17, failed weaning trial 6 times. Bronchoscopy on 12/20. Completed antibiotic course (Levaquin, Zosyn, doxycycline). Duplex 12/14 negative for DVT.   -RVP, Urine Legionella, Strep antigen, MRSA, blood cx negative   -Autoimmune workup negative (SARITHA, ANCA, RF, complement, hep panel)   -Plan for trach , unsure when--> transfer to vent unit after. Medical optimization needed before as per CTS. Trach not attempted on 1/4 by CTS as pt was hypotensive and in afib during the procedure.   -Plan for feeds: NG tube bolus feeds after trach, discuss PEG placement with family if no improvement of mental status   -Fentanyl and Propofol for sedation. Avoid Precedex 2/2 bradycardia   -Solumedrol 60mg daily     #Normocytic Anemia, likely anemia of chronic disease   -Hemoglobin 6.9 -> 9.9; s/p 1 unit PRBCs on 1/3/19   -Iron studies; total iron low, ferritin high, TIBC low   -Keep active T&S, transfuse if < 7     #Tachybrady Syndrome (AF w/ RVR alternating with pauses)   -Per EP, 11 second asystole event likely 2/2 vasovagal episode. No PPM at this time.   -Amiodarone 400mg daily ; check LFT  -No anticoagulation 2/2 diffuse alveolar hemorrhage   -Atropine PRN for bradycardia   -Can give 2.5 to 5mg of Lopressor IV before or during the tracheostomy procedure as per cardiology  -[id number]: 4   -HASBLED: 3    #HLD-Simvastatin 5mg PO qhs   #HTN -Currently hypotensive, home medications held   #Varicella Zoster Right Shoulder, resolved -Completed course of acyclovir   #Constipation-Senna, lactulose, colace. Last BM on 1/3/19     DVT ppx: Heparin 5000 units q8h   GI ppx: Protonix 40mg IV BID   Diet: NPO  Activity: Increase as tolerated   Dispo: trach placement -> transfer to vent unit   FULL CODE

## 2019-01-05 NOTE — PROGRESS NOTE ADULT - ASSESSMENT
IMPRESSION:    Acute on chronic hypercapnic respiratory failure s/p intubation s/p bronchoscopy alveolar hem, ? etiology was on lovenox  Failed SBT several times  IDALMIS / OHS  Pneumonia/Aspiration/ fluid overload  Tachy pamela syndrome with pauses      PLAN:     CNS: no SAT due to agitation, keep propofol and fentanyl, dont use precedex can cause bradycardia at high doses    HEENT: Oral care    PULMONARY:  HOB @ 45 degrees, no vent changes, no SBT today, tracheostomy today with CTS, c/w solumedrol for DAH    CARDIOVASCULAR: on amiodarone per EP, no need for PPM, Keep equal balance    GI: GI prophylaxis. Continue og feeding     RENAL:  Follow up lytes.  Correct as needed BMP. Kent catheter    INFECTIOUS DISEASE: Follow up cultures    HEMATOLOGICAL:  DVT prophylaxis.    ENDOCRINE: rheumatological work up: negative    POOR PROGNOSIS  Monitor in ICU for now

## 2019-01-05 NOTE — PROGRESS NOTE ADULT - SUBJECTIVE AND OBJECTIVE BOX
Over Night Events: unable to have trach s/p bronch multiple episodes a fib// on amnio drip        ROS:  See HPI    PHYSICAL EXAM    ICU Vital Signs Last 24 Hrs  T(C): 37.9 (05 Jan 2019 12:00), Max: 37.9 (05 Jan 2019 12:00)  T(F): 100.2 (05 Jan 2019 12:00), Max: 100.2 (05 Jan 2019 12:00)  HR: 86 (05 Jan 2019 16:00) (46 - 152)  BP: 158/79 (05 Jan 2019 16:00) (70/39 - 161/73)  BP(mean): 121 (05 Jan 2019 16:00) (48 - 121)  ABP: --  ABP(mean): --  RR: 26 (05 Jan 2019 16:00) (17 - 33)  SpO2: 97% (05 Jan 2019 16:00) (91% - 100%)        01-04-19 @ 07:01  -  01-05-19 @ 07:00  --------------------------------------------------------  IN: 1736.5 mL / OUT: 2685 mL / NET: -948.5 mL    01-05-19 @ 07:01  -  01-05-19 @ 16:17  --------------------------------------------------------  IN: 939.3 mL / OUT: 735 mL / NET: 204.3 mL        General:  HEENT:                Lymph Nodes: NO cervical LN   Lungs: Bilateral BS  Cardiovascular: Regular   Abdomen: Soft, Positive BS  Extremities: No clubbing   Skin:   Neurological:       LABS:                          9.9    11.97 )-----------( 185      ( 05 Jan 2019 04:53 )             30.5                                               01-05    145  |  101  |  22<H>  ----------------------------<  193<H>  3.3<L>   |  28  |  0.7    Ca    8.2<L>      05 Jan 2019 04:53  Mg     1.9     01-05                                                                                                                                      Culture - Acid Fast - Bronchial w/Smear (collected 04 Jan 2019 14:45)  Source: .Broncial None    Culture - Bronchial (collected 04 Jan 2019 14:45)  Source: .Broncial None  Gram Stain (04 Jan 2019 23:57):    Few polymorphonuclear leukocytes per low power field    No Squamous epithelial cells per low power field    Rare Gram Positive Cocci in Pairs and Chains per oil power field    Culture - Blood (collected 02 Jan 2019 17:14)  Source: .Blood None  Preliminary Report (04 Jan 2019 01:01):    No growth to date.                                                   Mode: AC/ CMV (Assist Control/ Continuous Mandatory Ventilation)  RR (machine): 18  TV (machine): 450  FiO2: 50  PEEP: 8  ITime: 1  MAP: 22  PIP: 40                                      ABG - ( 04 Jan 2019 08:21 )  pH, Arterial: 7.40  pH, Blood: x     /  pCO2: 50    /  pO2: 65    / HCO3: 31    / Base Excess: 5.4   /  SaO2: 95                  MEDICATIONS  (STANDING):  amiodarone Infusion 1 mG/Min (33.333 mL/Hr) IV Continuous <Continuous>  amiodarone Infusion 0.5 mG/Min (16.667 mL/Hr) IV Continuous <Continuous>  chlorhexidine 0.12% Liquid 15 milliLiter(s) Oral Mucosa two times a day  chlorhexidine 4% Liquid 1 Application(s) Topical <User Schedule>  docusate sodium Liquid 100 milliGRAM(s) Oral two times a day  fentaNYL   Infusion. 0.5 MICROgram(s)/kG/Hr (4.035 mL/Hr) IV Continuous <Continuous>  heparin  Injectable 5000 Unit(s) SubCutaneous every 8 hours  lactulose Syrup 20 Gram(s) Oral daily  methylPREDNISolone sodium succinate Injectable 60 milliGRAM(s) IV Push daily  multivitamin 1 Tablet(s) Oral daily  nystatin Powder 1 Application(s) Topical two times a day  pantoprazole  Injectable 40 milliGRAM(s) IV Push two times a day  phenylephrine    Infusion 0.5 MICROgram(s)/kG/Min (7.566 mL/Hr) IV Continuous <Continuous>  propofol Infusion 5 MICROgram(s)/kG/Min (2.421 mL/Hr) IV Continuous <Continuous>  senna Syrup 15 milliLiter(s) Oral daily  simvastatin 5 milliGRAM(s) Oral at bedtime    MEDICATIONS  (PRN):  acetaminophen   Tablet .. 650 milliGRAM(s) Oral every 6 hours PRN Temp greater or equal to 38C (100.4F)  guaiFENesin    Syrup 200 milliGRAM(s) Oral every 6 hours PRN Cough      Xrays:                                                                                     ECHO

## 2019-01-06 NOTE — PROGRESS NOTE ADULT - SUBJECTIVE AND OBJECTIVE BOX
SUBJECTIVE:    Patient is a 73y old Female who presents with a chief complaint of SOB and rash (05 Jan 2019 16:56)    Currently admitted to medicine with the primary diagnosis of diffuse alveolar hemorrhage      Today is hospital day 26d. Overnight, patient desatted again and Fi02 was increased. Tube feed residual > 700cc.     PAST MEDICAL & SURGICAL HISTORY  Hypertension  No significant past surgical history    SOCIAL HISTORY:  Patient denies alcohol, tobacco, and recreational drug use.   From [facility] (assisted living), functional at baseline     ALLERGIES:  No Known Allergies    MEDICATIONS:  STANDING MEDICATIONS  amiodarone    Tablet 200 milliGRAM(s) Oral every 12 hours  amiodarone Infusion 1 mG/Min IV Continuous <Continuous>  amiodarone Infusion 0.5 mG/Min IV Continuous <Continuous>  chlorhexidine 0.12% Liquid 15 milliLiter(s) Oral Mucosa two times a day  chlorhexidine 4% Liquid 1 Application(s) Topical <User Schedule>  docusate sodium Liquid 100 milliGRAM(s) Oral two times a day  fentaNYL   Infusion. 0.5 MICROgram(s)/kG/Hr IV Continuous <Continuous>  heparin  Injectable 5000 Unit(s) SubCutaneous every 8 hours  lactulose Syrup 20 Gram(s) Oral daily  methylPREDNISolone sodium succinate Injectable 60 milliGRAM(s) IV Push daily  multivitamin 1 Tablet(s) Oral daily  nystatin Powder 1 Application(s) Topical two times a day  pantoprazole  Injectable 40 milliGRAM(s) IV Push two times a day  phenylephrine    Infusion 0.5 MICROgram(s)/kG/Min IV Continuous <Continuous>  propofol Infusion 5 MICROgram(s)/kG/Min IV Continuous <Continuous>  senna Syrup 15 milliLiter(s) Oral daily  simvastatin 5 milliGRAM(s) Oral at bedtime    PRN MEDICATIONS  acetaminophen   Tablet .. 650 milliGRAM(s) Oral every 6 hours PRN  guaiFENesin    Syrup 200 milliGRAM(s) Oral every 6 hours PRN    VITALS:   T(F): 98.8  HR: 98  BP: 102/51  RR: 29  SpO2: 92%    LABS:                        9.9    11.97 )-----------( 185      ( 05 Jan 2019 04:53 )             30.5     01-05    145  |  101  |  22<H>  ----------------------------<  193<H>  3.3<L>   |  28  |  0.7    Ca    8.2<L>      05 Jan 2019 04:53  Mg     1.9     01-05    ABG - ( 05 Jan 2019 08:42 )  pH, Arterial: 7.49  pH, Blood: x     /  pCO2: 41    /  pO2: 71    / HCO3: 31    / Base Excess: 6.7   /  SaO2: 97          Culture - Acid Fast - Bronchial w/Smear (collected 04 Jan 2019 14:45)  Source: .Broncial None    Culture - Bronchial (collected 04 Jan 2019 14:45)  Source: .Broncial None  Gram Stain (04 Jan 2019 23:57):    Few polymorphonuclear leukocytes per low power field    No Squamous epithelial cells per low power field    Rare Gram Positive Cocci in Pairs and Chains per oil power field  Preliminary Report (05 Jan 2019 20:30)    Moderate Proteus mirabilis    RADIOLOGY:  AM CXR ordered  KUB ordered     PHYSICAL EXAM:  GEN: No acute distress, lying comfortably in bed   LUNGS: Clear to auscultation bilaterally, no labored breathing   HEART: S1/S2 present. RRR.   ABD: Soft, non-tender, non-distended. Bowel sounds present.   EXT: Noncyanotic, nonedematous, 2+ peripheral pulses, skin intact   NEURO: AAOX3, CN II-XII grossly intact     Lines/Tubes   Endotracheal tube  OG tube  Kent catheter  R IJ SUBJECTIVE:    Patient is a 73y old Female who presents with a chief complaint of SOB and rash (05 Jan 2019 16:56)    Currently admitted to medicine with the primary diagnosis of diffuse alveolar hemorrhage      Today is hospital day 26d. Overnight, patient desatted again and Fi02 was increased. Tube feed residual > 700cc and appearing bilious. Patient made NPO, tube feeds held, and KUB ordered to rule out obstruction as patient has not had a bowel movement in a few days and abdomen is distended. Of note, on rectal exam 24 hours ago there was no stool in the rectal vault. Patient passing gas.     PAST MEDICAL & SURGICAL HISTORY  Hypertension  No significant past surgical history    SOCIAL HISTORY:  Patient denies alcohol, tobacco, and recreational drug use.   From [facility] (assisted living), functional at baseline     ALLERGIES:  No Known Allergies    MEDICATIONS:  STANDING MEDICATIONS  amiodarone    Tablet 200 milliGRAM(s) Oral every 12 hours  amiodarone Infusion 1 mG/Min IV Continuous <Continuous>  amiodarone Infusion 0.5 mG/Min IV Continuous <Continuous>  chlorhexidine 0.12% Liquid 15 milliLiter(s) Oral Mucosa two times a day  chlorhexidine 4% Liquid 1 Application(s) Topical <User Schedule>  docusate sodium Liquid 100 milliGRAM(s) Oral two times a day  fentaNYL   Infusion. 0.5 MICROgram(s)/kG/Hr IV Continuous <Continuous>  heparin  Injectable 5000 Unit(s) SubCutaneous every 8 hours  lactulose Syrup 20 Gram(s) Oral daily  methylPREDNISolone sodium succinate Injectable 60 milliGRAM(s) IV Push daily  multivitamin 1 Tablet(s) Oral daily  nystatin Powder 1 Application(s) Topical two times a day  pantoprazole  Injectable 40 milliGRAM(s) IV Push two times a day  phenylephrine    Infusion 0.5 MICROgram(s)/kG/Min IV Continuous <Continuous>  propofol Infusion 5 MICROgram(s)/kG/Min IV Continuous <Continuous>  senna Syrup 15 milliLiter(s) Oral daily  simvastatin 5 milliGRAM(s) Oral at bedtime    PRN MEDICATIONS  acetaminophen   Tablet .. 650 milliGRAM(s) Oral every 6 hours PRN  guaiFENesin    Syrup 200 milliGRAM(s) Oral every 6 hours PRN    VITALS:   T(F): 98.8  HR: 98  BP: 102/51  RR: 29  SpO2: 92%    LABS:                        9.9    11.97 )-----------( 185      ( 05 Jan 2019 04:53 )             30.5     01-05    145  |  101  |  22<H>  ----------------------------<  193<H>  3.3<L>   |  28  |  0.7    Ca    8.2<L>      05 Jan 2019 04:53  Mg     1.9     01-05    ABG - ( 05 Jan 2019 08:42 )  pH, Arterial: 7.49  pH, Blood: x     /  pCO2: 41    /  pO2: 71    / HCO3: 31    / Base Excess: 6.7   /  SaO2: 97          Culture - Acid Fast - Bronchial w/Smear (collected 04 Jan 2019 14:45)  Source: .Broncial None    Culture - Bronchial (collected 04 Jan 2019 14:45)  Source: .Broncial None  Gram Stain (04 Jan 2019 23:57):    Few polymorphonuclear leukocytes per low power field    No Squamous epithelial cells per low power field    Rare Gram Positive Cocci in Pairs and Chains per oil power field  Preliminary Report (05 Jan 2019 20:30)    Moderate Proteus mirabilis    RADIOLOGY:  AM CXR ordered  KUB ordered     PHYSICAL EXAM:  GEN: No acute distress, intubated and sedated, moves all extremities, follows simple commands when off sedation (eye close, hand squeeze), tracking eye movements   LUNGS: on mechanical ventilation, bilateral breath sounds   HEART: Irregular rhythm, tachycardic   ABD: Soft, non-tender, minimally distended. Bowel sounds present.   EXT: Noncyanotic, nonedematous, 2+ peripheral pulses, skin intact   NEURO: intubated and sedated     Lines/Tubes   Endotracheal tube  OG tube  Kent catheter  R IJ

## 2019-01-06 NOTE — PROGRESS NOTE ADULT - ATTENDING COMMENTS
1/5  check bnp//f/u ep// continue mech vent ///off pressors
1/5  check bnp//f/u ep// continue mech vent ///off pressors    1/6 new overnight events; possible ileus/new temp/ new LLL infiltrate    recurrent episodes of afib; holding trache
12/15 pt seen on rds with team//chart reviewed /// brought to ccu for resp failure//unable to d/c bipap  pt at present off bipap///new onset afib///rash as noted  cardiology consult//bnp  cxr bilateral infiltrates    12/15  case discussed with staff at bedside still requiring bipap// may require intubatin/ speak with family regarding goals of care
Attending Statement: I have personally performed a face to face diagnostic evaluation on this patient. I have reviewed the above note and agree with the history, exam and plan of care, except as I have noted in the text.
Attending Statement: I have personally performed a face to face diagnostic evaluation on this patient. I have written the above note pertaining to the history, exam and plan of care.

## 2019-01-06 NOTE — PROGRESS NOTE ADULT - ASSESSMENT
73F from [facility] w/ PMH of HTN presented to [facility] with worsening generalized weakness and SOB for 3 days. Currently admitted to CCU with diffuse alveolar hemorrhage 2/2 Lovenox, intubated and sedated. Scheduled for tracheostomy placement with CTS surgery, will be transferred to vent unit after. Kent catheter and central line may be removed after successful trach. Long-tube feeding plan to be determined.     #Acute on Chronic Hypercapnic Respiratory Failure s/p intubation, Diffuse Alveolar Hemorrhage likely 2/2 Lovenox   -Intubated on 12/17, failed weaning trial 6 times. Bronchoscopy on 12/20. Completed antibiotic course (Levaquin, Zosyn, doxycycline). Duplex 12/14 negative for DVT.   -RVP, Urine Legionella, Strep antigen, MRSA, blood cx negative   -Recent bronch cx with rare Proteus   -Autoimmune workup negative (SARITHA, ANCA, RF, complement, hep panel)   -Plan for trach, date TBD--> transfer to vent unit after. Medical optimization needed before as per CTS. Trach not attempted on 1/4 by CTS as pt was hypotensive and in afib during the procedure.   -Plan for feeds: NG tube bolus feeds after trach, discuss PEG placement with family if no improvement of mental status   -Fentanyl and Propofol for sedation. Avoid Precedex 2/2 bradycardia   -Solumedrol 60mg daily     #Normocytic Anemia, likely anemia of chronic disease   -Hemoglobin 6.9 -> 9.9; s/p 1 unit PRBCs on 1/3/19   -Iron studies; total iron low, ferritin high, TIBC low   -Keep active T&S, transfuse if < 7     #Tachybrady Syndrome (AF w/ RVR alternating with pauses)   -Per EP, 11 second asystole event likely 2/2 vasovagal episode. No PPM at this time.   -Amiodarone 400mg daily   -No anticoagulation 2/2 diffuse alveolar hemorrhage   -Atropine PRN for bradycardia   -Can give 2.5 to 5mg of Lopressor IV before or during the tracheostomy procedure as per cardiology  -[id number]: 4   -HASBLED: 3    #HLD-Simvastatin 5mg PO qhs     #HTN -Currently hypotensive, home medications held     #Varicella Zoster Right Shoulder, resolved -Completed course of acyclovir     #Constipation-Senna, lactulose, colace. Last BM on 1/3/19     DVT ppx: Heparin 5000 units q8h   GI ppx: Protonix 40mg IV BID   Diet: NPO with tube feeds   Activity: Increase as tolerated   Dispo: trach placement -> transfer to vent unit   FULL CODE 73F from [facility] w/ PMH of HTN presented to [facility] with worsening generalized weakness and SOB for 3 days. Currently admitted to CCU with diffuse alveolar hemorrhage 2/2 Lovenox, intubated and sedated. Scheduled for tracheostomy placement with CTS surgery, will be transferred to vent unit after. Kent catheter and central line may be removed after successful trach. Long-tube feeding plan to be determined.     #Acute on Chronic Hypercapnic Respiratory Failure s/p intubation, Diffuse Alveolar Hemorrhage likely 2/2 Lovenox   -Intubated on 12/17, failed weaning trial 6 times. Bronchoscopy on 12/20. Completed antibiotic course (Levaquin, Zosyn, doxycycline). Duplex 12/14 negative for DVT.   -RVP, Urine Legionella, Strep antigen, MRSA, blood cx negative   -Recent bronch cx with rare Proteus. Meropenem started. ID consulted.   -Autoimmune workup negative (SARITHA, ANCA, RF, complement, hep panel)   -Plan for trach, date TBD--> transfer to vent unit after. Medical optimization needed before as per CTS. Trach not attempted on 1/4 by CTS as pt was hypotensive and in afib during the procedure.   -Plan for feeds: NG tube bolus feeds after trach, discuss PEG placement with family if no improvement of mental status   -Fentanyl and Propofol for sedation. Avoid Precedex 2/2 bradycardia   -Solumedrol 60mg daily     #Rule Out Obstruction  -Distended abdomen, no bowel movements, + flatulence, bilious residual via OG tube   -KUB ordered. If obstruction present, consult general surgery.     #Decreased Urine Output  -Monitor BUN/Cr, UOP, electrolytes  -IVFs started     #Hypophosphatemia    #Normocytic Anemia, likely anemia of chronic disease   -Hemoglobin 6.9 -> 9.9; s/p 1 unit PRBCs on 1/3/19   -Iron studies; total iron low, ferritin high, TIBC low   -Keep active T&S, transfuse if < 7     #Tachybrady Syndrome (AF w/ RVR alternating with pauses)   -Per EP, 11 second asystole event likely 2/2 vasovagal episode. No PPM at this time.   -Amiodarone 400mg daily   -No anticoagulation 2/2 diffuse alveolar hemorrhage   -Atropine PRN for bradycardia   -Can give 2.5 to 5mg of Lopressor IV before or during the tracheostomy procedure as per cardiology  -[id number]: 4   -HASBLED: 3    #HLD-Simvastatin 5mg PO qhs     #HTN -Currently hypotensive, home medications held     #Varicella Zoster Right Shoulder, resolved -Completed course of acyclovir     #Constipation-Senna, lactulose, colace. Last BM on 1/3/19     DVT ppx: Heparin 5000 units q8h   GI ppx: Protonix 40mg IV BID   Diet: NPO with tube feeds   Activity: Increase as tolerated   Dispo: trach placement -> transfer to vent unit   FULL CODE

## 2019-01-06 NOTE — PROGRESS NOTE ADULT - SUBJECTIVE AND OBJECTIVE BOX
Over Night Events: KUB for evaluation of possible ileus// recurrent episode of afib treated with beta blocker//New temp 102  ID F/U; start meropenem        ROS:  See HPI    PHYSICAL EXAM    ICU Vital Signs Last 24 Hrs  T(C): 37.9 (06 Jan 2019 12:00), Max: 38.9 (06 Jan 2019 04:00)  T(F): 100.3 (06 Jan 2019 12:00), Max: 102.1 (06 Jan 2019 04:00)  HR: 76 (06 Jan 2019 12:00) (58 - 144)  BP: 110/45 (06 Jan 2019 12:00) (86/62 - 160/67)  BP(mean): 63 (06 Jan 2019 12:00) (54 - 121)  ABP: --  ABP(mean): --  RR: 24 (06 Jan 2019 12:00) (19 - 32)  SpO2: 100% (06 Jan 2019 12:00) (90% - 100%)        01-05-19 @ 07:01 - 01-06-19 @ 07:00  --------------------------------------------------------  IN: 1758.9 mL / OUT: 1230 mL / NET: 528.9 mL    01-06-19 @ 07:01  -  01-06-19 @ 14:21  --------------------------------------------------------  IN: 793.5 mL / OUT: 60 mL / NET: 733.5 mL        General:  HEENT:                Lymph Nodes: NO cervical LN   Lungs: Bilateral BS  Cardiovascular: Regular   Abdomen: Soft, Positive BS  Extremities: No clubbing   Skin:   Neurological:       LABS:                          10.8   11.62 )-----------( 215      ( 06 Jan 2019 04:30 )             33.0                                               01-06    143  |  99  |  28<H>  ----------------------------<  170<H>  3.6   |  29  |  0.9    Ca    8.0<L>      06 Jan 2019 04:30  Phos  1.4     01-06  Mg     2.0     01-06    TPro  6.0  /  Alb  2.9<L>  /  TBili  1.0  /  DBili  0.5<H>  /  AST  16  /  ALT  27  /  AlkPhos  70  01-06                                                                                           LIVER FUNCTIONS - ( 06 Jan 2019 04:30 )  Alb: 2.9 g/dL / Pro: 6.0 g/dL / ALK PHOS: 70 U/L / ALT: 27 U/L / AST: 16 U/L / GGT: x                                                  Culture - Acid Fast - Bronchial w/Smear (collected 04 Jan 2019 14:45)  Source: .Broncial None    Culture - Bronchial (collected 04 Jan 2019 14:45)  Source: .Broncial None  Gram Stain (04 Jan 2019 23:57):    Few polymorphonuclear leukocytes per low power field    No Squamous epithelial cells per low power field    Rare Gram Positive Cocci in Pairs and Chains per oil power field  Preliminary Report (05 Jan 2019 20:30):    Moderate Proteus mirabilis                                                   Mode: AC/ CMV (Assist Control/ Continuous Mandatory Ventilation)  RR (machine): 18  TV (machine): 450  FiO2: 80  PEEP: 8  ITime: 1  MAP: 20  PIP: 38                                      ABG - ( 06 Jan 2019 06:31 )  pH, Arterial: 7.52  pH, Blood: x     /  pCO2: 36    /  pO2: 58    / HCO3: 30    / Base Excess: 6.2   /  SaO2: 97                  MEDICATIONS  (STANDING):  amiodarone    Tablet 200 milliGRAM(s) Oral every 12 hours  amiodarone Infusion 1 mG/Min (33.333 mL/Hr) IV Continuous <Continuous>  chlorhexidine 0.12% Liquid 15 milliLiter(s) Oral Mucosa two times a day  chlorhexidine 4% Liquid 1 Application(s) Topical <User Schedule>  docusate sodium Liquid 100 milliGRAM(s) Oral two times a day  fentaNYL   Infusion. 0.5 MICROgram(s)/kG/Hr (4.035 mL/Hr) IV Continuous <Continuous>  heparin  Injectable 5000 Unit(s) SubCutaneous every 8 hours  lactated ringers. 1000 milliLiter(s) (100 mL/Hr) IV Continuous <Continuous>  lactulose Syrup 20 Gram(s) Oral daily  meropenem  IVPB      meropenem  IVPB 1000 milliGRAM(s) IV Intermittent every 8 hours  methylPREDNISolone sodium succinate Injectable 60 milliGRAM(s) IV Push daily  multivitamin 1 Tablet(s) Oral daily  nystatin Powder 1 Application(s) Topical two times a day  pantoprazole  Injectable 40 milliGRAM(s) IV Push two times a day  propofol Infusion 5 MICROgram(s)/kG/Min (2.421 mL/Hr) IV Continuous <Continuous>  senna Syrup 15 milliLiter(s) Oral daily  simvastatin 5 milliGRAM(s) Oral at bedtime    MEDICATIONS  (PRN):  acetaminophen   Tablet .. 650 milliGRAM(s) Oral every 6 hours PRN Temp greater or equal to 38C (100.4F)  guaiFENesin    Syrup 200 milliGRAM(s) Oral every 6 hours PRN Cough      Xrays:  new LLL infiltrate                                                                                   ECHO

## 2019-01-07 NOTE — PROGRESS NOTE ADULT - SUBJECTIVE AND OBJECTIVE BOX
OVERNIGHT EVENTS: still intubated, on fentanyl/propofol, po amiodarone    Vital Signs Last 24 Hrs  T(C): 36.9 (07 Jan 2019 08:00), Max: 37.9 (06 Jan 2019 12:00)  T(F): 98.4 (07 Jan 2019 08:00), Max: 100.3 (06 Jan 2019 12:00)  HR: 64 (07 Jan 2019 08:00) (58 - 144)  BP: 110/57 (07 Jan 2019 08:00) (86/44 - 128/68)  BP(mean): 67 (07 Jan 2019 08:00) (54 - 101)  RR: 20 (07 Jan 2019 08:00) (17 - 32)  SpO2: 100% (07 Jan 2019 08:00) (96% - 100%)    PHYSICAL EXAMINATION:    GENERAL: awake, follows commands    HEENT: Head is normocephalic and atraumatic. Extraocular muscles are intact. Mucous membranes are moist.    NECK: Supple.    LUNGS: b/l rhonchi    HEART: S2S2 R OLU 3/6    ABDOMEN: Soft, nontender, and nondistended.      EXTREMITIES: Without any cyanosis, clubbing, rash, lesions or edema.    NEUROLOGIC: Grossly intact.    SKIN: No ulceration or induration present.      LABS:                        8.8    8.97  )-----------( 155      ( 07 Jan 2019 04:30 )             27.5     01-07    140  |  100  |  32<H>  ----------------------------<  104<H>  3.1<L>   |  28  |  0.6<L>    Ca    7.2<L>      07 Jan 2019 04:30  Phos  1.4     01-06  Mg     1.8     01-07    TPro  6.0  /  Alb  2.9<L>  /  TBili  1.0  /  DBili  0.5<H>  /  AST  16  /  ALT  27  /  AlkPhos  70  01-06      Urinalysis Basic - ( 06 Jan 2019 18:00 )    Color: Dark Yellow / Appearance: Turbid / SG: >=1.030 / pH: x  Gluc: x / Ketone: Negative  / Bili: Small / Urobili: 1.0   Blood: x / Protein: 100 / Nitrite: Positive   Leuk Esterase: Large / RBC: x / WBC >50 /HPF   Sq Epi: x / Non Sq Epi: Few /HPF / Bacteria: TNTC /HPF      ABG - ( 07 Jan 2019 06:31 )  pH, Arterial: 7.46  pH, Blood: x     /  pCO2: 44    /  pO2: 80    / HCO3: 31    / Base Excess: 6.9   /  SaO2: 98        450/18/60/8                        01-06-19 @ 07:01  -  01-07-19 @ 07:00  --------------------------------------------------------  IN: 3398.8 mL / OUT: 1185 mL / NET: 2213.8 mL    01-07-19 @ 07:01  -  01-07-19 @ 09:23  --------------------------------------------------------  IN: 218.7 mL / OUT: 0 mL / NET: 218.7 mL        MICROBIOLOGY:  Culture Results:   Moderate Proteus mirabilis ESBL  Normal Respiratory Windy present (01-04 @ 14:45)  Culture Results:   Testing in progress (01-04 @ 14:45)      MEDICATIONS  (STANDING):  amiodarone    Tablet 200 milliGRAM(s) Oral every 12 hours  chlorhexidine 0.12% Liquid 15 milliLiter(s) Oral Mucosa two times a day  chlorhexidine 4% Liquid 1 Application(s) Topical <User Schedule>  docusate sodium Liquid 100 milliGRAM(s) Oral two times a day  fentaNYL   Infusion. 0.5 MICROgram(s)/kG/Hr (4.035 mL/Hr) IV Continuous <Continuous>  heparin  Injectable 5000 Unit(s) SubCutaneous every 8 hours  lactated ringers. 1000 milliLiter(s) (100 mL/Hr) IV Continuous <Continuous>  lactulose Syrup 20 Gram(s) Oral daily  meropenem  IVPB      meropenem  IVPB 1000 milliGRAM(s) IV Intermittent every 8 hours  methylPREDNISolone sodium succinate Injectable 60 milliGRAM(s) IV Push daily  multivitamin 1 Tablet(s) Oral daily  nystatin Powder 1 Application(s) Topical two times a day  pantoprazole  Injectable 40 milliGRAM(s) IV Push two times a day  potassium chloride  20 mEq/100 mL IVPB 20 milliEquivalent(s) IV Intermittent every 2 hours  propofol Infusion 5 MICROgram(s)/kG/Min (2.421 mL/Hr) IV Continuous <Continuous>  senna Syrup 15 milliLiter(s) Oral daily  simvastatin 5 milliGRAM(s) Oral at bedtime    MEDICATIONS  (PRN):  acetaminophen   Tablet .. 650 milliGRAM(s) Oral every 6 hours PRN Temp greater or equal to 38C (100.4F)  guaiFENesin    Syrup 200 milliGRAM(s) Oral every 6 hours PRN Cough      RADIOLOGY & ADDITIONAL STUDIES:

## 2019-01-07 NOTE — PROGRESS NOTE ADULT - ASSESSMENT
73F from [facility] w/ PMH of HTN presented to [facility] with worsening generalized weakness and SOB for 3 days. Currently admitted to CCU with diffuse alveolar hemorrhage 2/2 Lovenox, intubated and sedated. Scheduled for tracheostomy placement with CTS surgery, will be transferred to vent unit after. Kent catheter and central line may be removed after successful trach. Long-tube feeding plan to be determined.     #Acute on Chronic Hypercapnic Respiratory Failure s/p intubation, Diffuse Alveolar Hemorrhage likely 2/2 Lovenox   -Intubated on 12/17, failed weaning trial 6 times. Bronchoscopy on 12/20. Completed antibiotic course (Levaquin, Zosyn, doxycycline). Duplex 12/14 negative for DVT.   -RVP, Urine Legionella, Strep antigen, MRSA, blood cx negative   -Recent bronch cx with rare Proteus ESBL   -Meropenem 600 q6h   -ID on board: d/c Kent, d/c central line, place midline if needed, contact isolation   -Autoimmune workup negative (SARITHA, ANCA, RF, complement, hep panel)   -Plan for trach, date TBD--> transfer to vent unit after. Medical optimization needed before as per CTS. Trach not attempted on 1/4 by CTS as pt was hypotensive and in afib during the procedure.   -OG tube feeds restarted   -Fentanyl and Propofol for sedation. Avoid Precedex 2/2 bradycardia   -Solumedrol 60mg daily     #UA Positive  -Follow up urine culture. ID on board.     #Hypokalemia  -Repleted, monitor BMP     #Normocytic Anemia, likely anemia of chronic disease   -Hemoglobin 6.9 -> 9.9 -> 8.8  -Iron studies; total iron low, ferritin high, TIBC low   -Keep active T&S, transfuse if < 7     #Tachybrady Syndrome (AF w/ RVR alternating with pauses)   -Per EP, 11 second asystole event likely 2/2 vasovagal episode. No PPM at this time.   -Amiodarone 200mg q12h    -Hold off on metoprolol tartrate 25 q8h for now   -No anticoagulation 2/2 diffuse alveolar hemorrhage   -Atropine PRN for bradycardia   -Can give 2.5 to 5mg of Lopressor IV before or during the tracheostomy procedure as per cardiology  -[id number]: 4   -HASBLED: 3    #HLD-Simvastatin 5mg PO qhs     #HTN -Currently hypotensive, home medications held     #Varicella Zoster Right Shoulder, resolved -Completed course of acyclovir     #Constipation-Senna, lactulose, colace     DVT ppx: Heparin 5000 units q8h   GI ppx: Protonix 40mg IV BID   Diet: NPO with tube feeds   Activity: Increase as tolerated   Dispo: trach placement -> transfer to vent unit   FULL CODE

## 2019-01-07 NOTE — PROGRESS NOTE ADULT - SUBJECTIVE AND OBJECTIVE BOX
SUBJECTIVE:    Patient is a 74y old Female who presents with a chief complaint of SOB and rash (07 Jan 2019 11:18)    Currently admitted to medicine with the primary diagnosis of diffuse alveolar hemorrhage      Today is hospital day 27d. Patient had a bowel movement and tube feeds were restarted. Awaiting new EPS recommendations. Per ID recommendations, antibiotics were adjusted. Central line and Kent discontinued. Midline to be inserted. Family requesting goals of care discussion.     PAST MEDICAL & SURGICAL HISTORY  Hypertension  No significant past surgical history    SOCIAL HISTORY:  Patient denies alcohol, tobacco, and recreational drug use.   From [facility] (assisted living), functional at baseline     ALLERGIES:  No Known Allergies    MEDICATIONS:  STANDING MEDICATIONS  amiodarone    Tablet 200 milliGRAM(s) Oral every 12 hours  chlorhexidine 0.12% Liquid 15 milliLiter(s) Oral Mucosa two times a day  chlorhexidine 4% Liquid 1 Application(s) Topical <User Schedule>  docusate sodium Liquid 100 milliGRAM(s) Oral two times a day  fentaNYL   Infusion. 0.5 MICROgram(s)/kG/Hr IV Continuous <Continuous>  furosemide   Injectable 40 milliGRAM(s) IV Push daily  heparin  Injectable 5000 Unit(s) SubCutaneous every 8 hours  lactulose Syrup 20 Gram(s) Oral daily  meropenem  IVPB 500 milliGRAM(s) IV Intermittent every 6 hours  methylPREDNISolone sodium succinate Injectable 60 milliGRAM(s) IV Push daily  multivitamin 1 Tablet(s) Oral daily  nystatin Powder 1 Application(s) Topical two times a day  pantoprazole  Injectable 40 milliGRAM(s) IV Push two times a day  propofol Infusion 5 MICROgram(s)/kG/Min IV Continuous <Continuous>  senna Syrup 15 milliLiter(s) Oral daily  simvastatin 5 milliGRAM(s) Oral at bedtime    PRN MEDICATIONS  acetaminophen   Tablet .. 650 milliGRAM(s) Oral every 6 hours PRN  guaiFENesin    Syrup 200 milliGRAM(s) Oral every 6 hours PRN    VITALS:   T(F): 98.4  HR: 64  BP: 122/51  RR: 20  SpO2: 99%    LABS:                        8.8    8.97  )-----------( 155      ( 07 Jan 2019 04:30 )             27.5     01-07    140  |  100  |  32<H>  ----------------------------<  104<H>  3.1<L>   |  28  |  0.6<L>    Ca    7.2<L>      07 Jan 2019 04:30  Phos  1.4     01-06  Mg     1.8     01-07    TPro  6.0  /  Alb  2.9<L>  /  TBili  1.0  /  DBili  0.5<H>  /  AST  16  /  ALT  27  /  AlkPhos  70  01-06    Urinalysis Basic - ( 06 Jan 2019 18:00 )    Color: Dark Yellow / Appearance: Turbid / SG: >=1.030 / pH: x  Gluc: x / Ketone: Negative  / Bili: Small / Urobili: 1.0   Blood: x / Protein: 100 / Nitrite: Positive   Leuk Esterase: Large / RBC: x / WBC >50 /HPF   Sq Epi: x / Non Sq Epi: Few /HPF / Bacteria: TNTC /HPF    ABG - ( 07 Jan 2019 06:31 )  pH, Arterial: 7.46  pH, Blood: x     /  pCO2: 44    /  pO2: 80    / HCO3: 31    / Base Excess: 6.9   /  SaO2: 98        Culture - Fungal, Bronchial (collected 04 Jan 2019 14:45)  Source: .Broncial None  Preliminary Report (07 Jan 2019 08:29):    Testing in progress    Culture - Acid Fast - Bronchial w/Smear (collected 04 Jan 2019 14:45)  Source: .Broncial None    Culture - Bronchial (collected 04 Jan 2019 14:45)  Source: .Broncial None  Gram Stain (04 Jan 2019 23:57):    Few polymorphonuclear leukocytes per low power field    No Squamous epithelial cells per low power field    Rare Gram Positive Cocci in Pairs and Chains per oil power field  Final Report (06 Jan 2019 16:59):    Moderate Proteus mirabilis ESBL    Normal Respiratory Windy present  Organism: Proteus mirabilis ESBL (06 Jan 2019 16:59)  Organism: Proteus mirabilis ESBL (06 Jan 2019 16:59)    RADIOLOGY:  CXR  Impression:    Bilateral diffuse opacities, confluent left lower and left retrocardiac   opacities are unchanged.  Small left pleural effusion is new.    PHYSICAL EXAM:  GEN: No acute distress, intubated and sedated, moves all extremities, follows simple commands when off sedation (eye close, hand squeeze), tracking eye movements   LUNGS: on mechanical ventilation, bilateral breath sounds   HEART: Irregular rhythm, tachycardic   ABD: Soft, non-tender, minimally distended. Bowel sounds present.   EXT: Noncyanotic, nonedematous, 2+ peripheral pulses, skin intact   NEURO: intubated and sedated     Lines/Tubes   Endotracheal tube  OG tube  Kent catheter  R IJ

## 2019-01-07 NOTE — PROGRESS NOTE ADULT - SUBJECTIVE AND OBJECTIVE BOX
INTERVAL HPI/OVERNIGHT EVENTS:  Pt went to OR for trach on 1/4, but surgery had to be cancelled due to Afib/tachyarrythmia with HR in 130's-140's.    Currently she is in NSR in the 70's.    MEDICATIONS  (STANDING):  amiodarone    Tablet 200 milliGRAM(s) Oral every 12 hours  chlorhexidine 0.12% Liquid 15 milliLiter(s) Oral Mucosa two times a day  chlorhexidine 4% Liquid 1 Application(s) Topical <User Schedule>  docusate sodium Liquid 100 milliGRAM(s) Oral two times a day  fentaNYL   Infusion. 0.5 MICROgram(s)/kG/Hr (4.035 mL/Hr) IV Continuous <Continuous>  furosemide   Injectable 40 milliGRAM(s) IV Push daily  heparin  Injectable 5000 Unit(s) SubCutaneous every 8 hours  lactulose Syrup 20 Gram(s) Oral daily  meropenem  IVPB 500 milliGRAM(s) IV Intermittent every 6 hours  methylPREDNISolone sodium succinate Injectable 60 milliGRAM(s) IV Push daily  multivitamin 1 Tablet(s) Oral daily  nystatin Powder 1 Application(s) Topical two times a day  pantoprazole  Injectable 40 milliGRAM(s) IV Push two times a day  potassium chloride  20 mEq/100 mL IVPB 20 milliEquivalent(s) IV Intermittent every 2 hours  propofol Infusion 5 MICROgram(s)/kG/Min (2.421 mL/Hr) IV Continuous <Continuous>  senna Syrup 15 milliLiter(s) Oral daily  simvastatin 5 milliGRAM(s) Oral at bedtime    MEDICATIONS  (PRN):  acetaminophen   Tablet .. 650 milliGRAM(s) Oral every 6 hours PRN Temp greater or equal to 38C (100.4F)  guaiFENesin    Syrup 200 milliGRAM(s) Oral every 6 hours PRN Cough      Allergies    No Known Allergies    Intolerances        REVIEW OF SYSTEMS  unable to obtain due to intubated    Vital Signs Last 24 Hrs  T(C): 36.9 (07 Jan 2019 08:00), Max: 37.9 (06 Jan 2019 12:00)  T(F): 98.4 (07 Jan 2019 08:00), Max: 100.3 (06 Jan 2019 12:00)  HR: 64 (07 Jan 2019 08:00) (58 - 144)  BP: 110/57 (07 Jan 2019 08:00) (86/44 - 128/68)  BP(mean): 67 (07 Jan 2019 08:00) (55 - 101)  RR: 20 (07 Jan 2019 08:00) (17 - 32)  SpO2: 100% (07 Jan 2019 08:00) (96% - 100%)    01-06-19 @ 07:01  -  01-07-19 @ 07:00  --------------------------------------------------------  IN: 3398.8 mL / OUT: 1185 mL / NET: 2213.8 mL    01-07-19 @ 07:01  -  01-07-19 @ 11:18  --------------------------------------------------------  IN: 218.7 mL / OUT: 0 mL / NET: 218.7 mL        Physical Exam    GENERAL: Pt intubated in NAD  HEAD:  Atraumatic, Normocephalic  EYES: EOMI, PERRLA, conjunctiva and sclera clear  ENMT: No tonsillar erythema, exudates, or enlargements; ist mucous membranes, Good dentition, No lesions  NECK: Supple and normal thyroid.  No JVD or carotid bruit.  Carotid pulse is 2+ bilaterally.  HEART: Regular rate and rhythm; No murmurs, rubs, or gallops.  PULMONARY: Clear to auscultation and perfusion.  No rales, wheezing, or rhonchi bilaterally.  ABDOMEN: Soft, Nontender, Nondistended; Bowel sounds present  EXTREMITIES:  2+ Peripheral Pulses, No clubbing, cyanosis, or edema  LYMPH: No lymphadenopathy noted  NEUROLOGICAL: unable to assess    LABS:                        8.8    8.97  )-----------( 155      ( 07 Jan 2019 04:30 )             27.5     01-07    140  |  100  |  32<H>  ----------------------------<  104<H>  3.1<L>   |  28  |  0.6<L>    Ca    7.2<L>      07 Jan 2019 04:30  Phos  1.4     01-06  Mg     1.8     01-07    TPro  6.0  /  Alb  2.9<L>  /  TBili  1.0  /  DBili  0.5<H>  /  AST  16  /  ALT  27  /  AlkPhos  70  01-06      Urinalysis Basic - ( 06 Jan 2019 18:00 )    Color: Dark Yellow / Appearance: Turbid / SG: >=1.030 / pH: x  Gluc: x / Ketone: Negative  / Bili: Small / Urobili: 1.0   Blood: x / Protein: 100 / Nitrite: Positive   Leuk Esterase: Large / RBC: x / WBC >50 /HPF   Sq Epi: x / Non Sq Epi: Few /HPF / Bacteria: TNTC /HPF        RADIOLOGY & ADDITIONAL TESTS:   Xray Chest 1 View- PORTABLE-Routine (01.06.19 @ 06:01) >  Impression:      Bilateral opacities, unchanged.    Support tubes and lines as above.

## 2019-01-07 NOTE — PROGRESS NOTE ADULT - ASSESSMENT
IMPRESSION:    Acute on chronic hypercapnic respiratory failure s/p intubation s/p bronchoscopy alveolar hem, ? etiology was on lovenox/ PNA  Failed SBT several times  IDALMIS / OHS  Pneumonia/Aspiration/ fluid overload  Tachy pamela syndrome with pauses  FOR TRACHEO    PLAN:     CNS: no SAT due to agitation, keep propofol/ LOWER FETNANYL    HEENT: Oral care    PULMONARY:  HOB @ 45 degrees, no vent changes, no SBT today, tracheostomy, ONCE DAILY, IV LASIX    CARDIOVASCULAR: on amiodarone per EP, no need for PPM, Keep neg balance    GI: GI prophylaxis. Continue og feeding     RENAL:  Follow up lytes.  Correct as needed BMP. Kent catheter    INFECTIOUS DISEASE: Follow up cultures    HEMATOLOGICAL:  DVT prophylaxis.    ENDOCRINE: rheumatological work up: negative    POOR PROGNOSIS  Monitor in ICU for now  VERY POOR PROGNOSis

## 2019-01-07 NOTE — CONSULT NOTE ADULT - SUBJECTIVE AND OBJECTIVE BOX
REQUESTED OF: DR Nguyễn    CLINICAL ISSUE TO BE EVALUATED BY CONSULTANT: Goals of care        DENISE BARAJAS 74yFemale  HPI:  74 yo F from [facility] w/ pmhx of HTN reports with worsening generalized weakness and SOB for 3 days. Pt was at urgent care for evaluation of rash on right shoulder and chest, was found to have O2 sat of 88% on RA and send to the ED for further evaluation. Pt reports b/l leg swelling for a few months. Pt's denies CP, palpitations, orthopnea or PND. Rash on right shoulder, chest and upper arm started on Sunday 12/9/18, pt admits to pruritus, denies pain.   No fever, chills, cough, URI symptoms, dizziness, abd pain, n/v/d, dysuria.     - on arrival to ED, Pt was found to have A fib with RVR HR 130s and O2 sat 69% on RA. Pt improved after Bipap and IV lasix 40mg X1  - pro-  and CXR showes pulmonary edema. (12 Dec 2018 01:56)    Pt seen in CCU, orally intubated, sedated. No noted pain/distress.       PAST MEDICAL & SURGICAL HISTORY:  Hypertension  No significant past surgical history        PHYSICAL EXAM:  Elderly lady, critically ill, sedated  PERRL  RRR  Abdom soft  +anasarca         T(C): 36.9, Max: 37.3 (18:00)  HR: 67 (58 - 144)  BP: 124/65 (86/44 - 128/68)  RR: 18 (18 - 32)  SpO2: 99% (96% - 100%)      LABS/STUDIES:  01-07    140  |  100  |  32<H>  ----------------------------<  104<H>  3.1<L>   |  28  |  0.6<L>    Ca    7.2<L>      07 Jan 2019 04:30  Phos  1.4     01-06  Mg     1.8     01-07    TPro  6.0  /  Alb  2.9<L>  /  TBili  1.0  /  DBili  0.5<H>  /  AST  16  /  ALT  27  /  AlkPhos  70  01-06                            8.8    8.97  )-----------( 155      ( 07 Jan 2019 04:30 )             27.5       MEDICATIONS  (STANDING):  amiodarone    Tablet 200 milliGRAM(s) Oral every 12 hours  chlorhexidine 0.12% Liquid 15 milliLiter(s) Oral Mucosa two times a day  chlorhexidine 4% Liquid 1 Application(s) Topical <User Schedule>  docusate sodium Liquid 100 milliGRAM(s) Oral two times a day  fentaNYL   Infusion. 0.5 MICROgram(s)/kG/Hr (4.035 mL/Hr) IV Continuous <Continuous>  furosemide   Injectable 40 milliGRAM(s) IV Push daily  heparin  Injectable 5000 Unit(s) SubCutaneous every 8 hours  lactulose Syrup 20 Gram(s) Oral daily  meropenem  IVPB 500 milliGRAM(s) IV Intermittent every 6 hours  methylPREDNISolone sodium succinate Injectable 60 milliGRAM(s) IV Push daily  multivitamin 1 Tablet(s) Oral daily  nystatin Powder 1 Application(s) Topical two times a day  pantoprazole  Injectable 40 milliGRAM(s) IV Push two times a day  propofol Infusion 5 MICROgram(s)/kG/Min (2.421 mL/Hr) IV Continuous <Continuous>  senna Syrup 15 milliLiter(s) Oral daily  simvastatin 5 milliGRAM(s) Oral at bedtime    MEDICATIONS  (PRN):  acetaminophen   Tablet .. 650 milliGRAM(s) Oral every 6 hours PRN Temp greater or equal to 38C (100.4F)  guaiFENesin    Syrup 200 milliGRAM(s) Oral every 6 hours PRN Cough        Copen Symptom Assesment Scale      PPS (Palliative Performance Scale):  10

## 2019-01-07 NOTE — PROGRESS NOTE ADULT - ASSESSMENT
Paroxysmal Atrial fibrillation with RVR  - pt is currently in NSR  - TSH was normal on admission  - She is on amiodarone 200 mg q 12 hours  - Pt became tachycardic while in OR - surgery had to be cancelled  - recommend IV lopressor prior to OR if BP tolerates.  - CHADSVasc score is 4 but pt is not a candidate for anticoagulation due to alveolar hemorrhage  - replete potassium    Hypoxic Respiratory failure  - Pt has been unable to wean from the vent.  Pt needs a tracheostomy    LLL pna  - ESBL proteus   - Abx per ID Paroxysmal Atrial fibrillation with RVR  - pt is currently in NSR  - TSH was normal on admission  - She is on amiodarone 200 mg q 12 hours  - Pt became tachycardic while in OR - surgery had to be cancelled  - recommend IV lopressor if pt converts to AF in OR if BP tolerates.  - CHADSVasc score is 4 but pt is not a candidate for anticoagulation due to alveolar hemorrhage  - replete potassium    Hypoxic Respiratory failure  - Pt has been unable to wean from the vent.  Pt needs a tracheostomy    LLL pna  - ESBL proteus   - Abx per ID

## 2019-01-07 NOTE — CONSULT NOTE ADULT - ASSESSMENT
73F from [facility] w/ PMH of HTN presented to [facility] with worsening generalized weakness and SOB for 3 days. Currently admitted to CCU with diffuse alveolar hemorrhage 2/2 Lovenox, intubated and sedated. Scheduled for tracheostomy.    IMPRESSION:  LLLL PNA secondary to ESBL Proteus with respiratory failure requiring ventilation  BCx NTD    RECOMMENDATIONS:  Meropenem 500 mg iv q6h  D/c Right IJ catheter  Midline  D/c wilson  Contact isolation

## 2019-01-07 NOTE — CONSULT NOTE ADULT - ASSESSMENT
74yFemale being evaluated for goals of care    Case d/w CCU team. Pt with protracted hospital course with multiple complications.   Spoke to pt's brother/surrogate - clinical condition understood. Plan for trach tomorrow if pt stable for procedure.  Family aware of overall poor prognosis- plan to discuss goals of care     Full code status      Recommendations:  cont CCU mngmnt  Plan for trach tomorrow  Family meeting tomorrow 1/8/19 at 3pm to discuss further goals of care

## 2019-01-07 NOTE — CONSULT NOTE ADULT - SUBJECTIVE AND OBJECTIVE BOX
[facility]  74y, Female  Allergy: No Known Allergies      HPI:  72 yo F from [facility] w/ pmhx of HTN reports with worsening generalized weakness and SOB for 3 days. Pt was at urgent care for evaluation of rash on right shoulder and chest, was found to have O2 sat of 88% on RA and send to the ED for further evaluation. Pt reports b/l leg swelling for a few months. Pt's denies CP, palpitations, orthopnea or PND. Rash on right shoulder, chest and upper arm started on Sunday 12/9/18, pt admits to pruritus, denies pain.   No fever, chills, cough, URI symptoms, dizziness, abd pain, n/v/d, dysuria.     - on arrival to ED, Pt was found to have A fib with RVR HR 130s and O2 sat 69% on RA. Pt improved after Bipap and IV lasix 40mg X1  - pro-  and CXR showes pulmonary edema. (12 Dec 2018 01:56)    FAMILY HISTORY:  Family history of stroke (Father)    PAST MEDICAL & SURGICAL HISTORY:  Hypertension  No significant past surgical history        VITALS:  T(F): 98.7, Max: 100.3 (01-06-19 @ 12:00)  HR: 64  BP: 112/46  RR: 20Vital Signs Last 24 Hrs  T(C): 37.1 (07 Jan 2019 04:00), Max: 37.9 (06 Jan 2019 08:00)  T(F): 98.7 (07 Jan 2019 04:00), Max: 100.3 (06 Jan 2019 12:00)  HR: 64 (07 Jan 2019 06:00) (58 - 144)  BP: 112/46 (07 Jan 2019 06:00) (86/44 - 128/68)  BP(mean): 62 (07 Jan 2019 06:00) (54 - 101)  RR: 20 (07 Jan 2019 06:00) (17 - 32)  SpO2: 100% (07 Jan 2019 06:00) (96% - 100%)    TESTS & MEASUREMENTS:                        8.8    8.97  )-----------( 155      ( 07 Jan 2019 04:30 )             27.5     01-07    140  |  100  |  32<H>  ----------------------------<  104<H>  3.1<L>   |  28  |  0.6<L>    Ca    7.2<L>      07 Jan 2019 04:30  Phos  1.4     01-06  Mg     1.8     01-07    TPro  6.0  /  Alb  2.9<L>  /  TBili  1.0  /  DBili  0.5<H>  /  AST  16  /  ALT  27  /  AlkPhos  70  01-06    LIVER FUNCTIONS - ( 06 Jan 2019 04:30 )  Alb: 2.9 g/dL / Pro: 6.0 g/dL / ALK PHOS: 70 U/L / ALT: 27 U/L / AST: 16 U/L / GGT: x             Culture - Acid Fast - Bronchial w/Smear (collected 01-04-19 @ 14:45)  Source: .Broncial None    Culture - Bronchial (collected 01-04-19 @ 14:45)  Source: .Broncial None  Gram Stain (01-04-19 @ 23:57):    Few polymorphonuclear leukocytes per low power field    No Squamous epithelial cells per low power field    Rare Gram Positive Cocci in Pairs and Chains per oil power field  Final Report (01-06-19 @ 16:59):    Moderate Proteus mirabilis ESBL    Normal Respiratory Windy present  Organism: Proteus mirabilis ESBL (01-06-19 @ 16:59)  Organism: Proteus mirabilis ESBL (01-06-19 @ 16:59)      -  Amikacin: S <=8      -  Amoxicillin/Clavulanic Acid: S <=8/4      -  Ampicillin: R >16 These ampicillin results predict results for amoxicillin      -  Ampicillin/Sulbactam: R <=4/2      -  Aztreonam: R <=4      -  Cefazolin: R >16      -  Cefepime: R >16      -  Cefoxitin: S <=4      -  Ceftriaxone: R >32 Enterobacter, Citrobacter, and Serratia may develop resistance during prolonged therapy      -  Ciprofloxacin: R >2      -  Ertapenem: S <=0.5      -  Gentamicin: R >8      -  Levofloxacin: I 4      -  Meropenem: S <=1      -  Piperacillin/Tazobactam: R <=8      -  Tobramycin: R >8      -  Trimethoprim/Sulfamethoxazole: R >2/38      Method Type: MARIANA    Culture - Blood (collected 01-02-19 @ 17:14)  Source: .Blood None  Preliminary Report (01-04-19 @ 01:01):    No growth to date.      Urinalysis Basic - ( 06 Jan 2019 18:00 )    Color: Dark Yellow / Appearance: Turbid / SG: >=1.030 / pH: x  Gluc: x / Ketone: Negative  / Bili: Small / Urobili: 1.0   Blood: x / Protein: 100 / Nitrite: Positive   Leuk Esterase: Large / RBC: x / WBC >50 /HPF   Sq Epi: x / Non Sq Epi: Few /HPF / Bacteria: TNTC /HPF          RADIOLOGY & ADDITIONAL TESTS:    ANTIBIOTICS:  meropenem  IVPB      meropenem  IVPB 1000 milliGRAM(s) IV Intermittent every 8 hours

## 2019-01-07 NOTE — CHART NOTE - NSCHARTNOTEFT_GEN_A_CORE
Registered Dietitian Follow-Up     Patient Profile Reviewed                           Yes [x]   No []     Nutrition History Previously Obtained        Yes [x]  No []       Pertinent Subjective Information: Pt. remains intubated, spoke to RN at bedside- reports TF held as pt. had large volume residuals yesterday, improving overnight.      Pertinent Medical Interventions: Acute on Chronic Hypercapnic Respiratory Failure s/p intubation, Diffuse Alveolar Hemorrhage likely 2/2 Lovenox. -Intubated on 12/17, failed weaning trial 6 times. ID following. Normocytic Anemia, likely anemia of chronic disease. Tachybrady Syndrome- -No anticoagulation 2/2 diffuse alveolar hemorrhage. Dispo: trach placement -> transfer to vent unit. Family requesting GOC conversation.      Diet order: resumed this afternoon- Vital HP 30ml/h over 24h via OGT. Pt. was NPO since yesterday.      Anthropometrics:  - Ht. 152.4cm   - Wt. 82.7kg on 1/7 vs. variable weights  79.8kg (12/18), 81.5kg (12/21), 78.4kg (12/27), 78.9kg (12/28), 80.9kg (1/1)   - %wt change  - BMI 35.6 (using today's weight)  - IBW 45.5kg      Pertinent Lab Data: (1/7) RBC 2.97, Hg 8.8, Hct 27.5, K 3.1, BUN 32, creat 0.6, glu 104     Pertinent Meds: Senna, Heparin, Colace, Lactulose, Protonix, Propofol (63kcal/day), Simvastatin      Physical Findings:  - Appearance: intubated   - GI function: abd noted distended, had BM today   - Tubes: OGT  - Oral/Mouth cavity: NPO   - Skin: intact (BS 9)      Nutrition Requirements (from RD note on 1/4)  Weight Used: 82.3kg, 45.5kg IBW      Estimated Energy Needs    Continue [x]  Adjust [] 942-1121kcal/d (based on: 920-1072 (60-70% [id number] (1532, Ve: 8.9, Tmax: 37.3--recalculated 1/7) vs. 1000-1140kcal/d (22-25kcal/kg IBW) vs. 905-1152kcal/d (11-14kcal/act wt))  Adjusted Energy Recommendations:   kcal/day        Estimated Protein Needs    Continue [x]  Adjust [] 68-91gm/d (1.5-2.0gm/kg IBW) pt w/ ANTONIETTA  Adjusted Protein Recommendations:   gm/day        Estimated Fluid Needs        Continue [x]  Adjust [] 1mL/kcal or per CCU team   Adjusted Fluid Recommendations:   mL/day     Nutrient Intake: Continuous feeds of Vital HP @ 30mL/hr x24hrs + ProSource TF Q24H + 63kcal from Propofol. 1171 kcal, 74g protein, 590mL- resumed this afternoon. (104% est energy needs, 100% est protein needs)      [] Previous Nutrition Diagnosis:  Inadequate enteral nutrition infusion            [] Ongoing          [x] Resolved    However pt. intubated, remains at risk.      Nutrition Intervention: enteral and parenteral nutrition, nutrition related medication management     Rec: Continue Vital HP @ 30mL/hr x24hrs + ProSource TF Q24H. Continue MVI daily.      Goal/Expected Outcome: In 3 days enteral nutrition to provide >85% est energy needs, but not exceed 105%     Indicator/Monitoring: energy intake, diet order, renal/electrolyte profile, glucose profile, micronutrient profile, NFPF

## 2019-01-08 NOTE — CONSULT NOTE ADULT - CONSULT REASON
Tachy pamela syndrome with 11 second pause
Goals of care
Herpes Zoster
New onset afib
Respiratory distress
Trach
acute hypercapneic respiratory failure
diffuse alveolar hemorrhage
right arm twitching
PNA

## 2019-01-08 NOTE — PROGRESS NOTE ADULT - SUBJECTIVE AND OBJECTIVE BOX
SUBJECTIVE:    Patient is a 74y old Female who presents with a chief complaint of SOB and rash (08 Jan 2019 09:06)    Currently admitted to medicine with the primary diagnosis of Shortness of breath     Today is hospital day 28d. No acute overnight events. Will discuss plan for trach with CTS. Family to meet with palliative care team at 8pm today.     PAST MEDICAL & SURGICAL HISTORY  Hypertension  No significant past surgical history    SOCIAL HISTORY:  Patient denies alcohol, tobacco, and recreational drug use.   From [facility] (assisted living), functional at baseline     ALLERGIES:  No Known Allergies    MEDICATIONS:  STANDING MEDICATIONS  amiodarone    Tablet 200 milliGRAM(s) Oral every 12 hours  chlorhexidine 0.12% Liquid 15 milliLiter(s) Oral Mucosa two times a day  chlorhexidine 4% Liquid 1 Application(s) Topical <User Schedule>  docusate sodium Liquid 100 milliGRAM(s) Oral two times a day  furosemide   Injectable 40 milliGRAM(s) IV Push daily  heparin  Injectable 5000 Unit(s) SubCutaneous every 8 hours  meropenem  IVPB 500 milliGRAM(s) IV Intermittent every 6 hours  methylPREDNISolone sodium succinate Injectable 60 milliGRAM(s) IV Push daily  multivitamin 1 Tablet(s) Oral daily  nystatin Powder 1 Application(s) Topical two times a day  pantoprazole  Injectable 40 milliGRAM(s) IV Push two times a day  propofol Infusion 5 MICROgram(s)/kG/Min IV Continuous <Continuous>  senna Syrup 15 milliLiter(s) Oral daily  simvastatin 5 milliGRAM(s) Oral at bedtime    PRN MEDICATIONS  acetaminophen   Tablet .. 650 milliGRAM(s) Oral every 6 hours PRN  guaiFENesin    Syrup 200 milliGRAM(s) Oral every 6 hours PRN    VITALS:   T(F): 97.3  HR: 60  BP: 110/56  RR: 20  SpO2: 98%    LABS:             8.8    8.97  )-----------( 155      ( 07 Jan 2019 04:30 )             27.5   01-07    140  |  100  |  32<H>  ----------------------------<  104<H>  3.1<L>   |  28  |  0.6<L>    Ca    7.2<L>      07 Jan 2019 04:30  Mg     1.8     01-07    Urinalysis Basic - ( 06 Jan 2019 18:00 )    Color: Dark Yellow / Appearance: Turbid / SG: >=1.030 / pH: x  Gluc: x / Ketone: Negative  / Bili: Small / Urobili: 1.0   Blood: x / Protein: 100 / Nitrite: Positive   Leuk Esterase: Large / RBC: x / WBC >50 /HPF   Sq Epi: x / Non Sq Epi: Few /HPF / Bacteria: TNTC /HPF    ABG - ( 08 Jan 2019 04:53 )  pH, Arterial: 7.48  pH, Blood: x     /  pCO2: 43    /  pO2: 61    / HCO3: 32    / Base Excess: 7.6   /  SaO2: 94        Culture - Urine (collected 06 Jan 2019 18:00)  Source: .Urine Catheterized  Preliminary Report (08 Jan 2019 13:03):    >100,000 CFU/ml Proteus mirabilis    Culture - Blood (collected 06 Jan 2019 12:27)  Source: .Blood None  Preliminary Report (07 Jan 2019 21:02):  No growth to date.    RADIOLOGY:  CXR  Impression:    Bilateral diffuse opacities, confluent left lower and left retrocardiac   opacities are unchanged.  Small left pleural effusion is new.    KUB  Impression:  Nonobstructive bowel gas pattern with interval resolution of bowel   distention.    PHYSICAL EXAM:  GEN: intubated and sedated   LUNGS: on mechanical ventilation, bilateral breath sounds appreciated    HEART: regular rate, irregular rhythm, no murmurs appreciated   ABD: Soft, non-distended. Bowel sounds present.   EXT: Noncyanotic, nonedematous, 2+ peripheral pulses, skin intact   NEURO: intubated and sedated      Lines/Tubes   Left UE midline catheter  Endotracheal tube  OG tube

## 2019-01-08 NOTE — CONSULT NOTE ADULT - SUBJECTIVE AND OBJECTIVE BOX
Neurology Consult    Patient is a 74y old  Female who presents with a chief complaint of Diffuse alveolar hemorrhage (08 Jan 2019 13:15)      HPI:  74 yo F from [facility] w/ pmhx of HTN reports with worsening generalized weakness and SOB for 3 days. Pt was at urgent care for evaluation of rash on right shoulder and chest, was found to have O2 sat of 88% on RA and send to the ED for further evaluation. Pt reports b/l leg swelling for a few months. Pt's denies CP, palpitations, orthopnea or PND. Rash on right shoulder, chest and upper arm started on Sunday 12/9/18, pt admits to pruritus, denies pain.   No fever, chills, cough, URI symptoms, dizziness, abd pain, n/v/d, dysuria.     - on arrival to ED, Pt was found to have A fib with RVR HR 130s and O2 sat 69% on RA. Pt improved after Bipap and IV lasix 40mg X1  - pro-  and CXR showes pulmonary edema. (12 Dec 2018 01:56)    Patient had 2 episodes of right arm twitching that lasted about 3-4 minutes, second time she was given Ativan and then it stopped. Currently she is mechanically ventilated and sedated, non responsive to verbal stimuli. Withdraws to pain in both lower extremities, no response in upper extremities, no spontaneous movement in upper extremities noted.      PAST MEDICAL & SURGICAL HISTORY:  Hypertension  No significant past surgical history      FAMILY HISTORY:  Family history of stroke (Father)      Social History: (-) x 3    Allergies    No Known Allergies    Intolerances        MEDICATIONS  (STANDING):  amiodarone    Tablet 200 milliGRAM(s) Oral every 12 hours  chlorhexidine 0.12% Liquid 15 milliLiter(s) Oral Mucosa two times a day  chlorhexidine 4% Liquid 1 Application(s) Topical <User Schedule>  docusate sodium Liquid 100 milliGRAM(s) Oral two times a day  furosemide   Injectable 40 milliGRAM(s) IV Push daily  heparin  Injectable 5000 Unit(s) SubCutaneous every 8 hours  magnesium sulfate  IVPB 2 Gram(s) IV Intermittent once  magnesium sulfate  IVPB 2 Gram(s) IV Intermittent once  meropenem  IVPB 500 milliGRAM(s) IV Intermittent every 6 hours  methylPREDNISolone sodium succinate Injectable 60 milliGRAM(s) IV Push daily  multivitamin 1 Tablet(s) Oral daily  nystatin Powder 1 Application(s) Topical two times a day  pantoprazole  Injectable 40 milliGRAM(s) IV Push two times a day  potassium chloride   Solution 40 milliEquivalent(s) Oral once  propofol Infusion 5 MICROgram(s)/kG/Min (2.421 mL/Hr) IV Continuous <Continuous>  senna Syrup 15 milliLiter(s) Oral daily  simvastatin 5 milliGRAM(s) Oral at bedtime    MEDICATIONS  (PRN):  acetaminophen   Tablet .. 650 milliGRAM(s) Oral every 6 hours PRN Temp greater or equal to 38C (100.4F)  guaiFENesin    Syrup 200 milliGRAM(s) Oral every 6 hours PRN Cough      Vital Signs Last 24 Hrs  T(C): 36.3 (08 Jan 2019 12:00), Max: 36.4 (08 Jan 2019 00:00)  T(F): 97.4 (08 Jan 2019 12:00), Max: 97.6 (08 Jan 2019 00:00)  HR: 67 (08 Jan 2019 16:02) (60 - 108)  BP: 100/45 (08 Jan 2019 14:00) (93/54 - 122/51)  BP(mean): 56 (08 Jan 2019 14:00) (56 - 78)  RR: 20 (08 Jan 2019 14:00) (18 - 34)  SpO2: 99% (08 Jan 2019 16:02) (94% - 100%)    Neurologic Examination:  Patient is mechanically intubated and sedated on propofol.  She is non responsive to verbal or pain stimuli.   Her gaze is fixed, Pupils are reactive to light.  She randomly moves her LE and withdraws to pain, no movement in upper extremities, no withdrawal to pain.    Labs:   CBC Full  -  ( 08 Jan 2019 14:05 )  WBC Count : 7.27 K/uL  Hemoglobin : 8.9 g/dL  Hematocrit : 27.2 %  Platelet Count - Automated : 182 K/uL  Mean Cell Volume : 90.7 fL  Mean Cell Hemoglobin : 29.7 pg  Mean Cell Hemoglobin Concentration : 32.7 g/dL  Auto Neutrophil # : 6.52 K/uL  Auto Lymphocyte # : 0.40 K/uL  Auto Monocyte # : 0.20 K/uL  Auto Eosinophil # : 0.02 K/uL  Auto Basophil # : 0.01 K/uL  Auto Neutrophil % : 89.6 %  Auto Lymphocyte % : 5.5 %  Auto Monocyte % : 2.8 %  Auto Eosinophil % : 0.3 %  Auto Basophil % : 0.1 %    01-08    141  |  97<L>  |  25<H>  ----------------------------<  168<H>  3.1<L>   |  33<H>  |  0.5<L>    Ca    7.6<L>      08 Jan 2019 14:05  Mg     1.7     01-08    TPro  5.2<L>  /  Alb  2.5<L>  /  TBili  0.5  /  DBili  x   /  AST  11  /  ALT  17  /  AlkPhos  66  01-08    LIVER FUNCTIONS - ( 08 Jan 2019 14:05 )  Alb: 2.5 g/dL / Pro: 5.2 g/dL / ALK PHOS: 66 U/L / ALT: 17 U/L / AST: 11 U/L / GGT: x             Urinalysis Basic - ( 06 Jan 2019 18:00 )    Color: Dark Yellow / Appearance: Turbid / SG: >=1.030 / pH: x  Gluc: x / Ketone: Negative  / Bili: Small / Urobili: 1.0   Blood: x / Protein: 100 / Nitrite: Positive   Leuk Esterase: Large / RBC: x / WBC >50 /HPF   Sq Epi: x / Non Sq Epi: Few /HPF / Bacteria: TNTC /HPF          Neuroimaging:  NCHCT:   CT Angiography/Perfusion:  MRI Brain NC:  MRA Head/Neck:  EEG:    Assessment:  This is a 74y Female with h/o     Plan:   - 01-08-19 @ 17:10 Neurology Consult    Patient is a 74y old  Female who presents with a chief complaint of Diffuse alveolar hemorrhage (08 Jan 2019 13:15)  73 yr old woman with prolonged hospital/ ICU stay for resp failure sec to alveolar hemorrhage- and subsequently infection- now hemodynamically very unstable- cannot be taken for tracheostomy-  has rapid heart rate- vent dependent-  neurologically alert and responding to questions    PI:  74 yo F from [facility] w/ pmhx of HTN reports with worsening generalized weakness and SOB for 3 days. Pt was at urgent care for evaluation of rash on right shoulder and chest, was found to have O2 sat of 88% on RA and send to the ED for further evaluation. Pt reports b/l leg swelling for a few months. Pt's denies CP, palpitations, orthopnea or PND. Rash on right shoulder, chest and upper arm started on Sunday 12/9/18, pt admits to pruritus, denies pain.   No fever, chills, cough, URI symptoms, dizziness, abd pain, n/v/d, dysuria.     - on arrival to ED, Pt was found to have A fib with RVR HR 130s and O2 sat 69% on RA. Pt improved after Bipap and IV lasix 40mg X1  - pro-  and CXR showes pulmonary edema. (12 Dec 2018 01:56)    Patient had 2 episodes of right arm twitching that lasted about 3-4 minutes, second time she was given Ativan and then it stopped. Currently she is mechanically ventilated and sedated, non responsive to verbal stimuli. Withdraws to pain in both lower extremities, no response in upper extremities, no spontaneous movement in upper extremities noted.      PAST MEDICAL & SURGICAL HISTORY:  Hypertension  No significant past surgical history      FAMILY HISTORY:  Family history of stroke (Father)      Social History: (-) x 3    Allergies    No Known Allergies    Intolerances        MEDICATIONS  (STANDING):  amiodarone    Tablet 200 milliGRAM(s) Oral every 12 hours  chlorhexidine 0.12% Liquid 15 milliLiter(s) Oral Mucosa two times a day  chlorhexidine 4% Liquid 1 Application(s) Topical <User Schedule>  docusate sodium Liquid 100 milliGRAM(s) Oral two times a day  furosemide   Injectable 40 milliGRAM(s) IV Push daily  heparin  Injectable 5000 Unit(s) SubCutaneous every 8 hours  magnesium sulfate  IVPB 2 Gram(s) IV Intermittent once  magnesium sulfate  IVPB 2 Gram(s) IV Intermittent once  meropenem  IVPB 500 milliGRAM(s) IV Intermittent every 6 hours  methylPREDNISolone sodium succinate Injectable 60 milliGRAM(s) IV Push daily  multivitamin 1 Tablet(s) Oral daily  nystatin Powder 1 Application(s) Topical two times a day  pantoprazole  Injectable 40 milliGRAM(s) IV Push two times a day  potassium chloride   Solution 40 milliEquivalent(s) Oral once  propofol Infusion 5 MICROgram(s)/kG/Min (2.421 mL/Hr) IV Continuous <Continuous>  senna Syrup 15 milliLiter(s) Oral daily  simvastatin 5 milliGRAM(s) Oral at bedtime    MEDICATIONS  (PRN):  acetaminophen   Tablet .. 650 milliGRAM(s) Oral every 6 hours PRN Temp greater or equal to 38C (100.4F)  guaiFENesin    Syrup 200 milliGRAM(s) Oral every 6 hours PRN Cough      Vital Signs Last 24 Hrs  T(C): 36.3 (08 Jan 2019 12:00), Max: 36.4 (08 Jan 2019 00:00)  T(F): 97.4 (08 Jan 2019 12:00), Max: 97.6 (08 Jan 2019 00:00)  HR: 67 (08 Jan 2019 16:02) (60 - 108)  BP: 100/45 (08 Jan 2019 14:00) (93/54 - 122/51)  BP(mean): 56 (08 Jan 2019 14:00) (56 - 78)  RR: 20 (08 Jan 2019 14:00) (18 - 34)  SpO2: 99% (08 Jan 2019 16:02) (94% - 100%)    Neurologic Examination:  Patient is mechanically intubated and sedated on propofol.  She is non responsive to verbal or pain stimuli.   Her gaze is fixed, Pupils are reactive to light.  She randomly moves her LE and withdraws to pain, no movement in upper extremities, no withdrawal to pain.    Labs:   CBC Full  -  ( 08 Jan 2019 14:05 )  WBC Count : 7.27 K/uL  Hemoglobin : 8.9 g/dL  Hematocrit : 27.2 %  Platelet Count - Automated : 182 K/uL  Mean Cell Volume : 90.7 fL  Mean Cell Hemoglobin : 29.7 pg  Mean Cell Hemoglobin Concentration : 32.7 g/dL  Auto Neutrophil # : 6.52 K/uL  Auto Lymphocyte # : 0.40 K/uL  Auto Monocyte # : 0.20 K/uL  Auto Eosinophil # : 0.02 K/uL  Auto Basophil # : 0.01 K/uL  Auto Neutrophil % : 89.6 %  Auto Lymphocyte % : 5.5 %  Auto Monocyte % : 2.8 %  Auto Eosinophil % : 0.3 %  Auto Basophil % : 0.1 %    01-08    141  |  97<L>  |  25<H>  ----------------------------<  168<H>  3.1<L>   |  33<H>  |  0.5<L>    Ca    7.6<L>      08 Jan 2019 14:05  Mg     1.7     01-08    TPro  5.2<L>  /  Alb  2.5<L>  /  TBili  0.5  /  DBili  x   /  AST  11  /  ALT  17  /  AlkPhos  66  01-08    LIVER FUNCTIONS - ( 08 Jan 2019 14:05 )  Alb: 2.5 g/dL / Pro: 5.2 g/dL / ALK PHOS: 66 U/L / ALT: 17 U/L / AST: 11 U/L / GGT: x             Urinalysis Basic - ( 06 Jan 2019 18:00 )    Color: Dark Yellow / Appearance: Turbid / SG: >=1.030 / pH: x  Gluc: x / Ketone: Negative  / Bili: Small / Urobili: 1.0   Blood: x / Protein: 100 / Nitrite: Positive   Leuk Esterase: Large / RBC: x / WBC >50 /HPF   Sq Epi: x / Non Sq Epi: Few /HPF / Bacteria: TNTC /HPF

## 2019-01-08 NOTE — PROGRESS NOTE ADULT - SUBJECTIVE AND OBJECTIVE BOX
OVERNIGHT EVENTS: no events overnight, dc tlc, propofol, good BM S.P LASIX, STARTED ON FEEDS    Vital Signs Last 24 Hrs  T(C): 36.3 (08 Jan 2019 08:00), Max: 37.1 (07 Jan 2019 16:00)  T(F): 97.3 (08 Jan 2019 08:00), Max: 98.8 (07 Jan 2019 16:00)  HR: 67 (08 Jan 2019 08:00) (60 - 108)  BP: 113/42 (08 Jan 2019 08:00) (93/54 - 124/65)  BP(mean): 65 (08 Jan 2019 08:00) (64 - 75)  RR: 22 (08 Jan 2019 08:00) (18 - 34)  SpO2: 96% (08 Jan 2019 08:00) (94% - 100%)    PHYSICAL EXAMINATION:    GENERAL: AWAKE FOLLOWS COMMANDS    HEENT: Head is normocephalic and atraumatic. Extraocular muscles are intact. Mucous membranes are moist.    NECK: Supple.    LUNGS: B/L RHCONHI    HEART: Regular rate and rhythm without murmur.    ABDOMEN: Soft, nontender, and nondistended.      EXTREMITIES: Without any cyanosis, clubbing, rash, lesions or edema.    NEUROLOGIC: Grossly intact.    SKIN: No ulceration or induration present.      LABS:                        8.8    8.97  )-----------( 155      ( 07 Jan 2019 04:30 )             27.5     01-07    140  |  100  |  32<H>  ----------------------------<  104<H>  3.1<L>   |  28  |  0.6<L>    Ca    7.2<L>      07 Jan 2019 04:30  Mg     1.8     01-07        Urinalysis Basic - ( 06 Jan 2019 18:00 )    Color: Dark Yellow / Appearance: Turbid / SG: >=1.030 / pH: x  Gluc: x / Ketone: Negative  / Bili: Small / Urobili: 1.0   Blood: x / Protein: 100 / Nitrite: Positive   Leuk Esterase: Large / RBC: x / WBC >50 /HPF   Sq Epi: x / Non Sq Epi: Few /HPF / Bacteria: TNTC /HPF      ABG - ( 08 Jan 2019 04:53 )  pH, Arterial: 7.48  pH, Blood: x     /  pCO2: 43    /  pO2: 61    / HCO3: 32    / Base Excess: 7.6   /  SaO2: 94      450/18/50/8                          01-07-19 @ 07:01  -  01-08-19 @ 07:00  --------------------------------------------------------  IN: 876.5 mL / OUT: 3530 mL / NET: -2653.5 mL    01-08-19 @ 07:01  -  01-08-19 @ 09:06  --------------------------------------------------------  IN: 0 mL / OUT: 0 mL / NET: 0 mL        MICROBIOLOGY:  Culture Results:   No growth to date. (01-06 @ 12:27)  Culture Results:   Moderate Proteus mirabilis ESBL  Normal Respiratory Windy present (01-04 @ 14:45)  Culture Results:   Testing in progress (01-04 @ 14:45)      MEDICATIONS  (STANDING):  amiodarone    Tablet 200 milliGRAM(s) Oral every 12 hours  chlorhexidine 0.12% Liquid 15 milliLiter(s) Oral Mucosa two times a day  chlorhexidine 4% Liquid 1 Application(s) Topical <User Schedule>  docusate sodium Liquid 100 milliGRAM(s) Oral two times a day  furosemide   Injectable 40 milliGRAM(s) IV Push daily  heparin  Injectable 5000 Unit(s) SubCutaneous every 8 hours  lactulose Syrup 20 Gram(s) Oral daily  meropenem  IVPB 500 milliGRAM(s) IV Intermittent every 6 hours  methylPREDNISolone sodium succinate Injectable 60 milliGRAM(s) IV Push daily  multivitamin 1 Tablet(s) Oral daily  nystatin Powder 1 Application(s) Topical two times a day  pantoprazole  Injectable 40 milliGRAM(s) IV Push two times a day  propofol Infusion 5 MICROgram(s)/kG/Min (2.421 mL/Hr) IV Continuous <Continuous>  senna Syrup 15 milliLiter(s) Oral daily  simvastatin 5 milliGRAM(s) Oral at bedtime    MEDICATIONS  (PRN):  acetaminophen   Tablet .. 650 milliGRAM(s) Oral every 6 hours PRN Temp greater or equal to 38C (100.4F)  guaiFENesin    Syrup 200 milliGRAM(s) Oral every 6 hours PRN Cough      RADIOLOGY & ADDITIONAL STUDIES:

## 2019-01-08 NOTE — CONSULT NOTE ADULT - ASSESSMENT
73 yo female with diffuse alveolar hemorrhage, intubated and sedated presented with 2 episodes right arm twitching, seizure like activity. Currently right arm does not produce any spontaneous movement nor withdraws to pain stimuli.    Plan:  REEG  CTH nc  Start Keppra load with 1000mg then continue 750mg BID  Continue medical management    Neuroattending note will follow

## 2019-01-08 NOTE — GOALS OF CARE CONVERSATION - PERSONAL ADVANCE DIRECTIVE - WHAT MATTERS MOST
Continue aggressive medical management Continue current medical management with plan for elective palliative extubation.

## 2019-01-08 NOTE — GOALS OF CARE CONVERSATION - PERSONAL ADVANCE DIRECTIVE - CONVERSATION DETAILS
Palliative care team met with patient's brother and other various family members to discuss goals of care.  Patient was residing in an assisted living facility and managing with minimal assistance. Patient's emergency contact and health care agent is her brother Karthik.  Patient's brother was assisted by his spouse and two children with all decision making.     Palliative care team discussed patient's current diagnosis, prognosis, and treatment options.  Patients family verbalized understanding of her very complicated medical conditions.  At this time family wishes to continue aggressive medical management. Discussed code status and family are opting for a DNR.  After further discussion, plan is to revisit with family by Friday if medical status does not change/improve.      Plan for aggressive medical management. DNR.    Palliative care team will continue to monitor and provide support. Palliative care team met with patient's brother and other various family members to discuss goals of care.  Patient was residing in an assisted living facility and managing with minimal assistance. Patient's emergency contact and health care agent is her brother Lei.  Patient's brother was assisted by his spouse and two children with all decision making.     Palliative care team discussed patient's current diagnosis, prognosis, and treatment options.  Patients family verbalized understanding of her very complicated medical conditions.  All questions answered. At this time family wishes to proceed with elective palliative extubation on 01/10/2019 at 15:00. Family aware that this may result in patient's eventual death, and feel comfortable proceeding.  As per previous discussion, patient remains DNR.      Plan for elective palliative extubation. DNR.    Palliative care team will continue to monitor and provide support.

## 2019-01-08 NOTE — PROGRESS NOTE ADULT - SUBJECTIVE AND OBJECTIVE BOX
74yFemale with diagnosis: SHORTNESS OF BREATH ELEVATED BRAIN NATRIURETIC PEPTIDE LEVEL PULMONARY      Patient seen, no significant improvement in status. Remains vent dependent, FIO2: 50%. Fluctuating HR  No noted pain/discomfort      PHYSICAL EXAM  unchanged      T(C): , Max: 36.4 (00:00)  T(F): 97.4  HR: 67 (60 - 108)  BP: 100/45 (93/54 - 122/51)  RR: 20 (18 - 34)  SpO2: 99% (94% - 100%)              LABS:                          8.9    7.27  )-----------( 182      ( 08 Jan 2019 14:05 )             27.2                                                                                      01-08    141  |  97<L>  |  25<H>  ----------------------------<  168<H>  3.1<L>   |  33<H>  |  0.5<L>    Ca    7.6<L>      08 Jan 2019 14:05  Mg     1.7     01-08    TPro  5.2<L>  /  Alb  2.5<L>  /  TBili  0.5  /  DBili  x   /  AST  11  /  ALT  17  /  AlkPhos  66  01-08                                                      MEDICATIONS  (STANDING):  amiodarone    Tablet 200 milliGRAM(s) Oral every 12 hours  chlorhexidine 0.12% Liquid 15 milliLiter(s) Oral Mucosa two times a day  chlorhexidine 4% Liquid 1 Application(s) Topical <User Schedule>  docusate sodium Liquid 100 milliGRAM(s) Oral two times a day  furosemide   Injectable 40 milliGRAM(s) IV Push daily  heparin  Injectable 5000 Unit(s) SubCutaneous every 8 hours  meropenem  IVPB 500 milliGRAM(s) IV Intermittent every 6 hours  methylPREDNISolone sodium succinate Injectable 60 milliGRAM(s) IV Push daily  multivitamin 1 Tablet(s) Oral daily  nystatin Powder 1 Application(s) Topical two times a day  pantoprazole  Injectable 40 milliGRAM(s) IV Push two times a day  potassium chloride   Solution 40 milliEquivalent(s) Oral once  propofol Infusion 5 MICROgram(s)/kG/Min (2.421 mL/Hr) IV Continuous <Continuous>  senna Syrup 15 milliLiter(s) Oral daily  simvastatin 5 milliGRAM(s) Oral at bedtime    MEDICATIONS  (PRN):  acetaminophen   Tablet .. 650 milliGRAM(s) Oral every 6 hours PRN Temp greater or equal to 38C (100.4F)  guaiFENesin    Syrup 200 milliGRAM(s) Oral every 6 hours PRN Cough

## 2019-01-08 NOTE — GOALS OF CARE CONVERSATION - PERSONAL ADVANCE DIRECTIVE - TREATMENT GUIDELINE COMMENT
Continue aggressive medical management. DNR. Continue current medical management with plan for elective palliative extubation.

## 2019-01-08 NOTE — CONSULT NOTE ADULT - REASON FOR ADMISSION
SOB and rash

## 2019-01-08 NOTE — PROGRESS NOTE ADULT - ASSESSMENT
74yFemale being evaluated for goals of care.    Case d/w CCU team and EP attending.     Family meeting held w pt's brother and extended family. Clinical condition reviewed at length in detail, all questions answered.   Overall poor prognosis understood. Decision made to continue current level of care in pursuit of trach (if pt clinically stable for procedure). If pt remains too unstable for trach placement by the end of this week, family considering comfort care with vent withdrawal. If pt remains vent dependent chronically, family would consider comfort care.   If pt clinically declines into cardiac arrest, DNR status now in place.     50 minutes spent discussing advanced care planning.    Recommendations:  cont CCU mngmnt  GOC as above  Initiate DNR status  Surgery and EP following      We will follow  [phone]

## 2019-01-08 NOTE — CONSULT NOTE ADULT - ATTENDING COMMENTS
12/15 pt seen on rds with team//chart reviewed /// brought to ccu for resp failure//unable to d/c bipap  pt at present off bipap///new onset afib///rash as noted  cardiology consult//bnp  cxr bilateral infiltrates
respiratory failure following DAH.  unable to wean.  tracheostomy indicted.  discussed plans with patient's brother at bedside, including risks and benefits.  plan for tracheostomy scheduling.
patient seen and examined on 1/9 at 9.30 am  discussed with ICU resident-   two episodes of UE and chest clonic movements- eyes rolled back at the second episode-  no clear post ictal state- but received ativan for the second event    EEG shows generalized slowing / CT recommended    The description of the events is suggestive of seizures- patient is in a clinical setting where seizure is likely-    at this time treating with antiepileptic - benefit outweighs risk-  discussed all above with resident  continue Keppra 750 mg bid
73F    Hypertension    Admitted for hypoxemia, Afib RVR, , elevated d-dimer  Sepsis ruled out on admission  Afebrile  No leukocytosis  Lactic acidosis to 3    Rash on C4/5 area, vesicles? in axilla- cannot r/o Zoster  also L erythematous groin rash with blisters  CXR edema, likely fluid overload in the setting of Afib RVR, lower suspicion for PNA    - Valtrex 1g TID  - Airborne, contact  - CT Chest  - Pulm following  - Monitor rash     Spectra [phone]
The pause  of 11 seconds most likely due to vagal response as the PP interval slowly increases right before the pause.    - Recommend amiodarone to maintenance normal sinus rhythm.  - If patient becomes bradycardic the during tracheostomy atropine could be administered..

## 2019-01-08 NOTE — PROGRESS NOTE ADULT - SUBJECTIVE AND OBJECTIVE BOX
KARL, DENISE  74y, Female      OVERNIGHT EVENTS:    No fevers, no pressors, sedated, follows commands, no diarrhea    VITALS:  T(F): 97.3, Max: 98.8 (01-07-19 @ 16:00)  HR: 74  BP: 100/42  RR: 22Vital Signs Last 24 Hrs  T(C): 36.3 (08 Jan 2019 04:00), Max: 37.1 (07 Jan 2019 16:00)  T(F): 97.3 (08 Jan 2019 04:00), Max: 98.8 (07 Jan 2019 16:00)  HR: 74 (08 Jan 2019 05:00) (60 - 108)  BP: 100/42 (08 Jan 2019 05:00) (93/54 - 124/65)  BP(mean): 71 (07 Jan 2019 23:48) (64 - 75)  RR: 22 (08 Jan 2019 05:00) (18 - 34)  SpO2: 96% (08 Jan 2019 05:00) (94% - 100%)    TESTS & MEASUREMENTS:                        8.8    8.97  )-----------( 155      ( 07 Jan 2019 04:30 )             27.5     01-07    140  |  100  |  32<H>  ----------------------------<  104<H>  3.1<L>   |  28  |  0.6<L>    Ca    7.2<L>      07 Jan 2019 04:30  Mg     1.8     01-07          Culture - Blood (collected 01-06-19 @ 12:27)  Source: .Blood None  Preliminary Report (01-07-19 @ 21:02):    No growth to date.    Culture - Fungal, Bronchial (collected 01-04-19 @ 14:45)  Source: .Broncial None  Preliminary Report (01-07-19 @ 08:29):    Testing in progress    Culture - Acid Fast - Bronchial w/Smear (collected 01-04-19 @ 14:45)  Source: .Broncial None    Culture - Bronchial (collected 01-04-19 @ 14:45)  Source: .Broncial None  Gram Stain (01-04-19 @ 23:57):    Few polymorphonuclear leukocytes per low power field    No Squamous epithelial cells per low power field    Rare Gram Positive Cocci in Pairs and Chains per oil power field  Final Report (01-06-19 @ 16:59):    Moderate Proteus mirabilis ESBL    Normal Respiratory Windy present  Organism: Proteus mirabilis ESBL (01-06-19 @ 16:59)  Organism: Proteus mirabilis ESBL (01-06-19 @ 16:59)      -  Amikacin: S <=8      -  Amoxicillin/Clavulanic Acid: S <=8/4      -  Ampicillin: R >16 These ampicillin results predict results for amoxicillin      -  Ampicillin/Sulbactam: R <=4/2      -  Aztreonam: R <=4      -  Cefazolin: R >16      -  Cefepime: R >16      -  Cefoxitin: S <=4      -  Ceftriaxone: R >32 Enterobacter, Citrobacter, and Serratia may develop resistance during prolonged therapy      -  Ciprofloxacin: R >2      -  Ertapenem: S <=0.5      -  Gentamicin: R >8      -  Levofloxacin: I 4      -  Meropenem: S <=1      -  Piperacillin/Tazobactam: R <=8      -  Tobramycin: R >8      -  Trimethoprim/Sulfamethoxazole: R >2/38      Method Type: MARIANA    Culture - Blood (collected 01-02-19 @ 17:14)  Source: .Blood None  Final Report (01-08-19 @ 01:00):    No growth at 5 days.      Urinalysis Basic - ( 06 Jan 2019 18:00 )    Color: Dark Yellow / Appearance: Turbid / SG: >=1.030 / pH: x  Gluc: x / Ketone: Negative  / Bili: Small / Urobili: 1.0   Blood: x / Protein: 100 / Nitrite: Positive   Leuk Esterase: Large / RBC: x / WBC >50 /HPF   Sq Epi: x / Non Sq Epi: Few /HPF / Bacteria: TNTC /HPF          RADIOLOGY & ADDITIONAL TESTS:    ANTIBIOTICS:  meropenem  IVPB 500 milliGRAM(s) IV Intermittent every 6 hours

## 2019-01-08 NOTE — CONSULT NOTE ADULT - CONSULT REQUESTED DATE/TIME
02-Jan-2019
07-Jan-2019
08-Jan-2019 17:44
12-Dec-2018
12-Dec-2018 09:16
12-Dec-2018 14:49
15-Dec-2018 14:03
20-Dec-2018 21:21
28-Dec-2018 13:55
07-Jan-2019 06:45

## 2019-01-08 NOTE — CONSULT NOTE ADULT - PROVIDER SPECIALTY LIST ADULT
Cardiology
Critical Care
Electrophysiology
Infectious Disease
Neurology
Palliative Care
Pulmonology
Rheumatology
Thoracic Surgery
Infectious Disease

## 2019-01-08 NOTE — PROGRESS NOTE ADULT - ASSESSMENT
73F from [facility] w/ PMH of HTN presented to [facility] with worsening generalized weakness and SOB for 3 days. Currently admitted to CCU with diffuse alveolar hemorrhage 2/2 Lovenox, intubated and sedated. Scheduled for tracheostomy.    IMPRESSION:  LLLL PNA secondary to ESBL Proteus with respiratory failure requiring ventilation  BCx NTD  WBC 8.9    RECOMMENDATIONS:  Meropenem 500 mg iv q6h  D/c Right IJ catheter  Midline  Contact isolation

## 2019-01-08 NOTE — PROGRESS NOTE ADULT - ASSESSMENT
IMPRESSION    Acute on Chronic Hypercapnic Respiratory Failure s/p intubation s/p bronchoscopy 2/2 Diffuse Alveolar Hemorrhage 2/2 Lovenox?   Tachybrady Syndrome  Pneumonia, ESBL Proteus   UTI, Proteus     PLAN    CNS: Sedation (propofol)     HEENT: Oral care    PULMONARY: HOB @ 45 degrees. Patient remains intubated. CTS on board, will not take patient to OR without EPS input on further management of tachybrady syndrome. Solumedrol for DAH.     CARDIOVASCULAR: Echo with EF 50-55%, moderate pulmonary HTN. Tachybrady syndrome, amiodarone, no AC 2/2 DAH, EPS following. Lasix to maintain I=O.     GI: GI prophylaxis. OG tube feeds     RENAL: Monitor I&O, electrolytes.     INFECTIOUS DISEASE: Pneumonia 2/2 ESBL Proteus. UTI 2/2 Proteus. ID on board, continue meropenem. Blood culture negative to date. Midline in place L UE.     HEMATOLOGICAL: DVT ppx with heparin sub-q.     ENDOCRINE: Rheumatologic workup negative     CCU monitoring  Palliative care to meet with family to discuss goals of care at 3pm today 1/8/19

## 2019-01-08 NOTE — PROGRESS NOTE ADULT - ASSESSMENT
IMPRESSION:    Acute on chronic hypercapnic respiratory failure s/p intubation s/p bronchoscopy alveolar hem, ? etiology was on lovenox  Failed SBT several times for tracheo  IDALMIS / OHS  Pneumonia/Aspiration/ fluid overload  Tachy paemla syndrome with pauses      PLAN:     CNS: keep propofol     HEENT: Oral care    PULMONARY:  HOB @ 45 degrees, no vent changes,  SBT today, tracheostomy today with CTS, c/w solumedrol for DAH    CARDIOVASCULAR: on amiodarone per EP, no need for PPM, Keep equal balance    GI: GI prophylaxis. Continue og feeding     RENAL:  Follow up lytes.  Correct as needed BMP. Kent catheter    INFECTIOUS DISEASE: Follow up cultures    HEMATOLOGICAL:  DVT prophylaxis.    ENDOCRINE: rheumatological work up: negative    POOR PROGNOSIS  Monitor in ICU for now  palliatve care eval

## 2019-01-09 NOTE — PROGRESS NOTE ADULT - SUBJECTIVE AND OBJECTIVE BOX
OVERNIGHT EVENTS: still intubated, ventilated, muscle twitching, EEG, neuro, s/p ativan, s/p palliative care  Vital Signs Last 24 Hrs  T(C): 37.2 (09 Jan 2019 04:00), Max: 37.2 (08 Jan 2019 20:00)  T(F): 98.9 (09 Jan 2019 04:00), Max: 99 (09 Jan 2019 00:00)  HR: 71 (09 Jan 2019 08:00) (58 - 74)  BP: 133/67 (09 Jan 2019 08:00) (98/43 - 133/67)  BP(mean): 90 (09 Jan 2019 08:00) (56 - 90)  RR: 20 (09 Jan 2019 08:00) (18 - 27)  SpO2: 96% (09 Jan 2019 08:00) (96% - 99%)    PHYSICAL EXAMINATION:    GENERAL: AXO FOLLOWS COMMANDS    HEENT: Head is normocephalic and atraumatic. Extraocular muscles are intact. Mucous membranes are moist.    NECK: Supple.    LUNGS: B/L RHONCHI    HEART: Regular rate and rhythm without murmur.    ABDOMEN: Soft, nontender, and nondistended.      EXTREMITIES: Without any cyanosis, clubbing, rash, lesions or edema.    NEUROLOGIC: Grossly intact.    SKIN: No ulceration or induration present.      LABS:                        10.0   9.44  )-----------( 202      ( 09 Jan 2019 04:23 )             30.5     01-09    142  |  98  |  28<H>  ----------------------------<  140<H>  3.7   |  29  |  0.5<L>    Ca    8.0<L>      09 Jan 2019 04:23  Mg     2.3     01-09    TPro  5.8<L>  /  Alb  2.6<L>  /  TBili  0.6  /  DBili  x   /  AST  19  /  ALT  19  /  AlkPhos  74  01-09    PT/INR - ( 09 Jan 2019 04:23 )   PT: 12.60 sec;   INR: 1.10 ratio         PTT - ( 09 Jan 2019 04:23 )  PTT:27.7 sec    ABG - ( 09 Jan 2019 08:03 )  pH, Arterial: 7.55  pH, Blood: x     /  pCO2: 44    /  pO2: 64    / HCO3: 38    / Base Excess: 14.0  /  SaO2: 95        450/18/50/8                        01-08-19 @ 07:01  -  01-09-19 @ 07:00  --------------------------------------------------------  IN: 1067 mL / OUT: 2275 mL / NET: -1208 mL        MICROBIOLOGY:  Culture Results:   >100,000 CFU/ml Proteus mirabilis ESBL (01-06 @ 18:00)  Culture Results:   No growth to date. (01-06 @ 12:27)      MEDICATIONS  (STANDING):  amiodarone    Tablet 200 milliGRAM(s) Oral every 12 hours  chlorhexidine 0.12% Liquid 15 milliLiter(s) Oral Mucosa two times a day  chlorhexidine 4% Liquid 1 Application(s) Topical <User Schedule>  docusate sodium Liquid 100 milliGRAM(s) Oral two times a day  furosemide   Injectable 40 milliGRAM(s) IV Push daily  heparin  Injectable 5000 Unit(s) SubCutaneous every 8 hours  meropenem  IVPB 500 milliGRAM(s) IV Intermittent every 6 hours  methylPREDNISolone sodium succinate Injectable 60 milliGRAM(s) IV Push daily  multivitamin 1 Tablet(s) Oral daily  nystatin Powder 1 Application(s) Topical two times a day  pantoprazole  Injectable 40 milliGRAM(s) IV Push two times a day  propofol Infusion 5 MICROgram(s)/kG/Min (2.421 mL/Hr) IV Continuous <Continuous>  senna Syrup 15 milliLiter(s) Oral daily  simvastatin 5 milliGRAM(s) Oral at bedtime    MEDICATIONS  (PRN):  acetaminophen   Tablet .. 650 milliGRAM(s) Oral every 6 hours PRN Temp greater or equal to 38C (100.4F)  guaiFENesin    Syrup 200 milliGRAM(s) Oral every 6 hours PRN Cough      RADIOLOGY & ADDITIONAL STUDIES:

## 2019-01-09 NOTE — PROGRESS NOTE ADULT - ASSESSMENT
74yFemale being evaluated for goals of care.    Repeat family meeting held. CLinical condition understood. Family aware pt unable to pursue trach placement. Plan to withdraw vent support tomorrow 1/9/19 at 3pm in pursuit of comfort care.  Family aware of likely progression to resp failure, cardiac arrest and death following vent withdrawal.  End of life care explained in detail.    All questions answered.   DNR status in place    35 minutes spent discussing advanced care planning    Recommendations:  cont CCU mngmnt w no further escalation of care  Plan as above  dc propofol in am    We will follow  [phone]

## 2019-01-09 NOTE — PROGRESS NOTE ADULT - ASSESSMENT
IMPRESSION:    Acute on chronic hypercapnic respiratory failure s/p intubation s/p bronchoscopy alveolar hem, ? etiology was on lovenox  Failed SBT several times for tracheo  IDALMIS / OHS  Pneumonia/Aspiration/ fluid overload  Tachy pamela syndrome with pauses  ? SEIZURE      PLAN:     CNS: keep propofol     HEENT: Oral care    PULMONARY:  HOB @ 45 degrees, no vent changes,  TRACHEO    CARDIOVASCULAR: on amiodarone per EP, no need for PPM, Keep equal balance, LASIX PO    GI: GI prophylaxis. Continue og feeding     RENAL:  Follow up lytes.  Correct as needed BMP. Kent catheter    INFECTIOUS DISEASE: Follow up cultures    HEMATOLOGICAL:  DVT prophylaxis.    ENDOCRINE: rheumatological work up: negative    POOR PROGNOSIS  Monitor in ICU for now  palliatve care eval

## 2019-01-09 NOTE — PROGRESS NOTE ADULT - SUBJECTIVE AND OBJECTIVE BOX
74yFemale with diagnosis: SHORTNESS OF BREATH ELEVATED BRAIN NATRIURETIC PEPTIDE LEVEL PULMONARY      Patient seen, chart reviewed, no improvement in status, unable to pursue trach placement.       PHYSICAL EXAM  unchanged      T(C): , Max: 37.2 (20:00)  T(F): 99  HR: 62 (56 - 74)  BP: 96/51 (96/51 - 133/67)  RR: 22 (18 - 27)  SpO2: 94% (94% - 99%)              LABS:                          10.0   9.44  )-----------( 202      ( 09 Jan 2019 04:23 )             30.5                                                                                      01-09    142  |  98  |  28<H>  ----------------------------<  140<H>  3.7   |  29  |  0.5<L>    Ca    8.0<L>      09 Jan 2019 04:23  Mg     2.3     01-09    TPro  5.8<L>  /  Alb  2.6<L>  /  TBili  0.6  /  DBili  x   /  AST  19  /  ALT  19  /  AlkPhos  74  01-09                                                      MEDICATIONS  (STANDING):  amiodarone    Tablet 200 milliGRAM(s) Oral every 12 hours  chlorhexidine 0.12% Liquid 15 milliLiter(s) Oral Mucosa two times a day  chlorhexidine 4% Liquid 1 Application(s) Topical <User Schedule>  docusate sodium Liquid 100 milliGRAM(s) Oral two times a day  heparin  Injectable 5000 Unit(s) SubCutaneous every 8 hours  levETIRAcetam  IVPB 750 milliGRAM(s) IV Intermittent every 12 hours  meropenem  IVPB 500 milliGRAM(s) IV Intermittent every 6 hours  methylPREDNISolone sodium succinate Injectable 60 milliGRAM(s) IV Push daily  multivitamin 1 Tablet(s) Oral daily  nystatin Powder 1 Application(s) Topical two times a day  pantoprazole  Injectable 40 milliGRAM(s) IV Push two times a day  propofol Infusion 5 MICROgram(s)/kG/Min (2.421 mL/Hr) IV Continuous <Continuous>  senna Syrup 15 milliLiter(s) Oral daily  simvastatin 5 milliGRAM(s) Oral at bedtime    MEDICATIONS  (PRN):  acetaminophen   Tablet .. 650 milliGRAM(s) Oral every 6 hours PRN Temp greater or equal to 38C (100.4F)  guaiFENesin    Syrup 200 milliGRAM(s) Oral every 6 hours PRN Cough

## 2019-01-09 NOTE — PROGRESS NOTE ADULT - ASSESSMENT
73F from [facility] w/ PMH of HTN presented to [facility] with worsening generalized weakness and SOB for 3 days. Currently admitted to CCU with diffuse alveolar hemorrhage 2/2 Lovenox, intubated and sedated. Scheduled for tracheostomy.    IMPRESSION:  LLLL PNA secondary to ESBL Proteus with respiratory failure requiring ventilation  BCx NTD  WBC 8.9  UCx Proteus    RECOMMENDATIONS:  Meropenem 500 mg iv q6h

## 2019-01-09 NOTE — PROGRESS NOTE ADULT - SUBJECTIVE AND OBJECTIVE BOX
SUBJECTIVE:    Patient is a 74y old Female who presents with a chief complaint of SOB and rash (09 Jan 2019 09:12)    Currently admitted to medicine with the primary diagnosis of PNA 2/2 ESBL Proteus, s/p intubation for respiratory failure      Today is hospital day 29d. No acute overnight events. Yesterday, patient was noted to have bilateral upper extremity twitching like movements with one episode of eyes rolling back. Routine EEG and STAT labs were ordered and neurology consult placed. Cardiothoracic surgery is signing off as patient is too unstable for trach placement at this time. Patient was made DNR after family meeting with palliative care yesterday.     PAST MEDICAL & SURGICAL HISTORY  Hypertension  No significant past surgical history    SOCIAL HISTORY:  Patient denies alcohol, tobacco, and recreational drug use.   From [facility] (assisted living), functional at baseline     ALLERGIES:  No Known Allergies    MEDICATIONS:  STANDING MEDICATIONS  amiodarone    Tablet 200 milliGRAM(s) Oral every 12 hours  chlorhexidine 0.12% Liquid 15 milliLiter(s) Oral Mucosa two times a day  chlorhexidine 4% Liquid 1 Application(s) Topical <User Schedule>  docusate sodium Liquid 100 milliGRAM(s) Oral two times a day  heparin  Injectable 5000 Unit(s) SubCutaneous every 8 hours  meropenem  IVPB 500 milliGRAM(s) IV Intermittent every 6 hours  methylPREDNISolone sodium succinate Injectable 60 milliGRAM(s) IV Push daily  multivitamin 1 Tablet(s) Oral daily  nystatin Powder 1 Application(s) Topical two times a day  pantoprazole  Injectable 40 milliGRAM(s) IV Push two times a day  propofol Infusion 5 MICROgram(s)/kG/Min IV Continuous <Continuous>  senna Syrup 15 milliLiter(s) Oral daily  simvastatin 5 milliGRAM(s) Oral at bedtime    PRN MEDICATIONS  acetaminophen   Tablet .. 650 milliGRAM(s) Oral every 6 hours PRN  guaiFENesin    Syrup 200 milliGRAM(s) Oral every 6 hours PRN    VITALS:   T(F): 98.9  HR: 71  BP: 133/67  RR: 20  SpO2: 96%    LABS:                        10.0   9.44  )-----------( 202      ( 09 Jan 2019 04:23 )             30.5     01-09    142  |  98  |  28<H>  ----------------------------<  140<H>  3.7   |  29  |  0.5<L>    Ca    8.0<L>      09 Jan 2019 04:23  Mg     2.3     01-09    TPro  5.8<L>  /  Alb  2.6<L>  /  TBili  0.6  /  DBili  x   /  AST  19  /  ALT  19  /  AlkPhos  74  01-09    PT/INR - ( 09 Jan 2019 04:23 )   PT: 12.60 sec;   INR: 1.10 ratio         PTT - ( 09 Jan 2019 04:23 )  PTT:27.7 sec    ABG - ( 09 Jan 2019 08:03 )  pH, Arterial: 7.55  pH, Blood: x     /  pCO2: 44    /  pO2: 64    / HCO3: 38    / Base Excess: 14.0  /  SaO2: 95        Culture - Urine (collected 06 Jan 2019 18:00)  Source: .Urine Catheterized  Final Report (09 Jan 2019 08:21):    >100,000 CFU/ml Proteus mirabilis ESBL  Organism: Proteus mirabilis ESBL (09 Jan 2019 08:21)  Organism: Proteus mirabilis ESBL (09 Jan 2019 08:21)    Culture - Blood (collected 06 Jan 2019 12:27)  Source: .Blood None  Preliminary Report (07 Jan 2019 21:02):    No growth to date.    RADIOLOGY:  CXR  Impression    Unchanged left greater than right bilateral opacities.    EEG  Generalized slowing     PHYSICAL EXAM:  GEN: intubated and sedated   LUNGS: on mechanical ventilation, bilateral breath sounds appreciated    HEART: regular rate, irregular rhythm, no murmurs appreciated   ABD: Soft, non-distended. Bowel sounds present.   EXT: Noncyanotic, nonedematous, 2+ peripheral pulses, skin intact   NEURO: intubated and sedated      Lines/Tubes   Left UE midline catheter  Endotracheal tube  OG tube

## 2019-01-09 NOTE — PROGRESS NOTE ADULT - SUBJECTIVE AND OBJECTIVE BOX
KARL, DENISE  74y, Female      OVERNIGHT EVENTS:    no fevers, no pressors, FiO2 50 %, follows commands    VITALS:  T(F): 98.9, Max: 99 (01-09-19 @ 00:00)  HR: 66  BP: 113/48  RR: 20Vital Signs Last 24 Hrs  T(C): 37.2 (09 Jan 2019 04:00), Max: 37.2 (08 Jan 2019 20:00)  T(F): 98.9 (09 Jan 2019 04:00), Max: 99 (09 Jan 2019 00:00)  HR: 66 (09 Jan 2019 06:00) (58 - 74)  BP: 113/48 (09 Jan 2019 06:00) (98/43 - 131/50)  BP(mean): 67 (09 Jan 2019 06:00) (56 - 81)  RR: 20 (09 Jan 2019 06:00) (18 - 27)  SpO2: 97% (09 Jan 2019 06:00) (94% - 99%)    TESTS & MEASUREMENTS:                        10.0   9.44  )-----------( 202      ( 09 Jan 2019 04:23 )             30.5     01-09    142  |  98  |  28<H>  ----------------------------<  140<H>  3.7   |  29  |  0.5<L>    Ca    8.0<L>      09 Jan 2019 04:23  Mg     2.3     01-09    TPro  5.8<L>  /  Alb  2.6<L>  /  TBili  0.6  /  DBili  x   /  AST  19  /  ALT  19  /  AlkPhos  74  01-09    LIVER FUNCTIONS - ( 09 Jan 2019 04:23 )  Alb: 2.6 g/dL / Pro: 5.8 g/dL / ALK PHOS: 74 U/L / ALT: 19 U/L / AST: 19 U/L / GGT: x             Culture - Urine (collected 01-06-19 @ 18:00)  Source: .Urine Catheterized  Preliminary Report (01-08-19 @ 13:03):    >100,000 CFU/ml Proteus mirabilis    Culture - Blood (collected 01-06-19 @ 12:27)  Source: .Blood None  Preliminary Report (01-07-19 @ 21:02):    No growth to date.    Culture - Fungal, Bronchial (collected 01-04-19 @ 14:45)  Source: .Broncial None  Preliminary Report (01-07-19 @ 08:29):    Testing in progress    Culture - Acid Fast - Bronchial w/Smear (collected 01-04-19 @ 14:45)  Source: .Broncial None    Culture - Bronchial (collected 01-04-19 @ 14:45)  Source: .Broncial None  Gram Stain (01-04-19 @ 23:57):    Few polymorphonuclear leukocytes per low power field    No Squamous epithelial cells per low power field    Rare Gram Positive Cocci in Pairs and Chains per oil power field  Final Report (01-06-19 @ 16:59):    Moderate Proteus mirabilis ESBL    Normal Respiratory Windy present  Organism: Proteus mirabilis ESBL (01-06-19 @ 16:59)  Organism: Proteus mirabilis ESBL (01-06-19 @ 16:59)      -  Amikacin: S <=8      -  Amoxicillin/Clavulanic Acid: S <=8/4      -  Ampicillin: R >16 These ampicillin results predict results for amoxicillin      -  Ampicillin/Sulbactam: R <=4/2      -  Aztreonam: R <=4      -  Cefazolin: R >16      -  Cefepime: R >16      -  Cefoxitin: S <=4      -  Ceftriaxone: R >32 Enterobacter, Citrobacter, and Serratia may develop resistance during prolonged therapy      -  Ciprofloxacin: R >2      -  Ertapenem: S <=0.5      -  Gentamicin: R >8      -  Levofloxacin: I 4      -  Meropenem: S <=1      -  Piperacillin/Tazobactam: R <=8      -  Tobramycin: R >8      -  Trimethoprim/Sulfamethoxazole: R >2/38      Method Type: MARIANA    Culture - Blood (collected 01-02-19 @ 17:14)  Source: .Blood None  Final Report (01-08-19 @ 01:00):    No growth at 5 days.            RADIOLOGY & ADDITIONAL TESTS:    ANTIBIOTICS:  meropenem  IVPB 500 milliGRAM(s) IV Intermittent every 6 hours

## 2019-01-09 NOTE — PROGRESS NOTE ADULT - ASSESSMENT
IMPRESSION    Acute on Chronic Hypercapnic Respiratory Failure s/p intubation s/p bronchoscopy 2/2 Diffuse Alveolar Hemorrhage 2/2 Lovenox?   Tachybrady Syndrome  Pneumonia 2/2 ESBL Proteus   UTI 2/2 Proteus     PLAN    CNS: Sedation (propofol)     HEENT: Oral care    PULMONARY: HOB @ 45 degrees. Patient remains intubated. CTS signing off as patient is too unstable for OR. Solumedrol 60mg IV daily for DAH.     CARDIOVASCULAR: Echo with EF 50-55%, moderate pulmonary HTN. Tachybrady syndrome, amiodarone 200mg BID, no AC 2/2 DAH, EPS following. Lasix switched to PO, maintain I=O.     GI: GI prophylaxis. OG tube feeds      RENAL: Monitor I&O, electrolytes.     INFECTIOUS DISEASE: Pneumonia 2/2 ESBL Proteus. UTI 2/2 Proteus. ID on board, continue meropenem 500 q6h. Blood culture negative to date. Midline in place L UE.     HEMATOLOGICAL: DVT ppx with heparin sub-q.     NEUROLOGIC: Neurology consulted, follow up recommendations. Routine EEG with generalized slowing.     ENDOCRINE: Rheumatologic workup negative     CCU monitoring, will decide on transfer to vent unit   DNR   Will meet with patient's family and palliative care again today.

## 2019-01-10 NOTE — PROGRESS NOTE ADULT - ASSESSMENT
IMPRESSION    Acute on Chronic Hypercapnic Respiratory Failure s/p intubation s/p bronchoscopy 2/2 Diffuse Alveolar Hemorrhage 2/2 Lovenox?   Tachybrady Syndrome  Pneumonia 2/2 ESBL Proteus   UTI 2/2 Proteus     PLAN    CNS: Sedation (propofol)     HEENT: Oral care    PULMONARY: HOB @ 45 degrees. CTS signed off as patient was too unstable for OR. Solumedrol 60mg IV daily for DAH. Patient remains intubated, will extubate will palliative care team this afternoon in anticipation for end of life care.     CARDIOVASCULAR: Echo with EF 50-55%, moderate pulmonary HTN. Tachybrady syndrome, amiodarone 200mg BID, no AC 2/2 DAH, EPS following. Lasix to maintain I=O.     GI: GI prophylaxis. Hold tube feeds.     RENAL: Monitor I&O, electrolytes.     INFECTIOUS DISEASE: Pneumonia 2/2 ESBL Proteus. UTI 2/2 Proteus. Meropenem 500 q6h. Blood culture negative to date. Midline in place L UE.     HEMATOLOGICAL: DVT ppx with heparin sub-q.     NEUROLOGIC: Routine EEG with generalized slowing. Keppra for seizure prophylaxis.     ENDOCRINE: Rheumatologic workup negative     CCU monitoring  Plan to extubate with palliative care today, DNR

## 2019-01-10 NOTE — GOALS OF CARE CONVERSATION - PERSONAL ADVANCE DIRECTIVE - CONVERSATION/DISCUSSION
Prognosis/Treatment Options/Diagnosis/MOLST Discussed
Diagnosis/Prognosis/Treatment Options/MOLST Discussed

## 2019-01-10 NOTE — PROGRESS NOTE ADULT - SUBJECTIVE AND OBJECTIVE BOX
Patient is a 74y old  Female who presents with a chief complaint of SOB and rash (10 Sathish 2019 05:50)        Over Night Events:    no overnight issues  remains intubated  on propofol  failed SBT this am    ROS:  See HPI    PHYSICAL EXAM    ICU Vital Signs Last 24 Hrs  T(C): 37.2 (10 Sathish 2019 08:00), Max: 37.2 (2019 12:00)  T(F): 98.9 (10 Sathish 2019 08:00), Max: 99 (2019 12:00)  HR: 60 (10 Sathish 2019 10:00) (56 - 116)  BP: 94/49 (10 Sathish 2019 10:00) (92/49 - 113/62)  BP(mean): 62 (10 Sathish 2019 10:00) (60 - 92)  ABP: --  ABP(mean): --  RR: 20 (10 Sathish 2019 10:00) (20 - 24)  SpO2: 100% (10 Sathish 2019 10:00) (93% - 100%)      General:  HEENT: BEATRIZ             Lymphatic system: No cervical LN   Lungs: Bilateral BS  Cardiovascular: Regular   Gastrointestinal: Soft, Positive BS  Extremities: No clubbing.  Moves extremities.  Full Range of motion   Skin: Warm, intact  Neurological: No motor or sensory deficit       19 @ 07:  -  01-10-19 @ 07:00  --------------------------------------------------------  IN:    Enteral Tube Flush: 90 mL    IV PiggyBack: 200 mL    propofol Infusion: 213 mL    Vital High Protein: 450 mL  Total IN: 953 mL    OUT:    Incontinent per Collection Ba mL    Voided: 1000 mL  Total OUT: 1250 mL    Total NET: -297 mL      01-10-19 @ 07:  -  01-10-19 @ 10:15  --------------------------------------------------------  IN:    propofol Infusion: 14 mL  Total IN: 14 mL    OUT:  Total OUT: 0 mL    Total NET: 14 mL          LABS:                            10.7   9.13  )-----------( 221      ( 10 Sathish 2019 06:46 )             33.2                                               01-10    141  |  92<L>  |  24<H>  ----------------------------<  178<H>  2.8<L>   |  33<H>  |  0.5<L>    Ca    7.8<L>      10 Sathish 2019 06:46  Mg     1.8     10    TPro  5.8<L>  /  Alb  2.6<L>  /  TBili  0.6  /  DBili  x   /  AST  19  /  ALT  19  /  AlkPhos  74  -09      PT/INR - ( 2019 04:23 )   PT: 12.60 sec;   INR: 1.10 ratio         PTT - ( 2019 04:23 )  PTT:27.7 sec                                                                                     LIVER FUNCTIONS - ( 2019 04:23 )  Alb: 2.6 g/dL / Pro: 5.8 g/dL / ALK PHOS: 74 U/L / ALT: 19 U/L / AST: 19 U/L / GGT: x                                                                                               Mode: AC/ CMV (Assist Control/ Continuous Mandatory Ventilation)  RR (machine): 18  TV (machine): 400  FiO2: 50  PEEP: 8  ITime: 1  MAP: 22  PIP: 34                                      ABG - ( 10 Sathish 2019 06:30 )  pH, Arterial: 7.52  pH, Blood: x     /  pCO2: 50    /  pO2: 60    / HCO3: 41    / Base Excess: 16.4  /  SaO2: 93                  MEDICATIONS  (STANDING):  amiodarone    Tablet 200 milliGRAM(s) Oral every 12 hours  chlorhexidine 0.12% Liquid 15 milliLiter(s) Oral Mucosa two times a day  chlorhexidine 4% Liquid 1 Application(s) Topical <User Schedule>  docusate sodium Liquid 100 milliGRAM(s) Oral two times a day  furosemide    Tablet 40 milliGRAM(s) Oral daily  heparin  Injectable 5000 Unit(s) SubCutaneous every 8 hours  levETIRAcetam  IVPB 750 milliGRAM(s) IV Intermittent every 12 hours  meropenem  IVPB 500 milliGRAM(s) IV Intermittent every 6 hours  methylPREDNISolone sodium succinate Injectable 60 milliGRAM(s) IV Push daily  multivitamin 1 Tablet(s) Oral daily  nystatin Powder 1 Application(s) Topical two times a day  pantoprazole  Injectable 40 milliGRAM(s) IV Push two times a day  propofol Infusion 5 MICROgram(s)/kG/Min (2.421 mL/Hr) IV Continuous <Continuous>  senna Syrup 15 milliLiter(s) Oral daily  simvastatin 5 milliGRAM(s) Oral at bedtime    MEDICATIONS  (PRN):  acetaminophen   Tablet .. 650 milliGRAM(s) Oral every 6 hours PRN Temp greater or equal to 38C (100.4F)  guaiFENesin    Syrup 200 milliGRAM(s) Oral every 6 hours PRN Cough      Xrays: bibasilar opacites Patient is a 74y old  Female who presents with a chief complaint of SOB and rash (10 Sathish 2019 05:50)        Over Night Events:    no overnight issues  remains intubated  on propofol  failed SBT this am    ROS:  See HPI    PHYSICAL EXAM    ICU Vital Signs Last 24 Hrs  T(C): 37.2 (10 Sathish 2019 08:00), Max: 37.2 (2019 12:00)  T(F): 98.9 (10 Sathish 2019 08:00), Max: 99 (2019 12:00)  HR: 60 (10 Sathish 2019 10:00) (56 - 116)  BP: 94/49 (10 Sathish 2019 10:00) (92/49 - 113/62)  BP(mean): 62 (10 Sathish 2019 10:00) (60 - 92)  ABP: --  ABP(mean): --  RR: 20 (10 Sathish 2019 10:00) (20 - 24)  SpO2: 100% (10 Sathish 2019 10:00) (93% - 100%)      General: AND  HEENT: BEATRIZ             Lymphatic system: No cervical LN   Lungs: Bilateral BS  Cardiovascular: Regular   Gastrointestinal: Soft, Positive BS  Extremities: No clubbing.  Moves extremities.  Full Range of motion   Skin: Warm, intact  Neurological: No motor or sensory deficit follows simple commands, awake on painful stimuli      19 @ 07:01  -  01-10-19 @ 07:00  --------------------------------------------------------  IN:    Enteral Tube Flush: 90 mL    IV PiggyBack: 200 mL    propofol Infusion: 213 mL    Vital High Protein: 450 mL  Total IN: 953 mL    OUT:    Incontinent per Collection Ba mL    Voided: 1000 mL  Total OUT: 1250 mL    Total NET: -297 mL      01-10-19 @ 07:  -  01-10-19 @ 10:15  --------------------------------------------------------  IN:    propofol Infusion: 14 mL  Total IN: 14 mL    OUT:  Total OUT: 0 mL    Total NET: 14 mL          LABS:                            10.7   9.13  )-----------( 221      ( 10 Sathish 2019 06:46 )             33.2                                               01-10    141  |  92<L>  |  24<H>  ----------------------------<  178<H>  2.8<L>   |  33<H>  |  0.5<L>    Ca    7.8<L>      10 Sathish 2019 06:46  Mg     1.8     01-10    TPro  5.8<L>  /  Alb  2.6<L>  /  TBili  0.6  /  DBili  x   /  AST  19  /  ALT  19  /  AlkPhos  74        PT/INR - ( 2019 04:23 )   PT: 12.60 sec;   INR: 1.10 ratio         PTT - ( 2019 04:23 )  PTT:27.7 sec                                                                                     LIVER FUNCTIONS - ( 2019 04:23 )  Alb: 2.6 g/dL / Pro: 5.8 g/dL / ALK PHOS: 74 U/L / ALT: 19 U/L / AST: 19 U/L / GGT: x                                                                                               Mode: AC/ CMV (Assist Control/ Continuous Mandatory Ventilation)  RR (machine): 18  TV (machine): 400  FiO2: 50  PEEP: 8  ITime: 1  MAP: 22  PIP: 34                                      ABG - ( 10 Sathish 2019 06:30 )  pH, Arterial: 7.52  pH, Blood: x     /  pCO2: 50    /  pO2: 60    / HCO3: 41    / Base Excess: 16.4  /  SaO2: 93                  MEDICATIONS  (STANDING):  amiodarone    Tablet 200 milliGRAM(s) Oral every 12 hours  chlorhexidine 0.12% Liquid 15 milliLiter(s) Oral Mucosa two times a day  chlorhexidine 4% Liquid 1 Application(s) Topical <User Schedule>  docusate sodium Liquid 100 milliGRAM(s) Oral two times a day  furosemide    Tablet 40 milliGRAM(s) Oral daily  heparin  Injectable 5000 Unit(s) SubCutaneous every 8 hours  levETIRAcetam  IVPB 750 milliGRAM(s) IV Intermittent every 12 hours  meropenem  IVPB 500 milliGRAM(s) IV Intermittent every 6 hours  methylPREDNISolone sodium succinate Injectable 60 milliGRAM(s) IV Push daily  multivitamin 1 Tablet(s) Oral daily  nystatin Powder 1 Application(s) Topical two times a day  pantoprazole  Injectable 40 milliGRAM(s) IV Push two times a day  propofol Infusion 5 MICROgram(s)/kG/Min (2.421 mL/Hr) IV Continuous <Continuous>  senna Syrup 15 milliLiter(s) Oral daily  simvastatin 5 milliGRAM(s) Oral at bedtime    MEDICATIONS  (PRN):  acetaminophen   Tablet .. 650 milliGRAM(s) Oral every 6 hours PRN Temp greater or equal to 38C (100.4F)  guaiFENesin    Syrup 200 milliGRAM(s) Oral every 6 hours PRN Cough      Xrays: bibasilar opacites   unchanged Patient is a 74y old  Female who presents with a chief complaint of SOB and rash (10 Sathish 2019 05:50)        Over Night Events:    no overnight issues  remains intubated  on propofol      ROS:  See HPI    PHYSICAL EXAM    ICU Vital Signs Last 24 Hrs  T(C): 37.2 (10 Sathish 2019 08:00), Max: 37.2 (2019 12:00)  T(F): 98.9 (10 Sathish 2019 08:00), Max: 99 (2019 12:00)  HR: 60 (10 Sathish 2019 10:00) (56 - 116)  BP: 94/49 (10 Sathish 2019 10:00) (92/49 - 113/62)  BP(mean): 62 (10 Sathish 2019 10:00) (60 - 92)  RR: 20 (10 Sathish 2019 10:00) (20 - 24)  SpO2: 100% (10 Sathish 2019 10:00) (93% - 100%)      General: AND  HEENT: BEATRIZ             Lymphatic system: No cervical LN   Lungs: Bilateral BS, dec bs diffusely  Cardiovascular: Regular   Gastrointestinal: Soft, Positive BS  Extremities: No clubbing.  Moves extremities.  Full Range of motion   Skin: Warm, intact  Neurological: No motor or sensory deficit follows simple commands, awake on painful stimuli      19 @ 07:  -  01-10-19 @ 07:00  --------------------------------------------------------  IN:    Enteral Tube Flush: 90 mL    IV PiggyBack: 200 mL    propofol Infusion: 213 mL    Vital High Protein: 450 mL  Total IN: 953 mL    OUT:    Incontinent per Collection Ba mL    Voided: 1000 mL  Total OUT: 1250 mL    Total NET: -297 mL      01-10-19 @ 07:  -  01-10-19 @ 10:15  --------------------------------------------------------  IN:    propofol Infusion: 14 mL  Total IN: 14 mL    OUT:  Total OUT: 0 mL    Total NET: 14 mL          LABS:                            10.7   9.13  )-----------( 221      ( 10 Sathish 2019 06:46 )             33.2                                               01-10    141  |  92<L>  |  24<H>  ----------------------------<  178<H>  2.8<L>   |  33<H>  |  0.5<L>    Ca    7.8<L>      10 Sathish 2019 06:46  Mg     1.8     01-10    TPro  5.8<L>  /  Alb  2.6<L>  /  TBili  0.6  /  DBili  x   /  AST  19  /  ALT  19  /  AlkPhos  74        PT/INR - ( 2019 04:23 )   PT: 12.60 sec;   INR: 1.10 ratio         PTT - ( 2019 04:23 )  PTT:27.7 sec                                                                                     LIVER FUNCTIONS - ( 2019 04:23 )  Alb: 2.6 g/dL / Pro: 5.8 g/dL / ALK PHOS: 74 U/L / ALT: 19 U/L / AST: 19 U/L / GGT: x                                                                                               Mode: AC/ CMV (Assist Control/ Continuous Mandatory Ventilation)  RR (machine): 18  TV (machine): 400  FiO2: 50  PEEP: 8  ITime: 1  MAP: 22  PIP: 34                                      ABG - ( 10 Sathish 2019 06:30 )  pH, Arterial: 7.52  pH, Blood: x     /  pCO2: 50    /  pO2: 60    / HCO3: 41    / Base Excess: 16.4  /  SaO2: 93                  MEDICATIONS  (STANDING):  amiodarone    Tablet 200 milliGRAM(s) Oral every 12 hours  chlorhexidine 0.12% Liquid 15 milliLiter(s) Oral Mucosa two times a day  chlorhexidine 4% Liquid 1 Application(s) Topical <User Schedule>  docusate sodium Liquid 100 milliGRAM(s) Oral two times a day  furosemide    Tablet 40 milliGRAM(s) Oral daily  heparin  Injectable 5000 Unit(s) SubCutaneous every 8 hours  levETIRAcetam  IVPB 750 milliGRAM(s) IV Intermittent every 12 hours  meropenem  IVPB 500 milliGRAM(s) IV Intermittent every 6 hours  methylPREDNISolone sodium succinate Injectable 60 milliGRAM(s) IV Push daily  multivitamin 1 Tablet(s) Oral daily  nystatin Powder 1 Application(s) Topical two times a day  pantoprazole  Injectable 40 milliGRAM(s) IV Push two times a day  propofol Infusion 5 MICROgram(s)/kG/Min (2.421 mL/Hr) IV Continuous <Continuous>  senna Syrup 15 milliLiter(s) Oral daily  simvastatin 5 milliGRAM(s) Oral at bedtime    MEDICATIONS  (PRN):  acetaminophen   Tablet .. 650 milliGRAM(s) Oral every 6 hours PRN Temp greater or equal to 38C (100.4F)  guaiFENesin    Syrup 200 milliGRAM(s) Oral every 6 hours PRN Cough      Xrays: bibasilar opacites   unchanged

## 2019-01-10 NOTE — PROGRESS NOTE ADULT - ASSESSMENT
74yFemale being evaluated for comfort measures    Palliative team had met with family prior and goal was established to withdraw patient off vent support today for comfort measures only. Family reiterate that they want patient extubated and they are aware of the high likelihood of patient progressing to respiratory failure and death post liberation. Palliative team will continue to provide supportive care.           Recommendations:  d/c propofol  start morphine drip at 4mg/hr  Morphine 4mg iv q15 mins prn resp distress post extubation  ativan 2mg iv q30 mins PRN agitation post extubation  DNI/DNR/MOLSt  known poor prognosis  we will f/u

## 2019-01-10 NOTE — DISCHARGE NOTE FOR THE EXPIRED PATIENT - HOSPITAL COURSE
73 yo female with Acute on Chronic Hypercapnic Respiratory Failure s/p intubation s/p bronchoscopy 2/2 Diffuse Alveolar Hemorrhage 2/2 Lovenox? ,Tachybrady Syndrome, Pneumonia 2/2 ESBL Proteus   UTI 2/2 Proteus .  Pt has been intubated since 12/17 and could not be weaned off. Pt failed several SBTs and was not stable enough for a tracheostomy.  Pt and the family consulted palliative care and chose to liberate the pt from the vent on 1/10/2019.   Pt passed away at 16:57 on 1/10/2019.

## 2019-01-10 NOTE — PROGRESS NOTE ADULT - SUBJECTIVE AND OBJECTIVE BOX
SUBJECTIVE:    Patient is a 74y old Female who presents with a chief complaint of SOB and rash (10 Sathish 2019 10:14)    Currently admitted to medicine with the primary diagnosis of PNA, respiratory failure s/p intubation      Today is hospital day 30d. Patient failed spontaneous breathing trial this AM. Family has decided that as tracheostomy is no longer an option at this time, they would like to extubate the patient with plan for end of life care with the palliative care team's guidance. The team will meet at 3pm this afternoon. We will perform all measures to ensure comfort for the patient during this time.     PAST MEDICAL & SURGICAL HISTORY  Hypertension  No significant past surgical history    SOCIAL HISTORY:  Patient denied alcohol, tobacco, and recreational drug use.   From [facility] (assisted living), functional at baseline     ALLERGIES:  No Known Allergies    MEDICATIONS:  STANDING MEDICATIONS  amiodarone    Tablet 200 milliGRAM(s) Oral every 12 hours  chlorhexidine 0.12% Liquid 15 milliLiter(s) Oral Mucosa two times a day  chlorhexidine 4% Liquid 1 Application(s) Topical <User Schedule>  docusate sodium Liquid 100 milliGRAM(s) Oral two times a day  furosemide    Tablet 40 milliGRAM(s) Oral daily  heparin  Injectable 5000 Unit(s) SubCutaneous every 8 hours  levETIRAcetam  IVPB 750 milliGRAM(s) IV Intermittent every 12 hours  meropenem  IVPB 500 milliGRAM(s) IV Intermittent every 6 hours  methylPREDNISolone sodium succinate Injectable 60 milliGRAM(s) IV Push daily  multivitamin 1 Tablet(s) Oral daily  nystatin Powder 1 Application(s) Topical two times a day  pantoprazole  Injectable 40 milliGRAM(s) IV Push two times a day  potassium chloride    Tablet ER 40 milliEquivalent(s) Oral every 4 hours  potassium chloride  20 mEq/100 mL IVPB 20 milliEquivalent(s) IV Intermittent once  propofol Infusion 5 MICROgram(s)/kG/Min IV Continuous <Continuous>  senna Syrup 15 milliLiter(s) Oral daily  simvastatin 5 milliGRAM(s) Oral at bedtime    PRN MEDICATIONS  acetaminophen   Tablet .. 650 milliGRAM(s) Oral every 6 hours PRN  guaiFENesin    Syrup 200 milliGRAM(s) Oral every 6 hours PRN    VITALS:   T(F): 98.9  HR: 60  BP: 94/49  RR: 20  SpO2: 100%    LABS:                        10.7   9.13  )-----------( 221      ( 10 Sathish 2019 06:46 )             33.2     01-10    141  |  92<L>  |  24<H>  ----------------------------<  178<H>  2.8<L>   |  33<H>  |  0.5<L>    Ca    7.8<L>      10 Sathish 2019 06:46  Mg     1.8     01-10    TPro  5.8<L>  /  Alb  2.6<L>  /  TBili  0.6  /  DBili  x   /  AST  19  /  ALT  19  /  AlkPhos  74  01-09    PT/INR - ( 09 Jan 2019 04:23 )   PT: 12.60 sec;   INR: 1.10 ratio         PTT - ( 09 Jan 2019 04:23 )  PTT:27.7 sec    ABG - ( 10 Sathish 2019 06:30 )  pH, Arterial: 7.52  pH, Blood: x     /  pCO2: 50    /  pO2: 60    / HCO3: 41    / Base Excess: 16.4  /  SaO2: 93        RADIOLOGY:  CXR  Impression:    Bilateral opacifications. Support devices as described.  Without difference.    PHYSICAL EXAM:  GEN: intubated and sedated   LUNGS: on mechanical ventilation, bilateral breath sounds appreciated    HEART: regular rate, irregular rhythm, no murmurs appreciated   ABD: Soft, non-distended. Bowel sounds present.   EXT: Noncyanotic, nonedematous, 2+ peripheral pulses, skin intact   NEURO: intubated and sedated      Lines/Tubes   Left UE midline catheter  Endotracheal tube  OG tube

## 2019-01-10 NOTE — PROGRESS NOTE ADULT - SUBJECTIVE AND OBJECTIVE BOX
KARL, DENISE  74y, Female      OVERNIGHT EVENTS:    No fevers, no pressors, sedated, follows commands, on lactulose with diarrhea, no central lines    VITALS:  T(F): 98.5, Max: 99 (01-09-19 @ 08:00)  HR: 72  BP: 98/47  RR: 21Vital Signs Last 24 Hrs  T(C): 36.9 (09 Jan 2019 20:00), Max: 37.2 (09 Jan 2019 08:00)  T(F): 98.5 (09 Jan 2019 20:00), Max: 99 (09 Jan 2019 08:00)  HR: 72 (10 Sathish 2019 02:00) (56 - 116)  BP: 98/47 (10 Sathish 2019 02:00) (96/51 - 133/67)  BP(mean): 62 (10 Sathish 2019 02:00) (62 - 92)  RR: 21 (10 Sathish 2019 02:00) (20 - 22)  SpO2: 96% (10 Sathish 2019 02:00) (94% - 98%)    TESTS & MEASUREMENTS:                        10.0   9.44  )-----------( 202      ( 09 Jan 2019 04:23 )             30.5     01-09    142  |  98  |  28<H>  ----------------------------<  140<H>  3.7   |  29  |  0.5<L>    Ca    8.0<L>      09 Jan 2019 04:23  Mg     2.3     01-09    TPro  5.8<L>  /  Alb  2.6<L>  /  TBili  0.6  /  DBili  x   /  AST  19  /  ALT  19  /  AlkPhos  74  01-09    LIVER FUNCTIONS - ( 09 Jan 2019 04:23 )  Alb: 2.6 g/dL / Pro: 5.8 g/dL / ALK PHOS: 74 U/L / ALT: 19 U/L / AST: 19 U/L / GGT: x             Culture - Urine (collected 01-06-19 @ 18:00)  Source: .Urine Catheterized  Final Report (01-09-19 @ 08:21):    >100,000 CFU/ml Proteus mirabilis ESBL  Organism: Proteus mirabilis ESBL (01-09-19 @ 08:21)  Organism: Proteus mirabilis ESBL (01-09-19 @ 08:21)      -  Amikacin: S <=8      -  Amoxicillin/Clavulanic Acid: S <=8/4      -  Ampicillin: R >16 These ampicillin results predict results for amoxicillin      -  Ampicillin/Sulbactam: R <=4/2      -  Aztreonam: R >16      -  Cefazolin: R >16 For uncomplicated UTI with K. pneumoniae, E. coli, or P. mirablis: MARIANA <=16 is sensitive and MARIANA >=32 is resistant. This also predicts results for oral agents cefaclor, cefdinir, cefpodoxime, cefprozil, cefuroxime axetil, cephalexin and locarbef for uncomplicated UTI. Note that some isolates may be susceptible to these agents while testing resistant to cefazolin.      -  Cefepime: R >16      -  Cefoxitin: S <=4      -  Ceftriaxone: R >32 Enterobacter, Citrobacter, and Serratia may develop resistance during prolonged therapy      -  Ciprofloxacin: R >2      -  Ertapenem: S <=0.5      -  Gentamicin: R >8      -  Levofloxacin: R >4      -  Meropenem: S <=1      -  Nitrofurantoin: R >64 Should not be used to treat pyelonephritis      -  Piperacillin/Tazobactam: R <=8      -  Tobramycin: R >8      -  Trimethoprim/Sulfamethoxazole: R >2/38      Method Type: MARIANA    Culture - Blood (collected 01-06-19 @ 12:27)  Source: .Blood None  Preliminary Report (01-07-19 @ 21:02):    No growth to date.    Culture - Fungal, Bronchial (collected 01-04-19 @ 14:45)  Source: .Broncial None  Preliminary Report (01-07-19 @ 08:29):    Testing in progress    Culture - Acid Fast - Bronchial w/Smear (collected 01-04-19 @ 14:45)  Source: .Broncial None    Culture - Bronchial (collected 01-04-19 @ 14:45)  Source: .Broncial None  Gram Stain (01-04-19 @ 23:57):    Few polymorphonuclear leukocytes per low power field    No Squamous epithelial cells per low power field    Rare Gram Positive Cocci in Pairs and Chains per oil power field  Final Report (01-06-19 @ 16:59):    Moderate Proteus mirabilis ESBL    Normal Respiratory Windy present  Organism: Proteus mirabilis ESBL (01-06-19 @ 16:59)  Organism: Proteus mirabilis ESBL (01-06-19 @ 16:59)      -  Amikacin: S <=8      -  Amoxicillin/Clavulanic Acid: S <=8/4      -  Ampicillin: R >16 These ampicillin results predict results for amoxicillin      -  Ampicillin/Sulbactam: R <=4/2      -  Aztreonam: R <=4      -  Cefazolin: R >16      -  Cefepime: R >16      -  Cefoxitin: S <=4      -  Ceftriaxone: R >32 Enterobacter, Citrobacter, and Serratia may develop resistance during prolonged therapy      -  Ciprofloxacin: R >2      -  Ertapenem: S <=0.5      -  Gentamicin: R >8      -  Levofloxacin: I 4      -  Meropenem: S <=1      -  Piperacillin/Tazobactam: R <=8      -  Tobramycin: R >8      -  Trimethoprim/Sulfamethoxazole: R >2/38      Method Type: MARIANA            RADIOLOGY & ADDITIONAL TESTS:    ANTIBIOTICS:  meropenem  IVPB 500 milliGRAM(s) IV Intermittent every 6 hours

## 2019-01-10 NOTE — PROGRESS NOTE ADULT - ASSESSMENT
IMPRESSION:    Acute on chronic hypercapnic respiratory failure s/p intubation s/p bronchoscopy alveolar hem, ? etiology was on lovenox  Failed SBT several times   IDALMIS / OHS  VAP  Pneumonia/Aspiration/ fluid overload  Tachy pamela syndrome with pauses  ? SEIZURE      PLAN:     CNS: keep propofol , stop before liberation    HEENT: Oral care    PULMONARY:  HOB @ 45 degrees, no vent changes,  no trach candidate liberation today, DNI, pal;liative follwoing    CARDIOVASCULAR: on amiodarone per EP, no need for PPM, Keep equal balance, po lasix    GI: GI prophylaxis. Continue hold feeds    RENAL:  Follow up lytes.  Correct as needed BMP. Kent catheter    INFECTIOUS DISEASE: Follow up cultures    HEMATOLOGICAL:  DVT prophylaxis.    ENDOCRINE: rheumatological work up: negative    POOR PROGNOSIS  Monitor in ICU for now  palliative care eval IMPRESSION:    Acute on chronic hypercapnic respiratory failure s/p intubation s/p bronchoscopy alveolar hem, ? etiology was on lovenox  Failed SBT several times   IDALMIS / OHS  VAP  Pneumonia/Aspiration/ fluid overload  Tachy pamela syndrome with pauses controlled        PLAN:     CNS: keep propofol , stop before liberation    HEENT: Oral care    PULMONARY:  HOB @ 45 degrees, no vent changes, liberation today with palliative, discussed with family, DNI afterwards    CARDIOVASCULAR: on amiodarone per EP, no need for PPM, Keep equal balance, po lasix    GI: GI prophylaxis. hold feed for liberation    RENAL:  Follow up lytes.  Correct as needed BMP. Kent catheter    INFECTIOUS DISEASE: Follow up cultures on merrem for ESBL PNA    HEMATOLOGICAL:  DVT prophylaxis.    ENDOCRINE: rheumatological work up: negative    POOR PROGNOSIS  Monitor in ICU for now  palliative care eval IMPRESSION:    Acute on chronic hypercapnic respiratory failure s/p intubation s/p bronchoscopy alveolar hem  Failed SBT several times  IDALMIS / OHS  VAP  Pneumonia/Aspiration/ fluid overload  Tachy pamela syndrome with pauses controlled        PLAN:     CNS: keep propofol , stop before liberation    HEENT: Oral care    PULMONARY:  HOB @ 45 degrees, no vent changes, liberation today with palliative, discussed with family, DNI afterwards    CARDIOVASCULAR: on amiodarone per EP, no need for PPM, Keep equal balance, po lasix    GI: GI prophylaxis. hold feed for liberation    RENAL:  Follow up lytes.  Correct as needed BMP. Kent catheter    INFECTIOUS DISEASE: Follow up cultures on merrem for ESBL PNA    HEMATOLOGICAL:  DVT prophylaxis.    ENDOCRINE: rheumatological work up: negative    POOR PROGNOSIS  Monitor in ICU for now  palliative care eval

## 2019-01-10 NOTE — GOALS OF CARE CONVERSATION - PERSONAL ADVANCE DIRECTIVE - CONVERSATION DETAILS
Palliative care team met with patient's family at bedside to discuss goals of care. Discussed patient's current diagnosis, prognosis, and treatment options. Family verbalized clear understanding. And at this time wish to proceed with an elective palliative extubation.  Family aware this may result in patient's ultimately passing, and wish her to remain comfortable.      Plan for elective palliative extubation and complete comfort measures. No further blood work or finger sticks. No artifical nutrition or IV hydration. No IVabx and no pulse oximetry. DNR/DNI.      Palliative care team will continue to monitor and provide support.

## 2019-01-10 NOTE — PROGRESS NOTE ADULT - SUBJECTIVE AND OBJECTIVE BOX
Patient is a 74y old Female who presents with a chief complaint of SOB and rash    Patient seen. orally intubated/sedated     PHYSICAL EXAM    T(C): , Max: 37.2 (18:00)  T(F): 98  HR: 60 (60 - 116)  BP: 91/45 (91/45 - 108/47)  RR: 19 (19 - 25)  SpO2: 99% (93% - 100%)                                    10.7   9.13  )-----------( 221      ( 10 Sathish 2019 06:46 )             33.2                                                                                      01-10    141  |  92<L>  |  24<H>  ----------------------------<  178<H>  2.8<L>   |  33<H>  |  0.5<L>    Ca    7.8<L>      10 Sathish 2019 06:46  Mg     1.8     01-10    TPro  5.8<L>  /  Alb  2.6<L>  /  TBili  0.6  /  DBili  x   /  AST  19  /  ALT  19  /  AlkPhos  74  01-09                                                      MEDICATIONS  (STANDING):  morphine  Infusion 4 mG/Hr (4 mL/Hr) IV Continuous <Continuous>    MEDICATIONS  (PRN):  LORazepam   Injectable 2 milliGRAM(s) IV Push every 30 minutes PRN Agitation  LORazepam   Injectable 1 milliGRAM(s) IV Push every 30 minutes PRN Agitation  morphine  - Injectable 4 milliGRAM(s) IV Push every 15 minutes PRN Respiratory distress

## 2019-01-10 NOTE — GOALS OF CARE CONVERSATION - PERSONAL ADVANCE DIRECTIVE - FAMILY/RELATIVE
Lei patient's brother; Other various family members present
Patient's brother and multiple family members present for meeting

## 2019-01-10 NOTE — PROGRESS NOTE ADULT - REASON FOR ADMISSION
SOB and rash
Acute Hypercapnic Respiratory Failure
SOB and rash
Respiratory Failure
SOB and rash
Diffuse alveolar hemorrhage
Dyspnea, Rash
Respiratory Distress
SOB and rash
Shortness of breath
SOB and rash

## 2019-01-10 NOTE — PROGRESS NOTE ADULT - ASSESSMENT
IMPRESSION:  LLLL PNA secondary to ESBL Proteus with respiratory failure requiring ventilation  BCx NTD  WBC 8.9  UCx Proteus ESBL    RECOMMENDATIONS:  Meropenem 500 mg iv q6h  Will require a 14 day course of iv ABx

## 2019-01-11 LAB
CULTURE RESULTS: SIGNIFICANT CHANGE UP
SPECIMEN SOURCE: SIGNIFICANT CHANGE UP

## 2019-07-19 NOTE — ED ADULT TRIAGE NOTE - WEIGHT METHOD
[facility]  [address] [place] [address],[address]  Phone: [phone]  Fax: [phone]    Kristofer Langston MD        July 19, 2019     Patient: Berry Stafford   YOB: 1976   Date of Visit: 7/19/2019       To Whom It May Concern: It is my medical opinion that Berry Stafford should be excused from work 7/12-7/19 due to exacerbation of chronic illness    If you have any questions or concerns, please don't hesitate to call.     Sincerely,         Kristofer Langston MD
stated

## 2019-09-10 NOTE — PROGRESS NOTE ADULT - ASSESSMENT
[facility]  Chief Complaint   Patient presents with   • [facility] Nursing Home Visit      9/10/2019     Anticipated next visit:   10/15/19.    FACILITY:[facility]     Patient being seen today for the following reasons:  Hypertension, atrial fibrillation, dementia, bipolar illness, weakness and deconditioning.    NEW CONCERNS: Slight elevation of blood pressure (155/89)         Please see the facility Medication Administration Record for the most accurate medication list.    MEDS:  Current Outpatient Medications   Medication Sig   • diphenoxylate-atropine (LOMOTIL) 2.5-0.025 MG tablet Take 1 tablet by mouth every 6 hours as needed for Diarrhea.   • warfarin (COUMADIN) 2.5 MG tablet Taking 1.5 tablet by mouth daily or as directed.   • OLANZapine (ZYPREXA) 2.5 MG tablet Take 1 tablet by mouth nightly.   • divalproex (DEPAKOTE ER) 500 MG 24 hr ER tablet Take 1 tablet by mouth daily.   • simvastatin (ZOCOR) 10 MG tablet Take 1 tablet by mouth nightly.   • furosemide (LASIX) 40 MG tablet Take 2 tablets by mouth daily.   • potassium chloride (KLOR-CON) 20 MEQ packet DISSOLVE 1 PACKET AS DIRECTED & GIVE BY MOUTH FIVE TIMES DAILY   • spironolactone (ALDACTONE) 25 MG tablet Take 1 tablet by mouth 2 times daily.   • isosorbide dinitrate (ISORDIL) 30 MG tablet Take 90 mg by mouth daily.   • aspirin 81 MG EC tablet *DO NOT CRUSH* GIVE 1 TABLET BY MOUTH ONCE DAILY FOR PERIPHERAL VASCULAR DISEASE   • Cholecalciferol (VITAMIN D3) 2000 units capsule GIVE 1 CAPSULE BY MOUTH EVERY MORNING FOR SUPPLEMENT FOR GENERALIZED WEAKNESS   • loratadine (CLARITIN) 10 MG tablet Take 1 tablet by mouth daily.   • NITROSTAT 0.4 MG sublingual tablet GIVE 1 TABLET SUBLINGUALLY EVERY 5 MIN UP TO 3 DOSES AS NEEDED FOR CHEST PAIN-IF NO RELIEF, CALL MD * DO NOT CRUSH* (NEXT TIME FAX DR. Morales   • BISAC-EVAC 10 MG suppository INSERT 1 SUPP PER RECTUM ONCE DAILY AS NEEDED FOR CONSTIPATION   • DILT- MG  24 hr capsule GIVE 1 CAPSULE BY MOUTH ONCE DAILY FOR CHRONIC A-FIB **DO NOT CRUSH*   • allopurinol (ZYLOPRIM) 300 MG tablet GIVE 1 TABLET BY MOUTH ONCE DAILY FOR GOUT   • MILK OF MAGNESIA 7.75 % suspension GIVE 30ML BY MOUTH ONCE DAILY AS NEEDED FOR CONSTIPATION   • folic acid (FOLATE) 1 MG tablet GIVE 1 TABLET BY MOUTH EVERY MORNING FOR SUPPLEMENT   • MAPAP 325 MG tablet GIVE 2 TABLETS (650MG) BY MOUTH EVERY 4 HOURS AS NEEDED FOR PAIN *NOT TO EXCEED 3GM APAP/24H*   • DISPENSE One rollator walker Dx: 780.79 and 715.00     No current facility-administered medications for this visit.                                                                       IMMUNIZATIONS:   Immunization History   Administered Date(s) Administered   • Influenza, seasonal, injectable, trivalent 10/20/2005, 12/16/2006, 10/01/2012, 10/12/2016   • Pneumococcal Conjugate 13 valent 03/28/2017   • Pneumococcal polysaccharide, adult, 23 valent 08/01/2014   • Td:Adult type tetanus/diphtheria 12/16/2008     ALLERGIES:   ALLERGIES:   Allergen Reactions   • Ciprofloxacin HIVES     Rash all over body   • Enablex GI UPSET and DIARRHEA   • Enalapril NAUSEA     PROBLEMS:   Patient Active Problem List    Diagnosis Date Noted   • Chronic diastolic heart failure (CMS/HCC) 08/17/2018     Priority: Low   • Chest pain 06/05/2017     Priority: Low     Echocardiogram 03/2017: Normal left ventricular size and systolic function. Severe left ventricular hypertrophy. Left ventricular ejection fraction, 65 % . Rhythm precludes evaluation of diastolic function. Abnormal septal motion consistent with pacemaker. Moderately increased right ventricular basal diameter. Normal right ventricular systolic function. RV fractional area change 54 %. Right ventricular systolic pressure 48.9 mmHg. Catheter/pacemaker wire in the right ventricular cavity. Moderately increased left atrial volume 43.3 ml/m². Moderately increased right atrial size. Mildly dilated ascending aorta, 3.7  DENISE BARAJAS 73y Female  MRN#: [id number]   CODE STATUS:FULLCODE      SUBJECTIVE  Patient is a 73y old Female who presents with a chief complaint of SOB and rash (14 Dec 2018 15:00)  Currently admitted to medicine with the primary diagnosis of Shortness of breath  Today is hospital day 3d, and this morning she is in in respiratory distress when we try to wean off the bipap.      OBJECTIVE  PAST MEDICAL & SURGICAL HISTORY  Hypertension  No significant past surgical history    ALLERGIES:  No Known Allergies    MEDICATIONS:  STANDING MEDICATIONS  acyclovir IVPB 800 milliGRAM(s) IV Intermittent every 8 hours  ALBUTerol    90 MICROgram(s) HFA Inhaler 1 Puff(s) Inhalation every 4 hours  ALBUTerol/ipratropium for Nebulization 3 milliLiter(s) Nebulizer every 6 hours  chlorhexidine 4% Liquid 1 Application(s) Topical <User Schedule>  enoxaparin Injectable 80 milliGRAM(s) SubCutaneous every 12 hours  methylPREDNISolone sodium succinate Injectable 60 milliGRAM(s) IV Push daily  metoprolol tartrate 12.5 milliGRAM(s) Oral every 12 hours  piperacillin/tazobactam IVPB.      piperacillin/tazobactam IVPB. 2.25 Gram(s) IV Intermittent once  piperacillin/tazobactam IVPB. 2.25 Gram(s) IV Intermittent every 6 hours  simvastatin 5 milliGRAM(s) Oral at bedtime  tiotropium 18 MICROgram(s) Capsule 1 Capsule(s) Inhalation daily    PRN MEDICATIONS  diphenhydrAMINE 25 milliGRAM(s) Oral every 6 hours PRN      VITAL SIGNS: Last 24 Hours  T(C): 35.2 (14 Dec 2018 14:26), Max: 36.6 (13 Dec 2018 22:39)  T(F): 95.4 (14 Dec 2018 14:26), Max: 97.8 (13 Dec 2018 22:39)  HR: 110 (14 Dec 2018 14:26) (71 - 110)  BP: 127/57 (14 Dec 2018 14:26) (100/52 - 135/87)  BP(mean): --  RR: 20 (14 Dec 2018 14:26) (18 - 20)  SpO2: 94% (14 Dec 2018 13:42) (93% - 100%)    LABS:                        12.1   6.23  )-----------( 143      ( 13 Dec 2018 07:14 )             38.0     12-13    136  |  92<L>  |  42<H>  ----------------------------<  96  3.8   |  33<H>  |  1.3    Ca    8.3<L>      13 Dec 2018 07:14  Mg     1.9     12-13    TPro  7.1  /  Alb  3.6  /  TBili  0.4  /  DBili  <0.2  /  AST  24  /  ALT  32  /  AlkPhos  89  12-13        ABG - ( 14 Dec 2018 13:19 )  pH, Arterial: 7.39  pH, Blood: x     /  pCO2: 60    /  pO2: 85    / HCO3: 36    / Base Excess: 9.4   /  SaO2: 97                    Culture - Blood (collected 12 Dec 2018 20:40)  Source: .Blood None  Gram Stain (13 Dec 2018 23:21):    Growth in aerobic bottle: Gram Positive Cocci in Clusters  Preliminary Report (13 Dec 2018 23:21):    Growth in aerobic bottle: Gram Positive Cocci in Clusters    "Due to technical problems, Proteus sp. will Not be reported as part of    the BCID panel until further notice"    ***Blood Panel PCR results on this specimen are available    approximately 3 hours after the Gram stain result.***    Gram stain, PCR, and/or culture results may not always    correspond due to difference in methodologies.    ************************************************************    This PCR assay was performed using [employer].    The following targets are tested for: Enterococcus,    vancomycin resistant enterococci, Listeria monocytogenes,    coagulase negative staphylococci, S. aureus,    methicillin resistant S. aureus, Streptococcus agalactiae    (Group B), S. pneumoniae, S.pyogenes (Group A),    Acinetobacter baumannii, Enterobacter cloacae, E. coli,    Klebsiella oxytoca, K. pneumoniae, Proteus sp.,    Serratia marcescens, Haemophilus influenzae,    Neisseria meningitidis, Pseudomonas aeruginosa, Candida    albicans, C. glabrata, C krusei, C parapsilosis,    C. tropicalis and the KPC resistance gene.  Organism: Blood Culture PCR (14 Dec 2018 01:42)  Organism: Blood Culture PCR (14 Dec 2018 01:42)    Culture - Blood (collected 12 Dec 2018 16:51)  Source: .Blood None  Preliminary Report (14 Dec 2018 01:01):    No growth to date.    Culture - Blood (collected 11 Dec 2018 19:26)  Source: .Blood Blood-Peripheral  Preliminary Report (13 Dec 2018 01:02):    No growth to date.    Culture - Blood (collected 11 Dec 2018 19:26)  Source: .Blood Blood-Peripheral  Preliminary Report (13 Dec 2018 01:02):    No growth to date.      CARDIAC MARKERS ( 12 Dec 2018 16:51 )  x     / 0.02 ng/mL / 180 U/L / x     / 12.1 ng/mL      RADIOLOGY:  < from: CT Chest No Cont (12.12.18 @ 18:08) >  Mucoid debris in the proximal trachea (3:61) suggestive of aspiration.    Bilateral patchy airspace opacities greatest in the right lower lobe.   Correlate for infectious/inflammatory etiologies.    Prominent mediastinal lymph nodes up to 1.0 cm, probably reactive in   nature.    Cholelithiasis with suggestion of calcification within the wall of the   gallbladder (porcelain gallbladder).      PHYSICAL EXAM:  GENERAL: NAD, well-developed  HEAD:  Atraumatic, Normocephalic  EYES: EOMI, PERRLA, conjunctiva and sclera clear  NECK: Supple,  Pulm: B/L dry crackles   CV: Regular rate and rhythm; No murmurs, rubs, or gallops  GI: Soft, Nontender, Nondistended;   EXTREMITIES:  2+ Peripheral Pulses, No clubbing, cyanosis, or edema  PSYCH: AAOx3  NEUROLOGY: non-focal  SKIN: rash upper right chest and right upper back     ASSESSMENT & PLAN  72 yo F from [facility] w pmhx of HTN reports with worsening generalized weakness and SOB for 3 days. Pt found to be hypoxic in ED and A fib with RVR. CXR showed    # Acute hypercapnic respiratory failure/ aspiration?  - opacities and mucus debris in chest CT, CXR repeat today showed worsening bilateral opacities   - on BIPAP   - on zosyn now for suspected aspiration PNA, d/c levo/ceftr as per ID ( afebrile, no leukocytosis )    -  d/c lasix as per cardiology,  f/u 2D ECHO  - pulm follow up    # new onset A fib  - CHADsVASc: 4-5  - pt back to NSR now  - c/w Toprol 12.5mg qD for now  - lovenox 80mg q12 for anticoagulation for now    # right shoulder vesicular rash   - VZV zoster  - ID follow up  - valacyclovir 1000mg q8  - benadryl for pruritus   - contact and airborne precaution as per ID    # HTN, controlled   - c/w HCTZ    # positive blood culture coag neg staph:   - likely contaminanted/ f/u repeat blood culture     # DVT ppx: on Lovenox therapeutic dose cm.    Echocardiogram 1/05/2016:0.4 mg IV Regadenoson/Lexiscan protocol is used. Rhythm at baseline is ventricular paced, with PVCs. Patient tolerated the infusion well, with minor nonspecific adverse effects. No ischemic symptoms or ischemic ECG changes noted.     • Obesity (BMI 30-39.9) 06/02/2017     Priority: Low   • Vascular dementia without behavioral disturbance 06/02/2017     Priority: Low   • Paroxysmal atrial fibrillation (CMS/HCC) 04/24/2017     Priority: Low   • Idiopathic chronic gout of left hand 05/24/2016     Priority: Low   • Pacemaker 03/22/2016     Priority: Low   • Long term (current) use of anticoagulants 05/19/2015     Priority: Low   • Encounter for therapeutic drug monitoring 05/19/2015     Priority: Low   • CAD (coronary artery disease) 11/06/2014     Priority: Low     Stress test 1/5/2015: 0.4 mg IV Regadenoson/Lexiscan protocol is used. Rhythm at baseline is ventricular paced, with PVCs. Patient tolerated the infusion well, with minor nonspecific adverse effects. No ischemic symptoms or ischemic ECG changes noted. Myocardial perfusion scan reveals no significant areas of ischemia, inferior wall abnormality which is fixed suggestive either prior infarction or soft tissue attenuation, EF is 46 %.    Stress test 10/2011 significant for a large fixed defect of the inferior wall extending to the apex. EF is maintained. No wall motion abnormality was noted. Inferior wall abnormality was suggestive of either prior infarction or artifact secondary to interference from hepatic uptake with radionuclide tracer. This study was similar to patient's stress test of 2003 with cardiac catheterization of 2005 not having shown any  significant stenosis.    Hospitalization 04/2005 with troponin I of 12.6 with cardiac catheterization on 04/19/2005 with normal coronaries and normal ejection fraction.    Cardiac cath 8/2013 Mild coronary artery disease in the left anterior descending, otherwise normal coronary  DENISE BARAJAS 73y Female  MRN#: [id number]   CODE STATUS:FULLCODE      SUBJECTIVE  Patient is a 73y old Female who presents with a chief complaint of SOB and rash (14 Dec 2018 15:00)  Currently admitted to medicine with the primary diagnosis of Shortness of breath  Today is hospital day 3d, and this morning she is in in respiratory distress when we try to wean off the bipap.      OBJECTIVE  PAST MEDICAL & SURGICAL HISTORY  Hypertension  No significant past surgical history    ALLERGIES:  No Known Allergies    MEDICATIONS:  STANDING MEDICATIONS  acyclovir IVPB 800 milliGRAM(s) IV Intermittent every 8 hours  ALBUTerol    90 MICROgram(s) HFA Inhaler 1 Puff(s) Inhalation every 4 hours  ALBUTerol/ipratropium for Nebulization 3 milliLiter(s) Nebulizer every 6 hours  chlorhexidine 4% Liquid 1 Application(s) Topical <User Schedule>  enoxaparin Injectable 80 milliGRAM(s) SubCutaneous every 12 hours  methylPREDNISolone sodium succinate Injectable 60 milliGRAM(s) IV Push daily  metoprolol tartrate 12.5 milliGRAM(s) Oral every 12 hours  piperacillin/tazobactam IVPB.      piperacillin/tazobactam IVPB. 2.25 Gram(s) IV Intermittent once  piperacillin/tazobactam IVPB. 2.25 Gram(s) IV Intermittent every 6 hours  simvastatin 5 milliGRAM(s) Oral at bedtime  tiotropium 18 MICROgram(s) Capsule 1 Capsule(s) Inhalation daily    PRN MEDICATIONS  diphenhydrAMINE 25 milliGRAM(s) Oral every 6 hours PRN      VITAL SIGNS: Last 24 Hours  T(C): 35.2 (14 Dec 2018 14:26), Max: 36.6 (13 Dec 2018 22:39)  T(F): 95.4 (14 Dec 2018 14:26), Max: 97.8 (13 Dec 2018 22:39)  HR: 110 (14 Dec 2018 14:26) (71 - 110)  BP: 127/57 (14 Dec 2018 14:26) (100/52 - 135/87)  BP(mean): --  RR: 20 (14 Dec 2018 14:26) (18 - 20)  SpO2: 94% (14 Dec 2018 13:42) (93% - 100%)    LABS:                        12.1   6.23  )-----------( 143      ( 13 Dec 2018 07:14 )             38.0     12-13    136  |  92<L>  |  42<H>  ----------------------------<  96  3.8   |  33<H>  |  1.3    Ca    8.3<L>      13 Dec 2018 07:14  Mg     1.9     12-13    TPro  7.1  /  Alb  3.6  /  TBili  0.4  /  DBili  <0.2  /  AST  24  /  ALT  32  /  AlkPhos  89  12-13        ABG - ( 14 Dec 2018 13:19 )  pH, Arterial: 7.39  pH, Blood: x     /  pCO2: 60    /  pO2: 85    / HCO3: 36    / Base Excess: 9.4   /  SaO2: 97                    Culture - Blood (collected 12 Dec 2018 20:40)  Source: .Blood None  Gram Stain (13 Dec 2018 23:21):    Growth in aerobic bottle: Gram Positive Cocci in Clusters  Preliminary Report (13 Dec 2018 23:21):    Growth in aerobic bottle: Gram Positive Cocci in Clusters    "Due to technical problems, Proteus sp. will Not be reported as part of    the BCID panel until further notice"    ***Blood Panel PCR results on this specimen are available    approximately 3 hours after the Gram stain result.***    Gram stain, PCR, and/or culture results may not always    correspond due to difference in methodologies.    ************************************************************    This PCR assay was performed using [employer].    The following targets are tested for: Enterococcus,    vancomycin resistant enterococci, Listeria monocytogenes,    coagulase negative staphylococci, S. aureus,    methicillin resistant S. aureus, Streptococcus agalactiae    (Group B), S. pneumoniae, S.pyogenes (Group A),    Acinetobacter baumannii, Enterobacter cloacae, E. coli,    Klebsiella oxytoca, K. pneumoniae, Proteus sp.,    Serratia marcescens, Haemophilus influenzae,    Neisseria meningitidis, Pseudomonas aeruginosa, Candida    albicans, C. glabrata, C krusei, C parapsilosis,    C. tropicalis and the KPC resistance gene.  Organism: Blood Culture PCR (14 Dec 2018 01:42)  Organism: Blood Culture PCR (14 Dec 2018 01:42)    Culture - Blood (collected 12 Dec 2018 16:51)  Source: .Blood None  Preliminary Report (14 Dec 2018 01:01):    No growth to date.    Culture - Blood (collected 11 Dec 2018 19:26)  Source: .Blood Blood-Peripheral  Preliminary Report (13 Dec 2018 01:02):    No growth to date.    Culture - Blood (collected 11 Dec 2018 19:26)  Source: .Blood Blood-Peripheral  Preliminary Report (13 Dec 2018 01:02):    No growth to date.      CARDIAC MARKERS ( 12 Dec 2018 16:51 )  x     / 0.02 ng/mL / 180 U/L / x     / 12.1 ng/mL      RADIOLOGY:  < from: CT Chest No Cont (12.12.18 @ 18:08) >  Mucoid debris in the proximal trachea (3:61) suggestive of aspiration.    Bilateral patchy airspace opacities greatest in the right lower lobe.   Correlate for infectious/inflammatory etiologies.    Prominent mediastinal lymph nodes up to 1.0 cm, probably reactive in   nature.    Cholelithiasis with suggestion of calcification within the wall of the   gallbladder (porcelain gallbladder).      PHYSICAL EXAM:  GENERAL: NAD, well-developed  HEAD:  Atraumatic, Normocephalic  EYES: EOMI, PERRLA, conjunctiva and sclera clear  NECK: Supple,  Pulm: B/L dry crackles   CV: Regular rate and rhythm; No murmurs, rubs, or gallops  GI: Soft, Nontender, Nondistended;   EXTREMITIES:  2+ Peripheral Pulses, No clubbing, cyanosis, or edema  PSYCH: AAOx3  NEUROLOGY: non-focal  SKIN: rash upper right chest and right upper back     ASSESSMENT & PLAN  72 yo F from [facility] w pmhx of HTN reports with worsening generalized weakness and SOB for 3 days. Pt found to be hypoxic in ED and A fib with RVR. CXR showed    # Acute hypercapnic respiratory failure/ aspiration?  - opacities and mucus debris in chest CT, CXR repeat today showed worsening bilateral opacities   - on BIPAP, failed to wean off during the day   - on zosyn now for suspected aspiration PNA, d/c levo/ceftr as per ID ( afebrile, no leukocytosis )    -  d/c lasix as per cardiology,  f/u 2D ECHO  - pulm follow up    # new onset A fib  - CHADsVASc: 4-5  - pt back to NSR now  - c/w Toprol 12.5mg qD for now  - lovenox 80mg q12 for anticoagulation for now    # right shoulder vesicular rash   - VZV zoster  - ID follow up  - valacyclovir 1000mg q8  - benadryl for pruritus   - contact and airborne precaution as per ID    # HTN, controlled   - c/w HCTZ    # positive blood culture coag neg staph:   - likely contaminanted/ f/u repeat blood culture     # DVT ppx: on Lovenox therapeutic dose arteries. Low normal left ventricular systolic function with wall motion abnormalities due to permanent pacemaker. No aortic stenosis or mitral regurgitation.     8/2013 stress test Abnormal findings suggestive of an area of ischemia involving the  inferior wall as noted above. Please see body of report for complete details.  Gated images could not be obtained secondary to inability to capture an RR interval. Thus, wall motion analysis was not performed.  The estimated left ventricular ejection fraction was not calculated secondary to the inability to capture the RR interval. Note is made that an echo was done earlier in the day and on echo the left ventricular ejection fraction was estimated to be about 50%.       • Diastolic heart failure (CMS/[facility]) 08/01/2014     Priority: Low     Echocardiogram 04/2017: 1. Limited Echo done for evaluation of LV systolic function. 2. Normal left ventricular size and systolic function. Moderately increased left ventricular septal wall thickness. Mildly increased left ventricular posterior wall thickness. 3. Left ventricular ejection fraction, 61 %. Rhythm precludes evaluation of diastolic function.    Echo 8/10/13: LVH with I/IV diastolic dysfunction Moderate left ventricular hypertrophy.Low normal left ventricular systolic function. Grade I/IV diastolic dysfunction (abnormal relaxation filling pattern), normal to mildly elevated filling pressures.Abnormal septal motion consistent with conduction abnormality. Mildly increased left atrial size. No significant valve abnormalities. Pulmonary artery systolic pressure is normal.    Echocardiogram 8/2014 Technically limited study. Definity contrast was administered. Normal left ventricular systolic function. Septal hypertrophy. Normal left ventricular filling pressure. Normal right ventricular size and systolic function. Moderately increased left atrial size. Mildly increased right atrial size. Mild tricuspid valve regurgitation. Right  ventricular systolic pressure 41 mmHg.     • Dermatochalasis both eyes 10/22/2013     Priority: Low   • Myopia 10/22/2013     Priority: Low   • Lens replaced by other means both eyes 10/22/2013     Priority: Low   • Essential hypertension, benign 07/24/2013     Priority: Low   • Bipolar disorder (CMS/HCC) 07/24/2013     Priority: Low   • Atrioventricular block 07/24/2013     Priority: Low     Status post permanent pacemaker       MEDICAL HISTORY:   Past Medical History:   Diagnosis Date   • Acute cholecystitis    • Acute on chronic diastolic (congestive) heart failure (CMS/HCC) 4/12/2017   • Acute on chronic respiratory failure with hypoxia and hypercapnia (CMS/HCC) 4/13/2017   • AV block     S/P permanent pacemaker   • Bilateral cataracts    • Bilateral leg edema 5/23/2016   • Bipolar disorder (CMS/HCC)     w/recurrent depression   • CAD (coronary artery disease)    • Depression     bipolar   • Femur fracture, right (CMS/HCC)    • HTN (hypertension)    • Hypercholesterolemia     hyperlipidemia   • Hyperglycemia    • Hypoxemia 4/12/2017   • Impaired glucose tolerance    • OAB (overactive bladder)    • Obesity    • Old myocardial infarction    • Osteoarthritis     degenerative   • Pneumonia    • Urinary incontinence    • Urinary tract infection    • Viral URI with cough 4/12/2017     SURGICAL HISTORY:   Past Surgical History:   Procedure Laterality Date   • Cardiac stress test complete  8/14/2013        • Cataract extraction w/  intraocular lens implant  08/29/2012   • Cataract extraction w/  intraocular lens implant  09/05/2012   • Cdl pacemaker generator replacement  12/03/2009   • Colonoscopy diagnostic  03/19/2010   • Discission,2nd cataract,laser  10/7/2013    Yag Laser/Second Cataract both eyes   • Excision of lingual tonsil     • Femur closed reduction  11/27/2007    w/internal fixation   • Fixation device removal  02/19/2008   • Hemorrhoid surgery     • Inguinal hernia repair     • Left heart  cath,percutaneous  2003   • Left heart cath,percutaneous  2005   • Orif femur fracture  2009   • Ptca      pacermaker   • Removal gallbladder     • Tonsillectomy and adenoidectomy     • Transurethral elec-surg prostatectom  2008     FAMILY HISTORY:   Family History   Problem Relation Age of Onset   • Arthritis Mother    • Cancer Mother    • Heart disease Father    • Cancer Maternal Aunt      Review of patient's family status indicates:    Mother                                           Father                                           Brother                                          Maternal Aunt                                                Patient Care Team:  Keenan Dominguez MD as PCP - General (Family Practice)  Lina Alcantara PA-C as Physician Assistant (Physician Assistant)  Ry CARR MD (Cardiology)  Keenan Dominguez MD as Post Acute Facility Provider: Physician (Family Practice)       SUBJECTIVE        No complaints. Denies pain and denies shortness of breath. Likes to watch ball games on TV. Self propels with his wheelchair.      OBJECTIVE:  Alert, sitting up comfortably in his wheelchair and watching TV.         Vitals:    09/10/19 0706   BP: 155/89   Pulse: 66   Resp: 20   Temp: 97.2 °F (36.2 °C)     HEENT: Pupils equal, round, reactive to light (PERRL), no facial weakness.   NECK: No cervical or supraclavicular lymphadenopathy.  No enlarged thyroid.  No carotid bruits.   CHEST: Clear to ausculation.   HEART: Rhythm regular, no murmur, no S3, S4.   ABDOMEN: Soft, bowel sounds present, no tenderness, masses, or hepatosplenomegaly.  NEUROLOGIC:   Hand grasps symmetric.  EXTREMITIES: 1+ pretibial edema.    SKIN: No rash or skin breakdown.      Derek was seen today for prison/assisted living nursing home visit.    Diagnoses and all orders for this visit:    Vascular dementia without behavioral disturbance    Bipolar disorder, current episode  mixed, severe, without psychotic features (CMS/HCC)    Essential hypertension, benign, needing better control.    Paroxysmal atrial fibrillation (CMS/HCC)    Chronic diastolic heart failure (CMS/HCC)    Weakness    Obesity (BMI 30-39.9)        PLAN  Increased diltiazem extended release to 360 mg daily.  Continue with other meds and cares the same.  Follow-up in one month, or else sooner as needed.  .         Keenan Dominguez MD  [phone] office cell  [phone] [facility] cell  [phone] fax

## 2021-03-02 NOTE — PRE-OP CHECKLIST - NOTHING BY MOUTH SINCE
Mother states that the patient has a yeast infection. Patient is currently on her period and can't use the medication that mom purchased OTC. Mother is wondering if PCP can prescribed an oral medication. Please call to discuss.    02-Jan-2019 00:00

## 2022-11-13 NOTE — ED PROVIDER NOTE - CRITICAL CARE PROVIDED
No
direct patient care (not related to procedure)/interpretation of diagnostic studies/additional history taking/documentation/consult w/ pt's family directly relating to pts condition

## 2023-09-20 NOTE — PROVIDER CONTACT NOTE (CHANGE IN STATUS NOTIFICATION) - DATE AND TIME:
Thank you.     Prepped a 48 hour monitor for patient. Charting to be completed in monitor appt from 9/6/23.            02-Jan-2019 02:40

## 2024-12-05 NOTE — PRE-OP CHECKLIST - SELECT TESTS ORDERED
INR/EKG/CXR/BMP/POCT Blood Glucose/Urinalysis/CBC/CMP/PT/PTT/Type and Cross/Type and Screen Cassie Ford PT/PTT/EKG/Type and Cross/CXR/CBC/CMP/Type and Screen/INR/BMP/Urinalysis
